# Patient Record
Sex: MALE | Race: WHITE | NOT HISPANIC OR LATINO | Employment: OTHER | URBAN - METROPOLITAN AREA
[De-identification: names, ages, dates, MRNs, and addresses within clinical notes are randomized per-mention and may not be internally consistent; named-entity substitution may affect disease eponyms.]

---

## 2017-01-30 ENCOUNTER — TELEPHONE (OUTPATIENT)
Dept: FAMILY MEDICINE | Facility: CLINIC | Age: 67
End: 2017-01-30

## 2017-01-30 DIAGNOSIS — E78.5 HYPERLIPIDEMIA, UNSPECIFIED HYPERLIPIDEMIA TYPE: Primary | ICD-10-CM

## 2017-01-30 RX ORDER — PRAVASTATIN SODIUM 80 MG/1
80 TABLET ORAL DAILY
Qty: 90 TABLET | Refills: 0 | Status: SHIPPED | OUTPATIENT
Start: 2017-01-30 | End: 2017-04-28 | Stop reason: SDUPTHER

## 2017-01-30 RX ORDER — PRAVASTATIN SODIUM 40 MG/1
TABLET ORAL
Qty: 90 TABLET | Refills: 1 | Status: SHIPPED | OUTPATIENT
Start: 2017-01-30 | End: 2017-01-30 | Stop reason: SDUPTHER

## 2017-01-30 NOTE — TELEPHONE ENCOUNTER
----- Message from Violette Osorio sent at 1/30/2017  2:45 PM CST -----  Contact: self/[phone] or [phone]  He is returning the nurse call.

## 2017-01-30 NOTE — TELEPHONE ENCOUNTER
Attempted to reach patient on phone - no answer - left voice message to call office and also sent message over patient portal:      Message sent over patient portal:  I have released your lab results.  Your blood sugar, kidney and liver function are all within acceptable range.  Your cholesterol levels are okay BUT due to heart history - we want your LDL below 100 - increase your Pravastatin from 40 to 80 mg daily.  Recheck fasting labs and then office visit in 3 months to recheck cholesterol levels.      Your Amiodarone levels are low - NON-THERAPEUTIC levels - need to make follow up appointment with Cardiology as we discussed at our office visit.

## 2017-01-30 NOTE — TELEPHONE ENCOUNTER
Spoke with patient on phone - advised I had sent him message over portal as well:    Your blood sugar, kidney and liver function are all within acceptable range.  Your cholesterol levels are okay BUT due to heart history - we want your LDL below 100 - increase your Pravastatin from 40 to 80 mg daily.  Recheck fasting labs and then office visit in 3 months to recheck cholesterol levels.      Your Amiodarone levels are low - NON-THERAPEUTIC levels - need to make follow up appointment with Cardiology as we discussed at our office visit.

## 2017-02-06 ENCOUNTER — OFFICE VISIT (OUTPATIENT)
Dept: FAMILY MEDICINE | Facility: CLINIC | Age: 67
End: 2017-02-06
Payer: MEDICARE

## 2017-02-06 VITALS
DIASTOLIC BLOOD PRESSURE: 74 MMHG | HEIGHT: 71 IN | SYSTOLIC BLOOD PRESSURE: 116 MMHG | BODY MASS INDEX: 35.14 KG/M2 | WEIGHT: 251 LBS | OXYGEN SATURATION: 98 % | HEART RATE: 78 BPM | TEMPERATURE: 98 F

## 2017-02-06 DIAGNOSIS — J06.9 UPPER RESPIRATORY TRACT INFECTION, UNSPECIFIED TYPE: Primary | ICD-10-CM

## 2017-02-06 DIAGNOSIS — J10.1 INFLUENZA A: ICD-10-CM

## 2017-02-06 LAB
CTP QC/QA: YES
FLUAV AG NPH QL: POSITIVE
FLUBV AG NPH QL: NEGATIVE

## 2017-02-06 PROCEDURE — 99999 PR PBB SHADOW E&M-EST. PATIENT-LVL IV: CPT | Mod: PBBFAC,,, | Performed by: NURSE PRACTITIONER

## 2017-02-06 PROCEDURE — 99214 OFFICE O/P EST MOD 30 MIN: CPT | Mod: PBBFAC,PN | Performed by: NURSE PRACTITIONER

## 2017-02-06 PROCEDURE — 99213 OFFICE O/P EST LOW 20 MIN: CPT | Mod: S$PBB,,, | Performed by: NURSE PRACTITIONER

## 2017-02-06 PROCEDURE — 87804 INFLUENZA ASSAY W/OPTIC: CPT | Mod: PBBFAC,PN | Performed by: NURSE PRACTITIONER

## 2017-02-06 RX ORDER — PROMETHAZINE HYDROCHLORIDE AND CODEINE PHOSPHATE 6.25; 1 MG/5ML; MG/5ML
SOLUTION ORAL
Qty: 180 ML | Refills: 0 | Status: SHIPPED | OUTPATIENT
Start: 2017-02-06 | End: 2017-06-23 | Stop reason: ALTCHOICE

## 2017-02-06 RX ORDER — OSELTAMIVIR PHOSPHATE 75 MG/1
75 CAPSULE ORAL 2 TIMES DAILY
Qty: 10 CAPSULE | Refills: 0 | Status: SHIPPED | OUTPATIENT
Start: 2017-02-06 | End: 2017-02-11

## 2017-02-06 RX ORDER — NEOMYCIN SULFATE, POLYMYXIN B SULFATE AND HYDROCORTISONE 10; 3.5; 1 MG/ML; MG/ML; [USP'U]/ML
SUSPENSION/ DROPS AURICULAR (OTIC)
Refills: 0 | COMMUNITY
Start: 2016-12-29 | End: 2017-02-06 | Stop reason: ALTCHOICE

## 2017-02-06 RX ORDER — BENZONATATE 200 MG/1
200 CAPSULE ORAL 3 TIMES DAILY PRN
Qty: 30 CAPSULE | Refills: 0 | Status: SHIPPED | OUTPATIENT
Start: 2017-02-06 | End: 2017-02-16

## 2017-02-06 NOTE — PROGRESS NOTES
Subjective:       Patient ID: Hussein Steinberg is a 66 y.o. male.    Chief Complaint: URI    URI    This is a new problem. Episode onset: past 72 hours. The problem has been unchanged. There has been no fever. Associated symptoms include congestion, coughing, headaches, rhinorrhea, sinus pain and a sore throat. Pertinent negatives include no abdominal pain, chest pain, diarrhea, dysuria, ear pain, nausea, neck pain, rash, sneezing or vomiting. Associated symptoms comments: Cough is intermittently productive - yellow.  Nasal discharge - clear and thin. Treatments tried: Coricidin HBP. The treatment provided moderate relief.       Previous Medications    AMIODARONE (PACERONE) 200 MG TAB    TAKE 1 TABLET EVERY DAY SKIP WEDNESDAY AND SUNDAY    AMLODIPINE (NORVASC) 5 MG TABLET    Take 1 tablet (5 mg total) by mouth once daily.    LISINOPRIL (PRINIVIL,ZESTRIL) 40 MG TABLET    Take 1 tablet (40 mg total) by mouth once daily.    METOPROLOL TARTRATE (LOPRESSOR) 100 MG TABLET    TAKE 1 TABLET BY MOUTH TWICE A DAY    NITROGLYCERIN (NITROSTAT) 0.4 MG SL TABLET    Place under the tongue. 1 tablet, sublingual Sublingual .  take up to 3 tablets 5 minutes apart for chest pain    PRADAXA 150 MG CAP    TAKE 1 TABLET TWICE A DAY    PRAVASTATIN (PRAVACHOL) 80 MG TABLET    Take 1 tablet (80 mg total) by mouth once daily.    TRIAMTERENE-HYDROCHLOROTHIAZIDE 37.5-25 MG (MAXZIDE-25) 37.5-25 MG PER TABLET    Take 1 tablet by mouth once daily.       Past Medical History   Diagnosis Date    Atrial fibrillation     Cardiac arrest 2012     has pacemaker/defibrillator placed 3/19/13 - followed by [facility]    Cataract     High cholesterol     Hypertension     Kidney stone 10/2013    LBBB (left bundle branch block) 10/10/2012    LV dysfunction 10/10/2012    Prostate cancer      Treated by Dr. Rasmussen    Stroke 09/2012     + hemorrhagic infarct to right occipital lobe after started on Plavix following cardiac event    SVT  (supraventricular tachycardia) 10/10/2012       Past Surgical History   Procedure Laterality Date    Arthroscopic  knee    Prostatectomy      Cardiac pacemaker placement  2013    Colonoscopy  2/12/2016     + polyp - repeat in 5 years- Dr. Calles       Family History   Problem Relation Age of Onset    Macular degeneration Mother     Cataracts Mother     Hypertension Mother     Glaucoma Mother     Cancer Father 56     leukemia    Heart attack Brother 59     had stent placed    Heart disease Brother     Heart attack Maternal Grandfather     Heart attack Paternal Grandfather     No Known Problems Sister     Parkinsonism Brother 54    No Known Problems Brother     No Known Problems Daughter     No Known Problems Daughter     Amblyopia Neg Hx     Blindness Neg Hx     Diabetes Neg Hx     Retinal detachment Neg Hx     Strabismus Neg Hx     Stroke Neg Hx     Thyroid disease Neg Hx        Social History     Social History    Marital status:      Spouse name: N/A    Number of children: N/A    Years of education: N/A     Occupational History    administrative position      Social History Main Topics    Smoking status: Never Smoker    Smokeless tobacco: None    Alcohol use No    Drug use: No    Sexual activity: Yes     Partners: Female     Other Topics Concern    None     Social History Narrative       Review of Systems   Constitutional: Positive for chills and fatigue. Negative for activity change, appetite change, fever and unexpected weight change.   HENT: Positive for congestion, postnasal drip, rhinorrhea, sinus pressure and sore throat. Negative for ear pain, mouth sores, nosebleeds, sneezing, trouble swallowing and voice change.    Eyes: Negative.    Respiratory: Positive for cough. Negative for chest tightness and shortness of breath.    Cardiovascular: Negative for chest pain, palpitations and leg swelling.   Gastrointestinal: Negative.  Negative for abdominal pain, blood  "in stool, constipation, diarrhea, nausea and vomiting.   Endocrine: Negative.    Genitourinary: Negative for difficulty urinating, dysuria, flank pain, hematuria and urgency.   Musculoskeletal: Negative for arthralgias, back pain, gait problem, joint swelling, myalgias and neck pain.   Skin: Negative for color change, rash and wound.   Allergic/Immunologic: Negative for immunocompromised state.   Neurological: Positive for headaches. Negative for dizziness, tremors, seizures, syncope and speech difficulty.   Hematological: Negative for adenopathy. Does not bruise/bleed easily.   Psychiatric/Behavioral: Negative for behavioral problems, dysphoric mood, sleep disturbance and suicidal ideas. The patient is not nervous/anxious.          Objective:     Vitals:    02/06/17 1312   BP: 116/74   Pulse: 78   Temp: 98.4 °F (36.9 °C)   SpO2: 98%   Weight: 113.9 kg (251 lb)   Height: 5' 11" (1.803 m)          Physical Exam   Constitutional: He is oriented to person, place, and time. He appears well-developed and well-nourished.   HENT:   Head: Normocephalic.   Right Ear: External ear normal.   Left Ear: External ear normal.   Nose: Mucosal edema and rhinorrhea present.   Mouth/Throat: Posterior oropharyngeal erythema present. No oropharyngeal exudate or posterior oropharyngeal edema.   Eyes: EOM are normal. Pupils are equal, round, and reactive to light. Right eye exhibits no discharge. Left eye exhibits no discharge. No scleral icterus.   Neck: Normal range of motion. Neck supple. No tracheal deviation present. No thyromegaly present.   Cardiovascular: Normal rate, regular rhythm and normal heart sounds.    No murmur heard.  Pulmonary/Chest: Effort normal and breath sounds normal. No respiratory distress.   Abdominal: Soft. He exhibits no distension.   Musculoskeletal: Normal range of motion. He exhibits no edema.   Lymphadenopathy:     He has no cervical adenopathy.   Neurological: He is alert and oriented to person, place, " and time. Coordination normal.   Skin: Skin is warm and dry. No rash noted.   Psychiatric: He has a normal mood and affect. His behavior is normal.       Office Visit on 02/06/2017   Component Date Value Ref Range Status    Rapid Influenza A Ag 02/06/2017 Positive* Negative Final    Rapid Influenza B Ag 02/06/2017 Negative  Negative Final     Acceptable 02/06/2017 Yes   Final       Assessment:         ICD-10-CM ICD-9-CM   1. Upper respiratory tract infection, unspecified type J06.9 465.9   2. Influenza A J10.1 487.1       Plan:       Upper respiratory tract infection, unspecified type  -     POCT Influenza A/B    Influenza A  -  Take Tamiflu as directed.  Rotate Tylenol and Ibuprofen.  Continue  Coricidin during the day and Prometh with codeine at night.  See handout for other homecare instructions.  Off work for 1 week.  -     oseltamivir (TAMIFLU) 75 MG capsule; Take 1 capsule (75 mg total) by mouth 2 (two) times daily.  Dispense: 10 capsule; Refill: 0  -     promethazine-codeine 6.25-10 mg/5 ml (PHENERGAN WITH CODEINE) 6.25-10 mg/5 mL syrup; Take 1 to 2 teaspoons at night as needed for cough/congestion  Dispense: 180 mL; Refill: 0  -     benzonatate (TESSALON) 200 MG capsule; Take 1 capsule (200 mg total) by mouth 3 (three) times daily as needed for Cough.  Dispense: 30 capsule; Refill: 0    Return if symptoms worsen or fail to improve.     Patient's Medications   New Prescriptions    BENZONATATE (TESSALON) 200 MG CAPSULE    Take 1 capsule (200 mg total) by mouth 3 (three) times daily as needed for Cough.    OSELTAMIVIR (TAMIFLU) 75 MG CAPSULE    Take 1 capsule (75 mg total) by mouth 2 (two) times daily.    PROMETHAZINE-CODEINE 6.25-10 MG/5 ML (PHENERGAN WITH CODEINE) 6.25-10 MG/5 ML SYRUP    Take 1 to 2 teaspoons at night as needed for cough/congestion   Previous Medications    AMIODARONE (PACERONE) 200 MG TAB    TAKE 1 TABLET EVERY DAY SKIP WEDNESDAY AND SUNDAY    AMLODIPINE (NORVASC) 5 MG  TABLET    Take 1 tablet (5 mg total) by mouth once daily.    LISINOPRIL (PRINIVIL,ZESTRIL) 40 MG TABLET    Take 1 tablet (40 mg total) by mouth once daily.    METOPROLOL TARTRATE (LOPRESSOR) 100 MG TABLET    TAKE 1 TABLET BY MOUTH TWICE A DAY    NITROGLYCERIN (NITROSTAT) 0.4 MG SL TABLET    Place under the tongue. 1 tablet, sublingual Sublingual .  take up to 3 tablets 5 minutes apart for chest pain    PRADAXA 150 MG CAP    TAKE 1 TABLET TWICE A DAY    PRAVASTATIN (PRAVACHOL) 80 MG TABLET    Take 1 tablet (80 mg total) by mouth once daily.    TRIAMTERENE-HYDROCHLOROTHIAZIDE 37.5-25 MG (MAXZIDE-25) 37.5-25 MG PER TABLET    Take 1 tablet by mouth once daily.   Modified Medications    No medications on file   Discontinued Medications    NEOMYCIN-POLYMYXIN-HYDROCORTISONE (CORTISPORIN) 3.5-10,000-1 MG/ML-UNIT/ML-% OTIC SUSPENSION    INSTILL 4 DROPS IN LEFT EAR EVERY 6 HOURS

## 2017-02-06 NOTE — MR AVS SNAPSHOT
[facility]  [address]  [place] [address]  Phone: [phone]  Fax: [phone]                  Hussein Steinberg   2017 1:00 PM   Office Visit    Description:  Male : 1950   Provider:  Valerie Nickerson NP   Department:  [facility]           Reason for Visit     URI           Diagnoses this Visit        Comments    Upper respiratory tract infection, unspecified type    -  Primary     Influenza A                To Do List           Goals (5 Years of Data)     None      Follow-Up and Disposition     Return if symptoms worsen or fail to improve.       These Medications        Disp Refills Start End    oseltamivir (TAMIFLU) 75 MG capsule 10 capsule 0 2017    Take 1 capsule (75 mg total) by mouth 2 (two) times daily. - Oral    Pharmacy: [facility]/pharmacy #[id number] - [place], [place] - [address] [address] Ph #: [phone]       promethazine-codeine 6.25-10 mg/5 ml (PHENERGAN WITH CODEINE) 6.25-10 mg/5 mL syrup 180 mL 0 2017     Take 1 to 2 teaspoons at night as needed for cough/congestion    Pharmacy: [facility]/pharmacy #[id number] - [place] - [address] [address] [facility] Ph #: [phone]       benzonatate (TESSALON) 200 MG capsule 30 capsule 0 2017    Take 1 capsule (200 mg total) by mouth 3 (three) times daily as needed for Cough. - Oral    Pharmacy: [facility]/pharmacy #[id number] - [place] - [address] [address] [facility] Ph #: [phone]         [facility]     [facility] On Call Nurse Care Line -  Assistance  Registered nurses in the [facility] provide clinical advisement, health education, appointment booking, and other advisory services.  Call for this free service at [phone].             Medications           Message regarding Medications     Verify the changes and/or additions to your medication regime listed below are the same as discussed with your clinician today.  If any of these changes or additions are incorrect, please notify  your healthcare provider.        START taking these NEW medications        Refills    oseltamivir (TAMIFLU) 75 MG capsule 0    Sig: Take 1 capsule (75 mg total) by mouth 2 (two) times daily.    Class: Normal    Route: Oral    promethazine-codeine 6.25-10 mg/5 ml (PHENERGAN WITH CODEINE) 6.25-10 mg/5 mL syrup 0    Sig: Take 1 to 2 teaspoons at night as needed for cough/congestion    Class: Print    benzonatate (TESSALON) 200 MG capsule 0    Sig: Take 1 capsule (200 mg total) by mouth 3 (three) times daily as needed for Cough.    Class: Normal    Route: Oral      STOP taking these medications     neomycin-polymyxin-hydrocortisone (CORTISPORIN) 3.5-10,000-1 mg/mL-unit/mL-% otic suspension INSTILL 4 DROPS IN LEFT EAR EVERY 6 HOURS           Verify that the below list of medications is an accurate representation of the medications you are currently taking.  If none reported, the list may be blank. If incorrect, please contact your healthcare provider. Carry this list with you in case of emergency.           Current Medications     amiodarone (PACERONE) 200 MG Tab TAKE 1 TABLET EVERY DAY SKIP WEDNESDAY AND SUNDAY    amlodipine (NORVASC) 5 MG tablet Take 1 tablet (5 mg total) by mouth once daily.    lisinopril (PRINIVIL,ZESTRIL) 40 MG tablet Take 1 tablet (40 mg total) by mouth once daily.    metoprolol tartrate (LOPRESSOR) 100 MG tablet TAKE 1 TABLET BY MOUTH TWICE A DAY    nitroGLYCERIN (NITROSTAT) 0.4 MG SL tablet Place under the tongue. 1 tablet, sublingual Sublingual .  take up to 3 tablets 5 minutes apart for chest pain    PRADAXA 150 mg Cap TAKE 1 TABLET TWICE A DAY    pravastatin (PRAVACHOL) 80 MG tablet Take 1 tablet (80 mg total) by mouth once daily.    triamterene-hydrochlorothiazide 37.5-25 mg (MAXZIDE-25) 37.5-25 mg per tablet Take 1 tablet by mouth once daily.    benzonatate (TESSALON) 200 MG capsule Take 1 capsule (200 mg total) by mouth 3 (three) times daily as needed for Cough.    oseltamivir (TAMIFLU) 75  "MG capsule Take 1 capsule (75 mg total) by mouth 2 (two) times daily.    promethazine-codeine 6.25-10 mg/5 ml (PHENERGAN WITH CODEINE) 6.25-10 mg/5 mL syrup Take 1 to 2 teaspoons at night as needed for cough/congestion           Clinical Reference Information           Your Vitals Were     BP Pulse Temp Height Weight SpO2    116/74 78 98.4 °F (36.9 °C) 5' 11" (1.803 m) 113.9 kg (251 lb) 98%    BMI                35.01 kg/m2          Blood Pressure          Most Recent Value    BP  116/74      Allergies as of 2/6/2017     No Known Allergies      Immunizations Administered on Date of Encounter - 2/6/2017     None      Orders Placed During Today's Visit      Normal Orders This Visit    POCT Influenza A/B          2/6/2017  2:03 PM - Valerie Nickerson NP      Component Results     Component Value Flag Ref Range Units Status    Rapid Influenza A Ag Positive (A) Negative  Final    Rapid Influenza B Ag Negative  Negative  Final     Acceptable Yes    Final            Instructions      Influenza (Adult)    Influenza is also called the flu. It is a viral illness that affects the air passages of your lungs. It is different from the common cold. The flu can easily be passed from one to person to another. It may be spread through the air by coughing and sneezing. Or it can be spread by touching the sick person and then touching your own eyes, nose, or mouth.  The flu starts 1 to 3 days after you are exposed to the flu virus. It may last for 1 to 2 weeks. You usually dont need to take antibiotics unless you have a complication. This might be an ear or sinus infection or pneumonia.  Symptoms of the flu may be mild or severe. They can include extreme tiredness (wanting to stay in bed all day), chills, fevers, muscle aches, soreness with eye movement, headache, and a dry, hacking cough.  Home care  Follow these guidelines when caring for yourself at home:  · Avoid being around cigarette smoke, whether yours or other " peoples.  · Acetaminophen or ibuprofen will help ease your fever, muscle aches, and headache. Dont give aspirin to anyone younger than 18 who has the flu. Aspirin can harm the liver.  · Nausea and loss of appetite are common with the flu. Eat light meals. Drink 6 to 8 glasses of liquids every day. Good choices are water, sport drinks, soft drinks without caffeine, juices, tea, and soup. Extra fluids will also help loosen secretions in your nose and lungs.  · Over-the-counter cold medicines will not make the flu go away faster. But the medicines may help with coughing, sore throat, and congestion in your nose and sinuses. Dont use a decongestant if you have high blood pressure.  · Stay home until your fever has been gone for at least 24 hours without using medicine to reduce fever.  Follow-up care  Follow up with your healthcare provider, or as advised, if you are not getting better over the next week.  If you are 65 or older, talk with your provider about getting a pneumococcal vaccine every 5 years. You should also get this vaccine if you have chronic asthma or COPD. All adults should get a flu vaccine every fall. Ask your provider about this.  When to seek medical advice  Call your healthcare provider right away if any of these occur:  · Cough with lots of colored sputum (mucus) or blood in your sputum  · Chest pain, shortness of breath, wheezing, or difficulty breathing  · Severe headache, or face, neck, or ear pain  · New rash with fever  · Fever of 100.4°F (38°C) or higher, or as directed by your healthcare provider  · Confusion, behavior change, or seizure  · Severe weakness or dizziness  · You get a fever or cough after getting better for a few days  Date Last Reviewed: 12/23/2014  © 6762-8518 [employer]. [address] [place] [address], [place] [address]. All rights reserved. This information is not intended as a substitute for professional medical care. Always follow your healthcare professional's  instructions.             Language Assistance Services     ATTENTION: Language assistance services are available, free of charge. Please call [phone].      ATENCIÓN: Si habla nabila, tiene a haro disposición servicios gratuitos de asistencia lingüística. Llame al [phone].     CHÚ Ý: N?u b?n nói Ti?ng Vi?t, có các d?ch v? h? tr? ngôn ng? mi?n phí dành cho b?n. G?i s? [phone].         [facility] complies with applicable Federal civil rights laws and does not discriminate on the basis of race, color, national origin, age, disability, or sex.

## 2017-02-06 NOTE — PATIENT INSTRUCTIONS
Influenza (Adult)    Influenza is also called the flu. It is a viral illness that affects the air passages of your lungs. It is different from the common cold. The flu can easily be passed from one to person to another. It may be spread through the air by coughing and sneezing. Or it can be spread by touching the sick person and then touching your own eyes, nose, or mouth.  The flu starts 1 to 3 days after you are exposed to the flu virus. It may last for 1 to 2 weeks. You usually dont need to take antibiotics unless you have a complication. This might be an ear or sinus infection or pneumonia.  Symptoms of the flu may be mild or severe. They can include extreme tiredness (wanting to stay in bed all day), chills, fevers, muscle aches, soreness with eye movement, headache, and a dry, hacking cough.  Home care  Follow these guidelines when caring for yourself at home:  · Avoid being around cigarette smoke, whether yours or other peoples.  · Acetaminophen or ibuprofen will help ease your fever, muscle aches, and headache. Dont give aspirin to anyone younger than 18 who has the flu. Aspirin can harm the liver.  · Nausea and loss of appetite are common with the flu. Eat light meals. Drink 6 to 8 glasses of liquids every day. Good choices are water, sport drinks, soft drinks without caffeine, juices, tea, and soup. Extra fluids will also help loosen secretions in your nose and lungs.  · Over-the-counter cold medicines will not make the flu go away faster. But the medicines may help with coughing, sore throat, and congestion in your nose and sinuses. Dont use a decongestant if you have high blood pressure.  · Stay home until your fever has been gone for at least 24 hours without using medicine to reduce fever.  Follow-up care  Follow up with your healthcare provider, or as advised, if you are not getting better over the next week.  If you are 65 or older, talk with your provider about getting a pneumococcal vaccine  every 5 years. You should also get this vaccine if you have chronic asthma or COPD. All adults should get a flu vaccine every fall. Ask your provider about this.  When to seek medical advice  Call your healthcare provider right away if any of these occur:  · Cough with lots of colored sputum (mucus) or blood in your sputum  · Chest pain, shortness of breath, wheezing, or difficulty breathing  · Severe headache, or face, neck, or ear pain  · New rash with fever  · Fever of 100.4°F (38°C) or higher, or as directed by your healthcare provider  · Confusion, behavior change, or seizure  · Severe weakness or dizziness  · You get a fever or cough after getting better for a few days  Date Last Reviewed: 12/23/2014  © 7276-5234 [employer], [employer]. [address] [place] [address], [place] [address]. All rights reserved. This information is not intended as a substitute for professional medical care. Always follow your healthcare professional's instructions.

## 2017-02-09 ENCOUNTER — TELEPHONE (OUTPATIENT)
Dept: FAMILY MEDICINE | Facility: CLINIC | Age: 67
End: 2017-02-09

## 2017-02-17 ENCOUNTER — OFFICE VISIT (OUTPATIENT)
Dept: OPTOMETRY | Facility: CLINIC | Age: 67
End: 2017-02-17
Payer: MEDICARE

## 2017-02-17 DIAGNOSIS — H52.7 REFRACTIVE ERRORS: ICD-10-CM

## 2017-02-17 DIAGNOSIS — H25.13 NUCLEAR SCLEROSIS, BILATERAL: Primary | ICD-10-CM

## 2017-02-17 DIAGNOSIS — H18.003 VORTEX KERATOPATHY, BILATERAL: ICD-10-CM

## 2017-02-17 DIAGNOSIS — H52.4 PRESBYOPIA OU: ICD-10-CM

## 2017-02-17 DIAGNOSIS — I10 ESSENTIAL HYPERTENSION: ICD-10-CM

## 2017-02-17 DIAGNOSIS — Z98.890 HISTORY OF REFRACTIVE SURGERY: ICD-10-CM

## 2017-02-17 DIAGNOSIS — Z13.5 SCREENING FOR EYE CONDITION: ICD-10-CM

## 2017-02-17 PROCEDURE — 92014 COMPRE OPH EXAM EST PT 1/>: CPT | Mod: S$PBB,,, | Performed by: OPTOMETRIST

## 2017-02-17 PROCEDURE — 92015 DETERMINE REFRACTIVE STATE: CPT | Mod: ,,, | Performed by: OPTOMETRIST

## 2017-02-17 PROCEDURE — 99213 OFFICE O/P EST LOW 20 MIN: CPT | Mod: PBBFAC | Performed by: OPTOMETRIST

## 2017-02-17 PROCEDURE — 99999 PR PBB SHADOW E&M-EST. PATIENT-LVL III: CPT | Mod: PBBFAC,,, | Performed by: OPTOMETRIST

## 2017-02-17 NOTE — MR AVS SNAPSHOT
[facility]  [address]  [facility] [address]  Phone: [phone]  Fax: [phone]                  Hussein Steinberg   2017 10:45 AM   Office Visit    Description:  Male : 1950   Provider:  Ray Esqueda OD   Department:  [facility]           Reason for Visit     Concerns About Ocular Health                To Do List           Goals (5 Years of Data)     None      [facility]     [facility] On Call Nurse Care Line -  Assistance  Registered nurses in the [facility] On Call Center provide clinical advisement, health education, appointment booking, and other advisory services.  Call for this free service at [phone].             Medications           Message regarding Medications     Verify the changes and/or additions to your medication regime listed below are the same as discussed with your clinician today.  If any of these changes or additions are incorrect, please notify your healthcare provider.             Verify that the below list of medications is an accurate representation of the medications you are currently taking.  If none reported, the list may be blank. If incorrect, please contact your healthcare provider. Carry this list with you in case of emergency.           Current Medications     amiodarone (PACERONE) 200 MG Tab TAKE 1 TABLET EVERY DAY SKIP WEDNESDAY AND     amlodipine (NORVASC) 5 MG tablet Take 1 tablet (5 mg total) by mouth once daily.    lisinopril (PRINIVIL,ZESTRIL) 40 MG tablet Take 1 tablet (40 mg total) by mouth once daily.    metoprolol tartrate (LOPRESSOR) 100 MG tablet TAKE 1 TABLET BY MOUTH TWICE A DAY    nitroGLYCERIN (NITROSTAT) 0.4 MG SL tablet Place under the tongue. 1 tablet, sublingual Sublingual .  take up to 3 tablets 5 minutes apart for chest pain    PRADAXA 150 mg Cap TAKE 1 TABLET TWICE A DAY    pravastatin (PRAVACHOL) 80 MG tablet Take 1 tablet (80 mg total) by mouth once daily.    promethazine-codeine  6.25-10 mg/5 ml (PHENERGAN WITH CODEINE) 6.25-10 mg/5 mL syrup Take 1 to 2 teaspoons at night as needed for cough/congestion    triamterene-hydrochlorothiazide 37.5-25 mg (MAXZIDE-25) 37.5-25 mg per tablet Take 1 tablet by mouth once daily.           Clinical Reference Information           Allergies as of 2/17/2017     No Known Allergies      Immunizations Administered on Date of Encounter - 2/17/2017     None      Instructions    S/P RK refractive surgery in each eye.  Early nuclear sclerotic lens changes in both eyes.  Hyperopia with astigmatism in each eye, with very satisfactory correctable visual acuity in each eye.  Presbyopia.  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   New spectacle lens Rx issued for full-time wear.  Recheck in one year.              Language Assistance Services     ATTENTION: Language assistance services are available, free of charge. Please call [phone].      ATENCIÓN: Si philomena dahl, tiene a haro disposición servicios gratuitos de asistencia lingüística. Llame al [phone].     GLEN Ý: N?u b?n nói Ti?ng Vi?t, có các d?ch v? h? tr? ngôn ng? mi?n phí dành cho b?n. G?i s? [phone].         [facility] complies with applicable Federal civil rights laws and does not discriminate on the basis of race, color, national origin, age, disability, or sex.

## 2017-02-17 NOTE — PROGRESS NOTES
HPI     Concerns About Ocular Health    Additional comments: Eye exam and refraction.  No acute ocular/vision   problems.            Comments   Patient's age: 66 y.o. WM  Occupation:   [employer] in [place]  Approximate date of last eye examination:  01/26/2016  Name of last eye doctor seen: Dr Esqueda  City/State: [facility]  Wears glasses? Yes     If yes, wears  Full-time or part-time?  Full-time  Present glasses are: Bifocal, SV Distance, SV Reading?  Progressive lens  Approximate age of present glasses:  3+ years old   Got new glasses following last exam, or subsequently?:  No   Any problem with VA with glasses?  No  Wears CLs?:  No  Headaches?  No  Eye pain/discomfort?  No                                                                                     Flashes?  No  Floaters?  No  Diplopia/Double vision?  No  Patient's Ocular History:         Any eye surgeries? No         Any eye injury?  No         Any treatment for eye disease?  No  Family history of eye disease?    Mother + Macular Degeneration    + Glaucoma    + Cataracts  Significant patient medical history:         1. Diabetes?  No   2. HBP?  Yes, controlled by medication and diet              3. Other (describe):  Heart attack and stroke Sept, 2012   -pacemaker, prostate cancer    ! OTC eyedrops currently using:  None   ! Prescription eye meds currently using:  None   ! Any history of allergy/adverse reaction to any eye meds used   previously?  No    ! Any history of allergy/adverse reaction to eyedrops used during prior   eye exam(s)? No    ! Any history of allergy/adverse reaction to Novacaine or similar meds?   No   ! Any history of allergy/adverse reaction to Epinephrine or similar meds?   No    ! Patient okay with use of anesthetic eyedrops to check eye pressure?    Yes        ! Patient okay with use of eyedrops to dilate pupils today?  Yes   !  Allergies/Medications/Medical History/Family History reviewed today?    Yes      PD =    "64/60  Desired reading distance =  19"                                                                     Last edited by Ray Esqueda, OD on 2/17/2017 11:28 AM. (History)            Assessment /Plan     For exam results, see Encounter Report.    1. Nuclear sclerosis, bilateral     2. Vortex keratopathy, bilateral     3. Essential hypertension     4. Screening for eye condition     5. History of refractive surgery     6. Refractive errors     7. Presbyopia OU                    S/P RK refractive surgery in each eye.  Early nuclear sclerotic lens changes in both eyes.  Hyperopia with astigmatism in each eye, with very satisfactory correctable visual acuity in each eye.  Presbyopia.  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   New spectacle lens Rx issued for full-time wear.  Recheck in one year.         "

## 2017-02-17 NOTE — PATIENT INSTRUCTIONS
S/P RK refractive surgery in each eye.  Early nuclear sclerotic lens changes in both eyes.  Hyperopia with astigmatism in each eye, with very satisfactory correctable visual acuity in each eye.  Presbyopia.  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   New spectacle lens Rx issued for full-time wear.  Recheck in one year.

## 2017-02-24 DIAGNOSIS — I10 ESSENTIAL HYPERTENSION, BENIGN: ICD-10-CM

## 2017-02-24 RX ORDER — TRIAMTERENE/HYDROCHLOROTHIAZID 37.5-25 MG
TABLET ORAL
Qty: 90 TABLET | Refills: 1 | Status: SHIPPED | OUTPATIENT
Start: 2017-02-24 | End: 2017-08-21 | Stop reason: SDUPTHER

## 2017-03-03 DIAGNOSIS — I10 ESSENTIAL HYPERTENSION, BENIGN: ICD-10-CM

## 2017-03-03 RX ORDER — AMLODIPINE BESYLATE 5 MG/1
TABLET ORAL
Qty: 90 TABLET | Refills: 1 | Status: SHIPPED | OUTPATIENT
Start: 2017-03-03 | End: 2017-08-21 | Stop reason: SDUPTHER

## 2017-03-13 ENCOUNTER — TELEPHONE (OUTPATIENT)
Dept: ELECTROPHYSIOLOGY | Facility: CLINIC | Age: 67
End: 2017-03-13

## 2017-03-13 DIAGNOSIS — I48.19 PERSISTENT ATRIAL FIBRILLATION: Primary | ICD-10-CM

## 2017-03-13 RX ORDER — DABIGATRAN ETEXILATE 150 MG/1
CAPSULE ORAL
Qty: 180 CAPSULE | Refills: 3 | Status: SHIPPED | OUTPATIENT
Start: 2017-03-13 | End: 2018-02-09 | Stop reason: SDUPTHER

## 2017-03-13 NOTE — TELEPHONE ENCOUNTER
Left message with pt's wife that I will call pharmacy to determine why med was not renewed. Called pharmacy and was told med was canceled electronically. Will review with Dr. Salvador and call in new Rx ASAP if needed.

## 2017-03-13 NOTE — TELEPHONE ENCOUNTER
----- Message from Mary Joanajeremiah sent at 3/13/2017 10:02 AM CDT -----  Contact: pt's wife-Zaira  Pt's wife called this am because the pharmacy said Dr. Salvador did not want the pt to refill the PRADAXA 150 mg Cap.  She said that is making him stop cold turkey and she wants to know about the side effects of stopping the meds like that.  She can be reached @ [phone].  The pt is completely out of the meds as of this am.  LOV 11/7/14.  Thanks

## 2017-03-20 ENCOUNTER — OFFICE VISIT (OUTPATIENT)
Dept: FAMILY MEDICINE | Facility: CLINIC | Age: 67
End: 2017-03-20
Payer: MEDICARE

## 2017-03-20 VITALS
HEIGHT: 71 IN | SYSTOLIC BLOOD PRESSURE: 140 MMHG | HEART RATE: 62 BPM | DIASTOLIC BLOOD PRESSURE: 80 MMHG | TEMPERATURE: 98 F | BODY MASS INDEX: 36.11 KG/M2 | WEIGHT: 257.94 LBS

## 2017-03-20 DIAGNOSIS — J40 BRONCHITIS: Primary | ICD-10-CM

## 2017-03-20 DIAGNOSIS — I10 ESSENTIAL HYPERTENSION: ICD-10-CM

## 2017-03-20 PROCEDURE — 99999 PR PBB SHADOW E&M-EST. PATIENT-LVL III: CPT | Mod: PBBFAC,,, | Performed by: NURSE PRACTITIONER

## 2017-03-20 PROCEDURE — 99213 OFFICE O/P EST LOW 20 MIN: CPT | Mod: S$PBB,,, | Performed by: NURSE PRACTITIONER

## 2017-03-20 PROCEDURE — 99213 OFFICE O/P EST LOW 20 MIN: CPT | Mod: PBBFAC,PO | Performed by: NURSE PRACTITIONER

## 2017-03-20 RX ORDER — BENZONATATE 200 MG/1
200 CAPSULE ORAL 2 TIMES DAILY PRN
Qty: 60 CAPSULE | Refills: 0 | Status: SHIPPED | OUTPATIENT
Start: 2017-03-20 | End: 2017-04-19

## 2017-03-20 RX ORDER — DOXYCYCLINE 100 MG/1
100 CAPSULE ORAL EVERY 12 HOURS
Qty: 20 CAPSULE | Refills: 0 | Status: SHIPPED | OUTPATIENT
Start: 2017-03-20 | End: 2017-03-30

## 2017-03-20 RX ORDER — GUAIFENESIN 600 MG/1
1200 TABLET, EXTENDED RELEASE ORAL 2 TIMES DAILY
Qty: 40 TABLET | Refills: 0 | COMMUNITY
Start: 2017-03-20 | End: 2017-03-30

## 2017-03-20 NOTE — MR AVS SNAPSHOT
[facility]   [place]  [place] [address]  Phone: [phone]  Fax: [phone]                  Hussein Steinberg   3/20/2017 5:40 PM   Office Visit    Description:  Male : 1950   Provider:  Rosaura Gomez NP   Department:  [facility]           Reason for Visit     Cough           Diagnoses this Visit        Comments    Bronchitis    -  Primary     Essential hypertension                To Do List           Future Appointments        Provider Department Dept Phone    6/15/2017 1:45 PM EKG, APPT [facility] - [phone]    6/15/2017 2:20 PM COORDINATED DEVICE CHECK [facility] - Arrhythmia [phone]    6/15/2017 3:20 PM MD [facility] - Arrhythmia [phone]      Goals (5 Years of Data)     None      Follow-Up and Disposition     Return if symptoms worsen or fail to improve.       These Medications        Disp Refills Start End    doxycycline (MONODOX) 100 MG capsule 20 capsule 0 3/20/2017 3/30/2017    Take 1 capsule (100 mg total) by mouth every 12 (twelve) hours. - Oral    Pharmacy: [facility]/pharmacy #[id number] - [place], [place] - [address] [address] Ph #: [phone]       benzonatate (TESSALON) 200 MG capsule 60 capsule 0 3/20/2017 2017    Take 1 capsule (200 mg total) by mouth 2 (two) times daily as needed. - Oral    Pharmacy: [facility]/pharmacy #[id number] - [place], [place] - [address] [address] Ph #: [phone]         PURCHASE these Medications (No prescription required)        Start End    guaifenesin (MUCINEX) 600 mg 12 hr tablet 3/20/2017 3/30/2017    Sig - Route: Take 2 tablets (1,200 mg total) by mouth 2 (two) times daily. - Oral    Class: OTC      [facility]     [facility] On Call Nurse Care Line -  Assistance  Registered nurses in the [facility] On Call Center provide clinical advisement, health education, appointment booking, and other advisory services.  Call for this free service at [phone].             Medications            Message regarding Medications     Verify the changes and/or additions to your medication regime listed below are the same as discussed with your clinician today.  If any of these changes or additions are incorrect, please notify your healthcare provider.        START taking these NEW medications        Refills    doxycycline (MONODOX) 100 MG capsule 0    Sig: Take 1 capsule (100 mg total) by mouth every 12 (twelve) hours.    Class: Normal    Route: Oral    benzonatate (TESSALON) 200 MG capsule 0    Sig: Take 1 capsule (200 mg total) by mouth 2 (two) times daily as needed.    Class: Normal    Route: Oral    guaifenesin (MUCINEX) 600 mg 12 hr tablet 0    Sig: Take 2 tablets (1,200 mg total) by mouth 2 (two) times daily.    Class: OTC    Route: Oral           Verify that the below list of medications is an accurate representation of the medications you are currently taking.  If none reported, the list may be blank. If incorrect, please contact your healthcare provider. Carry this list with you in case of emergency.           Current Medications     amiodarone (PACERONE) 200 MG Tab TAKE 1 TABLET EVERY DAY SKIP WEDNESDAY AND SUNDAY    amlodipine (NORVASC) 5 MG tablet TAKE 1 TABLET BY MOUTH EVERY DAY    dabigatran etexilate (PRADAXA) 150 mg Cap TAKE 1 TABLET TWICE A DAY    lisinopril (PRINIVIL,ZESTRIL) 40 MG tablet Take 1 tablet (40 mg total) by mouth once daily.    metoprolol tartrate (LOPRESSOR) 100 MG tablet TAKE 1 TABLET BY MOUTH TWICE A DAY    nitroGLYCERIN (NITROSTAT) 0.4 MG SL tablet Place under the tongue. 1 tablet, sublingual Sublingual .  take up to 3 tablets 5 minutes apart for chest pain    pravastatin (PRAVACHOL) 80 MG tablet Take 1 tablet (80 mg total) by mouth once daily.    promethazine-codeine 6.25-10 mg/5 ml (PHENERGAN WITH CODEINE) 6.25-10 mg/5 mL syrup Take 1 to 2 teaspoons at night as needed for cough/congestion    triamterene-hydrochlorothiazide 37.5-25 mg (MAXZIDE-25) 37.5-25 mg per tablet TAKE 1  "TABLET BY MOUTH ONCE DAILY.    benzonatate (TESSALON) 200 MG capsule Take 1 capsule (200 mg total) by mouth 2 (two) times daily as needed.    doxycycline (MONODOX) 100 MG capsule Take 1 capsule (100 mg total) by mouth every 12 (twelve) hours.    guaifenesin (MUCINEX) 600 mg 12 hr tablet Take 2 tablets (1,200 mg total) by mouth 2 (two) times daily.           Clinical Reference Information           Your Vitals Were     BP Pulse Temp Height Weight BMI    140/80 (BP Location: Left arm, Patient Position: Sitting, BP Method: Manual) 62 98.2 °F (36.8 °C) (Oral) 5' 11" (1.803 m) 117 kg (257 lb 15 oz) 35.98 kg/m2      Blood Pressure          Most Recent Value    BP  (!)  140/80      Allergies as of 3/20/2017     No Known Allergies      Immunizations Administered on Date of Encounter - 3/20/2017     None      Language Assistance Services     ATTENTION: Language assistance services are available, free of charge. Please call [phone].      ATENCIÓN: Si philomena dahl, tiene a haro disposición servicios gratuitos de asistencia lingüística. Llame al [phone].     GLEN Ý: N?u b?n nói Ti?ng Vi?t, có các d?ch v? h? tr? ngôn ng? mi?n phí judithh cho b?n. G?i s? [phone].         [facility] complies with applicable Federal civil rights laws and does not discriminate on the basis of race, color, national origin, age, disability, or sex.        "

## 2017-03-20 NOTE — PROGRESS NOTES
Subjective:       Patient ID: Hussein Steinberg is a 67 y.o. male.    Chief Complaint: Cough    Cough   This is a recurrent problem. Episode onset: about 6 weeks ago. The problem has been waxing and waning. The problem occurs every few minutes. The cough is productive of sputum. Associated symptoms include a sore throat. Pertinent negatives include no chest pain, chills, eye redness, fever, headaches, postnasal drip, rash, rhinorrhea, shortness of breath or wheezing. Treatments tried: sough syrup with codeine at night , Tessalon perles. There is no history of environmental allergies. was diagnosed with the flu in early february. treated with Tamiflu. The ciough has not resolved.      Review of Systems   Constitutional: Negative for activity change, appetite change, chills, fatigue, fever and unexpected weight change.   HENT: Positive for congestion and sore throat. Negative for facial swelling, postnasal drip, rhinorrhea, sinus pressure and trouble swallowing.    Eyes: Negative for pain, redness and visual disturbance.   Respiratory: Positive for cough and chest tightness. Negative for shortness of breath and wheezing.    Cardiovascular: Negative.  Negative for chest pain, palpitations and leg swelling.   Gastrointestinal: Negative for abdominal pain, diarrhea, nausea and vomiting.   Genitourinary: Negative for dysuria, flank pain and urgency.   Musculoskeletal: Negative for gait problem, neck pain and neck stiffness.   Skin: Negative for rash.   Allergic/Immunologic: Negative for environmental allergies, food allergies and immunocompromised state.   Neurological: Negative for dizziness, weakness, light-headedness and headaches.   Psychiatric/Behavioral: Negative for agitation and confusion. The patient is not nervous/anxious.        Past Medical History:   Diagnosis Date    Atrial fibrillation     Cardiac arrest 2012    has pacemaker/defibrillator placed 3/19/13 - [facility]    Cataract   "   High cholesterol     Hypertension     Kidney stone 10/2013    LBBB (left bundle branch block) 10/10/2012    LV dysfunction 10/10/2012    Prostate cancer     Treated by Dr. Rasmussen    Stroke 09/2012    + hemorrhagic infarct to right occipital lobe after started on Plavix following cardiac event    SVT (supraventricular tachycardia) 10/10/2012       Objective:     BP (!) 140/80 (BP Location: Left arm, Patient Position: Sitting, BP Method: Manual)  Pulse 62  Temp 98.2 °F (36.8 °C) (Oral)   Ht 5' 11" (1.803 m)  Wt 117 kg (257 lb 15 oz)  BMI 35.98 kg/m2      Physical Exam   Constitutional: He is oriented to person, place, and time. He appears well-developed and well-nourished.   HENT:   Head: Normocephalic.   Right Ear: Tympanic membrane normal.   Left Ear: Tympanic membrane normal.   Nose: Nose normal.   Mouth/Throat: Uvula is midline and mucous membranes are normal. Posterior oropharyngeal erythema present. No oropharyngeal exudate.   Eyes: Conjunctivae and EOM are normal. Pupils are equal, round, and reactive to light. No scleral icterus.   Neck: Normal range of motion. Neck supple.   Cardiovascular: Normal rate, regular rhythm and normal heart sounds.    Pulmonary/Chest: Effort normal. He has no wheezes. He has rales. He exhibits no tenderness.   Abdominal: Soft. Normal appearance and bowel sounds are normal. He exhibits no distension and no mass. There is no splenomegaly or hepatomegaly. There is no tenderness. There is no rebound, no guarding, no CVA tenderness, no tenderness at McBurney's point and negative Trinh's sign.   Musculoskeletal: Normal range of motion. He exhibits edema.   +1 bilateral peripheral edema   Lymphadenopathy:     He has no cervical adenopathy.   Neurological: He is alert and oriented to person, place, and time. He exhibits normal muscle tone. Coordination normal.   Skin: Skin is warm and dry.   Psychiatric: He has a normal mood and affect. His behavior is normal.   Vitals " reviewed.      Assessment:       1. Bronchitis    2. Essential hypertension        Plan:           Hussein was seen today for cough.    Diagnoses and all orders for this visit:    Essential hypertension  Stable, continue medication regime as prescribed     Bronchitis  -     doxycycline (MONODOX) 100 MG capsule; Take 1 capsule (100 mg total) by mouth every 12 (twelve) hours.  -     benzonatate (TESSALON) 200 MG capsule; Take 1 capsule (200 mg total) by mouth 2 (two) times daily as needed.  -     guaifenesin (MUCINEX) 600 mg 12 hr tablet; Take 2 tablets (1,200 mg total) by mouth 2 (two) times daily.    Follow up with PCP if symptoms persist or worsen

## 2017-04-28 RX ORDER — PRAVASTATIN SODIUM 80 MG/1
TABLET ORAL
Qty: 90 TABLET | Refills: 0 | Status: SHIPPED | OUTPATIENT
Start: 2017-04-28 | End: 2017-06-23 | Stop reason: ALTCHOICE

## 2017-06-02 ENCOUNTER — TELEPHONE (OUTPATIENT)
Dept: FAMILY MEDICINE | Facility: CLINIC | Age: 67
End: 2017-06-02

## 2017-06-02 NOTE — TELEPHONE ENCOUNTER
Spoke with pt he is out of town need labs to go to [facility] will go on Tuesday morning for his labs,cancel Monday appointment, will call an reschedule his follow up.

## 2017-06-06 LAB
ALBUMIN SERPL-MCNC: 4.5 G/DL (ref 3.6–5.1)
ALBUMIN/GLOB SERPL: 1.9 (CALC) (ref 1–2.5)
ALP SERPL-CCNC: 94 U/L (ref 40–115)
ALT SERPL-CCNC: 18 U/L (ref 9–46)
AST SERPL-CCNC: 17 U/L (ref 10–35)
BILIRUB SERPL-MCNC: 0.7 MG/DL (ref 0.2–1.2)
BUN SERPL-MCNC: 20 MG/DL (ref 7–25)
BUN/CREAT SERPL: NORMAL (CALC) (ref 6–22)
CALCIUM SERPL-MCNC: 9.6 MG/DL (ref 8.6–10.3)
CHLORIDE SERPL-SCNC: 102 MMOL/L (ref 98–110)
CHOLEST SERPL-MCNC: 183 MG/DL (ref 125–200)
CHOLEST/HDLC SERPL: 4 (CALC)
CO2 SERPL-SCNC: 30 MMOL/L (ref 20–31)
CREAT SERPL-MCNC: 1.07 MG/DL (ref 0.7–1.25)
GFR SERPL CREATININE-BSD FRML MDRD: 71 ML/MIN/1.73M2
GLOBULIN SER CALC-MCNC: 2.4 G/DL (CALC) (ref 1.9–3.7)
GLUCOSE SERPL-MCNC: 91 MG/DL (ref 65–99)
HDLC SERPL-MCNC: 46 MG/DL
LDLC SERPL CALC-MCNC: 114 MG/DL (CALC)
NONHDLC SERPL-MCNC: 137 MG/DL (CALC)
POTASSIUM SERPL-SCNC: 4.1 MMOL/L (ref 3.5–5.3)
PROT SERPL-MCNC: 6.9 G/DL (ref 6.1–8.1)
SODIUM SERPL-SCNC: 141 MMOL/L (ref 135–146)
TRIGL SERPL-MCNC: 116 MG/DL

## 2017-06-14 DIAGNOSIS — I48.19 PERSISTENT ATRIAL FIBRILLATION: Primary | ICD-10-CM

## 2017-06-14 DIAGNOSIS — I48.19 PERSISTENT ATRIAL FIBRILLATION: ICD-10-CM

## 2017-06-14 DIAGNOSIS — Z95.810 AUTOMATIC IMPLANTABLE CARDIAC DEFIBRILLATOR IN SITU: ICD-10-CM

## 2017-06-14 DIAGNOSIS — Z95.0 CARDIAC PACEMAKER IN SITU: Primary | ICD-10-CM

## 2017-06-15 ENCOUNTER — OFFICE VISIT (OUTPATIENT)
Dept: ELECTROPHYSIOLOGY | Facility: CLINIC | Age: 67
End: 2017-06-15
Payer: MEDICARE

## 2017-06-15 ENCOUNTER — CLINICAL SUPPORT (OUTPATIENT)
Dept: ELECTROPHYSIOLOGY | Facility: CLINIC | Age: 67
End: 2017-06-15
Payer: MEDICARE

## 2017-06-15 ENCOUNTER — HOSPITAL ENCOUNTER (OUTPATIENT)
Dept: CARDIOLOGY | Facility: CLINIC | Age: 67
Discharge: HOME | End: 2017-06-15
Payer: MEDICARE

## 2017-06-15 VITALS
HEART RATE: 75 BPM | DIASTOLIC BLOOD PRESSURE: 86 MMHG | BODY MASS INDEX: 35.49 KG/M2 | WEIGHT: 253.5 LBS | HEIGHT: 71 IN | SYSTOLIC BLOOD PRESSURE: 155 MMHG

## 2017-06-15 DIAGNOSIS — I48.0 PAROXYSMAL ATRIAL FIBRILLATION: ICD-10-CM

## 2017-06-15 DIAGNOSIS — Z91.89 AT RISK FOR AMIODARONE TOXICITY WITH LONG TERM USE: ICD-10-CM

## 2017-06-15 DIAGNOSIS — Z95.810 AUTOMATIC IMPLANTABLE CARDIOVERTER-DEFIBRILLATOR IN SITU: ICD-10-CM

## 2017-06-15 DIAGNOSIS — Z79.899 AT RISK FOR AMIODARONE TOXICITY WITH LONG TERM USE: ICD-10-CM

## 2017-06-15 DIAGNOSIS — I51.9 LV DYSFUNCTION: Primary | ICD-10-CM

## 2017-06-15 DIAGNOSIS — I42.0 DCM (DILATED CARDIOMYOPATHY): ICD-10-CM

## 2017-06-15 DIAGNOSIS — E78.9 LIPID DISORDER: ICD-10-CM

## 2017-06-15 DIAGNOSIS — I44.7 LBBB (LEFT BUNDLE BRANCH BLOCK): ICD-10-CM

## 2017-06-15 DIAGNOSIS — I48.19 PERSISTENT ATRIAL FIBRILLATION: ICD-10-CM

## 2017-06-15 DIAGNOSIS — E66.01 SEVERE OBESITY (BMI 35.0-35.9 WITH COMORBIDITY): ICD-10-CM

## 2017-06-15 DIAGNOSIS — Z95.810 AUTOMATIC IMPLANTABLE CARDIAC DEFIBRILLATOR IN SITU: ICD-10-CM

## 2017-06-15 DIAGNOSIS — I10 ESSENTIAL HYPERTENSION: ICD-10-CM

## 2017-06-15 PROCEDURE — 99213 OFFICE O/P EST LOW 20 MIN: CPT | Mod: PBBFAC | Performed by: INTERNAL MEDICINE

## 2017-06-15 PROCEDURE — 1159F MED LIST DOCD IN RCRD: CPT | Mod: ,,, | Performed by: INTERNAL MEDICINE

## 2017-06-15 PROCEDURE — 93010 ELECTROCARDIOGRAM REPORT: CPT | Mod: S$PBB,,, | Performed by: INTERNAL MEDICINE

## 2017-06-15 PROCEDURE — 99215 OFFICE O/P EST HI 40 MIN: CPT | Mod: S$PBB,,, | Performed by: INTERNAL MEDICINE

## 2017-06-15 PROCEDURE — 1126F AMNT PAIN NOTED NONE PRSNT: CPT | Mod: ,,, | Performed by: INTERNAL MEDICINE

## 2017-06-15 PROCEDURE — 93284 PRGRMG EVAL IMPLANTABLE DFB: CPT | Mod: PBBFAC | Performed by: INTERNAL MEDICINE

## 2017-06-15 PROCEDURE — 99999 PR PBB SHADOW E&M-EST. PATIENT-LVL III: CPT | Mod: PBBFAC,,, | Performed by: INTERNAL MEDICINE

## 2017-06-15 NOTE — PROGRESS NOTES
Subjective:   Patient ID:  Hussein Steinberg is a 67 y.o. male     Chief complaint:Atrial Fibrillation      HPI  From my last note (14):  DCM, AF, LBBB  He received CRT-D on 3/19/13. RAY Paradym >Sensing timin msec into a QRS of 160 msec.BiV QRS duration 100 msec   Status update since last visit 6 months ago:  He can do what he wants -- mostly -- he has been mowing his yard in one session-- He can do anything he wants but he tries not to lift -- trying to protect his ICD   He is working full time. He works in his yard, cleans out the gutters etc  He has little opportunity to climb stairs  PFT and DLCO/ TSH OK -- did not get repeat echo as ordered  Still with some Halo effect -- mild    Update since then:  Has been doing well  Still on amio  Last echo dec 2014:  >>   1 - Moderate left atrial enlargement.     2 - Mildly depressed left ventricular systolic function (EF 45-50%).     3 - Normal left ventricular diastolic function.     4 - Normal right ventricular systolic function .     5 - Mild tricuspid regurgitation.     6 - No evidence of LV dyssynchrony.  I have reviewed the actual image of the ECG tracing obtained today and it shows ABiV pacing and narrow QRSd  (120 at most).  [place] shows 1 day of AF last AMS for 3 min on 17. He is on Pradaxa  Amio tox not up to date     Current Outpatient Prescriptions   Medication Sig    amiodarone (PACERONE) 200 MG Tab TAKE 1 TABLET EVERY DAY SKIP WEDNESDAY AND     amlodipine (NORVASC) 5 MG tablet TAKE 1 TABLET BY MOUTH EVERY DAY    dabigatran etexilate (PRADAXA) 150 mg Cap TAKE 1 TABLET TWICE A DAY    lisinopril (PRINIVIL,ZESTRIL) 40 MG tablet Take 1 tablet (40 mg total) by mouth once daily.    metoprolol tartrate (LOPRESSOR) 100 MG tablet TAKE 1 TABLET BY MOUTH TWICE A DAY    nitroGLYCERIN (NITROSTAT) 0.4 MG SL tablet Place under the tongue. 1 tablet, sublingual Sublingual .  take up to 3 tablets 5 minutes apart for chest pain    pravastatin  (PRAVACHOL) 80 MG tablet TAKE 1 TABLET (80 MG TOTAL) BY MOUTH ONCE DAILY.    promethazine-codeine 6.25-10 mg/5 ml (PHENERGAN WITH CODEINE) 6.25-10 mg/5 mL syrup Take 1 to 2 teaspoons at night as needed for cough/congestion    triamterene-hydrochlorothiazide 37.5-25 mg (MAXZIDE-25) 37.5-25 mg per tablet TAKE 1 TABLET BY MOUTH ONCE DAILY.     No current facility-administered medications for this visit.      Review of Systems   Constitution: Negative for decreased appetite, weakness, malaise/fatigue, weight gain and weight loss.   HENT: Negative for headaches.    Eyes: Positive for visual halos. Negative for blurred vision.   Cardiovascular: Negative for chest pain, claudication, cyanosis, dyspnea on exertion, irregular heartbeat, leg swelling, near-syncope, orthopnea and palpitations.   Respiratory: Negative for cough, shortness of breath, sleep disturbances due to breathing, snoring and wheezing.    Endocrine: Negative for heat intolerance.   Hematologic/Lymphatic: Does not bruise/bleed easily.   Musculoskeletal: Negative for muscle weakness and myalgias.   Gastrointestinal: Negative for melena, nausea and vomiting.   Genitourinary: Negative for nocturia.   Neurological: Negative for excessive daytime sleepiness, dizziness and light-headedness.   Psychiatric/Behavioral: Negative for depression, memory loss and substance abuse. The patient does not have insomnia and is not nervous/anxious.        Objective:   Physical Exam   Constitutional: He is oriented to person, place, and time. He appears well-developed and well-nourished.   overweight   HENT:   Head: Normocephalic and atraumatic.   Right Ear: External ear normal.   Left Ear: External ear normal.   Eyes: Conjunctivae are normal. Pupils are equal, round, and reactive to light. Left conjunctiva is not injected. Left conjunctiva has no hemorrhage.   Neck: Neck supple. No JVD present. No thyromegaly present.   Cardiovascular: Normal rate, regular rhythm, normal  "heart sounds and intact distal pulses.  PMI is not displaced.  Exam reveals no gallop, no friction rub, no midsystolic click and no opening snap.    No murmur heard.  Pulses:       Carotid pulses are 2+ on the right side, and 2+ on the left side.       Radial pulses are 2+ on the right side, and 2+ on the left side.        Dorsalis pedis pulses are 2+ on the right side, and 2+ on the left side.        Posterior tibial pulses are 2+ on the right side, and 2+ on the left side.   Pulmonary/Chest: Effort normal and breath sounds normal. No respiratory distress. He has no wheezes. He has no rales. He exhibits no tenderness.   Device pocket is in excellent repair     Abdominal: Soft. Normal appearance. He exhibits no pulsatile liver. There is no hepatomegaly. There is no tenderness. There is no rigidity and no guarding.   Obese abdomen   Musculoskeletal: Normal range of motion. He exhibits no edema or tenderness.        Right knee: He exhibits no swelling.        Left knee: He exhibits no swelling.        Right ankle: He exhibits no swelling.        Left ankle: He exhibits no swelling.        Right lower leg: He exhibits no swelling.        Left lower leg: He exhibits no swelling.        Right foot: There is no swelling.        Left foot: There is no swelling.   Neurological: He is alert and oriented to person, place, and time. He has normal strength and normal reflexes. No cranial nerve deficit. Coordination normal.   Skin: Skin is warm, dry and intact. No rash noted. No cyanosis. No pallor.   Psychiatric: He has a normal mood and affect. His behavior is normal.   Nursing note and vitals reviewed.    BP (!) 155/86   Pulse 75   Ht 5' 11" (1.803 m)   Wt 115 kg (253 lb 8.5 oz)   BMI 35.36 kg/m²      Assessment:      1. LV dysfunction    2. DCM (dilated cardiomyopathy)    3. LBBB (left bundle branch block)    4. Paroxysmal atrial fibrillation    5. Essential hypertension    6. Lipid disorder    7. Severe obesity (BMI " 35.0-35.9 with comorbidity)    8. Automatic implantable cardioverter-defibrillator in situ    9. At risk for amiodarone toxicity with long term use        Plan:    Also needs ICD clinic visit Q 6 months  Orders Placed This Encounter   Procedures    TSH     Standing Status:   Future     Standing Expiration Date:   8/14/2018    Hemoglobin     Standing Status:   Future     Standing Expiration Date:   8/14/2018    Amiodarone level     Standing Status:   Future     Standing Expiration Date:   8/14/2018    Brain natriuretic peptide     Standing Status:   Future     Standing Expiration Date:   8/14/2018    2D echo with color flow doppler     Please analyze and report on TDIs  Tx     Standing Status:   Future     Standing Expiration Date:   6/15/2018    Complete PFT with bronchodilator     Standing Status:   Future     Standing Expiration Date:   6/15/2018     Return in about 1 year (around 6/15/2018).  There are no discontinued medications.  New Prescriptions    No medications on file     Modified Medications    No medications on file

## 2017-06-22 ENCOUNTER — TELEPHONE (OUTPATIENT)
Dept: ELECTROPHYSIOLOGY | Facility: CLINIC | Age: 67
End: 2017-06-22

## 2017-06-22 DIAGNOSIS — I10 ESSENTIAL HYPERTENSION, BENIGN: ICD-10-CM

## 2017-06-22 DIAGNOSIS — Z91.89 AT RISK FOR AMIODARONE TOXICITY WITH LONG TERM USE: Primary | ICD-10-CM

## 2017-06-22 DIAGNOSIS — Z79.899 AT RISK FOR AMIODARONE TOXICITY WITH LONG TERM USE: Primary | ICD-10-CM

## 2017-06-22 RX ORDER — METOPROLOL TARTRATE 100 MG/1
TABLET ORAL
Qty: 180 TABLET | Refills: 0 | Status: SHIPPED | OUTPATIENT
Start: 2017-06-22 | End: 2017-09-26 | Stop reason: SDUPTHER

## 2017-06-22 NOTE — TELEPHONE ENCOUNTER
----- Message from Eric Salvador MD sent at 6/15/2017  8:26 PM CDT -----  Please see comments below and call patient.  In general you do not need to route back to me.  TSH a bit low with normal T4 -- unusual with amio  Needs T3  Also Hb increasing -- needs CBC

## 2017-06-23 ENCOUNTER — LAB VISIT (OUTPATIENT)
Dept: LAB | Facility: HOSPITAL | Age: 67
End: 2017-06-23
Attending: INTERNAL MEDICINE
Payer: MEDICARE

## 2017-06-23 ENCOUNTER — OFFICE VISIT (OUTPATIENT)
Dept: FAMILY MEDICINE | Facility: CLINIC | Age: 67
End: 2017-06-23
Payer: MEDICARE

## 2017-06-23 VITALS
WEIGHT: 253.5 LBS | HEIGHT: 71 IN | HEART RATE: 66 BPM | BODY MASS INDEX: 35.49 KG/M2 | DIASTOLIC BLOOD PRESSURE: 80 MMHG | SYSTOLIC BLOOD PRESSURE: 130 MMHG | OXYGEN SATURATION: 98 % | TEMPERATURE: 98 F

## 2017-06-23 DIAGNOSIS — E78.9 LIPID DISORDER: ICD-10-CM

## 2017-06-23 DIAGNOSIS — I10 ESSENTIAL HYPERTENSION: Primary | ICD-10-CM

## 2017-06-23 DIAGNOSIS — I42.0 DCM (DILATED CARDIOMYOPATHY): ICD-10-CM

## 2017-06-23 DIAGNOSIS — Z85.46 HISTORY OF PROSTATE CANCER: ICD-10-CM

## 2017-06-23 DIAGNOSIS — Z79.899 AT RISK FOR AMIODARONE TOXICITY WITH LONG TERM USE: ICD-10-CM

## 2017-06-23 DIAGNOSIS — I48.19 PERSISTENT ATRIAL FIBRILLATION: ICD-10-CM

## 2017-06-23 DIAGNOSIS — Z91.89 AT RISK FOR AMIODARONE TOXICITY WITH LONG TERM USE: ICD-10-CM

## 2017-06-23 DIAGNOSIS — Z95.810 ICD (IMPLANTABLE CARDIOVERTER-DEFIBRILLATOR), BIVENTRICULAR, IN SITU: ICD-10-CM

## 2017-06-23 LAB
HGB BLD-MCNC: 16.1 G/DL
T3 SERPL-MCNC: 76 NG/DL

## 2017-06-23 PROCEDURE — 85018 HEMOGLOBIN: CPT

## 2017-06-23 PROCEDURE — 99999 PR PBB SHADOW E&M-EST. PATIENT-LVL IV: CPT | Mod: PBBFAC,,, | Performed by: NURSE PRACTITIONER

## 2017-06-23 PROCEDURE — 1126F AMNT PAIN NOTED NONE PRSNT: CPT | Mod: ,,, | Performed by: NURSE PRACTITIONER

## 2017-06-23 PROCEDURE — 84480 ASSAY TRIIODOTHYRONINE (T3): CPT

## 2017-06-23 PROCEDURE — 99214 OFFICE O/P EST MOD 30 MIN: CPT | Mod: S$PBB,,, | Performed by: NURSE PRACTITIONER

## 2017-06-23 PROCEDURE — 99214 OFFICE O/P EST MOD 30 MIN: CPT | Mod: PBBFAC,PN | Performed by: NURSE PRACTITIONER

## 2017-06-23 PROCEDURE — 1159F MED LIST DOCD IN RCRD: CPT | Mod: ,,, | Performed by: NURSE PRACTITIONER

## 2017-06-23 PROCEDURE — 36415 COLL VENOUS BLD VENIPUNCTURE: CPT

## 2017-06-23 RX ORDER — ROSUVASTATIN CALCIUM 20 MG/1
20 TABLET, COATED ORAL DAILY
Qty: 90 TABLET | Refills: 1 | Status: SHIPPED | OUTPATIENT
Start: 2017-06-23 | End: 2017-12-15 | Stop reason: SDUPTHER

## 2017-06-23 NOTE — PROGRESS NOTES
"Subjective:       Patient ID: Hussein Steinberg is a 67 y.o. male.    Chief Complaint: Follow-up (lab results) and Medication Refill    Patient is here today for 6 month follow up with lab results.    Patient has Hypertension that is controlled on Lisinopril, Amlodipine and Maxzide at present doses.  /80 (BP Location: Left arm, Patient Position: Sitting, BP Method: Manual)   Pulse 66   Temp 98.1 °F (36.7 °C) (Oral)   Ht 5' 11" (1.803 m)   Wt 115 kg (253 lb 8.5 oz)   SpO2 98%   BMI 35.36 kg/m²     Patient has Hyperlipidemia and currently takes Pravastatin 80 mg daily and LDL remain >100.  Does report he was on Crestor in past and tolerated well but was taken off due to cost. Crestor is now generic - will trial change to Crestor.    Patient has chronic multiple heart problems that is followed by cardiologist - see cardiology office visit progress notes.    Patient voices no complaints today.          Component      Latest Ref Rng & Units 6/6/2017 1/25/2017 6/21/2016   Glucose      65 - 99 mg/dL 91 90 90   BUN, Bld      7 - 25 mg/dL 20 19 16   Creatinine      0.70 - 1.25 mg/dL 1.07 1.15 1.0   eGFR if non       > OR = 60 mL/min/1.73m2 71 66 >60.0   eGFR if       > OR = 60 mL/min/1.73m2 83 76 >60.0   BUN/Creatinine Ratio      6 - 22 (calc) NOT APPLICABLE NOT APPLICABLE    Sodium      135 - 146 mmol/L 141 139 142   Potassium      3.5 - 5.3 mmol/L 4.1 3.9 3.8   Chloride      98 - 110 mmol/L 102 101 104   CO2      20 - 31 mmol/L 30 28 29   Calcium      8.6 - 10.3 mg/dL 9.6 9.3 9.0   Total Protein      6.1 - 8.1 g/dL 6.9 6.8 6.5   Albumin      3.6 - 5.1 g/dL 4.5 4.3 3.8   Globulin, Total      1.9 - 3.7 g/dL (calc) 2.4 2.5    Albumin/Globulin Ratio      1.0 - 2.5 (calc) 1.9 1.7    Total Bilirubin      0.2 - 1.2 mg/dL 0.7 0.5 0.6   Alkaline Phosphatase      40 - 115 U/L 94 83 84   AST      10 - 35 U/L 17 14 18   ALT      9 - 46 U/L 18 16 18   Anion Gap      8 - 16 mmol/L   9 "   Cholesterol      125 - 200 mg/dL 183 196 180   Triglycerides      <150 mg/dL 116 120 102   HDL      > OR = 40 mg/dL 46 46 42   LDL Cholesterol      <130 mg/dL (calc) 114 126 117.6   HDL/Chol Ratio      < OR = 5.0 (calc) 4.0 4.3 23.3   Total Cholesterol/HDL Ratio      2.0 - 5.0   4.3   Non-HDL Cholesterol      mg/dL   138   Non HDL Chol. (LDL+VLDL)      mg/dL (calc) 137 150      Previous Medications    AMIODARONE (PACERONE) 200 MG TAB    TAKE 1 TABLET EVERY DAY SKIP WEDNESDAY AND SUNDAY    AMLODIPINE (NORVASC) 5 MG TABLET    TAKE 1 TABLET BY MOUTH EVERY DAY    DABIGATRAN ETEXILATE (PRADAXA) 150 MG CAP    TAKE 1 TABLET TWICE A DAY    LISINOPRIL (PRINIVIL,ZESTRIL) 40 MG TABLET    Take 1 tablet (40 mg total) by mouth once daily.    METOPROLOL TARTRATE (LOPRESSOR) 100 MG TABLET    TAKE 1 TABLET BY MOUTH TWICE A DAY    NITROGLYCERIN (NITROSTAT) 0.4 MG SL TABLET    Place under the tongue. 1 tablet, sublingual Sublingual .  take up to 3 tablets 5 minutes apart for chest pain    PRAVASTATIN (PRAVACHOL) 80 MG TABLET    TAKE 1 TABLET (80 MG TOTAL) BY MOUTH ONCE DAILY.    TRIAMTERENE-HYDROCHLOROTHIAZIDE 37.5-25 MG (MAXZIDE-25) 37.5-25 MG PER TABLET    TAKE 1 TABLET BY MOUTH ONCE DAILY.       Past Medical History:   Diagnosis Date    Atrial fibrillation     Cardiac arrest 2012    has pacemaker/defibrillator [facility] 3/19/13 [facility]    Cataract     High cholesterol     Hypertension     Kidney stone 10/2013    LBBB (left bundle branch block) 10/10/2012    LV dysfunction 10/10/2012    Prostate cancer     Treated by Dr. Rasmussen    Stroke 09/2012    + hemorrhagic infarct to right occipital lobe after started on Plavix following cardiac event    SVT (supraventricular tachycardia) 10/10/2012       Past Surgical History:   Procedure Laterality Date    arthroscopic  knee    CARDIAC PACEMAKER PLACEMENT  2013    COLONOSCOPY  2/12/2016    + polyp - repeat in 5 years- Dr. Calles    PROSTATECTOMY          Family History   Problem Relation Age of Onset    Macular degeneration Mother     Cataracts Mother     Hypertension Mother     Glaucoma Mother     Cancer Father 56     leukemia    Heart attack Brother 59     had stent placed    Heart disease Brother     Heart attack Maternal Grandfather     Heart attack Paternal Grandfather     No Known Problems Sister     Parkinsonism Brother 54    No Known Problems Brother     No Known Problems Daughter     No Known Problems Daughter     Amblyopia Neg Hx     Blindness Neg Hx     Diabetes Neg Hx     Retinal detachment Neg Hx     Strabismus Neg Hx     Stroke Neg Hx     Thyroid disease Neg Hx        Social History     Social History    Marital status:      Spouse name: N/A    Number of children: N/A    Years of education: N/A     Occupational History    administrative position      Social History Main Topics    Smoking status: Never Smoker    Smokeless tobacco: None    Alcohol use No    Drug use: No    Sexual activity: Yes     Partners: Female     Other Topics Concern    None     Social History Narrative    None       Review of Systems   Constitutional: Negative for activity change, appetite change, fatigue, fever and unexpected weight change.   HENT: Negative for congestion, ear pain, hearing loss, mouth sores, nosebleeds, postnasal drip, rhinorrhea, sinus pressure, sneezing, sore throat, trouble swallowing and voice change.    Eyes: Negative.  Negative for discharge and visual disturbance.   Respiratory: Negative for cough, chest tightness, shortness of breath and wheezing.    Cardiovascular: Negative for chest pain, palpitations and leg swelling.   Gastrointestinal: Negative.  Negative for abdominal pain, blood in stool, constipation, diarrhea, nausea and vomiting.   Endocrine: Negative.  Negative for polydipsia and polyuria.   Genitourinary: Negative for difficulty urinating, dysuria, flank pain, hematuria and urgency.  "  Musculoskeletal: Positive for arthralgias. Negative for back pain, gait problem, joint swelling, myalgias and neck pain.   Skin: Negative for color change, rash and wound.   Allergic/Immunologic: Negative for immunocompromised state.   Neurological: Negative for dizziness, tremors, seizures, syncope, speech difficulty, weakness and headaches.   Hematological: Negative for adenopathy. Does not bruise/bleed easily.   Psychiatric/Behavioral: Negative for behavioral problems, confusion, dysphoric mood, sleep disturbance and suicidal ideas. The patient is not nervous/anxious.          Objective:     Vitals:    06/23/17 1014   BP: 130/80   BP Location: Left arm   Patient Position: Sitting   BP Method: Manual   Pulse: 66   Temp: 98.1 °F (36.7 °C)   TempSrc: Oral   SpO2: 98%   Weight: 115 kg (253 lb 8.5 oz)   Height: 5' 11" (1.803 m)          Physical Exam   Constitutional: He is oriented to person, place, and time. He appears well-developed and well-nourished. No distress.   + obesity with Body mass index is 35.7 kg/(m^2).       HENT:   Head: Normocephalic and atraumatic.   Right Ear: External ear normal.   Left Ear: External ear normal.   Nose: Nose normal.   Mouth/Throat: Oropharynx is clear and moist. No oropharyngeal exudate.   Eyes: EOM are normal. Pupils are equal, round, and reactive to light.   Neck: Normal range of motion. Neck supple. No JVD present. No tracheal deviation present. No thyromegaly present.   Cardiovascular: Normal rate, regular rhythm, normal heart sounds and intact distal pulses.    Pulmonary/Chest: Effort normal and breath sounds normal. No stridor. No respiratory distress.   Abdominal: Soft. Bowel sounds are normal. He exhibits no distension. There is no guarding.   Musculoskeletal: Normal range of motion. He exhibits no edema.   Lymphadenopathy:     He has no cervical adenopathy.   Neurological: He is alert and oriented to person, place, and time. Coordination normal.   Skin: Skin is warm and " dry. No rash noted. He is not diaphoretic.   Psychiatric: He has a normal mood and affect. His behavior is normal.         Assessment:         ICD-10-CM ICD-9-CM   1. Essential hypertension I10 401.9   2. Lipid disorder E78.9 272.9   3. Persistent atrial fibrillation I48.1 427.31   4. DCM (dilated cardiomyopathy) I42.0 425.4   5. ICD (implantable cardioverter-defibrillator), biventricular, in situ Z95.810 V45.02   6. History of prostate cancer Z85.46 V10.46       Plan:       Essential hypertension  -  Continue current medications and follow up in 6 months.  -     CBC auto differential; Future; Expected date: 12/18/2017  -     Comprehensive metabolic panel; Future; Expected date: 12/18/2017    Lipid disorder  -  Stop the Pravastatin 80 mg daily and change to Crestor 20 mg daily and repeat fasting labs and office visit in 6 months.  -     rosuvastatin (CRESTOR) 20 MG tablet; Take 1 tablet (20 mg total) by mouth once daily.  Dispense: 90 tablet; Refill: 1  -     Lipid panel; Future; Expected date: 12/18/2017    Persistent atrial fibrillation  -  Followed by cardiology.    DCM (dilated cardiomyopathy)  -  Followed by cardiology.    ICD (implantable cardioverter-defibrillator), biventricular, in situ  -  Sees ICD clinic every 6 months.    History of prostate cancer  -  Due for yearly PSA level  -     PROSTATE SPECIFIC ANTIGEN, DIAGNOSTIC; Future; Expected date: 12/18/2017      Return in about 6 months (around 12/23/2017) for fasting labs and full WELLNESS EXAM.     Patient's Medications   New Prescriptions    ROSUVASTATIN (CRESTOR) 20 MG TABLET    Take 1 tablet (20 mg total) by mouth once daily.   Previous Medications    AMIODARONE (PACERONE) 200 MG TAB    TAKE 1 TABLET EVERY DAY SKIP WEDNESDAY AND SUNDAY    AMLODIPINE (NORVASC) 5 MG TABLET    TAKE 1 TABLET BY MOUTH EVERY DAY    DABIGATRAN ETEXILATE (PRADAXA) 150 MG CAP    TAKE 1 TABLET TWICE A DAY    LISINOPRIL (PRINIVIL,ZESTRIL) 40 MG TABLET    Take 1 tablet (40 mg  total) by mouth once daily.    METOPROLOL TARTRATE (LOPRESSOR) 100 MG TABLET    TAKE 1 TABLET BY MOUTH TWICE A DAY    NITROGLYCERIN (NITROSTAT) 0.4 MG SL TABLET    Place under the tongue. 1 tablet, sublingual Sublingual .  take up to 3 tablets 5 minutes apart for chest pain    TRIAMTERENE-HYDROCHLOROTHIAZIDE 37.5-25 MG (MAXZIDE-25) 37.5-25 MG PER TABLET    TAKE 1 TABLET BY MOUTH ONCE DAILY.   Modified Medications    No medications on file   Discontinued Medications    PRAVASTATIN (PRAVACHOL) 80 MG TABLET    TAKE 1 TABLET (80 MG TOTAL) BY MOUTH ONCE DAILY.    PROMETHAZINE-CODEINE 6.25-10 MG/5 ML (PHENERGAN WITH CODEINE) 6.25-10 MG/5 ML SYRUP    Take 1 to 2 teaspoons at night as needed for cough/congestion

## 2017-06-24 RX ORDER — AMIODARONE HYDROCHLORIDE 200 MG/1
TABLET ORAL
Qty: 30 TABLET | Refills: 2 | Status: SHIPPED | OUTPATIENT
Start: 2017-06-24 | End: 2018-01-10 | Stop reason: SDUPTHER

## 2017-06-26 ENCOUNTER — TELEPHONE (OUTPATIENT)
Dept: ELECTROPHYSIOLOGY | Facility: CLINIC | Age: 67
End: 2017-06-26

## 2017-06-26 NOTE — TELEPHONE ENCOUNTER
----- Message from Eric Salvador MD sent at 6/23/2017  5:21 PM CDT -----  All results are OK. Please see comments below- if any- and call patient.  In general you do not need to route back to me.

## 2017-06-27 ENCOUNTER — OFFICE VISIT (OUTPATIENT)
Dept: FAMILY MEDICINE | Facility: CLINIC | Age: 67
End: 2017-06-27
Payer: MEDICARE

## 2017-06-27 VITALS
DIASTOLIC BLOOD PRESSURE: 80 MMHG | SYSTOLIC BLOOD PRESSURE: 120 MMHG | HEIGHT: 71 IN | OXYGEN SATURATION: 98 % | HEART RATE: 69 BPM | BODY MASS INDEX: 35.03 KG/M2 | WEIGHT: 250.25 LBS | TEMPERATURE: 99 F

## 2017-06-27 DIAGNOSIS — I10 ESSENTIAL HYPERTENSION, BENIGN: ICD-10-CM

## 2017-06-27 DIAGNOSIS — J20.9 ACUTE BRONCHITIS WITH SYMPTOMS > 10 DAYS: Primary | ICD-10-CM

## 2017-06-27 PROCEDURE — 1126F AMNT PAIN NOTED NONE PRSNT: CPT | Mod: ,,, | Performed by: NURSE PRACTITIONER

## 2017-06-27 PROCEDURE — 99213 OFFICE O/P EST LOW 20 MIN: CPT | Mod: S$PBB,,, | Performed by: NURSE PRACTITIONER

## 2017-06-27 PROCEDURE — 1159F MED LIST DOCD IN RCRD: CPT | Mod: ,,, | Performed by: NURSE PRACTITIONER

## 2017-06-27 PROCEDURE — 99999 PR PBB SHADOW E&M-EST. PATIENT-LVL IV: CPT | Mod: PBBFAC,,, | Performed by: NURSE PRACTITIONER

## 2017-06-27 PROCEDURE — 99214 OFFICE O/P EST MOD 30 MIN: CPT | Mod: PBBFAC,PN | Performed by: NURSE PRACTITIONER

## 2017-06-27 RX ORDER — AMOXICILLIN AND CLAVULANATE POTASSIUM 875; 125 MG/1; MG/1
1 TABLET, FILM COATED ORAL 2 TIMES DAILY
Qty: 20 TABLET | Refills: 0 | Status: SHIPPED | OUTPATIENT
Start: 2017-06-27 | End: 2017-07-07

## 2017-06-27 RX ORDER — PROMETHAZINE HYDROCHLORIDE AND CODEINE PHOSPHATE 6.25; 1 MG/5ML; MG/5ML
SOLUTION ORAL
Qty: 180 ML | Refills: 0 | Status: SHIPPED | OUTPATIENT
Start: 2017-06-27 | End: 2017-12-15 | Stop reason: ALTCHOICE

## 2017-06-27 RX ORDER — ALBUTEROL SULFATE 90 UG/1
2 AEROSOL, METERED RESPIRATORY (INHALATION) EVERY 6 HOURS PRN
Qty: 1 EACH | Refills: 1 | Status: SHIPPED | OUTPATIENT
Start: 2017-06-27 | End: 2018-07-10

## 2017-06-27 RX ORDER — LISINOPRIL 40 MG/1
40 TABLET ORAL DAILY
Qty: 90 TABLET | Refills: 1 | Status: SHIPPED | OUTPATIENT
Start: 2017-06-27 | End: 2017-12-15 | Stop reason: SDUPTHER

## 2017-06-27 RX ORDER — BENZONATATE 200 MG/1
200 CAPSULE ORAL 3 TIMES DAILY PRN
Qty: 30 CAPSULE | Refills: 0 | Status: SHIPPED | OUTPATIENT
Start: 2017-06-27 | End: 2017-07-07

## 2017-06-27 NOTE — PROGRESS NOTES
Subjective:       Patient ID: Hussein Steinberg is a 67 y.o. male.    Chief Complaint: URI (ptm started coughing 1 week ago with wheezing at night) and Bronchitis    Cough   This is a recurrent problem. Episode onset: over 7 days ago and worsening. The problem has been gradually worsening. The problem occurs every few minutes. The cough is productive of sputum. Associated symptoms include myalgias, a sore throat and wheezing. Pertinent negatives include no chest pain, chills, ear congestion, ear pain, fever, headaches, heartburn, hemoptysis, nasal congestion, postnasal drip, rash, rhinorrhea, shortness of breath, sweats or weight loss. Associated symptoms comments: No nasal congestion, no sinus pressure - more chest congestion with cough. Nothing aggravates the symptoms. He has tried OTC cough suppressant for the symptoms. The treatment provided no relief. His past medical history is significant for bronchitis. There is no history of asthma, bronchiectasis, COPD, emphysema, environmental allergies or pneumonia.       Previous Medications    AMIODARONE (PACERONE) 200 MG TAB    TAKE 1 TABLET EVERY DAY SKIP WEDNESDAY AND SUNDAY    AMLODIPINE (NORVASC) 5 MG TABLET    TAKE 1 TABLET BY MOUTH EVERY DAY    DABIGATRAN ETEXILATE (PRADAXA) 150 MG CAP    TAKE 1 TABLET TWICE A DAY    LISINOPRIL (PRINIVIL,ZESTRIL) 40 MG TABLET    Take 1 tablet (40 mg total) by mouth once daily.    METOPROLOL TARTRATE (LOPRESSOR) 100 MG TABLET    TAKE 1 TABLET BY MOUTH TWICE A DAY    NITROGLYCERIN (NITROSTAT) 0.4 MG SL TABLET    Place under the tongue. 1 tablet, sublingual Sublingual .  take up to 3 tablets 5 minutes apart for chest pain    ROSUVASTATIN (CRESTOR) 20 MG TABLET    Take 1 tablet (20 mg total) by mouth once daily.    TRIAMTERENE-HYDROCHLOROTHIAZIDE 37.5-25 MG (MAXZIDE-25) 37.5-25 MG PER TABLET    TAKE 1 TABLET BY MOUTH ONCE DAILY.       Past Medical History:   Diagnosis Date    Atrial fibrillation     Cardiac arrest 2012    has  pacemaker/defibrillator placed 3/19/13 - followed by [facility]    Cataract     DCM (dilated cardiomyopathy) 6/15/2017    High cholesterol     Hypertension     Kidney stone 10/2013    LBBB (left bundle branch block) 10/10/2012    LV dysfunction 10/10/2012    Prostate cancer     Treated by Dr. Rasmussen    Stroke 09/2012    + hemorrhagic infarct to right occipital lobe after started on Plavix following cardiac event    SVT (supraventricular tachycardia) 10/10/2012       Past Surgical History:   Procedure Laterality Date    arthroscopic  knee    CARDIAC PACEMAKER PLACEMENT  2013    COLONOSCOPY  2/12/2016    + polyp - repeat in 5 years- Dr. Calles    PROSTATECTOMY         Family History   Problem Relation Age of Onset    Macular degeneration Mother     Cataracts Mother     Hypertension Mother     Glaucoma Mother     Cancer Father 56     leukemia    Heart attack Brother 59     had stent placed    Heart disease Brother     Heart attack Maternal Grandfather     Heart attack Paternal Grandfather     No Known Problems Sister     Parkinsonism Brother 54    No Known Problems Brother     No Known Problems Daughter     No Known Problems Daughter     Amblyopia Neg Hx     Blindness Neg Hx     Diabetes Neg Hx     Retinal detachment Neg Hx     Strabismus Neg Hx     Stroke Neg Hx     Thyroid disease Neg Hx        Social History     Social History    Marital status:      Spouse name: N/A    Number of children: N/A    Years of education: N/A     Occupational History    administrative position      Social History Main Topics    Smoking status: Never Smoker    Smokeless tobacco: Never Used    Alcohol use No    Drug use: No    Sexual activity: Yes     Partners: Female     Other Topics Concern    None     Social History Narrative    None       Review of Systems   Constitutional: Positive for fatigue. Negative for activity change, appetite change, chills, fever, unexpected  "weight change and weight loss.   HENT: Positive for sore throat. Negative for congestion, ear pain, mouth sores, nosebleeds, postnasal drip, rhinorrhea, sinus pressure, sneezing, trouble swallowing and voice change.    Eyes: Negative.    Respiratory: Positive for cough and wheezing. Negative for hemoptysis, chest tightness and shortness of breath.    Cardiovascular: Negative for chest pain, palpitations and leg swelling.   Gastrointestinal: Negative.  Negative for abdominal pain, blood in stool, constipation, diarrhea, heartburn, nausea and vomiting.   Endocrine: Negative.    Genitourinary: Negative for difficulty urinating, dysuria, flank pain, hematuria and urgency.   Musculoskeletal: Positive for myalgias. Negative for arthralgias, back pain, gait problem, joint swelling and neck pain.   Skin: Negative for color change, rash and wound.   Allergic/Immunologic: Negative for environmental allergies and immunocompromised state.   Neurological: Negative for dizziness, tremors, seizures, syncope, speech difficulty and headaches.   Hematological: Negative for adenopathy. Does not bruise/bleed easily.   Psychiatric/Behavioral: Negative for behavioral problems, dysphoric mood, sleep disturbance and suicidal ideas. The patient is not nervous/anxious.          Objective:     Vitals:    06/27/17 1026   BP: 120/80   BP Location: Left arm   Patient Position: Sitting   BP Method: Manual   Pulse: 69   Temp: 99.1 °F (37.3 °C)   TempSrc: Oral   SpO2: 98%   Weight: 113.5 kg (250 lb 3.6 oz)   Height: 5' 11" (1.803 m)          Physical Exam   Constitutional: He is oriented to person, place, and time. He appears well-developed and well-nourished.   HENT:   Head: Normocephalic.   Right Ear: External ear normal.   Left Ear: External ear normal.   Nose: Mucosal edema present. No rhinorrhea.   Mouth/Throat: Oropharynx is clear and moist. No oropharyngeal exudate, posterior oropharyngeal edema or posterior oropharyngeal erythema.   Eyes: " EOM are normal. Pupils are equal, round, and reactive to light. Right eye exhibits no discharge. Left eye exhibits no discharge. No scleral icterus.   Neck: Normal range of motion. Neck supple. No tracheal deviation present. No thyromegaly present.   Cardiovascular: Normal rate, regular rhythm and normal heart sounds.    No murmur heard.  Pulmonary/Chest: Effort normal. No respiratory distress. He has no decreased breath sounds. He has wheezes. He has no rales.   Mild scattered end-expiratory wheezes bilaterally - wheezes did improve/clear with cough.   Abdominal: Soft. He exhibits no distension.   Musculoskeletal: Normal range of motion. He exhibits no edema.   Lymphadenopathy:     He has no cervical adenopathy.   Neurological: He is alert and oriented to person, place, and time. Coordination normal.   Skin: Skin is warm and dry. No rash noted.   Psychiatric: He has a normal mood and affect. His behavior is normal.         Assessment:         ICD-10-CM ICD-9-CM   1. Acute bronchitis with symptoms > 10 days J20.9 466.0   2. Essential hypertension, benign I10 401.1       Plan:       Acute bronchitis with symptoms > 10 days  - Patient has bout of Bronchitis usually once a year that is treated with symptomatic management for the first couple weeks but with worsening - it requires antibiotic treatment - patient has dilated cardiomyopathy and internal defibriillator in place - limited on medication to treat - try to avoid any type of steroid or anti-inflammatory.  This combination has worked well in the past to resolve symptoms.    -     amoxicillin-clavulanate 875-125mg (AUGMENTIN) 875-125 mg per tablet; Take 1 tablet by mouth 2 (two) times daily.  Dispense: 20 tablet; Refill: 0  -     benzonatate (TESSALON) 200 MG capsule; Take 1 capsule (200 mg total) by mouth 3 (three) times daily as needed for Cough.  Dispense: 30 capsule; Refill: 0  -     promethazine-codeine 6.25-10 mg/5 ml (PHENERGAN WITH CODEINE) 6.25-10 mg/5 mL  syrup; Take 1 to 2 teaspoons at night as needed for cough/congestion  Dispense: 180 mL; Refill: 0  -     albuterol 90 mcg/actuation inhaler; Inhale 2 puffs into the lungs every 6 (six) hours as needed for Wheezing. Rescue  Dispense: 1 each; Refill: 1    Essential hypertension  -     lisinopril (PRINIVIL,ZESTRIL) 40 MG tablet; Take 1 tablet (40 mg total) by mouth once daily.  Dispense: 90 tablet; Refill: 1      Return if symptoms worsen or fail to improve.     Patient's Medications   New Prescriptions    ALBUTEROL 90 MCG/ACTUATION INHALER    Inhale 2 puffs into the lungs every 6 (six) hours as needed for Wheezing. Rescue    AMOXICILLIN-CLAVULANATE 875-125MG (AUGMENTIN) 875-125 MG PER TABLET    Take 1 tablet by mouth 2 (two) times daily.    BENZONATATE (TESSALON) 200 MG CAPSULE    Take 1 capsule (200 mg total) by mouth 3 (three) times daily as needed for Cough.    PROMETHAZINE-CODEINE 6.25-10 MG/5 ML (PHENERGAN WITH CODEINE) 6.25-10 MG/5 ML SYRUP    Take 1 to 2 teaspoons at night as needed for cough/congestion   Previous Medications    AMIODARONE (PACERONE) 200 MG TAB    TAKE 1 TABLET EVERY DAY SKIP WEDNESDAY AND SUNDAY    AMLODIPINE (NORVASC) 5 MG TABLET    TAKE 1 TABLET BY MOUTH EVERY DAY    DABIGATRAN ETEXILATE (PRADAXA) 150 MG CAP    TAKE 1 TABLET TWICE A DAY    METOPROLOL TARTRATE (LOPRESSOR) 100 MG TABLET    TAKE 1 TABLET BY MOUTH TWICE A DAY    NITROGLYCERIN (NITROSTAT) 0.4 MG SL TABLET    Place under the tongue. 1 tablet, sublingual Sublingual .  take up to 3 tablets 5 minutes apart for chest pain    ROSUVASTATIN (CRESTOR) 20 MG TABLET    Take 1 tablet (20 mg total) by mouth once daily.    TRIAMTERENE-HYDROCHLOROTHIAZIDE 37.5-25 MG (MAXZIDE-25) 37.5-25 MG PER TABLET    TAKE 1 TABLET BY MOUTH ONCE DAILY.   Modified Medications    Modified Medication Previous Medication    LISINOPRIL (PRINIVIL,ZESTRIL) 40 MG TABLET lisinopril (PRINIVIL,ZESTRIL) 40 MG tablet       Take 1 tablet (40 mg total) by mouth once daily.     Take 1 tablet (40 mg total) by mouth once daily.   Discontinued Medications    No medications on file

## 2017-08-21 DIAGNOSIS — I10 ESSENTIAL HYPERTENSION, BENIGN: ICD-10-CM

## 2017-08-21 RX ORDER — AMLODIPINE BESYLATE 5 MG/1
TABLET ORAL
Qty: 90 TABLET | Refills: 1 | Status: SHIPPED | OUTPATIENT
Start: 2017-08-21 | End: 2018-02-16 | Stop reason: SDUPTHER

## 2017-08-21 RX ORDER — TRIAMTERENE/HYDROCHLOROTHIAZID 37.5-25 MG
TABLET ORAL
Qty: 90 TABLET | Refills: 1 | Status: SHIPPED | OUTPATIENT
Start: 2017-08-21 | End: 2018-02-16 | Stop reason: SDUPTHER

## 2017-09-26 DIAGNOSIS — I10 ESSENTIAL HYPERTENSION, BENIGN: ICD-10-CM

## 2017-09-27 RX ORDER — METOPROLOL TARTRATE 100 MG/1
TABLET ORAL
Qty: 180 TABLET | Refills: 0 | Status: SHIPPED | OUTPATIENT
Start: 2017-09-27 | End: 2017-12-15 | Stop reason: SDUPTHER

## 2017-10-25 ENCOUNTER — TELEPHONE (OUTPATIENT)
Dept: ELECTROPHYSIOLOGY | Facility: CLINIC | Age: 67
End: 2017-10-25

## 2017-10-25 NOTE — TELEPHONE ENCOUNTER
----- Message from Lisy Alcaraz sent at 10/25/2017  8:57 AM CDT -----  Contact: [facility]/Dr Harvinder Cuadra  Please call [facility] at [phone]/fax# [phone]. Patient is having a surgical tooth extraction done on 10/26/17 at 8 am and they need an immediate clearance today. Form was faxed on Monday 10/23/17    Thank you

## 2017-10-25 NOTE — TELEPHONE ENCOUNTER
Advised pt that Dr. Salvador need device checked for rhythm due to history of CVA and AF. Pt's dental extraction postponed until device check done. Pt to come tomorrow for device check. Understanding verbalized.

## 2017-10-26 ENCOUNTER — CLINICAL SUPPORT (OUTPATIENT)
Dept: ELECTROPHYSIOLOGY | Facility: CLINIC | Age: 67
End: 2017-10-26
Payer: MEDICARE

## 2017-10-26 DIAGNOSIS — I48.19 PERSISTENT ATRIAL FIBRILLATION: ICD-10-CM

## 2017-10-26 DIAGNOSIS — Z95.810 IMPLANTABLE CARDIOVERTER-DEFIBRILLATOR (ICD) IN SITU: ICD-10-CM

## 2017-10-26 PROCEDURE — 93284 PRGRMG EVAL IMPLANTABLE DFB: CPT | Mod: PBBFAC | Performed by: INTERNAL MEDICINE

## 2017-12-11 ENCOUNTER — LAB VISIT (OUTPATIENT)
Dept: LAB | Facility: HOSPITAL | Age: 67
End: 2017-12-11
Attending: NURSE PRACTITIONER
Payer: MEDICARE

## 2017-12-11 DIAGNOSIS — I10 ESSENTIAL HYPERTENSION: ICD-10-CM

## 2017-12-11 DIAGNOSIS — E78.9 LIPID DISORDER: ICD-10-CM

## 2017-12-11 LAB
ALBUMIN SERPL BCP-MCNC: 3.8 G/DL
ALP SERPL-CCNC: 105 U/L
ALT SERPL W/O P-5'-P-CCNC: 20 U/L
ANION GAP SERPL CALC-SCNC: 8 MMOL/L
AST SERPL-CCNC: 21 U/L
BASOPHILS # BLD AUTO: 0.05 K/UL
BASOPHILS NFR BLD: 0.5 %
BILIRUB SERPL-MCNC: 0.4 MG/DL
BUN SERPL-MCNC: 13 MG/DL
CALCIUM SERPL-MCNC: 9 MG/DL
CHLORIDE SERPL-SCNC: 102 MMOL/L
CHOLEST SERPL-MCNC: 153 MG/DL
CHOLEST/HDLC SERPL: 3.6 {RATIO}
CO2 SERPL-SCNC: 33 MMOL/L
CREAT SERPL-MCNC: 1.1 MG/DL
DIFFERENTIAL METHOD: NORMAL
EOSINOPHIL # BLD AUTO: 0.4 K/UL
EOSINOPHIL NFR BLD: 4.1 %
ERYTHROCYTE [DISTWIDTH] IN BLOOD BY AUTOMATED COUNT: 12.7 %
EST. GFR  (AFRICAN AMERICAN): >60 ML/MIN/1.73 M^2
EST. GFR  (NON AFRICAN AMERICAN): >60 ML/MIN/1.73 M^2
GLUCOSE SERPL-MCNC: 101 MG/DL
HCT VFR BLD AUTO: 48.1 %
HDLC SERPL-MCNC: 43 MG/DL
HDLC SERPL: 28.1 %
HGB BLD-MCNC: 15.6 G/DL
LDLC SERPL CALC-MCNC: 88.6 MG/DL
LYMPHOCYTES # BLD AUTO: 2.6 K/UL
LYMPHOCYTES NFR BLD: 27.7 %
MCH RBC QN AUTO: 28.2 PG
MCHC RBC AUTO-ENTMCNC: 32.4 G/DL
MCV RBC AUTO: 87 FL
MONOCYTES # BLD AUTO: 0.7 K/UL
MONOCYTES NFR BLD: 7.6 %
NEUTROPHILS # BLD AUTO: 5.5 K/UL
NEUTROPHILS NFR BLD: 60 %
NONHDLC SERPL-MCNC: 110 MG/DL
PLATELET # BLD AUTO: 240 K/UL
PMV BLD AUTO: 10.4 FL
POTASSIUM SERPL-SCNC: 4 MMOL/L
PROT SERPL-MCNC: 7.1 G/DL
RBC # BLD AUTO: 5.53 M/UL
SODIUM SERPL-SCNC: 143 MMOL/L
TRIGL SERPL-MCNC: 107 MG/DL
WBC # BLD AUTO: 9.24 K/UL

## 2017-12-11 PROCEDURE — 80061 LIPID PANEL: CPT

## 2017-12-11 PROCEDURE — 80053 COMPREHEN METABOLIC PANEL: CPT

## 2017-12-11 PROCEDURE — 36415 COLL VENOUS BLD VENIPUNCTURE: CPT

## 2017-12-11 PROCEDURE — 85025 COMPLETE CBC W/AUTO DIFF WBC: CPT

## 2017-12-15 ENCOUNTER — OFFICE VISIT (OUTPATIENT)
Dept: FAMILY MEDICINE | Facility: CLINIC | Age: 67
End: 2017-12-15
Payer: MEDICARE

## 2017-12-15 VITALS
HEIGHT: 71 IN | DIASTOLIC BLOOD PRESSURE: 84 MMHG | OXYGEN SATURATION: 97 % | HEART RATE: 60 BPM | TEMPERATURE: 98 F | SYSTOLIC BLOOD PRESSURE: 132 MMHG | WEIGHT: 264.31 LBS | BODY MASS INDEX: 37 KG/M2

## 2017-12-15 DIAGNOSIS — I10 ESSENTIAL HYPERTENSION, BENIGN: ICD-10-CM

## 2017-12-15 DIAGNOSIS — Z85.46 HISTORY OF PROSTATE CANCER: ICD-10-CM

## 2017-12-15 DIAGNOSIS — I42.0 DCM (DILATED CARDIOMYOPATHY): ICD-10-CM

## 2017-12-15 DIAGNOSIS — Z00.00 MEDICARE ANNUAL WELLNESS VISIT, SUBSEQUENT: Primary | ICD-10-CM

## 2017-12-15 DIAGNOSIS — E78.9 LIPID DISORDER: ICD-10-CM

## 2017-12-15 DIAGNOSIS — Z95.810 ICD (IMPLANTABLE CARDIOVERTER-DEFIBRILLATOR), BIVENTRICULAR, IN SITU: ICD-10-CM

## 2017-12-15 DIAGNOSIS — D04.9 SQUAMOUS CELL CARCINOMA IN SITU OF SKIN: ICD-10-CM

## 2017-12-15 DIAGNOSIS — E66.01 SEVERE OBESITY (BMI 35.0-39.9) WITH COMORBIDITY: ICD-10-CM

## 2017-12-15 PROCEDURE — 99214 OFFICE O/P EST MOD 30 MIN: CPT | Mod: PBBFAC,PN | Performed by: NURSE PRACTITIONER

## 2017-12-15 PROCEDURE — G0439 PPPS, SUBSEQ VISIT: HCPCS | Mod: ,,, | Performed by: NURSE PRACTITIONER

## 2017-12-15 PROCEDURE — 99999 PR PBB SHADOW E&M-EST. PATIENT-LVL IV: CPT | Mod: PBBFAC,,, | Performed by: NURSE PRACTITIONER

## 2017-12-15 RX ORDER — METOPROLOL TARTRATE 100 MG/1
100 TABLET ORAL 2 TIMES DAILY
Qty: 180 TABLET | Refills: 1 | Status: SHIPPED | OUTPATIENT
Start: 2017-12-15 | End: 2018-06-07 | Stop reason: SDUPTHER

## 2017-12-15 RX ORDER — LISINOPRIL 40 MG/1
40 TABLET ORAL DAILY
Qty: 90 TABLET | Refills: 1 | Status: SHIPPED | OUTPATIENT
Start: 2017-12-15 | End: 2018-06-07 | Stop reason: SDUPTHER

## 2017-12-15 RX ORDER — ROSUVASTATIN CALCIUM 20 MG/1
20 TABLET, COATED ORAL DAILY
Qty: 90 TABLET | Refills: 1 | Status: SHIPPED | OUTPATIENT
Start: 2017-12-15 | End: 2018-06-07 | Stop reason: SDUPTHER

## 2017-12-15 NOTE — PROGRESS NOTES
"Subjective:       Patient ID: Hussein Steinberg is a 67 y.o. male.    Chief Complaint: Annual Exam (wellness)    Patient is a 67 year old white male here today for Medicare Annual Wellness Visit with fasting lab results.    ###SEE FLOWSHEETS - Health Risk Assessment, Depression Screen and ADLs completed####    Patient has Hypertension that is controlled on Lisinopril, Amlodipine, Metoprolol and Maxzide at present doses.  /84 (BP Location: Right arm, Patient Position: Sitting, BP Method: Medium (Manual))   Pulse 60   Temp 98.4 °F (36.9 °C) (Oral)   Ht 5' 11" (1.803 m)   Wt 119.9 kg (264 lb 5.3 oz)   SpO2 97%   BMI 36.87 kg/m²     Patient has Hyperlipidemia.  At last visit, I changed patient to Crestor due to LDL > 100 on Pravastatin - cholesterol levels are much improved - see results below.    Patient has LV Dysfunction, Dilated Cardiomyopathy, LBBB, A-Fib, Automatic implantable Cardioverter-Defibrillator that are all followed by [facility] and up to date with exam in June 2017.    Patient has a history of Prostate Cancer and is followed by Urologist, Dr. Rasmussen.    Patient reports he had a Squamous Cell Carcinoma removed from left arm in October 2017 that is being followed by Dermatologist.    Wellness Labs:  -  CBC WNL  -  CMP within acceptable range.  -  Cholesterol improved on Crestor.    Health Maintenance:  - Up to date.      Component      Latest Ref Rng & Units 12/11/2017 6/6/2017 1/25/2017   WBC      3.90 - 12.70 K/uL 9.24     RBC      4.60 - 6.20 M/uL 5.53     Hemoglobin      14.0 - 18.0 g/dL 15.6     Hematocrit      40.0 - 54.0 % 48.1     MCV      82 - 98 fL 87     MCH      27.0 - 31.0 pg 28.2     MCHC      32.0 - 36.0 g/dL 32.4     RDW      11.5 - 14.5 % 12.7     Platelets      150 - 350 K/uL 240     MPV      9.2 - 12.9 fL 10.4     Gran #      1.8 - 7.7 K/uL 5.5     Lymph #      1.0 - 4.8 K/uL 2.6     Mono #      0.3 - 1.0 K/uL 0.7     Eos #      0.0 - 0.5 K/uL 0.4     Baso #      " 0.00 - 0.20 K/uL 0.05     Gran%      38.0 - 73.0 % 60.0     Lymph%      18.0 - 48.0 % 27.7     Mono%      4.0 - 15.0 % 7.6     Eosinophil%      0.0 - 8.0 % 4.1     Basophil%      0.0 - 1.9 % 0.5     Differential Method       Automated     Glucose      70 - 110 mg/dL 101 91 90   BUN, Bld      8 - 23 mg/dL 13 20 19   Creatinine      0.5 - 1.4 mg/dL 1.1 1.07 1.15   eGFR if non African American      >60 mL/min/1.73 m:2 >60 71 66   eGFR if African American      >60 mL/min/1.73 m:2 >60 83 76   BUN/Creatinine Ratio      6 - 22 (calc)  NOT APPLICABLE NOT APPLICABLE   Sodium      136 - 145 mmol/L 143 141 139   Potassium      3.5 - 5.1 mmol/L 4.0 4.1 3.9   Chloride      95 - 110 mmol/L 102 102 101   CO2      23 - 29 mmol/L 33 (H) 30 28   Calcium      8.7 - 10.5 mg/dL 9.0 9.6 9.3   Total Protein      6.0 - 8.4 g/dL 7.1 6.9 6.8   Albumin      3.5 - 5.2 g/dL 3.8 4.5 4.3   Globulin, Total      1.9 - 3.7 g/dL (calc)  2.4 2.5   Albumin/Globulin Ratio      1.0 - 2.5 (calc)  1.9 1.7   Total Bilirubin      0.1 - 1.0 mg/dL 0.4 0.7 0.5   Alkaline Phosphatase      55 - 135 U/L 105 94 83   AST      10 - 40 U/L 21 17 14   ALT      10 - 44 U/L 20 18 16   Anion Gap      8 - 16 mmol/L 8     Cholesterol      120 - 199 mg/dL 153 183 196   Triglycerides      30 - 150 mg/dL 107 116 120   HDL      40 - 75 mg/dL 43 46 46   LDL Cholesterol      63.0 - 159.0 mg/dL 88.6 114 126   HDL/Chol Ratio      20.0 - 50.0 % 28.1 4.0 4.3   Total Cholesterol/HDL Ratio      2.0 - 5.0 3.6     Non-HDL Cholesterol      mg/dL 110     Non HDL Chol. (LDL+VLDL)      mg/dL (calc)  137 150     Previous Medications    ALBUTEROL 90 MCG/ACTUATION INHALER    Inhale 2 puffs into the lungs every 6 (six) hours as needed for Wheezing. Rescue    AMIODARONE (PACERONE) 200 MG TAB    TAKE 1 TABLET EVERY DAY SKIP WEDNESDAY AND SUNDAY    AMLODIPINE (NORVASC) 5 MG TABLET    TAKE 1 TABLET BY MOUTH EVERY DAY    DABIGATRAN ETEXILATE (PRADAXA) 150 MG CAP    TAKE 1 TABLET TWICE A DAY     LISINOPRIL (PRINIVIL,ZESTRIL) 40 MG TABLET    Take 1 tablet (40 mg total) by mouth once daily.    METOPROLOL TARTRATE (LOPRESSOR) 100 MG TABLET    TAKE 1 TABLET BY MOUTH TWICE A DAY    NITROGLYCERIN (NITROSTAT) 0.4 MG SL TABLET    Place under the tongue. 1 tablet, sublingual Sublingual .  take up to 3 tablets 5 minutes apart for chest pain    ROSUVASTATIN (CRESTOR) 20 MG TABLET    Take 1 tablet (20 mg total) by mouth once daily.    TRIAMTERENE-HYDROCHLOROTHIAZIDE 37.5-25 MG (MAXZIDE-25) 37.5-25 MG PER TABLET    TAKE 1 TABLET BY MOUTH ONCE DAILY.       Past Medical History:   Diagnosis Date    Atrial fibrillation     Cardiac arrest 2012    has pacemaker/defibrillator placed 3/19/13 - followed by [facility]    Cataract     DCM (dilated cardiomyopathy) 6/15/2017    High cholesterol     Hypertension     Kidney stone 10/2013    LBBB (left bundle branch block) 10/10/2012    LV dysfunction 10/10/2012    Prostate cancer     Treated by Dr. Rasmussen    Squamous cell carcinoma in situ of skin 2017    left arm removed    Stroke 09/2012    + hemorrhagic infarct to right occipital lobe after started on Plavix following cardiac event    SVT (supraventricular tachycardia) 10/10/2012       Past Surgical History:   Procedure Laterality Date    arthroscopic  knee    CARDIAC PACEMAKER PLACEMENT  2013    COLONOSCOPY  2/12/2016    + polyp - repeat in 5 years- Dr. Calles    PROSTATECTOMY      SKIN CANCER EXCISION Left 10/2017    squamous cell carcinoma removed from left arm       Family History   Problem Relation Age of Onset    Macular degeneration Mother     Cataracts Mother     Hypertension Mother     Glaucoma Mother     Heart disease Mother      09/2017 passed  age 95 of heart attack    Cancer Father 56     leukemia    Heart attack Brother 59     had stent placed    Heart disease Brother     Heart attack Maternal Grandfather     Heart attack Paternal Grandfather     No Known Problems Sister   "   Parkinsonism Brother 54    No Known Problems Brother     No Known Problems Daughter     No Known Problems Daughter     Amblyopia Neg Hx     Blindness Neg Hx     Diabetes Neg Hx     Retinal detachment Neg Hx     Strabismus Neg Hx     Stroke Neg Hx     Thyroid disease Neg Hx        Social History     Social History    Marital status:      Spouse name: N/A    Number of children: N/A    Years of education: N/A     Occupational History    administrative position      Social History Main Topics    Smoking status: Never Smoker    Smokeless tobacco: Never Used    Alcohol use No    Drug use: No    Sexual activity: Yes     Partners: Female     Other Topics Concern    None     Social History Narrative    None       Review of Systems   Constitutional: Positive for unexpected weight change. Negative for activity change.   HENT: Negative for hearing loss and rhinorrhea.    Eyes: Negative for discharge and visual disturbance.   Respiratory: Positive for cough and wheezing. Negative for chest tightness.         Reports he is getting over an URI/bronchitis and just has lingering cough   Cardiovascular: Negative for chest pain and palpitations.   Gastrointestinal: Negative for blood in stool, constipation, diarrhea and vomiting.   Endocrine: Negative for polydipsia and polyuria.   Genitourinary: Negative for difficulty urinating, hematuria and urgency.   Musculoskeletal: Negative for arthralgias, joint swelling and neck pain.   Neurological: Negative for weakness and headaches.   Psychiatric/Behavioral: Negative for dysphoric mood.         Objective:     Vitals:    12/15/17 0805   BP: 132/84   BP Location: Right arm   Patient Position: Sitting   BP Method: Medium (Manual)   Pulse: 60   Temp: 98.4 °F (36.9 °C)   TempSrc: Oral   SpO2: 97%   Weight: 119.9 kg (264 lb 5.3 oz)   Height: 5' 11" (1.803 m)          Physical Exam   Constitutional: He is oriented to person, place, and time. He appears " well-developed and well-nourished. No distress.   + obesity with Body mass index is 36.87 kg/m².         HENT:   Head: Normocephalic and atraumatic.   Right Ear: External ear normal.   Left Ear: External ear normal.   Nose: Nose normal.   Mouth/Throat: Oropharynx is clear and moist. No oropharyngeal exudate.   Eyes: EOM are normal. Pupils are equal, round, and reactive to light.   Neck: Normal range of motion. Neck supple. No JVD present. No tracheal deviation present. No thyromegaly present.   Cardiovascular: Normal rate, regular rhythm, normal heart sounds and intact distal pulses.    Cardioverter-Defibrillator to left chest wall   Pulmonary/Chest: Effort normal and breath sounds normal. No stridor. No respiratory distress.   Abdominal: Soft. Bowel sounds are normal. He exhibits no distension. There is no guarding.   Musculoskeletal: Normal range of motion. He exhibits no edema.   Lymphadenopathy:     He has no cervical adenopathy.   Neurological: He is alert and oriented to person, place, and time. Coordination normal.   Skin: Skin is warm and dry. No rash noted. He is not diaphoretic.   Psychiatric: He has a normal mood and affect. His behavior is normal.         Assessment:         ICD-10-CM ICD-9-CM   1. Medicare annual wellness visit, subsequent Z00.00 V70.0   2. Squamous cell carcinoma in situ of skin D04.9 232.9   3. Essential hypertension, benign I10 401.1   4. Lipid disorder E78.9 272.9   5. DCM (dilated cardiomyopathy) I42.0 425.4   6. ICD (implantable cardioverter-defibrillator), biventricular, in situ Z95.810 V45.02   7. History of prostate cancer Z85.46 V10.46   8. Severe obesity (BMI 35.0-39.9) with comorbidity E66.01 278.01       Plan:       Medicare annual wellness visit, subsequent  Health Maintenance Summary     Colonoscopy Next Due 2/12/2021     Done 2/12/2016 2mm polyp - Dr. Calles   Lipid Panel Next Due 12/11/2022     Done 12/11/2017 LIPID PANEL    Done 6/6/2017 LIPID PANEL    Done  1/25/2017 LIPID PANEL    Done 6/21/2016 LIPID PANEL    Done 11/3/2015 LIPID PANEL     Patient has more history with this topic...   TETANUS VACCINE Next Due 6/23/2026     Done 6/23/2016 Imm Admin: Tdap   Zoster Vaccine Completed     Done 11/6/2015 done at [facility]   Hepatitis C Screening Completed     Done 6/21/2016 HEPATITIS C ANTIBODY   Pneumococcal (65+) Completed     Done 12/5/2016 Imm Admin: Pneumococcal Polysaccharide - 23 Valent    Done 11/6/2015 Imm Admin: Pneumococcal Conjugate - 13 Valent   Influenza Vaccine Completed     Done 11/22/2017 Imm Admin: Influenza - Trivalent - PF (ADULT)    Done 12/5/2016 Imm Admin: Influenza - High Dose    Done 10/29/2015 Imm Admin: Influenza Whole    Done 10/27/2015 Imm Admin: Influenza - High Dose    Done 9/30/2014 Imm Admin: Influenza    Patient has more history with this topic...         Squamous cell carcinoma in situ of skin  -  Removed from left arm in October 2017 - followed by dermatology.    Essential hypertension, benign  -  Controlled on Amlodipine, Lisinopril, Metoprolol and Maxzide at present doses.  -     lisinopril (PRINIVIL,ZESTRIL) 40 MG tablet; Take 1 tablet (40 mg total) by mouth once daily.  Dispense: 90 tablet; Refill: 1  -     metoprolol tartrate (LOPRESSOR) 100 MG tablet; Take 1 tablet (100 mg total) by mouth 2 (two) times daily.  Dispense: 180 tablet; Refill: 1    Lipid disorder  -  Improved on Crestor at present dose - recheck in 6 months.  -     rosuvastatin (CRESTOR) 20 MG tablet; Take 1 tablet (20 mg total) by mouth once daily.  Dispense: 90 tablet; Refill: 1    DCM (dilated cardiomyopathy)  -  Followed by Cardiology    ICD (implantable cardioverter-defibrillator), biventricular, in situ  -  Checked every 6 months - has appointment in March 2018 for check.    History of prostate cancer  -  Followed by Dr. Rasmussen    Severe obesity (BMI 35.0-39.9) with comorbidity  -  Advised on lifestyle modifications.      Return in about 6 months (around  6/15/2018) for cholesterol and blood pressure check.     Patient's Medications   New Prescriptions    No medications on file   Previous Medications    ALBUTEROL 90 MCG/ACTUATION INHALER    Inhale 2 puffs into the lungs every 6 (six) hours as needed for Wheezing. Rescue    AMIODARONE (PACERONE) 200 MG TAB    TAKE 1 TABLET EVERY DAY SKIP WEDNESDAY AND SUNDAY    AMLODIPINE (NORVASC) 5 MG TABLET    TAKE 1 TABLET BY MOUTH EVERY DAY    DABIGATRAN ETEXILATE (PRADAXA) 150 MG CAP    TAKE 1 TABLET TWICE A DAY    NITROGLYCERIN (NITROSTAT) 0.4 MG SL TABLET    Place under the tongue. 1 tablet, sublingual Sublingual .  take up to 3 tablets 5 minutes apart for chest pain    TRIAMTERENE-HYDROCHLOROTHIAZIDE 37.5-25 MG (MAXZIDE-25) 37.5-25 MG PER TABLET    TAKE 1 TABLET BY MOUTH ONCE DAILY.   Modified Medications    Modified Medication Previous Medication    LISINOPRIL (PRINIVIL,ZESTRIL) 40 MG TABLET lisinopril (PRINIVIL,ZESTRIL) 40 MG tablet       Take 1 tablet (40 mg total) by mouth once daily.    Take 1 tablet (40 mg total) by mouth once daily.    METOPROLOL TARTRATE (LOPRESSOR) 100 MG TABLET metoprolol tartrate (LOPRESSOR) 100 MG tablet       Take 1 tablet (100 mg total) by mouth 2 (two) times daily.    TAKE 1 TABLET BY MOUTH TWICE A DAY    ROSUVASTATIN (CRESTOR) 20 MG TABLET rosuvastatin (CRESTOR) 20 MG tablet       Take 1 tablet (20 mg total) by mouth once daily.    Take 1 tablet (20 mg total) by mouth once daily.   Discontinued Medications    PROMETHAZINE-CODEINE 6.25-10 MG/5 ML (PHENERGAN WITH CODEINE) 6.25-10 MG/5 ML SYRUP    Take 1 to 2 teaspoons at night as needed for cough/congestion

## 2017-12-17 DIAGNOSIS — E78.9 LIPID DISORDER: ICD-10-CM

## 2017-12-17 DIAGNOSIS — I10 ESSENTIAL HYPERTENSION, BENIGN: ICD-10-CM

## 2017-12-18 RX ORDER — LISINOPRIL 40 MG/1
TABLET ORAL
Qty: 90 TABLET | Refills: 1 | OUTPATIENT
Start: 2017-12-18

## 2017-12-18 RX ORDER — ROSUVASTATIN CALCIUM 20 MG/1
20 TABLET, COATED ORAL DAILY
Qty: 90 TABLET | Refills: 1 | OUTPATIENT
Start: 2017-12-18 | End: 2018-12-18

## 2018-01-10 RX ORDER — AMIODARONE HYDROCHLORIDE 200 MG/1
TABLET ORAL
Qty: 30 TABLET | Refills: 2 | Status: SHIPPED | OUTPATIENT
Start: 2018-01-10 | End: 2018-04-18 | Stop reason: SDUPTHER

## 2018-01-24 ENCOUNTER — PATIENT MESSAGE (OUTPATIENT)
Dept: FAMILY MEDICINE | Facility: CLINIC | Age: 68
End: 2018-01-24

## 2018-02-09 DIAGNOSIS — I48.19 PERSISTENT ATRIAL FIBRILLATION: ICD-10-CM

## 2018-02-09 RX ORDER — DABIGATRAN ETEXILATE MESYLATE 150 MG/1
CAPSULE ORAL
Qty: 180 CAPSULE | Refills: 3 | Status: SHIPPED | OUTPATIENT
Start: 2018-02-09 | End: 2019-02-06 | Stop reason: SDUPTHER

## 2018-02-16 DIAGNOSIS — I10 ESSENTIAL HYPERTENSION, BENIGN: ICD-10-CM

## 2018-02-16 RX ORDER — AMLODIPINE BESYLATE 5 MG/1
TABLET ORAL
Qty: 90 TABLET | Refills: 1 | Status: SHIPPED | OUTPATIENT
Start: 2018-02-16 | End: 2018-05-28 | Stop reason: SDUPTHER

## 2018-02-16 RX ORDER — TRIAMTERENE/HYDROCHLOROTHIAZID 37.5-25 MG
TABLET ORAL
Qty: 90 TABLET | Refills: 1 | Status: SHIPPED | OUTPATIENT
Start: 2018-02-16 | End: 2018-08-10 | Stop reason: SDUPTHER

## 2018-02-20 ENCOUNTER — OFFICE VISIT (OUTPATIENT)
Dept: OPTOMETRY | Facility: CLINIC | Age: 68
End: 2018-02-20
Payer: MEDICARE

## 2018-02-20 DIAGNOSIS — H18.003 VORTEX KERATOPATHY, BILATERAL: ICD-10-CM

## 2018-02-20 DIAGNOSIS — Z13.5 SCREENING FOR EYE CONDITION: ICD-10-CM

## 2018-02-20 DIAGNOSIS — H25.13 NUCLEAR SCLEROSIS, BILATERAL: Primary | ICD-10-CM

## 2018-02-20 DIAGNOSIS — H52.4 PRESBYOPIA OF BOTH EYES: ICD-10-CM

## 2018-02-20 DIAGNOSIS — Z98.890 HISTORY OF REFRACTIVE SURGERY: ICD-10-CM

## 2018-02-20 DIAGNOSIS — I10 ESSENTIAL HYPERTENSION: ICD-10-CM

## 2018-02-20 DIAGNOSIS — H52.7 REFRACTIVE ERRORS: ICD-10-CM

## 2018-02-20 PROCEDURE — 99999 PR PBB SHADOW E&M-EST. PATIENT-LVL II: CPT | Mod: PBBFAC,,, | Performed by: OPTOMETRIST

## 2018-02-20 PROCEDURE — 99212 OFFICE O/P EST SF 10 MIN: CPT | Mod: PBBFAC | Performed by: OPTOMETRIST

## 2018-02-20 PROCEDURE — 92014 COMPRE OPH EXAM EST PT 1/>: CPT | Mod: S$PBB,,, | Performed by: OPTOMETRIST

## 2018-02-20 PROCEDURE — 92015 DETERMINE REFRACTIVE STATE: CPT | Mod: ,,, | Performed by: OPTOMETRIST

## 2018-02-20 NOTE — PATIENT INSTRUCTIONS
S/P RK refractive surgery in each eye.  Early nuclear sclerotic lens changes in both eyes.  Few vitreous floaters in both eyes.  No evidence of retinal etiology.   Hyperopia with astigmatism in each eye, with very satisfactory correctable visual acuity in each eye.  Presbyopia.  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   New spectacle lens Rx issued for full-time wear.  Recheck in one year.

## 2018-02-20 NOTE — PROGRESS NOTES
HPI     Concerns About Ocular Health    Additional comments: Eye exam - general eye examination and refraction.            Comments   Patient's age: 67 y.o. WM  Occupation:   [employer]  Approximate date of last eye examination: 02/17/2017  Name of last eye doctor seen: Dr Esqueda  City/State: [facility]  Wears glasses? Yes     If yes, wears  Full-time or part-time?  Full-time  Present glasses are: Bifocal, SV Distance, SV Reading?  Progressive lens  Approximate age of present glasses:  3+ years old   Got new glasses following last exam, or subsequently?:  No   Any problem with VA with glasses?  No  Wears CLs?:  No  Headaches?  No  Eye pain/discomfort?  No                                                                                     Flashes?  No  Floaters?  No  Diplopia/Double vision?  No  Patient's Ocular History:         Any eye surgeries? No         Any eye injury?  No         Any treatment for eye disease?  No  Family history of eye disease?    Mother + Macular Degeneration    + Glaucoma    + Cataracts  Significant patient medical history:         1. Diabetes?  No   2. HBP?  Yes, controlled by medication and diet              3. Other (describe):  Heart attack and stroke Sept, 2012   -pacemaker, prostate cancer    ! OTC eyedrops currently using:  None   ! Prescription eye meds currently using:  None   ! Any history of allergy/adverse reaction to any eye meds used   previously?  No    ! Any history of allergy/adverse reaction to eyedrops used during prior   eye exam(s)? No    ! Any history of allergy/adverse reaction to Novacaine or similar meds?   No   ! Any history of allergy/adverse reaction to Epinephrine or similar meds?   No    ! Patient okay with use of anesthetic eyedrops to check eye pressure?    Yes        ! Patient okay with use of eyedrops to dilate pupils today?  Yes   !  Allergies/Medications/Medical History/Family History reviewed today?    Yes      PD =    "64/60  Desired reading distance =  19"                                                                     Last edited by Ray Esqueda, OD on 2/20/2018  2:51 PM. (History)            Assessment /Plan     For exam results, see Encounter Report.    1. Nuclear sclerosis, bilateral     2. Vortex keratopathy, bilateral     3. Essential hypertension     4. Screening for eye condition     5. History of refractive surgery     6. Refractive errors     7. Presbyopia of both eyes                    S/P RK refractive surgery in each eye.  Early nuclear sclerotic lens changes in both eyes.  Few vitreous floaters in both eyes.  No evidence of retinal etiology.   Hyperopia with astigmatism in each eye, with very satisfactory correctable visual acuity in each eye.  Presbyopia.  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   New spectacle lens Rx issued for full-time wear.  Recheck in one year.             "

## 2018-03-06 ENCOUNTER — CLINICAL SUPPORT (OUTPATIENT)
Dept: ELECTROPHYSIOLOGY | Facility: CLINIC | Age: 68
End: 2018-03-06
Payer: MEDICARE

## 2018-03-06 DIAGNOSIS — I48.19 PERSISTENT ATRIAL FIBRILLATION: ICD-10-CM

## 2018-03-06 DIAGNOSIS — Z95.810 IMPLANTABLE CARDIOVERTER-DEFIBRILLATOR (ICD) IN SITU: ICD-10-CM

## 2018-03-06 PROCEDURE — 93284 PRGRMG EVAL IMPLANTABLE DFB: CPT | Mod: PBBFAC | Performed by: INTERNAL MEDICINE

## 2018-04-18 RX ORDER — AMIODARONE HYDROCHLORIDE 200 MG/1
TABLET ORAL
Qty: 30 TABLET | Refills: 2 | Status: SHIPPED | OUTPATIENT
Start: 2018-04-18 | End: 2018-07-11 | Stop reason: SDUPTHER

## 2018-04-19 ENCOUNTER — PATIENT MESSAGE (OUTPATIENT)
Dept: GASTROENTEROLOGY | Facility: CLINIC | Age: 68
End: 2018-04-19

## 2018-05-01 ENCOUNTER — HOSPITAL ENCOUNTER (OUTPATIENT)
Dept: PULMONOLOGY | Facility: CLINIC | Age: 68
Discharge: HOME | End: 2018-05-01
Payer: MEDICARE

## 2018-05-01 ENCOUNTER — HOSPITAL ENCOUNTER (OUTPATIENT)
Dept: CARDIOLOGY | Facility: CLINIC | Age: 68
Discharge: HOME | End: 2018-05-01
Attending: INTERNAL MEDICINE
Payer: MEDICARE

## 2018-05-01 DIAGNOSIS — Z91.89 AT RISK FOR AMIODARONE TOXICITY WITH LONG TERM USE: ICD-10-CM

## 2018-05-01 DIAGNOSIS — I44.7 LBBB (LEFT BUNDLE BRANCH BLOCK): ICD-10-CM

## 2018-05-01 DIAGNOSIS — Z95.810 AUTOMATIC IMPLANTABLE CARDIOVERTER-DEFIBRILLATOR IN SITU: ICD-10-CM

## 2018-05-01 DIAGNOSIS — Z79.899 AT RISK FOR AMIODARONE TOXICITY WITH LONG TERM USE: ICD-10-CM

## 2018-05-01 DIAGNOSIS — I42.0 DCM (DILATED CARDIOMYOPATHY): ICD-10-CM

## 2018-05-01 LAB
DIASTOLIC DYSFUNCTION: NO
ESTIMATED PA SYSTOLIC PRESSURE: 31.94
MITRAL VALVE REGURGITATION: NORMAL
PRE FEV1 FVC: 80
PRE FEV1: 3.28
PRE FVC: 4.1
PREDICTED FEV1 FVC: 78
PREDICTED FEV1: 3.51
PREDICTED FVC: 4.4
RETIRED EF AND QEF - SEE NOTES: 60 (ref 55–65)
TRICUSPID VALVE REGURGITATION: NORMAL

## 2018-05-01 PROCEDURE — 94010 BREATHING CAPACITY TEST: CPT | Mod: PBBFAC | Performed by: INTERNAL MEDICINE

## 2018-05-01 PROCEDURE — 93306 TTE W/DOPPLER COMPLETE: CPT | Mod: PBBFAC | Performed by: INTERNAL MEDICINE

## 2018-05-01 PROCEDURE — 94729 DIFFUSING CAPACITY: CPT | Mod: 26,S$PBB,, | Performed by: INTERNAL MEDICINE

## 2018-05-01 PROCEDURE — 94010 BREATHING CAPACITY TEST: CPT | Mod: 26,S$PBB,, | Performed by: INTERNAL MEDICINE

## 2018-05-01 PROCEDURE — 94729 DIFFUSING CAPACITY: CPT | Mod: PBBFAC | Performed by: INTERNAL MEDICINE

## 2018-05-28 DIAGNOSIS — E78.9 LIPID DISORDER: Primary | ICD-10-CM

## 2018-05-28 DIAGNOSIS — I10 ESSENTIAL HYPERTENSION, BENIGN: ICD-10-CM

## 2018-05-29 RX ORDER — AMLODIPINE BESYLATE 5 MG/1
TABLET ORAL
Qty: 90 TABLET | Refills: 0 | Status: SHIPPED | OUTPATIENT
Start: 2018-05-29 | End: 2018-06-19 | Stop reason: SDUPTHER

## 2018-05-29 NOTE — TELEPHONE ENCOUNTER
Advise patient he is due for 6 month fasting labs and then office visit to check blood pressure and cholesterol - schedule appointments

## 2018-05-31 ENCOUNTER — TELEPHONE (OUTPATIENT)
Dept: FAMILY MEDICINE | Facility: CLINIC | Age: 68
End: 2018-05-31

## 2018-05-31 NOTE — TELEPHONE ENCOUNTER
Pt would like his labs sent to [facility] going on Tuesday morning.pt schedule follow up appointment.

## 2018-05-31 NOTE — TELEPHONE ENCOUNTER
----- Message from Dayanna Jenkins sent at 5/30/2018  5:11 PM CDT -----  Contact: Self / [phone]  Patient is requesting a call back regarding, a letter he received in the mail in regards to his medication. Please advise

## 2018-06-06 LAB
ALBUMIN SERPL-MCNC: 4.5 G/DL (ref 3.6–5.1)
ALBUMIN/GLOB SERPL: 2 (CALC) (ref 1–2.5)
ALP SERPL-CCNC: 94 U/L (ref 40–115)
ALT SERPL-CCNC: 18 U/L (ref 9–46)
AST SERPL-CCNC: 16 U/L (ref 10–35)
BILIRUB SERPL-MCNC: 0.6 MG/DL (ref 0.2–1.2)
BUN SERPL-MCNC: 13 MG/DL (ref 7–25)
BUN/CREAT SERPL: ABNORMAL (CALC) (ref 6–22)
CALCIUM SERPL-MCNC: 9.5 MG/DL (ref 8.6–10.3)
CHLORIDE SERPL-SCNC: 101 MMOL/L (ref 98–110)
CHOLEST SERPL-MCNC: 150 MG/DL
CHOLEST/HDLC SERPL: 3.3 (CALC)
CO2 SERPL-SCNC: 34 MMOL/L (ref 20–31)
CREAT SERPL-MCNC: 1 MG/DL (ref 0.7–1.25)
GFR SERPL CREATININE-BSD FRML MDRD: 77 ML/MIN/1.73M2
GLOBULIN SER CALC-MCNC: 2.3 G/DL (CALC) (ref 1.9–3.7)
GLUCOSE SERPL-MCNC: 95 MG/DL (ref 65–99)
HDLC SERPL-MCNC: 45 MG/DL
LDLC SERPL CALC-MCNC: 85 MG/DL (CALC)
NONHDLC SERPL-MCNC: 105 MG/DL (CALC)
POTASSIUM SERPL-SCNC: 3.9 MMOL/L (ref 3.5–5.3)
PROT SERPL-MCNC: 6.8 G/DL (ref 6.1–8.1)
SODIUM SERPL-SCNC: 142 MMOL/L (ref 135–146)
TRIGL SERPL-MCNC: 103 MG/DL

## 2018-06-07 DIAGNOSIS — E78.9 LIPID DISORDER: ICD-10-CM

## 2018-06-07 DIAGNOSIS — I10 ESSENTIAL HYPERTENSION, BENIGN: ICD-10-CM

## 2018-06-08 RX ORDER — ROSUVASTATIN CALCIUM 20 MG/1
20 TABLET, COATED ORAL DAILY
Qty: 90 TABLET | Refills: 0 | Status: SHIPPED | OUTPATIENT
Start: 2018-06-08 | End: 2018-09-04 | Stop reason: SDUPTHER

## 2018-06-08 RX ORDER — LISINOPRIL 40 MG/1
40 TABLET ORAL DAILY
Qty: 90 TABLET | Refills: 0 | Status: SHIPPED | OUTPATIENT
Start: 2018-06-08 | End: 2018-09-04 | Stop reason: SDUPTHER

## 2018-06-08 RX ORDER — METOPROLOL TARTRATE 100 MG/1
100 TABLET ORAL 2 TIMES DAILY
Qty: 180 TABLET | Refills: 0 | Status: SHIPPED | OUTPATIENT
Start: 2018-06-08 | End: 2018-09-04 | Stop reason: SDUPTHER

## 2018-06-14 DIAGNOSIS — I42.0 DILATED CARDIOMYOPATHY: ICD-10-CM

## 2018-06-14 DIAGNOSIS — Z95.810 ICD (IMPLANTABLE CARDIOVERTER-DEFIBRILLATOR) IN PLACE: Primary | ICD-10-CM

## 2018-06-15 ENCOUNTER — CLINICAL SUPPORT (OUTPATIENT)
Dept: ELECTROPHYSIOLOGY | Facility: CLINIC | Age: 68
End: 2018-06-15
Payer: MEDICARE

## 2018-06-15 DIAGNOSIS — I42.0 DILATED CARDIOMYOPATHY: ICD-10-CM

## 2018-06-15 DIAGNOSIS — Z95.810 ICD (IMPLANTABLE CARDIOVERTER-DEFIBRILLATOR) IN PLACE: ICD-10-CM

## 2018-06-15 PROCEDURE — 93284 PRGRMG EVAL IMPLANTABLE DFB: CPT | Mod: PBBFAC | Performed by: INTERNAL MEDICINE

## 2018-06-19 ENCOUNTER — OFFICE VISIT (OUTPATIENT)
Dept: FAMILY MEDICINE | Facility: CLINIC | Age: 68
End: 2018-06-19
Payer: MEDICARE

## 2018-06-19 VITALS
DIASTOLIC BLOOD PRESSURE: 98 MMHG | HEART RATE: 68 BPM | OXYGEN SATURATION: 97 % | TEMPERATURE: 99 F | SYSTOLIC BLOOD PRESSURE: 150 MMHG | HEIGHT: 71 IN | WEIGHT: 260.25 LBS | BODY MASS INDEX: 36.44 KG/M2

## 2018-06-19 DIAGNOSIS — I42.0 DCM (DILATED CARDIOMYOPATHY): ICD-10-CM

## 2018-06-19 DIAGNOSIS — Z85.46 HISTORY OF PROSTATE CANCER: ICD-10-CM

## 2018-06-19 DIAGNOSIS — Z95.810 AUTOMATIC IMPLANTABLE CARDIOVERTER-DEFIBRILLATOR IN SITU: ICD-10-CM

## 2018-06-19 DIAGNOSIS — E78.5 HYPERLIPIDEMIA, UNSPECIFIED HYPERLIPIDEMIA TYPE: ICD-10-CM

## 2018-06-19 DIAGNOSIS — I10 ESSENTIAL HYPERTENSION: Primary | ICD-10-CM

## 2018-06-19 PROCEDURE — 99214 OFFICE O/P EST MOD 30 MIN: CPT | Mod: S$PBB,,, | Performed by: NURSE PRACTITIONER

## 2018-06-19 PROCEDURE — 99214 OFFICE O/P EST MOD 30 MIN: CPT | Mod: PBBFAC,PN | Performed by: NURSE PRACTITIONER

## 2018-06-19 PROCEDURE — 99999 PR PBB SHADOW E&M-EST. PATIENT-LVL IV: CPT | Mod: PBBFAC,,, | Performed by: NURSE PRACTITIONER

## 2018-06-19 RX ORDER — AMLODIPINE BESYLATE 10 MG/1
10 TABLET ORAL DAILY
Qty: 30 TABLET | Refills: 0 | Status: SHIPPED | OUTPATIENT
Start: 2018-06-19 | End: 2018-07-17 | Stop reason: SDUPTHER

## 2018-06-19 NOTE — PROGRESS NOTES
"Subjective:       Patient ID: Hussein Steinberg is a 68 y.o. male.    Chief Complaint: Follow-up (lab results)    Patient is a 68-year-old white male with hypertension, hyperlipidemia, dilated cardiomyopathy, left ventricular dysfunction, left bundle branch block, A. fib, automatic implantable cardioverter-defibrillator followed by [facility], history of prostate cancer followed by urologist Dr. Rasmussen, history of Squamous cell carcinoma and basal cell carcinoma that was excised and followed by dermatology that is here today for 6 month follow-up with fasting lab results.    Patient has hypertension that is uncontrolled on lisinopril, amlodipine, metoprolol, and Maxide at present doses.  BP (!) 150/98   Pulse 68   Temp 98.6 °F (37 °C) (Oral)   Ht 5' 11" (1.803 m)   Wt 118.1 kg (260 lb 4.1 oz)   SpO2 97%   BMI 36.30 kg/m²     Patient has hyperlipidemia that is controlled on Crestor at present dose with an LDL of 85.    Patient has LV Dysfunction, Dilated Cardiomyopathy, LBBB, A-Fib, Automatic implantable Cardioverter-Defibrillator that are all followed by [facility].    Patient has a history of Prostate Cancer and is followed by Urologist, Dr. Rasmussen.     Patient reports he had a Squamous Cell Carcinoma removed from left arm and a Basal Cell Carcinoma removed from the nose that is followed by Dermatology.    Patient reports that due to his advancing age and multiple chronic problems with extensive cardiac history and several types of cancer present, he feels that he should establish care with a physician for his primary care provider and ask for my recommendations.  He reports he discussed with cardiologist and based on his significant medical history with other chronic problems, that it is his primary care provider that does an all around assessment and management of chronic conditions and that he should establish with a physician for care.        Component      Latest Ref Rng & Units 6/5/2018 " 12/11/2017 6/6/2017   Glucose      65 - 99 mg/dL 95 101 91   BUN, Bld      7 - 25 mg/dL 13 13 20   Creatinine      0.70 - 1.25 mg/dL 1.00 1.1 1.07   eGFR if non       > OR = 60 mL/min/1.73m2 77 >60 71   eGFR if       > OR = 60 mL/min/1.73m2 89 >60 83   BUN/Creatinine Ratio      6 - 22 (calc) NOT APPLICABLE  NOT APPLICABLE   Sodium      135 - 146 mmol/L 142 143 141   Potassium      3.5 - 5.3 mmol/L 3.9 4.0 4.1   Chloride      98 - 110 mmol/L 101 102 102   CO2      20 - 31 mmol/L 34 (H) 33 (H) 30   Calcium      8.6 - 10.3 mg/dL 9.5 9.0 9.6   Total Protein      6.1 - 8.1 g/dL 6.8 7.1 6.9   Albumin      3.6 - 5.1 g/dL 4.5 3.8 4.5   Globulin, Total      1.9 - 3.7 g/dL (calc) 2.3  2.4   Albumin/Globulin Ratio      1.0 - 2.5 (calc) 2.0  1.9   Total Bilirubin      0.2 - 1.2 mg/dL 0.6 0.4 0.7   Alkaline Phosphatase      40 - 115 U/L 94 105 94   AST      10 - 35 U/L 16 21 17   ALT      9 - 46 U/L 18 20 18   Anion Gap      8 - 16 mmol/L  8    Cholesterol      <200 mg/dL 150 153 183   Triglycerides      <150 mg/dL 103 107 116   HDL      >40 mg/dL 45 43 46   LDL Cholesterol      mg/dL (calc) 85 88.6 114   HDL/Chol Ratio      <5.0 (calc) 3.3 28.1 4.0   Total Cholesterol/HDL Ratio      2.0 - 5.0  3.6    Non-HDL Cholesterol      mg/dL  110    Non HDL Chol. (LDL+VLDL)      <130 mg/dL (calc) 105  137           Previous Medications    ALBUTEROL 90 MCG/ACTUATION INHALER    Inhale 2 puffs into the lungs every 6 (six) hours as needed for Wheezing. Rescue    AMIODARONE (PACERONE) 200 MG TAB    TAKE 1 TABLET EVERY DAY SKIP WEDNESDAY AND SUNDAY    AMLODIPINE (NORVASC) 5 MG TABLET    TAKE 1 TABLET BY MOUTH EVERY DAY    LISINOPRIL (PRINIVIL,ZESTRIL) 40 MG TABLET    TAKE 1 TABLET (40 MG TOTAL) BY MOUTH ONCE DAILY.    METOPROLOL TARTRATE (LOPRESSOR) 100 MG TABLET    TAKE 1 TABLET (100 MG TOTAL) BY MOUTH 2 (TWO) TIMES DAILY.    NITROGLYCERIN (NITROSTAT) 0.4 MG SL TABLET    Place under the tongue. 1 tablet,  sublingual Sublingual .  take up to 3 tablets 5 minutes apart for chest pain    PRADAXA 150 MG CAP    TAKE 1 TABLET TWICE A DAY    ROSUVASTATIN (CRESTOR) 20 MG TABLET    Take 1 tablet (20 mg total) by mouth once daily.    TRIAMTERENE-HYDROCHLOROTHIAZIDE 37.5-25 MG (MAXZIDE-25) 37.5-25 MG PER TABLET    TAKE 1 TABLET BY MOUTH ONCE DAILY.       Past Medical History:   Diagnosis Date    Atrial fibrillation     Cardiac arrest 2012    has pacemaker/defibrillator placed 3/19/13 - followed by [facility]    Cataract     DCM (dilated cardiomyopathy) 6/15/2017    High cholesterol     Hypertension     Kidney stone 10/2013    LBBB (left bundle branch block) 10/10/2012    LV dysfunction 10/10/2012    Prostate cancer     Treated by Dr. Rasmussen    Squamous cell carcinoma in situ of skin 2017    left arm removed    Stroke 09/2012    + hemorrhagic infarct to right occipital lobe after started on Plavix following cardiac event    SVT (supraventricular tachycardia) 10/10/2012       Past Surgical History:   Procedure Laterality Date    arthroscopic  knee    BASAL CELL CARCINOMA EXCISION  2018    removed from nose    CARDIAC PACEMAKER PLACEMENT  2013    COLONOSCOPY  2/12/2016    + polyp - repeat in 5 years- Dr. Calles    PROSTATECTOMY      SKIN CANCER EXCISION Left 10/2017    squamous cell carcinoma removed from left arm       Family History   Problem Relation Age of Onset    Macular degeneration Mother     Cataracts Mother     Hypertension Mother     Glaucoma Mother     Heart disease Mother         09/2017 passed  age 95 of heart attack    Cancer Father 56        leukemia    Heart attack Brother 59        had stent placed    Heart disease Brother     Heart attack Maternal Grandfather     Heart attack Paternal Grandfather     No Known Problems Sister     Parkinsonism Brother 54    No Known Problems Brother     No Known Problems Daughter     No Known Problems Daughter     Amblyopia Neg Hx      Blindness Neg Hx     Diabetes Neg Hx     Retinal detachment Neg Hx     Strabismus Neg Hx     Stroke Neg Hx     Thyroid disease Neg Hx        Social History     Social History    Marital status:      Spouse name: N/A    Number of children: N/A    Years of education: N/A     Occupational History    administrative position      Social History Main Topics    Smoking status: Never Smoker    Smokeless tobacco: Never Used    Alcohol use No    Drug use: No    Sexual activity: Yes     Partners: Female     Other Topics Concern    None     Social History Narrative    None       Review of Systems   Constitutional: Positive for unexpected weight change. Negative for activity change, appetite change, fatigue and fever.   HENT: Negative for congestion, ear pain, hearing loss, mouth sores, nosebleeds, postnasal drip, rhinorrhea, sinus pressure, sneezing, sore throat, trouble swallowing and voice change.    Eyes: Negative.  Negative for discharge and visual disturbance.   Respiratory: Negative for cough, chest tightness, shortness of breath and wheezing.    Cardiovascular: Negative for chest pain, palpitations and leg swelling.   Gastrointestinal: Negative.  Negative for abdominal pain, blood in stool, constipation, diarrhea, nausea and vomiting.   Endocrine: Negative.  Negative for polydipsia and polyuria.   Genitourinary: Negative for difficulty urinating, dysuria, flank pain, hematuria and urgency.   Musculoskeletal: Negative for arthralgias, back pain, gait problem, joint swelling, myalgias and neck pain.   Skin: Negative for color change, rash and wound.   Allergic/Immunologic: Negative for immunocompromised state.   Neurological: Negative for dizziness, tremors, seizures, syncope, speech difficulty, weakness and headaches.   Hematological: Negative for adenopathy. Does not bruise/bleed easily.   Psychiatric/Behavioral: Negative for behavioral problems, confusion, dysphoric mood, sleep disturbance and  "suicidal ideas. The patient is not nervous/anxious.          Objective:     Vitals:    06/19/18 1002   BP: (!) 150/100   BP Location: Right arm   Patient Position: Sitting   BP Method: Medium (Manual)   Pulse: 68   Temp: 98.6 °F (37 °C)   TempSrc: Oral   SpO2: 97%   Weight: 118.1 kg (260 lb 4.1 oz)   Height: 5' 11" (1.803 m)          Physical Exam   Constitutional: He is oriented to person, place, and time. He appears well-developed and well-nourished. No distress.   + obesity with Body mass index is 36.3 kg/m².   HENT:   Head: Normocephalic and atraumatic.   Right Ear: External ear normal.   Left Ear: External ear normal.   Nose: Nose normal.   Mouth/Throat: Oropharynx is clear and moist. No oropharyngeal exudate.   Eyes: EOM are normal. Pupils are equal, round, and reactive to light.   Neck: Normal range of motion. Neck supple. No JVD present. No tracheal deviation present. No thyromegaly present.   Cardiovascular: Normal rate, regular rhythm, normal heart sounds and intact distal pulses.    Cardioverter-Defibrillator to left chest wall   Pulmonary/Chest: Effort normal and breath sounds normal. No stridor. No respiratory distress.   Abdominal: Soft. Bowel sounds are normal. He exhibits no distension. There is no guarding.   Musculoskeletal: Normal range of motion. He exhibits edema.   Trace edema to BLE   Lymphadenopathy:     He has no cervical adenopathy.   Neurological: He is alert and oriented to person, place, and time. Coordination normal.   Skin: Skin is warm and dry. No rash noted. He is not diaphoretic.   Psychiatric: He has a normal mood and affect. His behavior is normal.         Assessment:         ICD-10-CM ICD-9-CM   1. Essential hypertension I10 401.9   2. Hyperlipidemia, unspecified hyperlipidemia type E78.5 272.4   3. DCM (dilated cardiomyopathy) I42.0 425.4   4. Automatic implantable cardioverter-defibrillator in situ Z95.810 V45.02   5. History of prostate cancer Z85.46 V10.46       Plan:     "   Essential hypertension, benign  -  Increase amlodipine from 5 mg up to 10 mg daily.  Continue all other medicines at present doses.  We'll make an appointment with Dr. Thea Ellison to establish care as PCP and follow-up on blood pressure.  -     amLODIPine (NORVASC) 10 MG tablet; Take 1 tablet (10 mg total) by mouth once daily.  Dispense: 30 tablet; Refill: 0    Hyperlipidemia, unspecified hyperlipidemia type  -  Continue generic Crestor present dose.  Cholesterol levels well controlled on this medication.    DCM (dilated cardiomyopathy)  -  Keep follow-up appointment with cardiologist.    Automatic implantable cardioverter-defibrillator in situ      Follow-up in about 3 weeks (around 7/10/2018) for establish care with Dr. Ellison and blood pressure check.     Patient's Medications   New Prescriptions    No medications on file   Previous Medications    ALBUTEROL 90 MCG/ACTUATION INHALER    Inhale 2 puffs into the lungs every 6 (six) hours as needed for Wheezing. Rescue    AMIODARONE (PACERONE) 200 MG TAB    TAKE 1 TABLET EVERY DAY SKIP WEDNESDAY AND SUNDAY    LISINOPRIL (PRINIVIL,ZESTRIL) 40 MG TABLET    TAKE 1 TABLET (40 MG TOTAL) BY MOUTH ONCE DAILY.    METOPROLOL TARTRATE (LOPRESSOR) 100 MG TABLET    TAKE 1 TABLET (100 MG TOTAL) BY MOUTH 2 (TWO) TIMES DAILY.    NITROGLYCERIN (NITROSTAT) 0.4 MG SL TABLET    Place under the tongue. 1 tablet, sublingual Sublingual .  take up to 3 tablets 5 minutes apart for chest pain    PRADAXA 150 MG CAP    TAKE 1 TABLET TWICE A DAY    ROSUVASTATIN (CRESTOR) 20 MG TABLET    Take 1 tablet (20 mg total) by mouth once daily.    TRIAMTERENE-HYDROCHLOROTHIAZIDE 37.5-25 MG (MAXZIDE-25) 37.5-25 MG PER TABLET    TAKE 1 TABLET BY MOUTH ONCE DAILY.   Modified Medications    Modified Medication Previous Medication    AMLODIPINE (NORVASC) 10 MG TABLET amLODIPine (NORVASC) 5 MG tablet       Take 1 tablet (10 mg total) by mouth once daily.    TAKE 1 TABLET BY MOUTH EVERY DAY    Discontinued Medications    No medications on file

## 2018-07-09 ENCOUNTER — PATIENT MESSAGE (OUTPATIENT)
Dept: FAMILY MEDICINE | Facility: CLINIC | Age: 68
End: 2018-07-09

## 2018-07-10 ENCOUNTER — OFFICE VISIT (OUTPATIENT)
Dept: FAMILY MEDICINE | Facility: CLINIC | Age: 68
End: 2018-07-10
Payer: MEDICARE

## 2018-07-10 VITALS
BODY MASS INDEX: 36.23 KG/M2 | TEMPERATURE: 98 F | WEIGHT: 258.81 LBS | HEART RATE: 77 BPM | OXYGEN SATURATION: 95 % | DIASTOLIC BLOOD PRESSURE: 86 MMHG | RESPIRATION RATE: 18 BRPM | HEIGHT: 71 IN | SYSTOLIC BLOOD PRESSURE: 132 MMHG

## 2018-07-10 DIAGNOSIS — I10 ESSENTIAL HYPERTENSION: Primary | ICD-10-CM

## 2018-07-10 DIAGNOSIS — R09.82 POST-NASAL DRIP: ICD-10-CM

## 2018-07-10 DIAGNOSIS — E78.5 HYPERLIPIDEMIA, UNSPECIFIED HYPERLIPIDEMIA TYPE: ICD-10-CM

## 2018-07-10 PROCEDURE — 99999 PR PBB SHADOW E&M-EST. PATIENT-LVL III: CPT | Mod: PBBFAC,,, | Performed by: FAMILY MEDICINE

## 2018-07-10 PROCEDURE — 99214 OFFICE O/P EST MOD 30 MIN: CPT | Mod: S$PBB,,, | Performed by: FAMILY MEDICINE

## 2018-07-10 PROCEDURE — 99213 OFFICE O/P EST LOW 20 MIN: CPT | Mod: PBBFAC,PN | Performed by: FAMILY MEDICINE

## 2018-07-10 RX ORDER — AMOXICILLIN 500 MG/1
500 CAPSULE ORAL 3 TIMES DAILY
Refills: 0 | COMMUNITY
Start: 2018-07-03 | End: 2018-07-10

## 2018-07-10 NOTE — PROGRESS NOTES
HPI:  Hussein Steinberg is a 68 y.o. year old male that  presents to establish care. He was started on a new blood pressure medication and is doing well. No reported side effects. He has an established cardiologist  Who wanted him to get established with a PCP.  Chief Complaint   Patient presents with    Establish Care    Blood Pressure Check   .     HPI    Past Medical History:   Diagnosis Date    Atrial fibrillation     Basal cell carcinoma     Cardiac arrest 2012    has pacemaker/defibrillator placed 3/19/13 [facility]    Cataract     DCM (dilated cardiomyopathy) 6/15/2017    High cholesterol     Hypertension     Kidney stone 10/2013    LBBB (left bundle branch block) 10/10/2012    LV dysfunction 10/10/2012    Prostate cancer     Treated by Dr. Rasmussen    Squamous cell carcinoma in situ of skin 2017    left arm removed    Stroke 09/2012    + hemorrhagic infarct to right occipital lobe after started on Plavix following cardiac event    SVT (supraventricular tachycardia) 10/10/2012     Social History     Social History    Marital status:      Spouse name: N/A    Number of children: N/A    Years of education: N/A     Occupational History    administrative position      Social History Main Topics    Smoking status: Never Smoker    Smokeless tobacco: Never Used    Alcohol use No    Drug use: No    Sexual activity: Yes     Partners: Female     Other Topics Concern    Not on file     Social History Narrative    No narrative on file     Past Surgical History:   Procedure Laterality Date    arthroscopic  knee    BASAL CELL CARCINOMA EXCISION  2018    removed from nose    CARDIAC PACEMAKER PLACEMENT  2013    COLONOSCOPY  2/12/2016    + polyp - repeat in 5 years- Dr. Calles    PROSTATECTOMY      SKIN CANCER EXCISION Left 10/2017    squamous cell carcinoma removed from left arm     Family History   Problem Relation Age of Onset    Macular degeneration  "Mother     Cataracts Mother     Hypertension Mother     Glaucoma Mother     Heart disease Mother         09/2017 passed  age 95 of heart attack    Cancer Father 56        leukemia    Heart attack Brother 59        had stent placed    Heart disease Brother     Heart attack Maternal Grandfather     Heart attack Paternal Grandfather     No Known Problems Sister     Parkinsonism Brother 54    No Known Problems Brother     No Known Problems Daughter     No Known Problems Daughter     Amblyopia Neg Hx     Blindness Neg Hx     Diabetes Neg Hx     Retinal detachment Neg Hx     Strabismus Neg Hx     Stroke Neg Hx     Thyroid disease Neg Hx            Review of Systems  General ROS: negative for chills, fever or weight loss  Psychological ROS: negative for hallucination, depression or suicidal ideation  Ophthalmic ROS: negative for blurry vision, photophobia or eye pain  ENT ROS: negative for epistaxis, sore throat or rhinorrhea  Respiratory ROS: pos dry cough, shortness of breath, or wheezing  Cardiovascular ROS: no chest pain or dyspnea on exertion  Gastrointestinal ROS: no abdominal pain, change in bowel habits, or black/ bloody stools  Genito-Urinary ROS: no dysuria, trouble voiding, or hematuria  Musculoskeletal ROS: negative for gait disturbance or muscular weakness  Neurological ROS: no syncope or seizures; no ataxia  Dermatological ROS: negative for pruritis, rash and jaundice      Physical Exam:  /86   Pulse 77   Temp 98.4 °F (36.9 °C) (Oral)   Resp 18   Ht 5' 11" (1.803 m)   Wt 117.4 kg (258 lb 13.1 oz)   SpO2 95%   BMI 36.10 kg/m²   General appearance: alert, cooperative, no distress  Constitutional:Oriented to person, place, and time.appears well-developed and well-nourished.  HEENT: Normocephalic, atraumatic, neck symmetrical, no nasal discharge, TM - clear bilaterally   Eyes: conjunctivae/corneas clear, PERRL, EOM's intact  Lungs: clear to auscultation bilaterally, no dullness " to percussion bilaterally  Heart: regular rate and rhythm without rub; no displacement of the PMI   Abdomen: soft, non-tender; bowel sounds normoactive; no organomegaly  Extremities: extremities symmetric; no clubbing, cyanosis, or edema  Integument: Skin color, texture, turgor normal; no rashes; hair distrubution normal  Neurologic: Alert and oriented X 3, normal strength, normal coordination and gait  Psychiatric: no pressured speech; normal affect; no evidence of impaired cognition   Physical Exam  LABS:    Complete Blood Count  Lab Results   Component Value Date    RBC 5.53 12/11/2017    HGB 15.6 12/11/2017    HCT 48.1 12/11/2017    MCV 87 12/11/2017    MCH 28.2 12/11/2017    MCHC 32.4 12/11/2017    RDW 12.7 12/11/2017     12/11/2017    MPV 10.4 12/11/2017    GRAN 5.5 12/11/2017    GRAN 60.0 12/11/2017    LYMPH 2.6 12/11/2017    LYMPH 27.7 12/11/2017    MONO 0.7 12/11/2017    MONO 7.6 12/11/2017    EOS 0.4 12/11/2017    BASO 0.05 12/11/2017    EOSINOPHIL 4.1 12/11/2017    BASOPHIL 0.5 12/11/2017    DIFFMETHOD Automated 12/11/2017       Comprehensive Metabolic Panel  Lab Results   Component Value Date    GLU 95 06/05/2018    BUN 13 06/05/2018    CREATININE 1.00 06/05/2018     06/05/2018    K 3.9 06/05/2018     06/05/2018    PROT 6.8 06/05/2018    ALBUMIN 4.5 06/05/2018    BILITOT 0.6 06/05/2018    AST 16 06/05/2018    ALKPHOS 94 06/05/2018    CO2 34 (H) 06/05/2018    ALT 18 06/05/2018    ANIONGAP 8 12/11/2017    EGFRNONAA 77 06/05/2018    ESTGFRAFRICA 89 06/05/2018       LIPID  Lab Results   Component Value Date    CHOL 150 06/05/2018    HDL 45 06/05/2018       TSH  Lab Results   Component Value Date    TSH 0.150 (L) 06/15/2017       Current Outpatient Prescriptions   Medication Sig Dispense Refill    amiodarone (PACERONE) 200 MG Tab TAKE 1 TABLET EVERY DAY SKIP WEDNESDAY AND SUNDAY 30 tablet 2    amLODIPine (NORVASC) 10 MG tablet Take 1 tablet (10 mg total) by mouth once daily. 30 tablet 0     lisinopril (PRINIVIL,ZESTRIL) 40 MG tablet TAKE 1 TABLET (40 MG TOTAL) BY MOUTH ONCE DAILY. 90 tablet 0    metoprolol tartrate (LOPRESSOR) 100 MG tablet TAKE 1 TABLET (100 MG TOTAL) BY MOUTH 2 (TWO) TIMES DAILY. 180 tablet 0    nitroGLYCERIN (NITROSTAT) 0.4 MG SL tablet Place under the tongue. 1 tablet, sublingual Sublingual .  take up to 3 tablets 5 minutes apart for chest pain      PRADAXA 150 mg Cap TAKE 1 TABLET TWICE A  capsule 3    rosuvastatin (CRESTOR) 20 MG tablet Take 1 tablet (20 mg total) by mouth once daily. 90 tablet 0    triamterene-hydrochlorothiazide 37.5-25 mg (MAXZIDE-25) 37.5-25 mg per tablet TAKE 1 TABLET BY MOUTH ONCE DAILY. 90 tablet 1     No current facility-administered medications for this visit.        Assessment:    ICD-10-CM ICD-9-CM    1. Essential hypertension I10 401.9    2. Hyperlipidemia, unspecified hyperlipidemia type E78.5 272.4    3. Post-nasal drip R09.82 784.91          Plan:  Instructed pt to use any anti-histamine for his cough.  Follow-up in 3 months (on 10/16/2018).          Thea Ellison MD  Answers for HPI/ROS submitted by the patient on 7/9/2018   activity change: No  unexpected weight change: No  neck pain: No  hearing loss: No  rhinorrhea: No  trouble swallowing: No  eye discharge: No  visual disturbance: No  chest tightness: No  wheezing: No  chest pain: No  palpitations: No  blood in stool: No  constipation: No  vomiting: No  diarrhea: No  polydipsia: No  polyuria: No  difficulty urinating: No  urgency: No  hematuria: No  joint swelling: No  arthralgias: No  headaches: No  weakness: No  confusion: No  dysphoric mood: No

## 2018-07-10 NOTE — LETTER
July 11, 2018      [place]  [address] [facility] [place]  [address]  [facility] [address]           [facility]  [address]  [facility] [address]  Phone: [phone]  Fax: [phone]          Patient: Hussein Steinberg   MR Number: [id number]   YOB: 1950   Date of Visit: 7/10/2018       Dear Valerie Nickerson:    Thank you for referring Hussein Steinberg to me for evaluation. Attached you will find relevant portions of my assessment and plan of care.    If you have questions, please do not hesitate to call me. I look forward to following Hussein Steinberg along with you.    Sincerely,    Thea Ellison MD    Enclosure  CC:  No Recipients    If you would like to receive this communication electronically, please contact [email or web address] or [phone] to request more information on [employer] access.    For providers and/or their staff who would like to refer a patient to [facility], please contact us through our one-stop-shop provider referral line, [facility] , at [phone].    If you feel you have received this communication in error or would no longer like to receive these types of communications, please e-mail [email or web address]

## 2018-07-12 RX ORDER — AMIODARONE HYDROCHLORIDE 200 MG/1
TABLET ORAL
Qty: 30 TABLET | Refills: 2 | Status: SHIPPED | OUTPATIENT
Start: 2018-07-12 | End: 2018-10-02 | Stop reason: SDUPTHER

## 2018-07-17 DIAGNOSIS — I10 ESSENTIAL HYPERTENSION: ICD-10-CM

## 2018-07-17 RX ORDER — AMLODIPINE BESYLATE 10 MG/1
TABLET ORAL
Qty: 30 TABLET | Refills: 2 | Status: SHIPPED | OUTPATIENT
Start: 2018-07-17 | End: 2018-12-19 | Stop reason: SDUPTHER

## 2018-08-10 DIAGNOSIS — I10 ESSENTIAL HYPERTENSION, BENIGN: ICD-10-CM

## 2018-08-10 RX ORDER — TRIAMTERENE/HYDROCHLOROTHIAZID 37.5-25 MG
TABLET ORAL
Qty: 90 TABLET | Refills: 0 | Status: SHIPPED | OUTPATIENT
Start: 2018-08-10 | End: 2018-11-07 | Stop reason: SDUPTHER

## 2018-08-16 ENCOUNTER — OFFICE VISIT (OUTPATIENT)
Dept: ELECTROPHYSIOLOGY | Facility: CLINIC | Age: 68
End: 2018-08-16
Payer: MEDICARE

## 2018-08-16 ENCOUNTER — HOSPITAL ENCOUNTER (OUTPATIENT)
Dept: CARDIOLOGY | Facility: CLINIC | Age: 68
Discharge: HOME | End: 2018-08-16
Payer: MEDICARE

## 2018-08-16 VITALS
DIASTOLIC BLOOD PRESSURE: 78 MMHG | HEART RATE: 72 BPM | BODY MASS INDEX: 35.8 KG/M2 | OXYGEN SATURATION: 94 % | SYSTOLIC BLOOD PRESSURE: 158 MMHG | HEIGHT: 71 IN | WEIGHT: 255.69 LBS

## 2018-08-16 DIAGNOSIS — E66.01 SEVERE OBESITY (BMI 35.0-35.9 WITH COMORBIDITY): ICD-10-CM

## 2018-08-16 DIAGNOSIS — I42.0 DCM (DILATED CARDIOMYOPATHY): Primary | ICD-10-CM

## 2018-08-16 DIAGNOSIS — I63.139: ICD-10-CM

## 2018-08-16 DIAGNOSIS — Z91.89 AT RISK FOR AMIODARONE TOXICITY WITH LONG TERM USE: ICD-10-CM

## 2018-08-16 DIAGNOSIS — Z86.010 ENCOUNTER FOR COLONOSCOPY DUE TO HISTORY OF ADENOMATOUS COLONIC POLYPS: ICD-10-CM

## 2018-08-16 DIAGNOSIS — Z12.11 ENCOUNTER FOR COLONOSCOPY DUE TO HISTORY OF ADENOMATOUS COLONIC POLYPS: ICD-10-CM

## 2018-08-16 DIAGNOSIS — C61 PROSTATE CANCER: ICD-10-CM

## 2018-08-16 DIAGNOSIS — I48.0 PAROXYSMAL ATRIAL FIBRILLATION: ICD-10-CM

## 2018-08-16 DIAGNOSIS — I44.7 LBBB (LEFT BUNDLE BRANCH BLOCK): ICD-10-CM

## 2018-08-16 DIAGNOSIS — I48.91 ATRIAL FIBRILLATION, UNSPECIFIED TYPE: ICD-10-CM

## 2018-08-16 DIAGNOSIS — Z79.899 AT RISK FOR AMIODARONE TOXICITY WITH LONG TERM USE: ICD-10-CM

## 2018-08-16 DIAGNOSIS — I48.91 ATRIAL FIBRILLATION, UNSPECIFIED TYPE: Primary | ICD-10-CM

## 2018-08-16 DIAGNOSIS — I10 ESSENTIAL HYPERTENSION: ICD-10-CM

## 2018-08-16 DIAGNOSIS — Z95.810 ICD (IMPLANTABLE CARDIOVERTER-DEFIBRILLATOR), BIVENTRICULAR, IN SITU: ICD-10-CM

## 2018-08-16 DIAGNOSIS — E78.5 HYPERLIPIDEMIA, UNSPECIFIED HYPERLIPIDEMIA TYPE: ICD-10-CM

## 2018-08-16 PROBLEM — Z85.46 HISTORY OF PROSTATE CANCER: Status: RESOLVED | Noted: 2017-06-23 | Resolved: 2018-08-16

## 2018-08-16 PROCEDURE — 99215 OFFICE O/P EST HI 40 MIN: CPT | Mod: S$PBB,,, | Performed by: INTERNAL MEDICINE

## 2018-08-16 PROCEDURE — 99999 PR PBB SHADOW E&M-EST. PATIENT-LVL III: CPT | Mod: PBBFAC,,, | Performed by: INTERNAL MEDICINE

## 2018-08-16 PROCEDURE — 99213 OFFICE O/P EST LOW 20 MIN: CPT | Mod: PBBFAC,25 | Performed by: INTERNAL MEDICINE

## 2018-08-16 PROCEDURE — 93010 ELECTROCARDIOGRAM REPORT: CPT | Mod: S$PBB,,, | Performed by: INTERNAL MEDICINE

## 2018-08-16 PROCEDURE — 93005 ELECTROCARDIOGRAM TRACING: CPT | Mod: PBBFAC | Performed by: INTERNAL MEDICINE

## 2018-08-16 NOTE — PROGRESS NOTES
Subjective:   Patient ID:  Hussein Steinberg is a 68 y.o. male     Chief complaint:Follow-up      HPI  Background   DCM, AF, LBBB  He received CRT-D on 3/19/13. RAY Paradym >Sensing timin msec into a QRS of 160 msec.BiV QRS duration 100 msec   Status update since last visit 6 months ago:  He can do what he wants -- mostly -- he has been mowing his yard in one session-- He can do anything he wants but he tries not to lift -- trying to protect his ICD   He is working full time. He works in his yard, cleans out the gutters etc  He has little opportunity to climb stairs  PFT and DLCO/ TSH OK -- did not get repeat echo as ordered  Still with some Halo effect -- mild     Update 2017:  Has been doing well  Still on amio  Last echo dec 2014:  >>   1 - Moderate left atrial enlargement.     2 - Mildly depressed left ventricular systolic function (EF 45-50%).     3 - Normal left ventricular diastolic function.     4 - Normal right ventricular systolic function .     5 - Mild tricuspid regurgitation.     6 - No evidence of LV dyssynchrony.  I have reviewed the actual image of the ECG tracing obtained today and it shows ABiV pacing and narrow QRSd  (120 at most).  [place] shows 1 day of AF last AMS for 3 min on 17. He is on Pradaxa  Amio tox not up to date    Update since then:  Had amio tox screen completed -- all OK -=- this includes DLCO in May 2018  TSH OK in 2017  Last amio level 0.5/0.5He has been doing well and has had no issues at all   Echo from may:  >>    1 - Normal left ventricular systolic function (EF 60-65%).     2 - Normal right ventricular systolic function .     3 - The estimated PA systolic pressure is 32 mmHg.     4 - Trivial to mild mitral regurgitation.     5 - Trivial to mild tricuspid regurgitation.     6 - Pacemaker/catheter seen in the RA, RV.     7 - No wall motion abnormalities.     8 - Normal left ventricular diastolic function.   I have reviewed the actual image of the ECG tracing  obtained today and it shows ABiV pacing with a favorable QRS morphology /duration (QRSd no more than 120 Msec in any one lead).      Current Outpatient Medications   Medication Sig    amiodarone (PACERONE) 200 MG Tab TAKE 1 TABLET EVERY DAY SKIP WEDNESDAY AND SUNDAY (Patient taking differently: TAKE 1/2 TABLET BID SKIP WEDNESDAY AND SUNDAY.)    amLODIPine (NORVASC) 10 MG tablet TAKE 1 TABLET BY MOUTH EVERY DAY    folic acid/multivit-min/lutein (CENTRUM SILVER ORAL) Take by mouth once daily.    lisinopril (PRINIVIL,ZESTRIL) 40 MG tablet TAKE 1 TABLET (40 MG TOTAL) BY MOUTH ONCE DAILY.    metoprolol tartrate (LOPRESSOR) 100 MG tablet TAKE 1 TABLET (100 MG TOTAL) BY MOUTH 2 (TWO) TIMES DAILY.    nitroGLYCERIN (NITROSTAT) 0.4 MG SL tablet Place under the tongue. 1 tablet, sublingual Sublingual .  take up to 3 tablets 5 minutes apart for chest pain    PRADAXA 150 mg Cap TAKE 1 TABLET TWICE A DAY    rosuvastatin (CRESTOR) 20 MG tablet Take 1 tablet (20 mg total) by mouth once daily.    triamterene-hydrochlorothiazide 37.5-25 mg (MAXZIDE-25) 37.5-25 mg per tablet TAKE 1 TABLET BY MOUTH ONCE DAILY.     No current facility-administered medications for this visit.      Review of Systems   Constitution: Positive for malaise/fatigue. Negative for decreased appetite, weakness, weight gain and weight loss.   Eyes: Negative for blurred vision.   Cardiovascular: Negative for chest pain, claudication, cyanosis, dyspnea on exertion, irregular heartbeat, leg swelling, near-syncope, orthopnea and palpitations.   Respiratory: Negative for cough, shortness of breath, sleep disturbances due to breathing, snoring and wheezing.    Endocrine: Negative for heat intolerance.   Hematologic/Lymphatic: Does not bruise/bleed easily.   Musculoskeletal: Negative for muscle weakness and myalgias.   Gastrointestinal: Negative for melena, nausea and vomiting.   Genitourinary: Negative for nocturia.   Neurological: Negative for excessive  daytime sleepiness, dizziness, headaches and light-headedness.   Psychiatric/Behavioral: Negative for depression, memory loss and substance abuse. The patient has insomnia. The patient is not nervous/anxious.        Objective:   Physical Exam   Constitutional: He is oriented to person, place, and time. He appears well-developed and well-nourished.   overweight   HENT:   Head: Normocephalic and atraumatic.   Right Ear: External ear normal.   Left Ear: External ear normal.   Eyes: Conjunctivae are normal. Pupils are equal, round, and reactive to light. Left conjunctiva is not injected. Left conjunctiva has no hemorrhage.   Neck: Neck supple. No JVD present. No thyromegaly present.   Cardiovascular: Normal rate, regular rhythm, normal heart sounds and intact distal pulses. PMI is not displaced. Exam reveals no gallop, no friction rub, no midsystolic click and no opening snap.   No murmur heard.  Pulses:       Carotid pulses are 2+ on the right side, and 2+ on the left side.       Radial pulses are 2+ on the right side, and 2+ on the left side.        Dorsalis pedis pulses are 2+ on the right side, and 2+ on the left side.        Posterior tibial pulses are 2+ on the right side, and 2+ on the left side.   Pulmonary/Chest: Effort normal and breath sounds normal. No respiratory distress. He has no wheezes. He has no rales. He exhibits no tenderness.   Device pocket is in excellent repair     Abdominal: Soft. Normal appearance. He exhibits no pulsatile liver. There is no hepatomegaly. There is no tenderness. There is no rigidity and no guarding.   Obese abdomen   Musculoskeletal: Normal range of motion. He exhibits no edema or tenderness.        Right knee: He exhibits no swelling.        Left knee: He exhibits no swelling.        Right ankle: He exhibits no swelling.        Left ankle: He exhibits no swelling.        Right lower leg: He exhibits no swelling.        Left lower leg: He exhibits no swelling.        Right  "foot: There is no swelling.        Left foot: There is no swelling.   Neurological: He is alert and oriented to person, place, and time. He has normal strength and normal reflexes. No cranial nerve deficit. Coordination normal.   Skin: Skin is warm, dry and intact. No rash noted. No cyanosis. No pallor.   Psychiatric: He has a normal mood and affect. His behavior is normal.   Nursing note and vitals reviewed.    BP (!) 158/78   Pulse 72   Ht 5' 11" (1.803 m)   Wt 116 kg (255 lb 11.2 oz)   SpO2 (!) 94%   BMI 35.66 kg/m²      Assessment:    LV remodeled back to normal  BPs a bit High -- we will register him in the [facility] BP clinic  1. DCM (dilated cardiomyopathy)    2. LBBB (left bundle branch block)    3. Essential hypertension    4. Hyperlipidemia, unspecified hyperlipidemia type    5. Cerebral infarction due to embolism of carotid artery, unspecified blood vessel laterality    6. Paroxysmal atrial fibrillation    7. Severe obesity (BMI 35.0-35.9 with comorbidity)    8. Prostate cancer    9. ICD (implantable cardioverter-defibrillator), biventricular, in situ    10. Encounter for colonoscopy due to history of adenomatous colonic polyps    11. At risk for amiodarone toxicity with long term use        Plan:       Orders Placed This Encounter   Procedures    TSH     Standing Status:   Future     Standing Expiration Date:   10/15/2019     Follow-up in about 1 year (around 8/16/2019), or if symptoms worsen or fail to improve.  There are no discontinued medications.  New Prescriptions    No medications on file     Modified Medications    No medications on file              "

## 2018-08-20 ENCOUNTER — TELEPHONE (OUTPATIENT)
Dept: ELECTROPHYSIOLOGY | Facility: CLINIC | Age: 68
End: 2018-08-20

## 2018-08-20 NOTE — TELEPHONE ENCOUNTER
----- Message from Eric Salvador MD sent at 8/18/2018 11:31 AM CDT -----  Results are abnormal.  Please see comments below and call patient.  In general you do not need to route back to me.  TSH seems high   Please ask lab to report T4

## 2018-08-20 NOTE — TELEPHONE ENCOUNTER
Spoke with patient. Provided patient with results of  Free T4 and TSH. Per - TSH were high, Md wanted to see T4,free results.    Notes recorded by Eric Salvador MD on 8/18/2018 at 11:31 AM CDT  Results are abnormal.  Please see comments below and call patient.  In general you do not need to route back to me.  TSH seems high   Please ask lab to report T4     Noted that T4, free results were marked in routed message from  as 1.01- message sent to MD alerting results were present. Advised pt to call clinic if pt had any questions or concerns. Pt verbalized understanding. No further questions or concerns at this time.    [facility] CARRION CCM

## 2018-08-27 ENCOUNTER — TELEPHONE (OUTPATIENT)
Dept: ELECTROPHYSIOLOGY | Facility: CLINIC | Age: 68
End: 2018-08-27

## 2018-08-27 NOTE — TELEPHONE ENCOUNTER
----- Message from Eric Salvador MD sent at 8/25/2018  3:29 PM CDT -----  OK   Then keep same-- no change in meds  ----- Message -----  From: Naomi Chicas RN  Sent: 8/20/2018  12:00 PM  To: Eric Salvador MD    I see on 8-16-18 T4,free was 1.01.  It was in the message you routed to me.    Thank you,  Naomi Chicas RN    ----- Message -----  From: Eric Salvador MD  Sent: 8/18/2018  11:31 AM  To: Naomi Chicas RN    Results are abnormal.  Please see comments below and call patient.  In general you do not need to route back to me.  TSH seems high   Please ask lab to report T4

## 2018-08-27 NOTE — TELEPHONE ENCOUNTER
Spoke with patient. Provided patient with results of T4 free and TSH. Per Dr.Abi-Samra- BRAGA   Then keep same-- no change in meds.     Advised pt to call clinic if pt had any questions or concerns. Pt verbalized understanding. No further questions or concerns at this time.    [facility] CARRION CCM

## 2018-09-04 DIAGNOSIS — E78.9 LIPID DISORDER: ICD-10-CM

## 2018-09-04 DIAGNOSIS — I10 ESSENTIAL HYPERTENSION, BENIGN: ICD-10-CM

## 2018-09-04 RX ORDER — ROSUVASTATIN CALCIUM 20 MG/1
TABLET, COATED ORAL
Qty: 90 TABLET | Refills: 0 | Status: SHIPPED | OUTPATIENT
Start: 2018-09-04 | End: 2019-01-22

## 2018-09-04 RX ORDER — LISINOPRIL 40 MG/1
TABLET ORAL
Qty: 90 TABLET | Refills: 0 | Status: SHIPPED | OUTPATIENT
Start: 2018-09-04 | End: 2018-09-17 | Stop reason: SDUPTHER

## 2018-09-04 RX ORDER — METOPROLOL TARTRATE 100 MG/1
TABLET ORAL
Qty: 180 TABLET | Refills: 0 | Status: SHIPPED | OUTPATIENT
Start: 2018-09-04 | End: 2019-01-09 | Stop reason: SDUPTHER

## 2018-09-17 DIAGNOSIS — I10 ESSENTIAL HYPERTENSION, BENIGN: ICD-10-CM

## 2018-09-17 RX ORDER — LISINOPRIL 40 MG/1
TABLET ORAL
Qty: 90 TABLET | Refills: 0 | Status: SHIPPED | OUTPATIENT
Start: 2018-09-17 | End: 2018-12-20 | Stop reason: SDUPTHER

## 2018-09-18 ENCOUNTER — CLINICAL SUPPORT (OUTPATIENT)
Dept: ELECTROPHYSIOLOGY | Facility: CLINIC | Age: 68
End: 2018-09-18
Attending: INTERNAL MEDICINE
Payer: MEDICARE

## 2018-09-18 DIAGNOSIS — I42.0 DILATED CARDIOMYOPATHY: ICD-10-CM

## 2018-09-18 DIAGNOSIS — Z95.810 ICD (IMPLANTABLE CARDIOVERTER-DEFIBRILLATOR) IN PLACE: Primary | ICD-10-CM

## 2018-09-18 DIAGNOSIS — Z95.810 ICD (IMPLANTABLE CARDIOVERTER-DEFIBRILLATOR) IN PLACE: ICD-10-CM

## 2018-09-18 PROCEDURE — 93284 PRGRMG EVAL IMPLANTABLE DFB: CPT | Mod: PBBFAC | Performed by: INTERNAL MEDICINE

## 2018-09-28 DIAGNOSIS — I10 ESSENTIAL HYPERTENSION, BENIGN: ICD-10-CM

## 2018-09-28 RX ORDER — METOPROLOL TARTRATE 100 MG/1
TABLET ORAL
Qty: 180 TABLET | Refills: 0 | OUTPATIENT
Start: 2018-09-28

## 2018-10-03 RX ORDER — AMIODARONE HYDROCHLORIDE 200 MG/1
TABLET ORAL
Qty: 30 TABLET | Refills: 2 | Status: SHIPPED | OUTPATIENT
Start: 2018-10-03 | End: 2019-01-06 | Stop reason: SDUPTHER

## 2018-10-08 ENCOUNTER — PATIENT MESSAGE (OUTPATIENT)
Dept: ADMINISTRATIVE | Facility: OTHER | Age: 68
End: 2018-10-08

## 2018-10-16 ENCOUNTER — OFFICE VISIT (OUTPATIENT)
Dept: FAMILY MEDICINE | Facility: CLINIC | Age: 68
End: 2018-10-16
Payer: MEDICARE

## 2018-10-16 VITALS
TEMPERATURE: 99 F | DIASTOLIC BLOOD PRESSURE: 84 MMHG | RESPIRATION RATE: 20 BRPM | WEIGHT: 258 LBS | OXYGEN SATURATION: 97 % | SYSTOLIC BLOOD PRESSURE: 132 MMHG | HEART RATE: 79 BPM | BODY MASS INDEX: 36.12 KG/M2 | HEIGHT: 71 IN

## 2018-10-16 DIAGNOSIS — I10 ESSENTIAL HYPERTENSION: Primary | ICD-10-CM

## 2018-10-16 DIAGNOSIS — E78.5 HYPERLIPIDEMIA, UNSPECIFIED HYPERLIPIDEMIA TYPE: ICD-10-CM

## 2018-10-16 DIAGNOSIS — G47.00 INSOMNIA, UNSPECIFIED TYPE: ICD-10-CM

## 2018-10-16 PROCEDURE — 99213 OFFICE O/P EST LOW 20 MIN: CPT | Mod: PBBFAC,PN | Performed by: FAMILY MEDICINE

## 2018-10-16 PROCEDURE — 99214 OFFICE O/P EST MOD 30 MIN: CPT | Mod: S$PBB,,, | Performed by: FAMILY MEDICINE

## 2018-10-16 PROCEDURE — 99999 PR PBB SHADOW E&M-EST. PATIENT-LVL III: CPT | Mod: PBBFAC,,, | Performed by: FAMILY MEDICINE

## 2018-10-16 RX ORDER — TRAZODONE HYDROCHLORIDE 50 MG/1
50 TABLET ORAL NIGHTLY
Qty: 30 TABLET | Refills: 2 | Status: SHIPPED | OUTPATIENT
Start: 2018-10-16 | End: 2019-01-22 | Stop reason: SDUPTHER

## 2018-10-16 NOTE — PROGRESS NOTES
HPI:  Hussein Steinberg is a 68 y.o. year old male that  presents for 3 month f/u of HTN. He is getting signed up for [facility] Hypertension Program. He just needs to get the BP cuff associated with it. He states that he is feeling pretty good but he is not sleeping. Does not have exercise routine. He does work in his yard.  Chief Complaint   Patient presents with    Follow-up     3 month f/u   .     HPI      Past Medical History:   Diagnosis Date    Atrial fibrillation     Basal cell carcinoma     Cardiac arrest 2012    has pacemaker/defibrillator placed 3/19/13 - followed by [facility]ologist    Cataract     DCM (dilated cardiomyopathy) 6/15/2017    High cholesterol     History of prostate cancer 6/23/2017    Hypertension     Kidney stone 10/2013    LBBB (left bundle branch block) 10/10/2012    LV dysfunction 10/10/2012    Prostate cancer     Treated by Dr. Rasmussen    Squamous cell carcinoma in situ of skin 2017    left arm removed    Stroke 09/2012    + hemorrhagic infarct to right occipital lobe after started on Plavix following cardiac event    SVT (supraventricular tachycardia) 10/10/2012     Social History     Socioeconomic History    Marital status:      Spouse name: Not on file    Number of children: Not on file    Years of education: Not on file    Highest education level: Not on file   Social Needs    Financial resource strain: Not on file    Food insecurity - worry: Not on file    Food insecurity - inability: Not on file    Transportation needs - medical: Not on file    Transportation needs - non-medical: Not on file   Occupational History    Occupation: administrative position   Tobacco Use    Smoking status: Never Smoker    Smokeless tobacco: Never Used   Substance and Sexual Activity    Alcohol use: No    Drug use: No    Sexual activity: Yes     Partners: Female   Other Topics Concern    Not on file   Social History Narrative    Not on file     Past Surgical  "History:   Procedure Laterality Date    arthroscopic  knee    BASAL CELL CARCINOMA EXCISION  2018    removed from nose    CARDIAC PACEMAKER PLACEMENT  2013    COLONOSCOPY  2/12/2016    + polyp - repeat in 5 years- Dr. Calles    PROSTATECTOMY      SKIN CANCER EXCISION Left 10/2017    squamous cell carcinoma removed from left arm     Family History   Problem Relation Age of Onset    Macular degeneration Mother     Cataracts Mother     Hypertension Mother     Glaucoma Mother     Heart disease Mother         09/2017 passed  age 95 of heart attack    Cancer Father 56        leukemia    Heart attack Brother 59        had stent placed    Heart disease Brother     Heart attack Maternal Grandfather     Heart attack Paternal Grandfather     No Known Problems Sister     Parkinsonism Brother 54    No Known Problems Brother     No Known Problems Daughter     No Known Problems Daughter     Amblyopia Neg Hx     Blindness Neg Hx     Diabetes Neg Hx     Retinal detachment Neg Hx     Strabismus Neg Hx     Stroke Neg Hx     Thyroid disease Neg Hx            Review of Systems  General ROS: negative for chills, fever or weight loss  ENT ROS: negative for epistaxis, sore throat or rhinorrhea  Respiratory ROS: no cough, shortness of breath, or wheezing  Cardiovascular ROS: no chest pain or dyspnea on exertion  Gastrointestinal ROS: no abdominal pain, change in bowel habits, or black/ bloody stools    Physical Exam:  /84   Pulse 79   Temp 98.6 °F (37 °C) (Oral)   Resp 20   Ht 5' 11" (1.803 m)   Wt 117 kg (258 lb)   SpO2 97%   BMI 35.98 kg/m²   General appearance: alert, cooperative, no distress  Constitutional:Oriented to person, place, and time.appears well-developed and well-nourished.  Lungs: clear to auscultation bilaterally, no dullness to percussion bilaterally  Heart: regular rate and rhythm without rub; no displacement of the PMI , S1&S2 present  Abdomen: soft, non-tender; bowel sounds " normoactive; no organomegaly  Physical Exam    LABS:    Complete Blood Count  Lab Results   Component Value Date    RBC 5.53 12/11/2017    HGB 15.6 12/11/2017    HCT 48.1 12/11/2017    MCV 87 12/11/2017    MCH 28.2 12/11/2017    MCHC 32.4 12/11/2017    RDW 12.7 12/11/2017     12/11/2017    MPV 10.4 12/11/2017    GRAN 5.5 12/11/2017    GRAN 60.0 12/11/2017    LYMPH 2.6 12/11/2017    LYMPH 27.7 12/11/2017    MONO 0.7 12/11/2017    MONO 7.6 12/11/2017    EOS 0.4 12/11/2017    BASO 0.05 12/11/2017    EOSINOPHIL 4.1 12/11/2017    BASOPHIL 0.5 12/11/2017    DIFFMETHOD Automated 12/11/2017       Comprehensive Metabolic Panel  Lab Results   Component Value Date    GLU 95 06/05/2018    BUN 13 06/05/2018    CREATININE 1.00 06/05/2018     06/05/2018    K 3.9 06/05/2018     06/05/2018    PROT 6.8 06/05/2018    ALBUMIN 4.5 06/05/2018    BILITOT 0.6 06/05/2018    AST 16 06/05/2018    ALKPHOS 94 06/05/2018    CO2 34 (H) 06/05/2018    ALT 18 06/05/2018    ANIONGAP 8 12/11/2017    EGFRNONAA 77 06/05/2018    ESTGFRAFRICA 89 06/05/2018       LIPID  Lab Results   Component Value Date    CHOL 150 06/05/2018    HDL 45 06/05/2018         TSH  Lab Results   Component Value Date    TSH 5.366 (H) 08/16/2018       Current Outpatient Medications   Medication Sig Dispense Refill    amiodarone (PACERONE) 200 MG Tab TAKE 1 TABLET EVERY DAY SKIP WEDNESDAY AND SUNDAY 30 tablet 2    amLODIPine (NORVASC) 10 MG tablet TAKE 1 TABLET BY MOUTH EVERY DAY 30 tablet 2    folic acid/multivit-min/lutein (CENTRUM SILVER ORAL) Take by mouth once daily.      lisinopril (PRINIVIL,ZESTRIL) 40 MG tablet TAKE 1 TABLET BY MOUTH EVERY DAY 90 tablet 0    metoprolol tartrate (LOPRESSOR) 100 MG tablet TAKE 1 TABLET TWICE A  tablet 0    nitroGLYCERIN (NITROSTAT) 0.4 MG SL tablet Place under the tongue. 1 tablet, sublingual Sublingual .  take up to 3 tablets 5 minutes apart for chest pain      PRADAXA 150 mg Cap TAKE 1 TABLET TWICE A DAY  180 capsule 3    rosuvastatin (CRESTOR) 20 MG tablet TAKE 1 TABLET EVERY DAY 90 tablet 0    triamterene-hydrochlorothiazide 37.5-25 mg (MAXZIDE-25) 37.5-25 mg per tablet TAKE 1 TABLET BY MOUTH ONCE DAILY. 90 tablet 0    FLUZONE HIGH-DOSE 2018-19, PF, 180 mcg/0.5 mL vaccine TO BE ADMINISTERED BY PHARMACIST FOR IMMUNIZATION  0    traZODone (DESYREL) 50 MG tablet Take 1 tablet (50 mg total) by mouth every evening. 30 tablet 2     No current facility-administered medications for this visit.        Assessment:    ICD-10-CM ICD-9-CM    1. Essential hypertension I10 401.9    2. Hyperlipidemia, unspecified hyperlipidemia type E78.5 272.4    3. Insomnia, unspecified type G47.00 780.52 traZODone (DESYREL) 50 MG tablet         Plan:    Follow-up in about 3 months (around 1/16/2019).          Thea Ellison MD  Answers for HPI/ROS submitted by the patient on 10/15/2018   activity change: No  unexpected weight change: No  neck pain: No  hearing loss: No  rhinorrhea: No  trouble swallowing: No  eye discharge: No  visual disturbance: No  chest tightness: No  wheezing: No  chest pain: No  palpitations: No  blood in stool: No  constipation: No  vomiting: No  diarrhea: No  polydipsia: No  polyuria: No  difficulty urinating: No  urgency: No  hematuria: No  joint swelling: No  arthralgias: Yes  headaches: No  weakness: No  confusion: No  dysphoric mood: No

## 2018-10-24 DIAGNOSIS — E78.9 LIPID DISORDER: ICD-10-CM

## 2018-10-24 RX ORDER — ROSUVASTATIN CALCIUM 20 MG/1
TABLET, COATED ORAL
Qty: 90 TABLET | Refills: 0 | Status: SHIPPED | OUTPATIENT
Start: 2018-10-24 | End: 2019-01-22

## 2018-11-07 DIAGNOSIS — I10 ESSENTIAL HYPERTENSION, BENIGN: ICD-10-CM

## 2018-11-09 RX ORDER — TRIAMTERENE/HYDROCHLOROTHIAZID 37.5-25 MG
TABLET ORAL
Qty: 90 TABLET | Refills: 0 | Status: SHIPPED | OUTPATIENT
Start: 2018-11-09 | End: 2019-01-22 | Stop reason: SDUPTHER

## 2018-11-11 ENCOUNTER — PATIENT MESSAGE (OUTPATIENT)
Dept: FAMILY MEDICINE | Facility: CLINIC | Age: 68
End: 2018-11-11

## 2018-11-11 DIAGNOSIS — E78.5 HYPERLIPIDEMIA, UNSPECIFIED HYPERLIPIDEMIA TYPE: Primary | ICD-10-CM

## 2018-11-12 ENCOUNTER — OFFICE VISIT (OUTPATIENT)
Dept: FAMILY MEDICINE | Facility: CLINIC | Age: 68
End: 2018-11-12
Payer: MEDICARE

## 2018-11-12 VITALS
WEIGHT: 261.25 LBS | TEMPERATURE: 99 F | BODY MASS INDEX: 36.57 KG/M2 | DIASTOLIC BLOOD PRESSURE: 80 MMHG | OXYGEN SATURATION: 95 % | SYSTOLIC BLOOD PRESSURE: 120 MMHG | HEART RATE: 68 BPM | HEIGHT: 71 IN

## 2018-11-12 DIAGNOSIS — J06.9 UPPER RESPIRATORY TRACT INFECTION, UNSPECIFIED TYPE: Primary | ICD-10-CM

## 2018-11-12 DIAGNOSIS — J40 BRONCHITIS: ICD-10-CM

## 2018-11-12 PROCEDURE — 99215 OFFICE O/P EST HI 40 MIN: CPT | Mod: PBBFAC,PN | Performed by: NURSE PRACTITIONER

## 2018-11-12 PROCEDURE — 99999 PR PBB SHADOW E&M-EST. PATIENT-LVL V: CPT | Mod: PBBFAC,,, | Performed by: NURSE PRACTITIONER

## 2018-11-12 PROCEDURE — 99213 OFFICE O/P EST LOW 20 MIN: CPT | Mod: S$PBB,,, | Performed by: NURSE PRACTITIONER

## 2018-11-12 RX ORDER — BENZONATATE 200 MG/1
200 CAPSULE ORAL 3 TIMES DAILY PRN
Qty: 30 CAPSULE | Refills: 0 | Status: SHIPPED | OUTPATIENT
Start: 2018-11-12 | End: 2018-11-22

## 2018-11-12 RX ORDER — EZETIMIBE 10 MG/1
10 TABLET ORAL DAILY
Qty: 90 TABLET | Refills: 3 | Status: SHIPPED | OUTPATIENT
Start: 2018-11-12 | End: 2019-01-22 | Stop reason: SDUPTHER

## 2018-11-12 RX ORDER — ALBUTEROL SULFATE 90 UG/1
2 AEROSOL, METERED RESPIRATORY (INHALATION) EVERY 6 HOURS PRN
Qty: 18 G | Refills: 0 | Status: SHIPPED | OUTPATIENT
Start: 2018-11-12 | End: 2019-08-23

## 2018-11-12 RX ORDER — PROMETHAZINE HYDROCHLORIDE AND CODEINE PHOSPHATE 6.25; 1 MG/5ML; MG/5ML
SOLUTION ORAL
Qty: 180 ML | Refills: 0 | Status: SHIPPED | OUTPATIENT
Start: 2018-11-12 | End: 2019-01-22

## 2018-11-12 NOTE — PROGRESS NOTES
Subjective:       Patient ID: Hussein Steinberg is a 68 y.o. male.    Chief Complaint: Bronchtis (started 3 to 4 days ago with coughing and chest congestion with some nasal congestion.) and Cough    Cough   This is a new problem. The current episode started in the past 7 days. The problem has been gradually worsening. The problem occurs every few minutes. The cough is productive of purulent sputum. Associated symptoms include chills, myalgias, nasal congestion, postnasal drip, rhinorrhea and wheezing. Pertinent negatives include no chest pain, ear congestion, ear pain, fever, headaches, heartburn, hemoptysis, rash, sore throat, shortness of breath, sweats or weight loss. The symptoms are aggravated by lying down. He has tried OTC cough suppressant and body position changes for the symptoms. His past medical history is significant for bronchitis. There is no history of asthma, bronchiectasis, COPD, emphysema, environmental allergies or pneumonia.       Current Outpatient Medications   Medication Sig Dispense Refill    amiodarone (PACERONE) 200 MG Tab TAKE 1 TABLET EVERY DAY SKIP WEDNESDAY AND SUNDAY 30 tablet 2    amLODIPine (NORVASC) 10 MG tablet TAKE 1 TABLET BY MOUTH EVERY DAY 30 tablet 2    FLUZONE HIGH-DOSE 2018-19, PF, 180 mcg/0.5 mL vaccine TO BE ADMINISTERED BY PHARMACIST FOR IMMUNIZATION  0    folic acid/multivit-min/lutein (CENTRUM SILVER ORAL) Take by mouth once daily.      lisinopril (PRINIVIL,ZESTRIL) 40 MG tablet TAKE 1 TABLET BY MOUTH EVERY DAY 90 tablet 0    metoprolol tartrate (LOPRESSOR) 100 MG tablet TAKE 1 TABLET TWICE A  tablet 0    nitroGLYCERIN (NITROSTAT) 0.4 MG SL tablet Place under the tongue. 1 tablet, sublingual Sublingual .  take up to 3 tablets 5 minutes apart for chest pain      PRADAXA 150 mg Cap TAKE 1 TABLET TWICE A  capsule 3    rosuvastatin (CRESTOR) 20 MG tablet TAKE 1 TABLET EVERY DAY 90 tablet 0    rosuvastatin (CRESTOR) 20 MG tablet TAKE 1 TABLET EVERY  DAY 90 tablet 0    traZODone (DESYREL) 50 MG tablet Take 1 tablet (50 mg total) by mouth every evening. 30 tablet 2    triamterene-hydrochlorothiazide 37.5-25 mg (MAXZIDE-25) 37.5-25 mg per tablet TAKE 1 TABLET BY MOUTH ONCE DAILY. 90 tablet 0     No current facility-administered medications for this visit.        Past Medical History:   Diagnosis Date    Atrial fibrillation     Basal cell carcinoma     Cardiac arrest 2012    has pacemaker/defibrillator placed 3/19/13 - followed by [facility]    Cataract     DCM (dilated cardiomyopathy) 6/15/2017    High cholesterol     History of prostate cancer 6/23/2017    Hypertension     Kidney stone 10/2013    LBBB (left bundle branch block) 10/10/2012    LV dysfunction 10/10/2012    Prostate cancer     Treated by Dr. Rasmussen    Squamous cell carcinoma in situ of skin 2017    left arm removed    Stroke 09/2012    + hemorrhagic infarct to right occipital lobe after started on Plavix following cardiac event    SVT (supraventricular tachycardia) 10/10/2012       Past Surgical History:   Procedure Laterality Date    arthroscopic  knee    BASAL CELL CARCINOMA EXCISION  2018    removed from nose    CARDIAC PACEMAKER PLACEMENT  2013    COLONOSCOPY  2/12/2016    + polyp - repeat in 5 years- Dr. Calles    PROSTATECTOMY      SKIN CANCER EXCISION Left 10/2017    squamous cell carcinoma removed from left arm       Family History   Problem Relation Age of Onset    Macular degeneration Mother     Cataracts Mother     Hypertension Mother     Glaucoma Mother     Heart disease Mother         09/2017 passed  age 95 of heart attack    Cancer Father 56        leukemia    Heart attack Brother 59        had stent placed    Heart disease Brother     Heart attack Maternal Grandfather     Heart attack Paternal Grandfather     No Known Problems Sister     Parkinsonism Brother 54    No Known Problems Brother     No Known Problems Daughter     No Known  Problems Daughter     Amblyopia Neg Hx     Blindness Neg Hx     Diabetes Neg Hx     Retinal detachment Neg Hx     Strabismus Neg Hx     Stroke Neg Hx     Thyroid disease Neg Hx        Social History     Socioeconomic History    Marital status:      Spouse name: None    Number of children: None    Years of education: None    Highest education level: None   Social Needs    Financial resource strain: None    Food insecurity - worry: None    Food insecurity - inability: None    Transportation needs - medical: None    Transportation needs - non-medical: None   Occupational History    Occupation: administrative position   Tobacco Use    Smoking status: Never Smoker    Smokeless tobacco: Never Used   Substance and Sexual Activity    Alcohol use: No    Drug use: No    Sexual activity: Yes     Partners: Female   Other Topics Concern    None   Social History Narrative    None       Review of Systems   Constitutional: Positive for chills. Negative for activity change, appetite change, fatigue, fever, unexpected weight change and weight loss.   HENT: Positive for congestion, postnasal drip and rhinorrhea. Negative for ear pain, mouth sores, nosebleeds, sinus pressure, sneezing, sore throat, trouble swallowing and voice change.    Eyes: Negative.    Respiratory: Positive for cough and wheezing. Negative for hemoptysis, chest tightness and shortness of breath.    Cardiovascular: Negative for chest pain, palpitations and leg swelling.   Gastrointestinal: Negative.  Negative for abdominal pain, blood in stool, constipation, diarrhea, heartburn, nausea and vomiting.   Endocrine: Negative.    Genitourinary: Negative for difficulty urinating, dysuria, flank pain, hematuria and urgency.   Musculoskeletal: Positive for myalgias. Negative for arthralgias, back pain, gait problem, joint swelling and neck pain.   Skin: Negative for color change, rash and wound.   Allergic/Immunologic: Negative for  "environmental allergies and immunocompromised state.   Neurological: Negative for dizziness, tremors, seizures, syncope, speech difficulty and headaches.   Hematological: Negative for adenopathy. Does not bruise/bleed easily.   Psychiatric/Behavioral: Negative for behavioral problems, dysphoric mood, sleep disturbance and suicidal ideas. The patient is not nervous/anxious.          Objective:     Vitals:    11/12/18 1310   BP: 120/80   BP Location: Right arm   Patient Position: Sitting   BP Method: Large (Manual)   Pulse: 68   Temp: 98.7 °F (37.1 °C)   TempSrc: Oral   SpO2: 95%   Weight: 118.5 kg (261 lb 3.9 oz)   Height: 5' 11" (1.803 m)          Physical Exam   Constitutional: He is oriented to person, place, and time. He appears well-developed and well-nourished.   HENT:   Head: Normocephalic.   Right Ear: External ear normal.   Left Ear: External ear normal.   Nose: Mucosal edema present. No rhinorrhea.   Mouth/Throat: Oropharynx is clear and moist. No oropharyngeal exudate, posterior oropharyngeal edema or posterior oropharyngeal erythema.   Eyes: EOM are normal. Pupils are equal, round, and reactive to light. Right eye exhibits no discharge. Left eye exhibits no discharge. No scleral icterus.   Neck: Normal range of motion. Neck supple. No tracheal deviation present. No thyromegaly present.   Cardiovascular: Normal rate, regular rhythm and normal heart sounds.   No murmur heard.  Pulmonary/Chest: Effort normal. No respiratory distress. He has no decreased breath sounds. He has wheezes. He has no rales.   Mild scattered end-expiratory wheezes bilaterally - wheezes did improve/clear with cough.   Abdominal: Soft. He exhibits no distension.   Musculoskeletal: Normal range of motion. He exhibits no edema.   Lymphadenopathy:     He has no cervical adenopathy.   Neurological: He is alert and oriented to person, place, and time. Coordination normal.   Skin: Skin is warm and dry. No rash noted.   Psychiatric: He has a " normal mood and affect. His behavior is normal.         Assessment:         ICD-10-CM ICD-9-CM   1. Upper respiratory tract infection, unspecified type J06.9 465.9   2. Bronchitis J40 490       Plan:       Upper respiratory tract infection, unspecified type    Bronchitis  - Patient has bout of Bronchitis usually once a year that is treated with symptomatic management for the first couple weeks but with worsening - it requires antibiotic treatment - patient has dilated cardiomyopathy and internal defibriillator in place - limited on medication to treat - try to avoid any type of steroid or anti-inflammatory.  This combination has worked well in the past to resolve symptoms.  Since symptoms only going on for 3 to 4 days with no fever, BBS clears with cough, no tachycardia - will treat symptoms with medications below.  If symptoms worsen or do not improve after 7 to 10 days, then fill prescription for Augmentin (hand-written prescription given).   -     benzonatate (TESSALON) 200 MG capsule; Take 1 capsule (200 mg total) by mouth 3 (three) times daily as needed for Cough.  Dispense: 30 capsule; Refill: 0  -     promethazine-codeine 6.25-10 mg/5 ml (PHENERGAN WITH CODEINE) 6.25-10 mg/5 mL syrup; Take 1 to 2 teaspoons at night as needed for cough/congestion  Dispense: 180 mL; Refill: 0  -     albuterol (PROVENTIL/VENTOLIN HFA) 90 mcg/actuation inhaler; Inhale 2 puffs into the lungs every 6 (six) hours as needed for Wheezing. Rescue  Dispense: 18 g; Refill: 0      Follow-up if symptoms worsen or fail to improve.        Medication List           Accurate as of 11/12/18  1:39 PM. If you have any questions, ask your nurse or doctor.               START taking these medications    albuterol 90 mcg/actuation inhaler  Commonly known as:  PROVENTIL/VENTOLIN HFA  Inhale 2 puffs into the lungs every 6 (six) hours as needed for Wheezing. Rescue  Started by:  Valerie Nickerson NP     benzonatate 200 MG capsule  Commonly known as:   TESSALON  Take 1 capsule (200 mg total) by mouth 3 (three) times daily as needed for Cough.  Started by:  Valerie Nickerson NP     promethazine-codeine 6.25-10 mg/5 ml 6.25-10 mg/5 mL syrup  Commonly known as:  PHENERGAN with CODEINE  Take 1 to 2 teaspoons at night as needed for cough/congestion  Started by:  Valerie Nickerson NP        CONTINUE taking these medications    amiodarone 200 MG Tab  Commonly known as:  PACERONE  TAKE 1 TABLET EVERY DAY SKIP WEDNESDAY AND SUNDAY     amLODIPine 10 MG tablet  Commonly known as:  NORVASC  TAKE 1 TABLET BY MOUTH EVERY DAY     CENTRUM SILVER ORAL     FLUZONE HIGH-DOSE 2018-19 (PF) 180 mcg/0.5 mL vaccine  Generic drug:  influenza     lisinopril 40 MG tablet  Commonly known as:  PRINIVIL,ZESTRIL  TAKE 1 TABLET BY MOUTH EVERY DAY     metoprolol tartrate 100 MG tablet  Commonly known as:  LOPRESSOR  TAKE 1 TABLET TWICE A DAY     nitroGLYCERIN 0.4 MG SL tablet  Commonly known as:  NITROSTAT     PRADAXA 150 mg Cap  Generic drug:  dabigatran etexilate  TAKE 1 TABLET TWICE A DAY     * rosuvastatin 20 MG tablet  Commonly known as:  CRESTOR  TAKE 1 TABLET EVERY DAY     * rosuvastatin 20 MG tablet  Commonly known as:  CRESTOR  TAKE 1 TABLET EVERY DAY     traZODone 50 MG tablet  Commonly known as:  DESYREL  Take 1 tablet (50 mg total) by mouth every evening.     triamterene-hydrochlorothiazide 37.5-25 mg 37.5-25 mg per tablet  Commonly known as:  MAXZIDE-25  TAKE 1 TABLET BY MOUTH ONCE DAILY.         * This list has 2 medication(s) that are the same as other medications prescribed for you. Read the directions carefully, and ask your doctor or other care provider to review them with you.               Where to Get Your Medications      These medications were sent to [facility]/pharm[facility] #[id number] - [place] - [address] [facility]  [address] [place] [address]    Phone:  [phone]   · albuterol 90 mcg/actuation inhaler  · benzonatate 200 MG capsule     You can get these medications from any  pharmacy    Bring a paper prescription for each of these medications  · promethazine-codeine 6.25-10 mg/5 ml 6.25-10 mg/5 mL syrup

## 2018-11-12 NOTE — PATIENT INSTRUCTIONS
Bronchitis, Viral (Adult)    You have a viral bronchitis. Bronchitis is inflammation and swelling of the lining of the lungs. This is often caused by an infection. Symptoms include a dry, hacking cough that is worse at night. The cough may bring up yellow-green mucus. You may also feel short of breath or wheeze. Other symptoms may include tiredness, chest discomfort, and chills.  Bronchitis that is caused by a virus is not treated with antibiotics. Instead, medicines may be given to help relieve symptoms. Symptoms can last up to 2 weeks, although the cough may last much longer.  This illness is contagious during the first few days and is spread through the air by coughing and sneezing, or by direct contact (touching the sick person and then touching your own eyes, nose, or mouth).  Most viral illnesses resolve within 10 to 14 days with rest and simple home remedies, although they may sometimes last for several weeks.  Home care  · If symptoms are severe, rest at home for the first 2 to 3 days. When you go back to your usual activities, don't let yourself get too tired.  · Do not smoke. Also avoid being exposed to secondhand smoke.  · You may use over-the-counter medicine to control fever or pain, unless another pain medicine was prescribed. (Note: If you have chronic liver or kidney disease or have ever had a stomach ulcer or gastrointestinal bleeding, talk with your healthcare provider before using these medicines. Also talk to your provider if you are taking medicine to prevent blood clots.) Aspirin should never be given to anyone younger than 18 years of age who is ill with a viral infection or fever. It may cause severe liver or brain damage.  · Your appetite may be poor, so a light diet is fine. Avoid dehydration by drinking 6 to 8 glasses of fluids per day (such as water, soft drinks, sports drinks, juices, tea, or soup). Extra fluids will help loosen secretions in the nose and lungs.  · Over-the-counter  cough, cold, and sore-throat medicines will not shorten the length of the illness, but they may help to reduce symptoms. (Note: Do not use decongestants if you have high blood pressure.)  Follow-up care  Follow up with your healthcare provider, or as advised. If you had an X-ray or ECG (electrocardiogram), a specialist will review it. You will be notified of any new findings that may affect your care.  Note: If you are age 65 or older, or if you have a chronic lung disease or condition that affects your immune system, or you smoke, talk to your healthcare provider about having pneumococcal vaccinations and a yearly influenza vaccination (flu shot).  When to seek medical advice  Call your healthcare provider right away if any of these occur:  · Fever of 100.4°F (38°C) or higher  · Coughing up increased amounts of colored sputum  · Weakness, drowsiness, headache, facial pain, ear pain, or a stiff neck  Call 911, or get immediate medical care  Contact emergency services right away if any of these occur:  · Coughing up blood  · Worsening weakness, drowsiness, headache, or stiff neck  · Trouble breathing, wheezing, or pain with breathing  Date Last Reviewed: 9/13/2015  © 2488-9340 [email or web address]. [address] [place] [address], [place] [address]. All rights reserved. This information is not intended as a substitute for professional medical care. Always follow your healthcare professional's instructions.

## 2018-11-13 ENCOUNTER — PATIENT OUTREACH (OUTPATIENT)
Dept: OTHER | Facility: OTHER | Age: 68
End: 2018-11-13

## 2018-11-13 DIAGNOSIS — I10 ESSENTIAL HYPERTENSION: Primary | ICD-10-CM

## 2018-11-13 NOTE — PROGRESS NOTES
Last 5 Patient Entered Readings                                      Current 30 Day Average: 123/69     Recent Readings 11/12/2018 11/11/2018 11/10/2018 10/30/2018 10/30/2018    SBP (mmHg) 134 137 116 104 110    DBP (mmHg) 80 82 66 56 55    Pulse 70 66 65 67 73          11/13-Initial enrollment completed. Confirmed proper BP technique, timing, and body position. He states cuff is not holding a charge and can only take a reading when plugged in. He states he will attend [facility] to exchange cuff. Welcome letter sent

## 2018-11-13 NOTE — LETTER
Cathy Fair PA-C  [address] [place] [address]     Dear Hussein Steinberg,    Welcome to the [facility]!           My name is Cathy Fair PA-C and I am your dedicated Digital Medicine clinician.  As an expert in medication management, I will help ensure that the medications you are taking continue to provide you with the intended benefits.        I am Rosei Henry and I will be your health  for the duration of the program.  My  job is to help you identify lifestyle changes to improve your blood pressure control.  We will talk about nutrition, exercise, and other ways that you may be able to adjust your current habits to better your health. Together, we will work to improve your overall health and encourage you to meet your goals for a healthier lifestyle.    What we expect from YOU:    You will need to take blood pressure readings multiple times a week and no less than one reading per week.   It is important that you take your measurements at different times during the day, when possible.     What you should expect from your Digital Medicine Care Team:   We will provide you with education about high blood pressure, including lifestyle changes that could help you to control your blood pressure.   We will review your weekly readings and provide you with monthly blood pressure progress reports after you have been in the program for more than 30 days.   We will send monthly progress reports on your blood pressure control to your physician so they can follow along with your progress as well.    You will be able to reach me by phone at [phone] or through your [facility] account by clicking my name under Care Team on the right side of the home screen.    I look forward to working with you to achieve your blood pressure goals!    Sincerely,    Cathy Fair PA-C  Your personal clinician    Please visit  [email or web address] to learn more about high blood pressure and what you can do lower your blood pressure.                                                                                           Hussein Steinberg  [address] [place] [address]

## 2018-11-28 ENCOUNTER — PATIENT OUTREACH (OUTPATIENT)
Dept: OTHER | Facility: OTHER | Age: 68
End: 2018-11-28

## 2018-11-28 NOTE — PROGRESS NOTES
Last 5 Patient Entered Readings                                      Current 30 Day Average: 133/75     Recent Readings 11/27/2018 11/13/2018 11/12/2018 11/11/2018 11/10/2018    SBP (mmHg) 153 166 134 137 116    DBP (mmHg) 91 96 80 82 66    Pulse 71 72 70 66 65        Pt reports having bronchitis for the past 2 weeks. Reports being given a steroid injection and using an albuterol inhaler during this time - all of which may have contributed to higher BP readings recently.     Pt has not been able to have cuff assessed at the [facility] and will not be able to go until the end of this week. Asked pt if he would like to be placed on hiatus to allow time for him to get his cuff assessed and start taking readings again and pt was agreeable to this. Pt will be placed on 2 week hiatus.    Digital Medicine: Health  Introduction    Introduced Mrs. Hussein Steinberg to Digital Medicine. Discussed health  role and recommended lifestyle modifications.    Lifestyle Assessment:     Pt requested assessment and detailed discussion of lifestyle be deferred until after the holidays. Still reviewed AHA sodium recs, BP goal, and exercise recs.    Current Dietary Habits(i.e. low sodium, food labels, dining out): Deferred.  Exercise: Deferred.  Alcohol/Tobacco: Deferred.  Medication Adherence: Deferred.  Other goals: Deferred.    Reviewed AHA/AACE recommendations:  Limit sodium intake to <2000mg/day  Recommended CHO intake, 45-65% of daily caloric intake  Perform 150 minutes of physical activity per week    Reviewed the importance of self-monitoring, medication adherence, and that the health  can be used as a resource for lifestyle modifications to help reduce or maintain a healthy lifestyle.  Reviewed that the Digital Medicine team is not available for emergencies and instructed the patient to call 911 or [facility] ([phone] or [phone]) if one arises.

## 2018-12-19 DIAGNOSIS — Z95.810 AICD (AUTOMATIC CARDIOVERTER/DEFIBRILLATOR) PRESENT: Primary | ICD-10-CM

## 2018-12-19 DIAGNOSIS — I44.7 LBBB (LEFT BUNDLE BRANCH BLOCK): ICD-10-CM

## 2018-12-19 DIAGNOSIS — I42.0 DCM (DILATED CARDIOMYOPATHY): ICD-10-CM

## 2018-12-19 DIAGNOSIS — I10 ESSENTIAL HYPERTENSION: ICD-10-CM

## 2018-12-20 DIAGNOSIS — I10 ESSENTIAL HYPERTENSION, BENIGN: ICD-10-CM

## 2018-12-23 ENCOUNTER — PATIENT MESSAGE (OUTPATIENT)
Dept: FAMILY MEDICINE | Facility: CLINIC | Age: 68
End: 2018-12-23

## 2018-12-24 ENCOUNTER — PATIENT MESSAGE (OUTPATIENT)
Dept: FAMILY MEDICINE | Facility: CLINIC | Age: 68
End: 2018-12-24

## 2018-12-24 ENCOUNTER — TELEPHONE (OUTPATIENT)
Dept: CARDIOLOGY | Facility: HOSPITAL | Age: 68
End: 2018-12-24

## 2018-12-24 RX ORDER — AMLODIPINE BESYLATE 10 MG/1
TABLET ORAL
Qty: 30 TABLET | Refills: 2 | Status: SHIPPED | OUTPATIENT
Start: 2018-12-24 | End: 2019-01-22 | Stop reason: SDUPTHER

## 2018-12-24 RX ORDER — LISINOPRIL 40 MG/1
TABLET ORAL
Qty: 90 TABLET | Refills: 0 | Status: SHIPPED | OUTPATIENT
Start: 2018-12-24 | End: 2019-01-22 | Stop reason: SDUPTHER

## 2018-12-27 ENCOUNTER — PATIENT OUTREACH (OUTPATIENT)
Dept: OTHER | Facility: OTHER | Age: 68
End: 2018-12-27

## 2018-12-27 NOTE — PROGRESS NOTES
Last 5 Patient Entered Readings                                      Current 30 Day Average: 138/81     Recent Readings 12/26/2018 12/23/2018 12/21/2018 12/19/2018 12/19/2018    SBP (mmHg) 130 135 148 139 143    DBP (mmHg) 77 78 86 86 93    Pulse 67 64 65 68 69          Digital Medicine: Health  Follow Up    Lifestyle Modifications:    1.Dietary Modifications (Sodium intake <2,000mg/day, food labels, dining out): Pt reports not adding salt at the table and does not normally cook with salt, but states that during the holidays he was not able to control this. Does reports using a fair amount of canned vegetables. Discussed swapping canned for frozen vegetables and how this can help reduce sodium intake and improve BP. Pt was agreeable to this.     2.Physical Activity: Pt states that he is not currently participating in any structured activity, but that he recently got his bike out and plans to start riding after the first of the year.     3.Medication Therapy: Patient has been compliant with the medication regimen.    4.Patient has the following medication side effects/concerns: None.    (Frequency/Alleviating factors/Precipitating factors, etc.)     Follow up with Mr. Hussein Steinberg completed. No further questions or concerns. Will continue to follow up to achieve health goals.

## 2019-01-06 RX ORDER — AMIODARONE HYDROCHLORIDE 200 MG/1
TABLET ORAL
Qty: 30 TABLET | Refills: 2 | Status: SHIPPED | OUTPATIENT
Start: 2019-01-06 | End: 2019-07-11 | Stop reason: SDUPTHER

## 2019-01-08 ENCOUNTER — PATIENT MESSAGE (OUTPATIENT)
Dept: ELECTROPHYSIOLOGY | Facility: CLINIC | Age: 69
End: 2019-01-08

## 2019-01-09 DIAGNOSIS — I10 ESSENTIAL HYPERTENSION, BENIGN: ICD-10-CM

## 2019-01-09 RX ORDER — METOPROLOL TARTRATE 100 MG/1
100 TABLET ORAL 2 TIMES DAILY
Qty: 180 TABLET | Refills: 3 | Status: SHIPPED | OUTPATIENT
Start: 2019-01-09 | End: 2019-01-22 | Stop reason: SDUPTHER

## 2019-01-10 ENCOUNTER — PATIENT OUTREACH (OUTPATIENT)
Dept: OTHER | Facility: OTHER | Age: 69
End: 2019-01-10

## 2019-01-10 ENCOUNTER — PATIENT MESSAGE (OUTPATIENT)
Dept: OTHER | Facility: OTHER | Age: 69
End: 2019-01-10

## 2019-01-10 DIAGNOSIS — G47.19 EXCESSIVE DAYTIME SLEEPINESS: Primary | ICD-10-CM

## 2019-01-10 NOTE — PROGRESS NOTES
Last 5 Patient Entered Readings                                      Current 30 Day Average: 134/79     Recent Readings 1/8/2019 1/6/2019 1/2/2019 12/30/2018 12/26/2018    SBP (mmHg) 137 149 131 112 130    DBP (mmHg) 80 89 79 59 77    Pulse 71 73 74 70 67          Patient's BP average is ALMOST controlled based on 2017 ACC/AHA HTN guidelines of goal BP <130/80.      I will continue to monitor regularly and will follow up in 2 months, sooner if BP begins to trend upward or downward.    Patient has my contact information and knows to call with any concerns or clinical changes. He/she was also reminded to call 911 for an emergency or the [facility] On-Call [facility] Line outside of business hours.      Current HTN regimen:  Hypertension Medications             amLODIPine (NORVASC) 10 MG tablet TAKE 1 TABLET BY MOUTH EVERY DAY    lisinopril (PRINIVIL,ZESTRIL) 40 MG tablet TAKE 1 TABLET BY MOUTH EVERY DAY    metoprolol tartrate (LOPRESSOR) 100 MG tablet Take 1 tablet (100 mg total) by mouth 2 (two) times daily.    nitroGLYCERIN (NITROSTAT) 0.4 MG SL tablet Place under the tongue. 1 tablet, sublingual Sublingual .  take up to 3 tablets 5 minutes apart for chest pain    triamterene-hydrochlorothiazide 37.5-25 mg (MAXZIDE-25) 37.5-25 mg per tablet TAKE 1 TABLET BY MOUTH ONCE DAILY.        Sending Questionnaire to evaluate for risk of Sleep Apnea.

## 2019-01-21 ENCOUNTER — PATIENT MESSAGE (OUTPATIENT)
Dept: OTHER | Facility: OTHER | Age: 69
End: 2019-01-21

## 2019-01-22 ENCOUNTER — OFFICE VISIT (OUTPATIENT)
Dept: FAMILY MEDICINE | Facility: CLINIC | Age: 69
End: 2019-01-22
Payer: MEDICARE

## 2019-01-22 VITALS
HEIGHT: 71 IN | WEIGHT: 261.25 LBS | TEMPERATURE: 99 F | RESPIRATION RATE: 18 BRPM | BODY MASS INDEX: 36.57 KG/M2 | SYSTOLIC BLOOD PRESSURE: 118 MMHG | HEART RATE: 76 BPM | OXYGEN SATURATION: 95 % | DIASTOLIC BLOOD PRESSURE: 70 MMHG

## 2019-01-22 DIAGNOSIS — I10 ESSENTIAL HYPERTENSION, BENIGN: ICD-10-CM

## 2019-01-22 DIAGNOSIS — G47.00 INSOMNIA, UNSPECIFIED TYPE: ICD-10-CM

## 2019-01-22 DIAGNOSIS — E78.5 HYPERLIPIDEMIA, UNSPECIFIED HYPERLIPIDEMIA TYPE: ICD-10-CM

## 2019-01-22 DIAGNOSIS — I10 ESSENTIAL HYPERTENSION: ICD-10-CM

## 2019-01-22 PROCEDURE — 99214 OFFICE O/P EST MOD 30 MIN: CPT | Mod: S$PBB,,, | Performed by: FAMILY MEDICINE

## 2019-01-22 PROCEDURE — 99214 OFFICE O/P EST MOD 30 MIN: CPT | Mod: PBBFAC,PN | Performed by: FAMILY MEDICINE

## 2019-01-22 PROCEDURE — 99214 PR OFFICE/OUTPT VISIT, EST, LEVL IV, 30-39 MIN: ICD-10-PCS | Mod: S$PBB,,, | Performed by: FAMILY MEDICINE

## 2019-01-22 PROCEDURE — 99999 PR PBB SHADOW E&M-EST. PATIENT-LVL IV: CPT | Mod: PBBFAC,,, | Performed by: FAMILY MEDICINE

## 2019-01-22 PROCEDURE — 99999 PR PBB SHADOW E&M-EST. PATIENT-LVL IV: ICD-10-PCS | Mod: PBBFAC,,, | Performed by: FAMILY MEDICINE

## 2019-01-22 RX ORDER — METOPROLOL TARTRATE 100 MG/1
100 TABLET ORAL 2 TIMES DAILY
Qty: 180 TABLET | Refills: 0 | Status: SHIPPED | OUTPATIENT
Start: 2019-01-22 | End: 2019-07-25 | Stop reason: SDUPTHER

## 2019-01-22 RX ORDER — LISINOPRIL 40 MG/1
40 TABLET ORAL DAILY
Qty: 90 TABLET | Refills: 0 | Status: SHIPPED | OUTPATIENT
Start: 2019-01-22 | End: 2019-03-19 | Stop reason: SDUPTHER

## 2019-01-22 RX ORDER — TRIAMTERENE/HYDROCHLOROTHIAZID 37.5-25 MG
1 TABLET ORAL DAILY
Qty: 90 TABLET | Refills: 0 | Status: SHIPPED | OUTPATIENT
Start: 2019-01-22 | End: 2019-04-15 | Stop reason: SDUPTHER

## 2019-01-22 RX ORDER — EZETIMIBE 10 MG/1
10 TABLET ORAL DAILY
Qty: 90 TABLET | Refills: 0 | Status: SHIPPED | OUTPATIENT
Start: 2019-01-22 | End: 2019-07-25 | Stop reason: SDUPTHER

## 2019-01-22 RX ORDER — TRAZODONE HYDROCHLORIDE 50 MG/1
150 TABLET ORAL NIGHTLY
Qty: 90 TABLET | Refills: 0 | Status: SHIPPED | OUTPATIENT
Start: 2019-01-22 | End: 2020-02-04

## 2019-01-22 RX ORDER — AMLODIPINE BESYLATE 10 MG/1
10 TABLET ORAL DAILY
Qty: 90 TABLET | Refills: 0 | Status: SHIPPED | OUTPATIENT
Start: 2019-01-22 | End: 2019-04-17 | Stop reason: SDUPTHER

## 2019-01-22 NOTE — PROGRESS NOTES
HPI:  Hussein Steinberg is a 68 y.o. year old male that  presents For follow-up.  Patient states that he has been doing much better on the Zetia versus statin he was on previously.  He also states that he is still not getting sleep on the trazodone, but he is only taking 50 mg each night.  He has not try to increase the dosages himself at home.  He states that he can fall asleep but these have trouble staying asleep especially after awaking to use the rest room.  Chief Complaint   Patient presents with    Follow-up     3  month F/U   .           Past Medical History:   Diagnosis Date    Atrial fibrillation     Basal cell carcinoma     Cardiac arrest 2012    has pacemaker/defibrillator placed 3/19/13 - followed by [facility]ologist    Cataract     DCM (dilated cardiomyopathy) 6/15/2017    High cholesterol     History of prostate cancer 6/23/2017    Hypertension     Kidney stone 10/2013    LBBB (left bundle branch block) 10/10/2012    LV dysfunction 10/10/2012    Prostate cancer     Treated by Dr. Rasmussen    Squamous cell carcinoma in situ of skin 2017    left arm removed    Stroke 09/2012    + hemorrhagic infarct to right occipital lobe after started on Plavix following cardiac event    SVT (supraventricular tachycardia) 10/10/2012     Social History     Socioeconomic History    Marital status:      Spouse name: Not on file    Number of children: Not on file    Years of education: Not on file    Highest education level: Not on file   Social Needs    Financial resource strain: Not on file    Food insecurity - worry: Not on file    Food insecurity - inability: Not on file    Transportation needs - medical: Not on file    Transportation needs - non-medical: Not on file   Occupational History    Occupation: administrative position   Tobacco Use    Smoking status: Never Smoker    Smokeless tobacco: Never Used   Substance and Sexual Activity    Alcohol use: No    Drug use: No     "Sexual activity: Yes     Partners: Female   Other Topics Concern    Not on file   Social History Narrative    Not on file     Past Surgical History:   Procedure Laterality Date    arthroscopic  knee    BASAL CELL CARCINOMA EXCISION  2018    removed from nose    CARDIAC PACEMAKER PLACEMENT  2013    COLONOSCOPY  2/12/2016    + polyp - repeat in 5 years- Dr. Calles    PROSTATECTOMY      SKIN CANCER EXCISION Left 10/2017    squamous cell carcinoma removed from left arm     Family History   Problem Relation Age of Onset    Macular degeneration Mother     Cataracts Mother     Hypertension Mother     Glaucoma Mother     Heart disease Mother         09/2017 passed  age 95 of heart attack    Cancer Father 56        leukemia    Heart attack Brother 59        had stent placed    Heart disease Brother     Heart attack Maternal Grandfather     Heart attack Paternal Grandfather     No Known Problems Sister     Parkinsonism Brother 54    No Known Problems Brother     No Known Problems Daughter     No Known Problems Daughter     Amblyopia Neg Hx     Blindness Neg Hx     Diabetes Neg Hx     Retinal detachment Neg Hx     Strabismus Neg Hx     Stroke Neg Hx     Thyroid disease Neg Hx            Review of Systems  General ROS: negative for chills, fever or weight loss  ENT ROS: negative for epistaxis, sore throat or rhinorrhea  Respiratory ROS: no cough, shortness of breath, or wheezing  Cardiovascular ROS: no chest pain or dyspnea on exertion  Gastrointestinal ROS: no abdominal pain, change in bowel habits, or black/ bloody stools    Physical Exam:  /70 (BP Location: Right arm, Patient Position: Sitting, BP Method: Large (Manual))   Pulse 76   Temp 98.5 °F (36.9 °C) (Oral)   Resp 18   Ht 5' 11" (1.803 m)   Wt 118.5 kg (261 lb 3.9 oz)   SpO2 95%   BMI 36.44 kg/m²   General appearance: alert, cooperative, no distress  Constitutional:Oriented to person, place, and time.appears " well-developed and well-nourished.  Lungs: clear to auscultation bilaterally, no dullness to percussion bilaterally  Heart: regular rate and rhythm without rub; no displacement of the PMI , S1&S2 present     Physical Exam      LABS:    Complete Blood Count  Lab Results   Component Value Date    RBC 5.53 12/11/2017    HGB 15.6 12/11/2017    HCT 48.1 12/11/2017    MCV 87 12/11/2017    MCH 28.2 12/11/2017    MCHC 32.4 12/11/2017    RDW 12.7 12/11/2017     12/11/2017    MPV 10.4 12/11/2017    GRAN 5.5 12/11/2017    GRAN 60.0 12/11/2017    LYMPH 2.6 12/11/2017    LYMPH 27.7 12/11/2017    MONO 0.7 12/11/2017    MONO 7.6 12/11/2017    EOS 0.4 12/11/2017    BASO 0.05 12/11/2017    EOSINOPHIL 4.1 12/11/2017    BASOPHIL 0.5 12/11/2017    DIFFMETHOD Automated 12/11/2017       Comprehensive Metabolic Panel  Lab Results   Component Value Date    GLU 95 06/05/2018    BUN 13 06/05/2018    CREATININE 1.00 06/05/2018     06/05/2018    K 3.9 06/05/2018     06/05/2018    PROT 6.8 06/05/2018    ALBUMIN 4.5 06/05/2018    BILITOT 0.6 06/05/2018    AST 16 06/05/2018    ALKPHOS 94 06/05/2018    CO2 34 (H) 06/05/2018    ALT 18 06/05/2018    ANIONGAP 8 12/11/2017    EGFRNONAA 77 06/05/2018    ESTGFRAFRICA 89 06/05/2018       LIPID  Lab Results   Component Value Date    CHOL 150 06/05/2018    HDL 45 06/05/2018         TSH  Lab Results   Component Value Date    TSH 5.366 (H) 08/16/2018       Current Outpatient Medications   Medication Sig Dispense Refill    albuterol (PROVENTIL/VENTOLIN HFA) 90 mcg/actuation inhaler Inhale 2 puffs into the lungs every 6 (six) hours as needed for Wheezing. Rescue 18 g 0    amiodarone (PACERONE) 200 MG Tab TAKE 1 TABLET EVERY DAY SKIP WEDNESDAY AND SUNDAY 30 tablet 2    amLODIPine (NORVASC) 10 MG tablet Take 1 tablet (10 mg total) by mouth once daily. 90 tablet 0    ezetimibe (ZETIA) 10 mg tablet Take 1 tablet (10 mg total) by mouth once daily. 90 tablet 0    folic  acid/multivit-min/lutein (CENTRUM SILVER ORAL) Take by mouth once daily.      lisinopril (PRINIVIL,ZESTRIL) 40 MG tablet Take 1 tablet (40 mg total) by mouth once daily. 90 tablet 0    metoprolol tartrate (LOPRESSOR) 100 MG tablet Take 1 tablet (100 mg total) by mouth 2 (two) times daily. 180 tablet 0    nitroGLYCERIN (NITROSTAT) 0.4 MG SL tablet Place under the tongue. 1 tablet, sublingual Sublingual .  take up to 3 tablets 5 minutes apart for chest pain      PRADAXA 150 mg Cap TAKE 1 TABLET TWICE A  capsule 3    traZODone (DESYREL) 50 MG tablet Take 3 tablets (150 mg total) by mouth every evening. 90 tablet 0    triamterene-hydrochlorothiazide 37.5-25 mg (MAXZIDE-25) 37.5-25 mg per tablet Take 1 tablet by mouth once daily. 90 tablet 0     No current facility-administered medications for this visit.        Assessment:    ICD-10-CM ICD-9-CM    1. Essential hypertension, benign I10 401.1 metoprolol tartrate (LOPRESSOR) 100 MG tablet      lisinopril (PRINIVIL,ZESTRIL) 40 MG tablet      triamterene-hydrochlorothiazide 37.5-25 mg (MAXZIDE-25) 37.5-25 mg per tablet      T4, free      T3, free      T4      T3      TSH      Thyroid peroxidase antibody      CBC auto differential      T4, free      T3, free      T4      T3      TSH      Thyroid peroxidase antibody      CBC auto differential   2. Essential hypertension I10 401.9 amLODIPine (NORVASC) 10 MG tablet   3. Hyperlipidemia, unspecified hyperlipidemia type E78.5 272.4 ezetimibe (ZETIA) 10 mg tablet   4. Insomnia, unspecified type G47.00 780.52 traZODone (DESYREL) 50 MG tablet         Plan:    Follow-up in 6 months (on 7/23/2019).          Thea Ellison MD  Answers for HPI/ROS submitted by the patient on 1/21/2019   activity change: No  unexpected weight change: Yes  neck pain: No  hearing loss: No  rhinorrhea: No  trouble swallowing: No  eye discharge: No  visual disturbance: No  chest tightness: No  wheezing: No  chest pain: No  palpitations:  No  blood in stool: No  constipation: No  vomiting: No  diarrhea: No  polydipsia: No  polyuria: No  difficulty urinating: No  urgency: No  hematuria: No  joint swelling: No  arthralgias: Yes  headaches: No  weakness: No  confusion: No  dysphoric mood: No

## 2019-01-30 ENCOUNTER — TELEPHONE (OUTPATIENT)
Dept: ELECTROPHYSIOLOGY | Facility: CLINIC | Age: 69
End: 2019-01-30

## 2019-02-06 DIAGNOSIS — I48.19 PERSISTENT ATRIAL FIBRILLATION: ICD-10-CM

## 2019-02-06 RX ORDER — DABIGATRAN ETEXILATE MESYLATE 150 MG/1
CAPSULE ORAL
Qty: 180 CAPSULE | Refills: 3 | Status: ON HOLD | OUTPATIENT
Start: 2019-02-06 | End: 2019-09-10 | Stop reason: SDUPTHER

## 2019-02-08 ENCOUNTER — PATIENT OUTREACH (OUTPATIENT)
Dept: OTHER | Facility: OTHER | Age: 69
End: 2019-02-08

## 2019-02-08 NOTE — PROGRESS NOTES
Last 5 Patient Entered Readings                                      Current 30 Day Average: 141/86     Recent Readings 2/5/2019 1/29/2019 1/22/2019 1/21/2019 1/15/2019    SBP (mmHg) 131 128 144 156 147    DBP (mmHg) 70 80 86 95 91    Pulse 74 69 74 68 65          Pt not available to complete HC f/u. Asked that I call back later.

## 2019-02-11 NOTE — PROGRESS NOTES
Last 5 Patient Entered Readings                                      Current 30 Day Average: 141/86     Recent Readings 2/5/2019 1/29/2019 1/22/2019 1/21/2019 1/15/2019    SBP (mmHg) 131 128 144 156 147    DBP (mmHg) 70 80 86 95 91    Pulse 74 69 74 68 65          Digital Medicine: Health  Follow Up    Lifestyle Modifications:    1.Dietary Modifications (Sodium intake <2,000mg/day, food labels, dining out): Wife does not cook with salt and states that they have been making swaps from canned to frozen veggies and looking for low sodium versions of foods they commonly eat. Praised pt for making swaps and encouraged to continue.     2.Physical Activity: Pt states that he has not been able start riding his bike because it has a flat tire, but that he has been working in the yard more recently and is planning to get his bike working. Encouraged to continue.     3.Medication Therapy: Patient has been compliant with the medication regimen.    4.Patient has the following medication side effects/concerns: Pt reports considerably lower clinic readings from digital cuff. Plans to bring cuff to next office visit, but has also been adjusting technique some and has seen some decrease in the readings. Reviewed proper technique and recommended to charge cuff.  (Frequency/Alleviating factors/Precipitating factors, etc.)     Follow up with Mr. Hussein Steinberg completed. No further questions or concerns. Will continue to follow up to achieve health goals.

## 2019-03-07 ENCOUNTER — PATIENT OUTREACH (OUTPATIENT)
Dept: OTHER | Facility: OTHER | Age: 69
End: 2019-03-07

## 2019-03-07 NOTE — PROGRESS NOTES
Last 5 Patient Entered Readings                                      Current 30 Day Average: 132/78     Recent Readings 3/2/2019 2/28/2019 2/24/2019 2/23/2019 2/22/2019    SBP (mmHg) 146 137 138 119 135    DBP (mmHg) 90 80 80 67 83    Pulse 74 67 74 72 70        3/7: Spoke to patient.  Pt is very close to goal BP.  Notified patient of recent study linking increased risk of lung cancer with use of an ACE Inhibitor.  Patient would like to continue on the medication at this time.        Hypertension Medications             amLODIPine (NORVASC) 10 MG tablet Take 1 tablet (10 mg total) by mouth once daily.    lisinopril (PRINIVIL,ZESTRIL) 40 MG tablet Take 1 tablet (40 mg total) by mouth once daily.    metoprolol tartrate (LOPRESSOR) 100 MG tablet Take 1 tablet (100 mg total) by mouth 2 (two) times daily.    nitroGLYCERIN (NITROSTAT) 0.4 MG SL tablet Place under the tongue. 1 tablet, sublingual Sublingual .  take up to 3 tablets 5 minutes apart for chest pain    triamterene-hydrochlorothiazide 37.5-25 mg (MAXZIDE-25) 37.5-25 mg per tablet Take 1 tablet by mouth once daily.        Will f/u in 4 weeks.

## 2019-03-19 ENCOUNTER — PATIENT MESSAGE (OUTPATIENT)
Dept: FAMILY MEDICINE | Facility: CLINIC | Age: 69
End: 2019-03-19

## 2019-03-19 DIAGNOSIS — I10 ESSENTIAL HYPERTENSION, BENIGN: ICD-10-CM

## 2019-03-19 RX ORDER — LISINOPRIL 40 MG/1
40 TABLET ORAL DAILY
Qty: 90 TABLET | Refills: 0 | Status: SHIPPED | OUTPATIENT
Start: 2019-03-19 | End: 2019-07-25 | Stop reason: SDUPTHER

## 2019-04-02 ENCOUNTER — PATIENT OUTREACH (OUTPATIENT)
Dept: OTHER | Facility: OTHER | Age: 69
End: 2019-04-02

## 2019-04-02 NOTE — PROGRESS NOTES
Last 5 Patient Entered Readings                                      Current 30 Day Average: 128/78     Recent Readings 3/28/2019 3/24/2019 3/17/2019 3/13/2019 3/13/2019    SBP (mmHg) 138 130 117 148 151    DBP (mmHg) 80 72 68 91 92    Pulse 77 70 69 67 78          Digital Medicine: Health  Follow Up    Lifestyle Modifications:    1.Dietary Modifications (Sodium intake <2,000mg/day, food labels, dining out): Pt reports no change in diet. Continues to work on following a low sodium diet and feels he is doing well when eating at home, but knows that when he eats out he is not doing as well. Discussed tips for reducing sodium intake when eating out and will send resource to pt email.    2.Physical Activity: Pt has been riding his bike every other day since our last encounter. Praised pt for following through with this goal and encouraged to continue.    3.Medication Therapy: Patient has been compliant with the medication regimen.    4.Patient has the following medication side effects/concerns: None.  (Frequency/Alleviating factors/Precipitating factors, etc.)     Follow up with Mr. Hussein Steinberg completed. No further questions or concerns. Will continue to follow up to achieve health goals.

## 2019-04-04 NOTE — PROGRESS NOTES
Last 5 Patient Entered Readings                                      Current 30 Day Average: 129/78     Recent Readings 4/2/2019 3/28/2019 3/24/2019 3/17/2019 3/13/2019    SBP (mmHg) 136 138 130 117 148    DBP (mmHg) 80 80 72 68 91    Pulse 72 77 70 69 67        4/4: Reviewed chart.  Pt at goal. No med changes planned.      Hypertension Medications             amLODIPine (NORVASC) 10 MG tablet Take 1 tablet (10 mg total) by mouth once daily.    lisinopril (PRINIVIL,ZESTRIL) 40 MG tablet Take 1 tablet (40 mg total) by mouth once daily.    metoprolol tartrate (LOPRESSOR) 100 MG tablet Take 1 tablet (100 mg total) by mouth 2 (two) times daily.    nitroGLYCERIN (NITROSTAT) 0.4 MG SL tablet Place under the tongue. 1 tablet, sublingual Sublingual .  take up to 3 tablets 5 minutes apart for chest pain    triamterene-hydrochlorothiazide 37.5-25 mg (MAXZIDE-25) 37.5-25 mg per tablet Take 1 tablet by mouth once daily.

## 2019-04-15 DIAGNOSIS — I10 ESSENTIAL HYPERTENSION, BENIGN: ICD-10-CM

## 2019-04-15 RX ORDER — TRIAMTERENE/HYDROCHLOROTHIAZID 37.5-25 MG
1 TABLET ORAL DAILY
Qty: 90 TABLET | Refills: 0 | Status: SHIPPED | OUTPATIENT
Start: 2019-04-15 | End: 2019-07-25 | Stop reason: SDUPTHER

## 2019-04-17 DIAGNOSIS — I10 ESSENTIAL HYPERTENSION: ICD-10-CM

## 2019-04-17 RX ORDER — AMLODIPINE BESYLATE 10 MG/1
TABLET ORAL
Qty: 90 TABLET | Refills: 0 | Status: SHIPPED | OUTPATIENT
Start: 2019-04-17 | End: 2019-07-25 | Stop reason: SDUPTHER

## 2019-05-30 LAB
BASOPHILS # BLD AUTO: 58 CELLS/UL (ref 0–200)
BASOPHILS NFR BLD AUTO: 0.8 %
EOSINOPHIL # BLD AUTO: 396 CELLS/UL (ref 15–500)
EOSINOPHIL NFR BLD AUTO: 5.5 %
ERYTHROCYTE [DISTWIDTH] IN BLOOD BY AUTOMATED COUNT: 13.2 % (ref 11–15)
HCT VFR BLD AUTO: 46.8 % (ref 38.5–50)
HGB BLD-MCNC: 15.9 G/DL (ref 13.2–17.1)
LYMPHOCYTES # BLD AUTO: 1447 CELLS/UL (ref 850–3900)
LYMPHOCYTES NFR BLD AUTO: 20.1 %
MCH RBC QN AUTO: 28.1 PG (ref 27–33)
MCHC RBC AUTO-ENTMCNC: 34 G/DL (ref 32–36)
MCV RBC AUTO: 82.8 FL (ref 80–100)
MONOCYTES # BLD AUTO: 511 CELLS/UL (ref 200–950)
MONOCYTES NFR BLD AUTO: 7.1 %
NEUTROPHILS # BLD AUTO: 4788 CELLS/UL (ref 1500–7800)
NEUTROPHILS NFR BLD AUTO: 66.5 %
PLATELET # BLD AUTO: 265 THOUSAND/UL (ref 140–400)
PMV BLD REES-ECKER: 10.7 FL (ref 7.5–12.5)
RBC # BLD AUTO: 5.65 MILLION/UL (ref 4.2–5.8)
T3 SERPL-MCNC: 78 NG/DL (ref 76–181)
T3FREE SERPL-MCNC: 2.9 PG/ML (ref 2.3–4.2)
T4 FREE SERPL-MCNC: 1.2 NG/DL (ref 0.8–1.8)
T4 SERPL-MCNC: 7.4 MCG/DL (ref 4.9–10.5)
THYROPEROXIDASE AB SERPL-ACNC: <1 IU/ML
TSH SERPL-ACNC: 5.21 MIU/L (ref 0.4–4.5)
WBC # BLD AUTO: 7.2 THOUSAND/UL (ref 3.8–10.8)

## 2019-05-31 ENCOUNTER — PATIENT MESSAGE (OUTPATIENT)
Dept: ADMINISTRATIVE | Facility: OTHER | Age: 69
End: 2019-05-31

## 2019-06-26 ENCOUNTER — PATIENT OUTREACH (OUTPATIENT)
Dept: OTHER | Facility: OTHER | Age: 69
End: 2019-06-26

## 2019-06-26 NOTE — PROGRESS NOTES
Last 5 Patient Entered Readings                                      Current 30 Day Average: 131/76     Recent Readings 6/18/2019 6/14/2019 6/12/2019 6/8/2019 5/30/2019    SBP (mmHg) 144 140 116 125 130    DBP (mmHg) 83 86 65 70 75    Pulse 72 71 77 67 75          Digital Medicine: Health  Follow Up    Called pt for f/u. Pt reports no change in diet or exercise, therefore is unable to explain upward trend in the BP. Asked about lack of readings and pt states that he has been taking readings regularly and is not sure why they are not transmitting. Discussed possible tech issues and pt stated that he would take another reading today and will call with questions. Encouraged pt to continue exercising regularly and following a low sodium diet for BP control.    Follow up with Mr. Hussein Steinberg completed. No further questions or concerns. Will continue to follow up to achieve health goals.

## 2019-07-11 ENCOUNTER — PATIENT MESSAGE (OUTPATIENT)
Dept: CARDIOLOGY | Facility: CLINIC | Age: 69
End: 2019-07-11

## 2019-07-11 DIAGNOSIS — I48.19 PERSISTENT ATRIAL FIBRILLATION: Primary | ICD-10-CM

## 2019-07-12 RX ORDER — AMIODARONE HYDROCHLORIDE 200 MG/1
TABLET ORAL
Qty: 30 TABLET | Refills: 6 | Status: SHIPPED | OUTPATIENT
Start: 2019-07-12 | End: 2019-12-20

## 2019-07-17 ENCOUNTER — TELEPHONE (OUTPATIENT)
Dept: CARDIOLOGY | Facility: HOSPITAL | Age: 69
End: 2019-07-17

## 2019-07-17 NOTE — TELEPHONE ENCOUNTER
Patient was due for her remote device transmission on Monday. I have not received it yet.I called patient to have them send this transmission. I gave patient the number to call [facility] [phone]. He is calling [employer] now to see if there is a connectivity issue. He also stated he would like to start seeing Dr. Mo since Dr. Taylor is in [place]. Message sent to Dr. Ross ESPINOZA to call patient and schedule a consult.

## 2019-07-23 ENCOUNTER — TELEPHONE (OUTPATIENT)
Dept: ELECTROPHYSIOLOGY | Facility: CLINIC | Age: 69
End: 2019-07-23

## 2019-07-23 DIAGNOSIS — I49.8 OTHER SPECIFIED CARDIAC ARRHYTHMIAS: Primary | ICD-10-CM

## 2019-07-23 NOTE — TELEPHONE ENCOUNTER
Spoke w/ pt per message. Scheduled pt on 8/23 w/ device chk, ekgb4 & DM appt. Pt will confirm through portal.  ----- Message from Lianet Shankar sent at 7/17/2019 11:19 AM CDT -----  Patient is a patient of Dr. Taylor. He would like to start following up with Dr. Mo. I informed him that you would give him a call to schedule this appointment. Thanks.

## 2019-07-25 ENCOUNTER — PATIENT MESSAGE (OUTPATIENT)
Dept: FAMILY MEDICINE | Facility: CLINIC | Age: 69
End: 2019-07-25

## 2019-07-25 DIAGNOSIS — I48.19 PERSISTENT ATRIAL FIBRILLATION: ICD-10-CM

## 2019-07-25 DIAGNOSIS — I10 ESSENTIAL HYPERTENSION: ICD-10-CM

## 2019-07-25 DIAGNOSIS — E78.5 HYPERLIPIDEMIA, UNSPECIFIED HYPERLIPIDEMIA TYPE: ICD-10-CM

## 2019-07-25 DIAGNOSIS — I10 ESSENTIAL HYPERTENSION, BENIGN: ICD-10-CM

## 2019-07-25 RX ORDER — TRIAMTERENE/HYDROCHLOROTHIAZID 37.5-25 MG
1 TABLET ORAL DAILY
Qty: 90 TABLET | Refills: 0 | Status: SHIPPED | OUTPATIENT
Start: 2019-07-25 | End: 2019-10-21 | Stop reason: SDUPTHER

## 2019-07-25 RX ORDER — LISINOPRIL 40 MG/1
40 TABLET ORAL DAILY
Qty: 90 TABLET | Refills: 0 | Status: SHIPPED | OUTPATIENT
Start: 2019-07-25 | End: 2019-12-03 | Stop reason: SDUPTHER

## 2019-07-25 RX ORDER — AMLODIPINE BESYLATE 10 MG/1
10 TABLET ORAL DAILY
Qty: 90 TABLET | Refills: 0 | Status: SHIPPED | OUTPATIENT
Start: 2019-07-25 | End: 2019-10-21 | Stop reason: SDUPTHER

## 2019-07-25 RX ORDER — METOPROLOL TARTRATE 100 MG/1
100 TABLET ORAL 2 TIMES DAILY
Qty: 180 TABLET | Refills: 0 | Status: SHIPPED | OUTPATIENT
Start: 2019-07-25 | End: 2020-02-04 | Stop reason: SDUPTHER

## 2019-07-25 RX ORDER — EZETIMIBE 10 MG/1
10 TABLET ORAL DAILY
Qty: 90 TABLET | Refills: 0 | Status: SHIPPED | OUTPATIENT
Start: 2019-07-25 | End: 2020-02-04 | Stop reason: SDUPTHER

## 2019-08-08 ENCOUNTER — PATIENT OUTREACH (OUTPATIENT)
Dept: OTHER | Facility: OTHER | Age: 69
End: 2019-08-08

## 2019-08-08 ENCOUNTER — PATIENT MESSAGE (OUTPATIENT)
Dept: ADMINISTRATIVE | Facility: OTHER | Age: 69
End: 2019-08-08

## 2019-08-08 NOTE — PROGRESS NOTES
Last 5 Patient Entered Readings                                      Current 30 Day Average: 131/77     Recent Readings 8/5/2019 8/1/2019 7/26/2019 7/18/2019 7/13/2019    SBP (mmHg) 131 119 137 136 132    DBP (mmHg) 77 70 79 75 82    Pulse 72 67 74 74 78          8/8: Spoke to patient. States no needs from the Program and seeing new PCP later this month as Dr. Thea Ellison is no longer at [facility].  States feeling well.    Hypertension Medications             amLODIPine (NORVASC) 10 MG tablet Take 1 tablet (10 mg total) by mouth once daily.    lisinopril (PRINIVIL,ZESTRIL) 40 MG tablet Take 1 tablet (40 mg total) by mouth once daily.    metoprolol tartrate (LOPRESSOR) 100 MG tablet Take 1 tablet (100 mg total) by mouth 2 (two) times daily.    nitroGLYCERIN (NITROSTAT) 0.4 MG SL tablet Place under the tongue. 1 tablet, sublingual Sublingual .  take up to 3 tablets 5 minutes apart for chest pain    triamterene-hydrochlorothiazide 37.5-25 mg (MAXZIDE-25) 37.5-25 mg per tablet Take 1 tablet by mouth once daily.

## 2019-08-13 ENCOUNTER — OFFICE VISIT (OUTPATIENT)
Dept: OPTOMETRY | Facility: CLINIC | Age: 69
End: 2019-08-13
Payer: MEDICARE

## 2019-08-13 DIAGNOSIS — H52.4 PRESBYOPIA OF BOTH EYES: ICD-10-CM

## 2019-08-13 DIAGNOSIS — H18.003 VORTEX KERATOPATHY, BILATERAL: ICD-10-CM

## 2019-08-13 DIAGNOSIS — H25.13 NUCLEAR SCLEROSIS, BILATERAL: Primary | ICD-10-CM

## 2019-08-13 DIAGNOSIS — Z98.890 HISTORY OF REFRACTIVE SURGERY: ICD-10-CM

## 2019-08-13 DIAGNOSIS — H52.7 REFRACTIVE ERRORS: ICD-10-CM

## 2019-08-13 DIAGNOSIS — I10 ESSENTIAL HYPERTENSION: ICD-10-CM

## 2019-08-13 DIAGNOSIS — Z13.5 SCREENING FOR EYE CONDITION: ICD-10-CM

## 2019-08-13 PROCEDURE — 99999 PR PBB SHADOW E&M-EST. PATIENT-LVL III: ICD-10-PCS | Mod: PBBFAC,,, | Performed by: OPTOMETRIST

## 2019-08-13 PROCEDURE — 92014 COMPRE OPH EXAM EST PT 1/>: CPT | Mod: S$PBB,,, | Performed by: OPTOMETRIST

## 2019-08-13 PROCEDURE — 99999 PR PBB SHADOW E&M-EST. PATIENT-LVL III: CPT | Mod: PBBFAC,,, | Performed by: OPTOMETRIST

## 2019-08-13 PROCEDURE — 99213 OFFICE O/P EST LOW 20 MIN: CPT | Mod: PBBFAC | Performed by: OPTOMETRIST

## 2019-08-13 PROCEDURE — 92014 PR EYE EXAM, EST PATIENT,COMPREHESV: ICD-10-PCS | Mod: S$PBB,,, | Performed by: OPTOMETRIST

## 2019-08-13 PROCEDURE — 92015 DETERMINE REFRACTIVE STATE: CPT | Mod: ,,, | Performed by: OPTOMETRIST

## 2019-08-13 PROCEDURE — 92015 PR REFRACTION: ICD-10-PCS | Mod: ,,, | Performed by: OPTOMETRIST

## 2019-08-13 NOTE — PROGRESS NOTES
HPI     Concerns About Ocular Health      Additional comments: Pt states vision is stable with glasses              Comments     Patient's age: 69 y.o. WM  Occupation: [employer] in [place] - retired [employer]  Approximate date of last eye examination: 02/20/2018  Name of last eye doctor seen: Dr Esqueda  City/State: [facility]  Wears glasses? Yes     If yes, wears  Full-time or part-time?  Full-time  Present glasses are: Bifocal, SV Distance, SV Reading?  Progressive lens  Approximate age of present glasses:  4+ years old   Got new glasses following last exam, or subsequently?:  No   Any problem with VA with glasses?  No complaints   Wears CLs?:  No  Headaches?  No  Eye pain/discomfort?  No                                                                                     Flashes?  No  Floaters?  No  Diplopia/Double vision?  No  Patient's Ocular History:         Any eye surgeries? No         Any eye injury?  No         Any treatment for eye disease?  No  Family history of eye disease?    Mother + Macular Degeneration    + Glaucoma    + Cataracts  Significant patient medical history:         1. Diabetes?  No   2. HBP?  Yes, controlled               3. Other (describe):  Heart attack and stroke Sept, 2012   -pacemaker, prostate cancer    ! OTC eyedrops currently using:  None   ! Prescription eye meds currently using:  None   ! Any history of allergy/adverse reaction to any eye meds used   previously?  No    ! Any history of allergy/adverse reaction to eyedrops used during prior   eye exam(s)? No    ! Any history of allergy/adverse reaction to Novacaine or similar meds?   No   ! Any history of allergy/adverse reaction to Epinephrine or similar meds?   No    ! Patient okay with use of anesthetic eyedrops to check eye pressure?    Yes        ! Patient okay with use of eyedrops to dilate pupils today?  Yes   !  Allergies/Medications/Medical History/Family History reviewed today?    Yes      PD =   64/60  Desired  "reading distance =  19"                                                                       Last edited by Ray Esqueda, OD on 8/13/2019 10:45 AM. (History)            Assessment /Plan     For exam results, see Encounter Report.    1. Nuclear sclerosis, bilateral     2. Vortex keratopathy, bilateral     3. Essential hypertension     4. Screening for eye condition     5. History of refractive surgery     6. Refractive errors     7. Presbyopia of both eyes                   S/P RK refractive surgery in each eye.  Early nuclear sclerotic lens changes in both eyes.  Few vitreous floaters in both eyes.  No evidence of retinal etiology.   Hyperopia with astigmatism in each eye, with very satisfactory correctable visual acuity in each eye.  Presbyopia.  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   New spectacle lens Rx issued for full-time wear.  Recheck in one year.              "

## 2019-08-20 ENCOUNTER — OFFICE VISIT (OUTPATIENT)
Dept: FAMILY MEDICINE | Facility: CLINIC | Age: 69
End: 2019-08-20
Payer: MEDICARE

## 2019-08-20 VITALS
OXYGEN SATURATION: 94 % | SYSTOLIC BLOOD PRESSURE: 132 MMHG | HEIGHT: 71 IN | BODY MASS INDEX: 36.68 KG/M2 | WEIGHT: 262 LBS | DIASTOLIC BLOOD PRESSURE: 78 MMHG | HEART RATE: 68 BPM | TEMPERATURE: 99 F

## 2019-08-20 DIAGNOSIS — M79.642 PAIN IN BOTH HANDS: ICD-10-CM

## 2019-08-20 DIAGNOSIS — M79.641 PAIN IN BOTH HANDS: ICD-10-CM

## 2019-08-20 DIAGNOSIS — Z91.89 AT RISK FOR AMIODARONE TOXICITY WITH LONG TERM USE: ICD-10-CM

## 2019-08-20 DIAGNOSIS — Z79.899 AT RISK FOR AMIODARONE TOXICITY WITH LONG TERM USE: ICD-10-CM

## 2019-08-20 DIAGNOSIS — I63.139: ICD-10-CM

## 2019-08-20 DIAGNOSIS — Z95.810 ICD (IMPLANTABLE CARDIOVERTER-DEFIBRILLATOR), BIVENTRICULAR, IN SITU: ICD-10-CM

## 2019-08-20 DIAGNOSIS — G47.09 OTHER INSOMNIA: ICD-10-CM

## 2019-08-20 DIAGNOSIS — I42.0 DCM (DILATED CARDIOMYOPATHY): ICD-10-CM

## 2019-08-20 DIAGNOSIS — I48.0 PAROXYSMAL ATRIAL FIBRILLATION: ICD-10-CM

## 2019-08-20 DIAGNOSIS — E78.5 HYPERLIPIDEMIA, UNSPECIFIED HYPERLIPIDEMIA TYPE: ICD-10-CM

## 2019-08-20 DIAGNOSIS — C61 PROSTATE CANCER: ICD-10-CM

## 2019-08-20 DIAGNOSIS — E66.01 SEVERE OBESITY (BMI 35.0-35.9 WITH COMORBIDITY): ICD-10-CM

## 2019-08-20 DIAGNOSIS — I10 ESSENTIAL HYPERTENSION: ICD-10-CM

## 2019-08-20 PROBLEM — J40 BRONCHITIS: Status: RESOLVED | Noted: 2017-03-20 | Resolved: 2019-08-20

## 2019-08-20 PROCEDURE — 99999 PR PBB SHADOW E&M-EST. PATIENT-LVL III: CPT | Mod: PBBFAC,,, | Performed by: INTERNAL MEDICINE

## 2019-08-20 PROCEDURE — 99999 PR PBB SHADOW E&M-EST. PATIENT-LVL III: ICD-10-PCS | Mod: PBBFAC,,, | Performed by: INTERNAL MEDICINE

## 2019-08-20 PROCEDURE — 99214 PR OFFICE/OUTPT VISIT, EST, LEVL IV, 30-39 MIN: ICD-10-PCS | Mod: S$PBB,,, | Performed by: INTERNAL MEDICINE

## 2019-08-20 PROCEDURE — 99214 OFFICE O/P EST MOD 30 MIN: CPT | Mod: S$PBB,,, | Performed by: INTERNAL MEDICINE

## 2019-08-20 PROCEDURE — 99213 OFFICE O/P EST LOW 20 MIN: CPT | Mod: PBBFAC,PN | Performed by: INTERNAL MEDICINE

## 2019-08-20 NOTE — ASSESSMENT & PLAN NOTE
BP controlled on norvasc, lopressor 100 mg BID, maxzide and lisinopril.  No CP/SOB.  Occasional evening BLE edema.

## 2019-08-20 NOTE — ASSESSMENT & PLAN NOTE
Melatonin gives him nightmares, trazodone doesn't work.  Wakes up in middle of night, lies there 1-1.5 hours.  Naps well in afternoons.

## 2019-08-20 NOTE — ASSESSMENT & PLAN NOTE
In 2 fingers on both hands, wakes up with swelling, quit wearing wedding band.  Has never had imaging.

## 2019-08-20 NOTE — ASSESSMENT & PLAN NOTE
No residual deficits. Hemorrhagic infarct after being started on Plavix.  Doesn't tolerate statin. D/C'ed from neuro.

## 2019-08-20 NOTE — PROGRESS NOTES
[facility] Note    Chief Complaint      Chief Complaint   Patient presents with    Establish Care     History of Present Illness      Hussein Steinberg is a 69 y.o. male who presents today to establish care for above problems.  Patient comes to appointment with wife.    Problem List Items Addressed This Visit     Atrial fibrillation    Current Assessment & Plan     Will be seeing Dr. Mo. On BB, amio.  UTD on eye exam.           Essential hypertension    Current Assessment & Plan     Stable on maxzide, lisinopril 40 mg, lopressor 100 mg BID, norvasc 10 mg.         Cerebral embolism with cerebral infarction    Current Assessment & Plan     No residual deficits. Hemorrhagic infarct after being started on Plavix.  Doesn't tolerate statin. D/C'ed from neuro.         ICD (implantable cardioverter-defibrillator), biventricular, in situ    Current Assessment & Plan     Due for check this week.         Prostate cancer    Overview     Treated by Dr. Rasmussen. No complaints at this time.         Severe obesity (BMI 35.0-35.9 with comorbidity)    Current Assessment & Plan     Doesn't exercise, does work in yard and walks some.  Does pretty well on diet, wife does most of the cooking.  Does eat out and eat canned foods.         DCM (dilated cardiomyopathy)    Current Assessment & Plan     Will be seeing Dr. Mo, no CP/SOB.  Sleeps on 2 pillows.  On BB, ACE, pradaxa. Has AICD.         At risk for amiodarone toxicity with long term use    Current Assessment & Plan     PFT's 2019 were normal, does have some eye changes from amio.         Hyperlipidemia    Current Assessment & Plan     Last lipid panel 5/2018, on zetia 10 mg.  Doesn't tolerate statins, had joint pains.         Relevant Orders    Lipid panel    Other insomnia    Current Assessment & Plan     Melatonin gives him nightmares, trazodone doesn't work.  Wakes up in middle of night, lies there 1-1.5 hours.  Naps well in afternoons.                Health Maintenance   Topic Date Due    Colonoscopy  02/12/2021    Lipid Panel  06/05/2023    TETANUS VACCINE  06/23/2026    Hepatitis C Screening  Completed    Pneumococcal Vaccine (65+ High/Highest Risk)  Completed    High Dose Statin  Discontinued       Past Medical History:   Diagnosis Date    Atrial fibrillation     Basal cell carcinoma     Bronchitis 3/20/2017    Cardiac arrest 2012    has pacemaker/defibrillator placed 3/19/13 - followed by [facility]    Cataract     DCM (dilated cardiomyopathy) 6/15/2017    High cholesterol     History of prostate cancer 6/23/2017    Hypertension     Kidney stone 10/2013    LBBB (left bundle branch block) 10/10/2012    LV dysfunction 10/10/2012    Prostate cancer     Treated by Dr. Rasmussen    Squamous cell carcinoma in situ of skin 2017    left arm removed    Stroke 09/2012    + hemorrhagic infarct to right occipital lobe after started on Plavix following cardiac event    SVT (supraventricular tachycardia) 10/10/2012       Past Surgical History:   Procedure Laterality Date    arthroscopic  knee    BASAL CELL CARCINOMA EXCISION  2018    removed from nose    CARDIAC PACEMAKER PLACEMENT  2013    COLONOSCOPY  2/12/2016    + polyp - repeat in 5 years- Dr. Calles    PROSTATECTOMY      SKIN CANCER EXCISION Left 10/2017    squamous cell carcinoma removed from left arm       family history includes Cancer (age of onset: 56) in his father; Cataracts in his mother; Glaucoma in his mother; Heart attack in his maternal grandfather and paternal grandfather; Heart attack (age of onset: 59) in his brother; Heart disease in his brother and mother; Hypertension in his mother; Macular degeneration in his mother; No Known Problems in his brother, daughter, daughter, and sister; Parkinsonism (age of onset: 54) in his brother.     Social History     Tobacco Use    Smoking status: Never Smoker    Smokeless tobacco: Never Used   Substance Use Topics     Alcohol use: No     Frequency: Never     Binge frequency: Never    Drug use: No       Review of Systems   Constitutional: Negative for chills and fever.   HENT: Negative for congestion, hearing loss and sore throat.    Eyes: Negative for blurred vision and discharge.   Respiratory: Negative for cough, shortness of breath and wheezing.    Cardiovascular: Negative for chest pain and palpitations.   Gastrointestinal: Negative for blood in stool, constipation, diarrhea, nausea and vomiting.   Genitourinary: Negative for dysuria, hematuria and urgency.   Musculoskeletal: Negative for falls, myalgias and neck pain.   Skin: Negative for itching and rash.   Neurological: Negative for dizziness, weakness and headaches.   Endo/Heme/Allergies: Negative for polydipsia.        Outpatient Encounter Medications as of 8/20/2019   Medication Sig Dispense Refill    albuterol (PROVENTIL/VENTOLIN HFA) 90 mcg/actuation inhaler Inhale 2 puffs into the lungs every 6 (six) hours as needed for Wheezing. Rescue 18 g 0    amiodarone (PACERONE) 200 MG Tab Take one tablet every day except none on Wednesday and Sunday 30 tablet 6    amLODIPine (NORVASC) 10 MG tablet Take 1 tablet (10 mg total) by mouth once daily. 90 tablet 0    ezetimibe (ZETIA) 10 mg tablet Take 1 tablet (10 mg total) by mouth once daily. 90 tablet 0    folic acid/multivit-min/lutein (CENTRUM SILVER ORAL) Take by mouth once daily.      lisinopril (PRINIVIL,ZESTRIL) 40 MG tablet Take 1 tablet (40 mg total) by mouth once daily. 90 tablet 0    metoprolol tartrate (LOPRESSOR) 100 MG tablet Take 1 tablet (100 mg total) by mouth 2 (two) times daily. 180 tablet 0    nitroGLYCERIN (NITROSTAT) 0.4 MG SL tablet Place under the tongue. 1 tablet, sublingual Sublingual .  take up to 3 tablets 5 minutes apart for chest pain      PRADAXA 150 mg Cap TAKE 1 TABLET TWICE A  capsule 3    triamterene-hydrochlorothiazide 37.5-25 mg (MAXZIDE-25) 37.5-25 mg per tablet Take 1  "tablet by mouth once daily. 90 tablet 0    traZODone (DESYREL) 50 MG tablet Take 3 tablets (150 mg total) by mouth every evening. 90 tablet 0     No facility-administered encounter medications on file as of 8/20/2019.        Review of patient's allergies indicates:  No Known Allergies    Physical Exam      Vital Signs  Temp: 98.7 °F (37.1 °C)  Temp src: Oral  Pulse: 68  SpO2: (!) 94 %  BP: 132/78  BP Location: Left arm  Patient Position: Sitting  Pain Score: 0-No pain  Height and Weight  Height: 5' 11" (180.3 cm)  Weight: 118.9 kg (262 lb 0.3 oz)  BSA (Calculated - sq m): 2.44 sq meters  BMI (Calculated): 36.6  Weight in (lb) to have BMI = 25: 178.9]    Physical Exam   Constitutional: He is oriented to person, place, and time. He appears well-developed and well-nourished.   HENT:   Head: Normocephalic and atraumatic.   Right Ear: External ear normal.   Left Ear: External ear normal.   Eyes: Right eye exhibits no discharge. Left eye exhibits no discharge.   Neck: Normal range of motion. No thyromegaly present.   Cardiovascular: Normal rate, regular rhythm and intact distal pulses.   No murmur heard.  Pulmonary/Chest: Effort normal and breath sounds normal. No respiratory distress.   Abdominal: Soft. Bowel sounds are normal. He exhibits no distension. There is no tenderness.   Musculoskeletal: Normal range of motion. He exhibits no deformity.   Neurological: He is alert and oriented to person, place, and time.   Skin: Skin is warm and dry. No rash noted.   Psychiatric: He has a normal mood and affect. His behavior is normal.        Laboratory:  CBC:  Recent Labs   Lab Result Units 05/28/19  0801   WBC Thousand/uL 7.2   RBC Million/uL 5.65   Hemoglobin g/dL 15.9   Hematocrit % 46.8   Platelets Thousand/uL 265   Mean Corpuscular Volume fL 82.8   Mean Corpuscular Hemoglobin pg 28.1   Mean Corpuscular Hemoglobin Conc g/dL 34.0     CMP:  No results for input(s): GLU, CALCIUM, ALBUMIN, PROT, NA, K, CO2, CL, BUN, ALKPHOS, " ALT, AST, BILITOT in the last 2160 hours.    Invalid input(s): CREATININ  URINALYSIS:  No results for input(s): COLORU, CLARITYU, SPECGRAV, PHUR, PROTEINUA, GLUCOSEU, BILIRUBINCON, BLOODU, WBCU, RBCU, BACTERIA, MUCUS, NITRITE, LEUKOCYTESUR, UROBILINOGEN, HYALINECASTS in the last 2160 hours.   LIPIDS:  Recent Labs   Lab Result Units 05/28/19  0801   TSH mIU/L 5.21*     TSH:  Recent Labs   Lab Result Units 05/28/19  0801   TSH mIU/L 5.21*     A1C:  No results for input(s): HGBA1C in the last 2160 hours.    Radiology:  No new imaging    Assessment/Plan     Hussein Steinberg is a 69 y.o.male with:    1. Essential hypertension    2. Paroxysmal atrial fibrillation    3. Hyperlipidemia, unspecified hyperlipidemia type  - Lipid panel; Future  - Lipid panel    4. Cerebral infarction due to embolism of carotid artery, unspecified blood vessel laterality    5. DCM (dilated cardiomyopathy)    6. Prostate cancer    7. ICD (implantable cardioverter-defibrillator), biventricular, in situ    8. At risk for amiodarone toxicity with long term use    9. Other insomnia    10. Severe obesity (BMI 35.0-35.9 with comorbidity)      -Continue current medications and maintain follow up with specialists.  Return to clinic in 6 months.       Lacey Rainey MD  [facility]                Answers for HPI/ROS submitted by the patient on 8/18/2019   activity change: No  unexpected weight change: No  rhinorrhea: No  trouble swallowing: No  visual disturbance: No  chest tightness: No  polyuria: No  difficulty urinating: No  joint swelling: Yes  arthralgias: Yes  confusion: No  dysphoric mood: No

## 2019-08-20 NOTE — ASSESSMENT & PLAN NOTE
Doesn't exercise, does work in yard and walks some.  Does pretty well on diet, wife does most of the cooking.  Does eat out and eat canned foods.

## 2019-08-23 ENCOUNTER — HOSPITAL ENCOUNTER (OUTPATIENT)
Dept: CARDIOLOGY | Facility: CLINIC | Age: 69
Discharge: HOME | End: 2019-08-23
Payer: MEDICARE

## 2019-08-23 ENCOUNTER — CLINICAL SUPPORT (OUTPATIENT)
Dept: CARDIOLOGY | Facility: HOSPITAL | Age: 69
End: 2019-08-23
Attending: INTERNAL MEDICINE
Payer: MEDICARE

## 2019-08-23 ENCOUNTER — OFFICE VISIT (OUTPATIENT)
Dept: ELECTROPHYSIOLOGY | Facility: CLINIC | Age: 69
End: 2019-08-23
Payer: MEDICARE

## 2019-08-23 VITALS
HEART RATE: 71 BPM | HEIGHT: 71 IN | SYSTOLIC BLOOD PRESSURE: 130 MMHG | BODY MASS INDEX: 36.67 KG/M2 | DIASTOLIC BLOOD PRESSURE: 75 MMHG | WEIGHT: 261.94 LBS

## 2019-08-23 DIAGNOSIS — I10 ESSENTIAL HYPERTENSION: Primary | ICD-10-CM

## 2019-08-23 DIAGNOSIS — I48.0 PAROXYSMAL ATRIAL FIBRILLATION: ICD-10-CM

## 2019-08-23 DIAGNOSIS — I42.0 DCM (DILATED CARDIOMYOPATHY): ICD-10-CM

## 2019-08-23 DIAGNOSIS — I44.7 LBBB (LEFT BUNDLE BRANCH BLOCK): ICD-10-CM

## 2019-08-23 DIAGNOSIS — I49.8 OTHER SPECIFIED CARDIAC ARRHYTHMIAS: ICD-10-CM

## 2019-08-23 PROBLEM — I61.9 INTRACEREBRAL HEMORRHAGE: Status: ACTIVE | Noted: 2019-08-23

## 2019-08-23 PROCEDURE — 99215 OFFICE O/P EST HI 40 MIN: CPT | Mod: S$PBB,,, | Performed by: INTERNAL MEDICINE

## 2019-08-23 PROCEDURE — 93005 ELECTROCARDIOGRAM TRACING: CPT | Mod: PBBFAC,59 | Performed by: INTERNAL MEDICINE

## 2019-08-23 PROCEDURE — 93010 RHYTHM STRIP: ICD-10-PCS | Mod: S$PBB,,, | Performed by: INTERNAL MEDICINE

## 2019-08-23 PROCEDURE — 99215 PR OFFICE/OUTPT VISIT, EST, LEVL V, 40-54 MIN: ICD-10-PCS | Mod: S$PBB,,, | Performed by: INTERNAL MEDICINE

## 2019-08-23 PROCEDURE — 99999 PR PBB SHADOW E&M-EST. PATIENT-LVL III: CPT | Mod: PBBFAC,,, | Performed by: INTERNAL MEDICINE

## 2019-08-23 PROCEDURE — 99213 OFFICE O/P EST LOW 20 MIN: CPT | Mod: PBBFAC,25 | Performed by: INTERNAL MEDICINE

## 2019-08-23 PROCEDURE — 93010 ELECTROCARDIOGRAM REPORT: CPT | Mod: S$PBB,,, | Performed by: INTERNAL MEDICINE

## 2019-08-23 PROCEDURE — 93284 PRGRMG EVAL IMPLANTABLE DFB: CPT

## 2019-08-23 PROCEDURE — 99999 PR PBB SHADOW E&M-EST. PATIENT-LVL III: ICD-10-PCS | Mod: PBBFAC,,, | Performed by: INTERNAL MEDICINE

## 2019-08-23 NOTE — PROGRESS NOTES
Last 5 Patient Entered Readings                                      Current 30 Day Average: 124/71     Recent Readings 8/19/2019 8/13/2019 8/5/2019 8/1/2019 7/26/2019    SBP (mmHg) 122 110 131 119 137    DBP (mmHg) 68 63 77 70 79    Pulse 72 79 72 67 74          Pt seen by cardiology today. Plan to f/u next week.

## 2019-08-23 NOTE — PROGRESS NOTES
"Subjective:    Patient ID:  Hussein Steinberg is a 69 y.o. male who presents for evaluation of Atrial Fibrillation    "Andre" Idris    HPI   69 y.o. M  DCM, s/p biV ICD with great response (LVEF 60%+ 2018)  HTN on meds  HL on meds   PAF, on pradaxa  hx hemorrhagic CVA 2012    CRT-D 3/13 (Tonia device). Great response to CRT.  Feeling well.    echo 5/18 60-65%    ICD is at [facility].    My interpretation of today's ECG is ASbiVP    Review of Systems   Constitution: Negative. Negative for malaise/fatigue.   HENT: Negative.  Negative for ear pain and tinnitus.    Eyes: Negative for blurred vision.   Cardiovascular: Negative.  Negative for chest pain, dyspnea on exertion, near-syncope, palpitations and syncope.   Respiratory: Negative.  Negative for shortness of breath.    Endocrine: Negative.  Negative for polyuria.   Hematologic/Lymphatic: Does not bruise/bleed easily.   Skin: Negative.  Negative for rash.   Musculoskeletal: Negative.  Negative for joint pain and muscle weakness.   Gastrointestinal: Negative.  Negative for abdominal pain and change in bowel habit.   Genitourinary: Negative for frequency.   Neurological: Negative.  Negative for dizziness and weakness.   Psychiatric/Behavioral: Negative.  Negative for depression. The patient is not nervous/anxious.    Allergic/Immunologic: Negative for environmental allergies.        Objective:    Physical Exam   Constitutional: He is oriented to person, place, and time. He appears well-developed and well-nourished.   HENT:   Head: Normocephalic and atraumatic.   Eyes: Conjunctivae, EOM and lids are normal. No scleral icterus.   Neck: Normal range of motion. No JVD present. No tracheal deviation present. No thyromegaly present.   Cardiovascular: Normal rate, regular rhythm, normal heart sounds and intact distal pulses.  No extrasystoles are present. PMI is not displaced. Exam reveals no gallop and no friction rub.   No murmur heard.  Pulses:       Radial pulses are 2+ on the " right side, and 2+ on the left side.   Pulmonary/Chest: Effort normal and breath sounds normal. No accessory muscle usage. No tachypnea. No respiratory distress. He has no wheezes. He has no rales.   Abdominal: Soft. Bowel sounds are normal. He exhibits no distension. There is no hepatosplenomegaly. There is no tenderness.   Musculoskeletal: Normal range of motion. He exhibits no edema.   Neurological: He is alert and oriented to person, place, and time. He has normal reflexes. He exhibits normal muscle tone.   Skin: Skin is warm and dry. No rash noted.   Psychiatric: He has a normal mood and affect. His behavior is normal.   Nursing note and vitals reviewed.        Assessment:       1. Essential hypertension    2. Paroxysmal atrial fibrillation    3. LBBB (left bundle branch block)    4. DCM (dilated cardiomyopathy)         Plan:       CRT-D at [facility].  Gen change; Tonia -> SJM device    I spent about a half hour discussing the risks and benefits of ICD generator replacement. Our discussion of risks included (but was not limited to) the possibility of infection, death, stroke, MI, pneumothorax, embolism, cardiac perforation, cardiac tamponade, renal injury, and bleeding.  I discussed with patient risks, indications, benefits, and alternatives of the planned procedure. All questions were answered. Patient understands and wishes to proceed.  No Pradaxa x3 day preprocedure.

## 2019-08-27 ENCOUNTER — PATIENT MESSAGE (OUTPATIENT)
Dept: ELECTROPHYSIOLOGY | Facility: CLINIC | Age: 69
End: 2019-08-27

## 2019-08-27 DIAGNOSIS — Z45.02 ICD (IMPLANTABLE CARDIOVERTER-DEFIBRILLATOR) BATTERY DEPLETION: ICD-10-CM

## 2019-08-27 DIAGNOSIS — I42.0 DCM (DILATED CARDIOMYOPATHY): Primary | ICD-10-CM

## 2019-08-27 DIAGNOSIS — I49.9 CARDIAC ARRHYTHMIA, UNSPECIFIED CARDIAC ARRHYTHMIA TYPE: ICD-10-CM

## 2019-08-29 ENCOUNTER — LAB VISIT (OUTPATIENT)
Dept: LAB | Facility: HOSPITAL | Age: 69
End: 2019-08-29
Attending: INTERNAL MEDICINE
Payer: MEDICARE

## 2019-08-29 DIAGNOSIS — I49.9 CARDIAC ARRHYTHMIA, UNSPECIFIED CARDIAC ARRHYTHMIA TYPE: ICD-10-CM

## 2019-08-29 DIAGNOSIS — I42.0 DCM (DILATED CARDIOMYOPATHY): ICD-10-CM

## 2019-08-29 DIAGNOSIS — Z45.02 ICD (IMPLANTABLE CARDIOVERTER-DEFIBRILLATOR) BATTERY DEPLETION: ICD-10-CM

## 2019-08-29 LAB
ANION GAP SERPL CALC-SCNC: 10 MMOL/L (ref 8–16)
APTT BLDCRRT: 39.6 SEC (ref 21–32)
BASOPHILS # BLD AUTO: 0.03 K/UL (ref 0–0.2)
BASOPHILS NFR BLD: 0.4 % (ref 0–1.9)
BUN SERPL-MCNC: 19 MG/DL (ref 8–23)
CALCIUM SERPL-MCNC: 9.2 MG/DL (ref 8.7–10.5)
CHLORIDE SERPL-SCNC: 105 MMOL/L (ref 95–110)
CO2 SERPL-SCNC: 27 MMOL/L (ref 23–29)
CREAT SERPL-MCNC: 1.1 MG/DL (ref 0.5–1.4)
DIFFERENTIAL METHOD: ABNORMAL
EOSINOPHIL # BLD AUTO: 0.6 K/UL (ref 0–0.5)
EOSINOPHIL NFR BLD: 7.7 % (ref 0–8)
ERYTHROCYTE [DISTWIDTH] IN BLOOD BY AUTOMATED COUNT: 13.4 % (ref 11.5–14.5)
EST. GFR  (AFRICAN AMERICAN): >60 ML/MIN/1.73 M^2
EST. GFR  (NON AFRICAN AMERICAN): >60 ML/MIN/1.73 M^2
GLUCOSE SERPL-MCNC: 101 MG/DL (ref 70–110)
HCT VFR BLD AUTO: 47.5 % (ref 40–54)
HGB BLD-MCNC: 15.1 G/DL (ref 14–18)
INR PPP: 1.1 (ref 0.8–1.2)
LYMPHOCYTES # BLD AUTO: 2 K/UL (ref 1–4.8)
LYMPHOCYTES NFR BLD: 26 % (ref 18–48)
MCH RBC QN AUTO: 27.2 PG (ref 27–31)
MCHC RBC AUTO-ENTMCNC: 31.8 G/DL (ref 32–36)
MCV RBC AUTO: 86 FL (ref 82–98)
MONOCYTES # BLD AUTO: 0.5 K/UL (ref 0.3–1)
MONOCYTES NFR BLD: 7.2 % (ref 4–15)
NEUTROPHILS # BLD AUTO: 4.4 K/UL (ref 1.8–7.7)
NEUTROPHILS NFR BLD: 58.7 % (ref 38–73)
PLATELET # BLD AUTO: 252 K/UL (ref 150–350)
PMV BLD AUTO: 10.2 FL (ref 9.2–12.9)
POTASSIUM SERPL-SCNC: 3.6 MMOL/L (ref 3.5–5.1)
PROTHROMBIN TIME: 11.5 SEC (ref 9–12.5)
RBC # BLD AUTO: 5.55 M/UL (ref 4.6–6.2)
SODIUM SERPL-SCNC: 142 MMOL/L (ref 136–145)
WBC # BLD AUTO: 7.51 K/UL (ref 3.9–12.7)

## 2019-08-29 PROCEDURE — 36415 COLL VENOUS BLD VENIPUNCTURE: CPT

## 2019-08-29 PROCEDURE — 80048 BASIC METABOLIC PNL TOTAL CA: CPT

## 2019-08-29 PROCEDURE — 85610 PROTHROMBIN TIME: CPT

## 2019-08-29 PROCEDURE — 85025 COMPLETE CBC W/AUTO DIFF WBC: CPT

## 2019-08-29 PROCEDURE — 85730 THROMBOPLASTIN TIME PARTIAL: CPT

## 2019-08-30 ENCOUNTER — PATIENT OUTREACH (OUTPATIENT)
Dept: OTHER | Facility: OTHER | Age: 69
End: 2019-08-30

## 2019-08-30 LAB
CHOLEST SERPL-MCNC: 179 MG/DL
CHOLEST/HDLC SERPL: 4.1 (CALC)
HDLC SERPL-MCNC: 44 MG/DL
LDLC SERPL CALC-MCNC: 115 MG/DL (CALC)
NONHDLC SERPL-MCNC: 135 MG/DL (CALC)
TRIGL SERPL-MCNC: 97 MG/DL

## 2019-08-30 NOTE — PROGRESS NOTES
Last 5 Patient Entered Readings                                      Current 30 Day Average: 123/71     Recent Readings 8/23/2019 8/19/2019 8/13/2019 8/5/2019 8/1/2019    SBP (mmHg) 135 122 110 131 119    DBP (mmHg) 77 68 63 77 70    Pulse 72 72 79 72 67          Digital Medicine: Health  Follow Up    Called pt for f/u. Pt states that he has been more active since our last encounter and attributes that to downward trend in BP. Mr. Steinberg will have his pacemaker batteries replaced in 2 weeks, which will cause a slight decrease in activity, but pt states that he plans to resume regular activity as soon as he is cleared by his cardiologist. Pt had a visit to establish care with a new PCP recently and was told that his labs showed slightly increased cholesterol, but was told this could be managed with medication and dietary changes. Discussed dietary recommendations for lowering cholesterol and encouraged to continue to be mindful of sodium intake as well. Pt verbalized understanding.     Follow up with Mr. Hussein Steinberg completed. No further questions or concerns. Will continue to follow up to achieve health goals.

## 2019-09-05 ENCOUNTER — TELEPHONE (OUTPATIENT)
Dept: ELECTROPHYSIOLOGY | Facility: CLINIC | Age: 69
End: 2019-09-05

## 2019-09-05 NOTE — TELEPHONE ENCOUNTER
Spoke to Hussein Steinberg    CONFIRMED procedure arrival time of 11am  Reiterated instructions including:  -Directions to check in desk  -NPO after midnight night prior to procedure  -High importance of HOLDING Pradaxa X 3 days. Last dose on 9/6   -Pre-procedure LABS 8.29  -Bathe night prior and morning prior to procedure with Hibiclens solution or an antibacterial soap       Pt verbalizes understanding of above and appreciates call.

## 2019-09-10 ENCOUNTER — ANESTHESIA EVENT (OUTPATIENT)
Dept: MEDSURG UNIT | Facility: HOSPITAL | Age: 69
End: 2019-09-10
Payer: MEDICARE

## 2019-09-10 ENCOUNTER — HOSPITAL ENCOUNTER (OUTPATIENT)
Facility: HOSPITAL | Age: 69
Discharge: HOME | End: 2019-09-10
Attending: INTERNAL MEDICINE | Admitting: INTERNAL MEDICINE
Payer: MEDICARE

## 2019-09-10 ENCOUNTER — ANESTHESIA (OUTPATIENT)
Dept: MEDSURG UNIT | Facility: HOSPITAL | Age: 69
End: 2019-09-10
Payer: MEDICARE

## 2019-09-10 VITALS
RESPIRATION RATE: 16 BRPM | HEART RATE: 60 BPM | OXYGEN SATURATION: 96 % | WEIGHT: 255 LBS | HEIGHT: 71 IN | SYSTOLIC BLOOD PRESSURE: 156 MMHG | BODY MASS INDEX: 35.7 KG/M2 | TEMPERATURE: 98 F | DIASTOLIC BLOOD PRESSURE: 84 MMHG

## 2019-09-10 DIAGNOSIS — Z01.812 PRE-PROCEDURE LAB EXAM: ICD-10-CM

## 2019-09-10 DIAGNOSIS — Z45.02 ICD (IMPLANTABLE CARDIOVERTER-DEFIBRILLATOR) BATTERY DEPLETION: Primary | ICD-10-CM

## 2019-09-10 DIAGNOSIS — I48.19 PERSISTENT ATRIAL FIBRILLATION: ICD-10-CM

## 2019-09-10 PROCEDURE — 93005 ELECTROCARDIOGRAM TRACING: CPT

## 2019-09-10 PROCEDURE — C1882 AICD, OTHER THAN SING/DUAL: HCPCS | Performed by: INTERNAL MEDICINE

## 2019-09-10 PROCEDURE — 63600175 PHARM REV CODE 636 W HCPCS: Performed by: NURSE ANESTHETIST, CERTIFIED REGISTERED

## 2019-09-10 PROCEDURE — 27201423 OPTIME MED/SURG SUP & DEVICES STERILE SUPPLY: Performed by: INTERNAL MEDICINE

## 2019-09-10 PROCEDURE — 93010 ELECTROCARDIOGRAM REPORT: CPT | Mod: 76,,, | Performed by: INTERNAL MEDICINE

## 2019-09-10 PROCEDURE — 93010 ELECTROCARDIOGRAM REPORT: CPT | Mod: ,,, | Performed by: INTERNAL MEDICINE

## 2019-09-10 PROCEDURE — 33264 RMVL & RPLCMT DFB GEN MLT LD: CPT | Mod: 22,,, | Performed by: INTERNAL MEDICINE

## 2019-09-10 PROCEDURE — 99220 PR INITIAL OBSERVATION CARE,LEVL III: ICD-10-PCS | Mod: ,,, | Performed by: INTERNAL MEDICINE

## 2019-09-10 PROCEDURE — D9220A PRA ANESTHESIA: Mod: CRNA,,, | Performed by: NURSE ANESTHETIST, CERTIFIED REGISTERED

## 2019-09-10 PROCEDURE — D9220A PRA ANESTHESIA: Mod: ANES,,, | Performed by: ANESTHESIOLOGY

## 2019-09-10 PROCEDURE — 37000009 HC ANESTHESIA EA ADD 15 MINS: Performed by: INTERNAL MEDICINE

## 2019-09-10 PROCEDURE — 33264 RMVL & RPLCMT DFB GEN MLT LD: CPT | Performed by: INTERNAL MEDICINE

## 2019-09-10 PROCEDURE — 93005 ELECTROCARDIOGRAM TRACING: CPT | Mod: 59

## 2019-09-10 PROCEDURE — 63600175 PHARM REV CODE 636 W HCPCS: Performed by: NURSE PRACTITIONER

## 2019-09-10 PROCEDURE — D9220A PRA ANESTHESIA: ICD-10-PCS | Mod: ANES,,, | Performed by: ANESTHESIOLOGY

## 2019-09-10 PROCEDURE — 93010 EKG 12-LEAD: ICD-10-PCS | Mod: 76,,, | Performed by: INTERNAL MEDICINE

## 2019-09-10 PROCEDURE — 25000003 PHARM REV CODE 250: Performed by: INTERNAL MEDICINE

## 2019-09-10 PROCEDURE — 63600175 PHARM REV CODE 636 W HCPCS: Performed by: INTERNAL MEDICINE

## 2019-09-10 PROCEDURE — 33264 PR REMV&REPLC CVD GEN MULT LEAD: ICD-10-PCS | Mod: 22,,, | Performed by: INTERNAL MEDICINE

## 2019-09-10 PROCEDURE — 37000008 HC ANESTHESIA 1ST 15 MINUTES: Performed by: INTERNAL MEDICINE

## 2019-09-10 PROCEDURE — 94761 N-INVAS EAR/PLS OXIMETRY MLT: CPT

## 2019-09-10 PROCEDURE — 99220 PR INITIAL OBSERVATION CARE,LEVL III: CPT | Mod: ,,, | Performed by: INTERNAL MEDICINE

## 2019-09-10 PROCEDURE — D9220A PRA ANESTHESIA: ICD-10-PCS | Mod: CRNA,,, | Performed by: NURSE ANESTHETIST, CERTIFIED REGISTERED

## 2019-09-10 DEVICE — CARDIAC RESYNCHRONIZATION DEVICE, TIERED-THERAPY CARDIOVERTER/DEFIBRILLATOR VVED DDDRV
Type: IMPLANTABLE DEVICE | Site: CHEST | Status: FUNCTIONAL
Brand: UNIFY ASSURA™

## 2019-09-10 RX ORDER — MIDAZOLAM HYDROCHLORIDE 1 MG/ML
INJECTION, SOLUTION INTRAMUSCULAR; INTRAVENOUS
Status: DISCONTINUED | OUTPATIENT
Start: 2019-09-10 | End: 2019-09-10

## 2019-09-10 RX ORDER — CEFAZOLIN SODIUM 1 G/3ML
1 INJECTION, POWDER, FOR SOLUTION INTRAMUSCULAR; INTRAVENOUS
Status: DISCONTINUED | OUTPATIENT
Start: 2019-09-11 | End: 2019-09-10

## 2019-09-10 RX ORDER — ONDANSETRON 2 MG/ML
INJECTION INTRAMUSCULAR; INTRAVENOUS
Status: DISCONTINUED | OUTPATIENT
Start: 2019-09-10 | End: 2019-09-10

## 2019-09-10 RX ORDER — METOPROLOL TARTRATE 50 MG/1
50 TABLET ORAL 2 TIMES DAILY
Status: DISCONTINUED | OUTPATIENT
Start: 2019-09-10 | End: 2019-09-10

## 2019-09-10 RX ORDER — BUPIVACAINE HYDROCHLORIDE 2.5 MG/ML
INJECTION, SOLUTION EPIDURAL; INFILTRATION; INTRACAUDAL
Status: DISCONTINUED | OUTPATIENT
Start: 2019-09-10 | End: 2019-09-11 | Stop reason: HOSPADM

## 2019-09-10 RX ORDER — ACETAMINOPHEN 325 MG/1
650 TABLET ORAL EVERY 6 HOURS PRN
Qty: 15 TABLET | Refills: 0 | COMMUNITY
Start: 2019-09-10

## 2019-09-10 RX ORDER — DABIGATRAN ETEXILATE 150 MG/1
CAPSULE ORAL
Qty: 180 CAPSULE | Refills: 3 | Status: SHIPPED | OUTPATIENT
Start: 2019-09-15 | End: 2020-12-21 | Stop reason: SDUPTHER

## 2019-09-10 RX ORDER — PHENYLEPHRINE HYDROCHLORIDE 10 MG/ML
INJECTION INTRAVENOUS
Status: DISCONTINUED | OUTPATIENT
Start: 2019-09-10 | End: 2019-09-10

## 2019-09-10 RX ORDER — FENTANYL CITRATE 50 UG/ML
INJECTION, SOLUTION INTRAMUSCULAR; INTRAVENOUS
Status: DISCONTINUED | OUTPATIENT
Start: 2019-09-10 | End: 2019-09-10

## 2019-09-10 RX ORDER — CEFAZOLIN SODIUM 1 G/3ML
2 INJECTION, POWDER, FOR SOLUTION INTRAMUSCULAR; INTRAVENOUS
Status: DISCONTINUED | OUTPATIENT
Start: 2019-09-10 | End: 2019-09-10 | Stop reason: HOSPADM

## 2019-09-10 RX ORDER — METOPROLOL TARTRATE 50 MG/1
100 TABLET ORAL 2 TIMES DAILY
Status: DISCONTINUED | OUTPATIENT
Start: 2019-09-10 | End: 2019-09-11 | Stop reason: HOSPADM

## 2019-09-10 RX ORDER — CEFAZOLIN SODIUM 1 G/3ML
1 INJECTION, POWDER, FOR SOLUTION INTRAMUSCULAR; INTRAVENOUS ONCE
Status: COMPLETED | OUTPATIENT
Start: 2019-09-10 | End: 2019-09-10

## 2019-09-10 RX ORDER — SODIUM CHLORIDE 0.9 % (FLUSH) 0.9 %
3 SYRINGE (ML) INJECTION
Status: DISCONTINUED | OUTPATIENT
Start: 2019-09-10 | End: 2019-09-11 | Stop reason: HOSPADM

## 2019-09-10 RX ORDER — CEFAZOLIN SODIUM 1 G/3ML
INJECTION, POWDER, FOR SOLUTION INTRAMUSCULAR; INTRAVENOUS
Status: DISCONTINUED | OUTPATIENT
Start: 2019-09-10 | End: 2019-09-10

## 2019-09-10 RX ORDER — LIDOCAINE HYDROCHLORIDE 20 MG/ML
INJECTION, SOLUTION INFILTRATION; PERINEURAL
Status: DISCONTINUED | OUTPATIENT
Start: 2019-09-10 | End: 2019-09-11 | Stop reason: HOSPADM

## 2019-09-10 RX ORDER — METOPROLOL SUCCINATE 50 MG/1
100 TABLET, EXTENDED RELEASE ORAL 2 TIMES DAILY
Status: DISCONTINUED | OUTPATIENT
Start: 2019-09-10 | End: 2019-09-10

## 2019-09-10 RX ORDER — SODIUM CHLORIDE 9 MG/ML
INJECTION, SOLUTION INTRAVENOUS CONTINUOUS
Status: DISCONTINUED | OUTPATIENT
Start: 2019-09-10 | End: 2019-09-11 | Stop reason: HOSPADM

## 2019-09-10 RX ORDER — PROPOFOL 10 MG/ML
VIAL (ML) INTRAVENOUS CONTINUOUS PRN
Status: DISCONTINUED | OUTPATIENT
Start: 2019-09-10 | End: 2019-09-10

## 2019-09-10 RX ORDER — ACETAMINOPHEN 325 MG/1
650 TABLET ORAL EVERY 4 HOURS PRN
Status: DISCONTINUED | OUTPATIENT
Start: 2019-09-10 | End: 2019-09-11 | Stop reason: HOSPADM

## 2019-09-10 RX ORDER — PROPOFOL 10 MG/ML
VIAL (ML) INTRAVENOUS
Status: DISCONTINUED | OUTPATIENT
Start: 2019-09-10 | End: 2019-09-10

## 2019-09-10 RX ORDER — CEPHALEXIN 500 MG/1
500 CAPSULE ORAL EVERY 8 HOURS
Qty: 15 CAPSULE | Refills: 0 | Status: SHIPPED | OUTPATIENT
Start: 2019-09-10 | End: 2020-02-04

## 2019-09-10 RX ORDER — LIDOCAINE HCL/PF 100 MG/5ML
SYRINGE (ML) INTRAVENOUS
Status: DISCONTINUED | OUTPATIENT
Start: 2019-09-10 | End: 2019-09-10

## 2019-09-10 RX ADMIN — SODIUM CHLORIDE: 0.9 INJECTION, SOLUTION INTRAVENOUS at 04:09

## 2019-09-10 RX ADMIN — PROPOFOL 50 MG: 10 INJECTION, EMULSION INTRAVENOUS at 04:09

## 2019-09-10 RX ADMIN — CEFAZOLIN 1 G: 330 INJECTION, POWDER, FOR SOLUTION INTRAMUSCULAR; INTRAVENOUS at 09:09

## 2019-09-10 RX ADMIN — PHENYLEPHRINE HYDROCHLORIDE 100 MCG: 10 INJECTION INTRAVENOUS at 05:09

## 2019-09-10 RX ADMIN — FENTANYL CITRATE 25 MCG: 50 INJECTION, SOLUTION INTRAMUSCULAR; INTRAVENOUS at 04:09

## 2019-09-10 RX ADMIN — LIDOCAINE HYDROCHLORIDE 100 MG: 20 INJECTION, SOLUTION INTRAVENOUS at 04:09

## 2019-09-10 RX ADMIN — ACETAMINOPHEN 650 MG: 325 TABLET ORAL at 06:09

## 2019-09-10 RX ADMIN — ONDANSETRON 4 MG: 2 INJECTION INTRAMUSCULAR; INTRAVENOUS at 05:09

## 2019-09-10 RX ADMIN — PHENYLEPHRINE HYDROCHLORIDE 100 MCG: 10 INJECTION INTRAVENOUS at 04:09

## 2019-09-10 RX ADMIN — ACETAMINOPHEN 650 MG: 325 TABLET ORAL at 09:09

## 2019-09-10 RX ADMIN — PROPOFOL 100 MCG/KG/MIN: 10 INJECTION, EMULSION INTRAVENOUS at 04:09

## 2019-09-10 RX ADMIN — CEFAZOLIN 2 G: 330 INJECTION, POWDER, FOR SOLUTION INTRAMUSCULAR; INTRAVENOUS at 04:09

## 2019-09-10 RX ADMIN — METOPROLOL TARTRATE 100 MG: 50 TABLET ORAL at 08:09

## 2019-09-10 RX ADMIN — MIDAZOLAM HYDROCHLORIDE 2 MG: 1 INJECTION, SOLUTION INTRAMUSCULAR; INTRAVENOUS at 04:09

## 2019-09-10 NOTE — Clinical Note
Existing device info:  left ventricle LEAD  [employer] 1258T-86 LEAD QUICKFLEX 1258T-86  S/N:[id number]  Eric Salvador  3/19/2013 - current     right atrium LEAD  [employer] 1888TC-52 LEAD TENDRIL 1888TC-52 S/N:[id number]  Eric Salvador  3/19/2013 - current     right ventricle LEAD  [employer] [id number] LEAD VIGILA 1CR 65 S/N:[id number]  Eric Salvador  3/19/2013 - current     CRT-D  [employer] CRT-9750 ICD PARADYM RF CRT-D 9750 S/N:[id number]  Eric Salvador  3/19/2013 - current

## 2019-09-10 NOTE — TRANSFER OF CARE
"Anesthesia Transfer of Care Note    Patient: Hussein Steinberg    Procedure(s) Performed: Procedure(s) (LRB):  CHANGE, ICD, BIV, GENERATOR (Left)    Patient location: PACU    Anesthesia Type: general    Transport from OR: Transported from OR on 6-10 L/min O2 by face mask with adequate spontaneous ventilation    Post pain: adequate analgesia    Post assessment: no apparent anesthetic complications and tolerated procedure well    Post vital signs: stable    Level of consciousness: awake, alert and oriented    Nausea/Vomiting: no nausea/vomiting    Complications: none    Transfer of care protocol was followed      Last vitals:   Visit Vitals  /75 (BP Location: Right arm, Patient Position: Lying)   Pulse 60   Temp 37.2 °C (99 °F)   Resp 20   Ht 5' 11" (1.803 m)   Wt 115.7 kg (255 lb)   SpO2 95%   BMI 35.57 kg/m²     "

## 2019-09-10 NOTE — Clinical Note
Right atrium LEAD [employer] 1888TC-52 LEAD TENDRIL 1888TC-52 S/N:[id number] Eric Salvador 3/19/2013 - current disconnected from existing generator     right ventricle LEAD [employer] [id number] LEAD VIGILA 1CR 65 S/N:[id number] disconnected from existing generator  Eric Salvador 3/19/2013 - current       left ventricle LEAD [employer] 1258T-86 LEAD QUICKFLEX 1258T-86  S/N:[id number]  Eric Salvador 3/19/2013 - current disconnected from existing device

## 2019-09-10 NOTE — PLAN OF CARE
Ambulated on unit this morning with wife and family at side.  Denies pain or SOB.  Verbalized understanding of procedure.  Oriented to room and call bell provided.  Will continue to monitor.

## 2019-09-10 NOTE — INTERVAL H&P NOTE
The patient has been examined and the H&P has been reviewed:    I concur with the findings and no changes have occurred since H&P was written.    We discussed the procedural details, risks and benefits and alternatives of the generator change with the patient, including but not limited to the risk of infection, pocket hematoma, lead dislodgement, lead malfunction, Patient  appeared to understand the whole discussion and verbalized that all questions were answered to satisfaction. After deliberating over the options, explanation, and risks/ benefits of the procedure, patient wishes to move forward with the planned procedure .    Last pradaxa was 9/6/19       Active Hospital Problems    Diagnosis  POA    ICD (implantable cardioverter-defibrillator) battery depletion [Z45.02]  Not Applicable      Resolved Hospital Problems   No resolved problems to display.

## 2019-09-10 NOTE — DISCHARGE SUMMARY
[facility]  Discharge Summary      Admit Date: 9/10/2019    Discharge Date and Time: 9/10/2019 10:05 PM    Attending Physician: No att. providers found     Reason for Admission: gen change     Procedures Performed: Procedure(s) (LRB):  CHANGE, ICD, BIV, GENERATOR (Left)    Hospital Course     Patient is s/p generator change for BIV ICD :   SJM generator implanted for a kenia generator: Biv ICD DF1 device   Tolerated procedure well. No acute complication noted.  Resume Pradaxa 5 days post op.   NO HEPARIN PRODUCTS  Keflex 500 mg TID for 5 days at discharge  Dressing will be removed in 7 days in Clinic      Other instruction:   ==============================  No lifting over 5 pounds  Do not let beam of shower/water hit site directly and no scrubbing in area  Follow up in device clinic in 1 week and with Ep clinic  in 3 months.  Notify Cardiology/EP increased redness, warmth, drainage, or re-opening of the wound   Please call [phone] option 1 during business hours or the main [facility] number and ask for on-call for device clinic after hours.            Final Diagnoses:    Principal Problem: <principal problem not specified>   Secondary Diagnoses:   Active Hospital Problems    Diagnosis  POA    ICD (implantable cardioverter-defibrillator) battery depletion [Z45.02]  Not Applicable      Resolved Hospital Problems   No resolved problems to display.       Discharged Condition: stable    Disposition: Home or Self Care    Follow Up/Patient Instructions:     Medications:  Reconciled Home Medications:      Medication List      START taking these medications    acetaminophen 325 MG tablet  Commonly known as:  TYLENOL  Take 2 tablets (650 mg total) by mouth every 6 (six) hours as needed (pain 1-4/10 pain scale).     cephALEXin 500 MG capsule  Commonly known as:  KEFLEX  Take 1 capsule (500 mg total) by mouth every 8 (eight) hours.        CHANGE how you take these medications    dabigatran etexilate 150  mg Cap  Commonly known as:  PRADAXA  TAKE 1 TABLET TWICE A DAY  Start taking on:  9/15/2019  What changed:    · how much to take  · how to take this  · when to take this  · additional instructions  · These instructions start on 9/15/2019. If you are unsure what to do until then, ask your doctor or other care provider.        CONTINUE taking these medications    amiodarone 200 MG Tab  Commonly known as:  PACERONE  Take one tablet every day except none on Wednesday and Sunday     amLODIPine 10 MG tablet  Commonly known as:  NORVASC  Take 1 tablet (10 mg total) by mouth once daily.     CENTRUM SILVER ORAL  Take by mouth once daily.     ezetimibe 10 mg tablet  Commonly known as:  ZETIA  Take 1 tablet (10 mg total) by mouth once daily.     lisinopril 40 MG tablet  Commonly known as:  PRINIVIL,ZESTRIL  Take 1 tablet (40 mg total) by mouth once daily.     metoprolol tartrate 100 MG tablet  Commonly known as:  LOPRESSOR  Take 1 tablet (100 mg total) by mouth 2 (two) times daily.     nitroGLYCERIN 0.4 MG SL tablet  Commonly known as:  NITROSTAT  Place under the tongue. 1 tablet, sublingual Sublingual .  take up to 3 tablets 5 minutes apart for chest pain     traZODone 50 MG tablet  Commonly known as:  DESYREL  Take 3 tablets (150 mg total) by mouth every evening.     triamterene-hydrochlorothiazide 37.5-25 mg 37.5-25 mg per tablet  Commonly known as:  MAXZIDE-25  Take 1 tablet by mouth once daily.          Discharge Procedure Orders   Diet Cardiac     No driving until:   Scheduling Instructions: Hold  Pradaxa for 5 days  Keflex 500 mg TID for 5 days at discharge  Dressing will be removed in 7 days in Clinic      ==============================  No lifting over 5 pounds  Do not let beam of shower/water hit site directly and no scrubbing in area  Follow up in device clinic in 1 week and with Ep clinic  in 3 months.  Notify Cardiology/EP increased redness, warmth, drainage, or re-opening of the wound   Please call [phone]  option 1 during business hours or the main [facility] number and ask for on-call for device clinic after hours.     Notify your health care provider if you experience any of the following:  temperature >100.4     Notify your health care provider if you experience any of the following:  persistent nausea and vomiting or diarrhea     Notify your health care provider if you experience any of the following:  severe uncontrolled pain     Notify your health care provider if you experience any of the following:  redness, tenderness, or signs of infection (pain, swelling, redness, odor or green/yellow discharge around incision site)     Notify your health care provider if you experience any of the following:  difficulty breathing or increased cough     Notify your health care provider if you experience any of the following:  severe persistent headache     Notify your health care provider if you experience any of the following:  worsening rash     Notify your health care provider if you experience any of the following:  persistent dizziness, light-headedness, or visual disturbances     Notify your health care provider if you experience any of the following:  increased confusion or weakness     Leave dressing on - Keep it clean, dry, and intact until clinic visit     Follow-up Information     [facility] - Arrhythmia In 1 week.    Specialty:  Cardiology  Why:  post gen change - wound check   Contact information:  [address] [facility] [address] [phone]  Additional information:  Clinic [place] - 3rd Floor           Dejan Mo MD In 3 months.    Specialties:  Electrophysiology, Cardiology  Why:  post gen change   Contact information:  [address]  [facility] [id number] [phone]

## 2019-09-10 NOTE — ANESTHESIA PREPROCEDURE EVALUATION
09/10/2019  Hussein Steinberg is a 69 y.o., male.    Anesthesia Evaluation    I have reviewed the Patient Summary Reports.     I have reviewed the Medications.     Review of Systems  Anesthesia Hx:  No problems with previous Anesthesia  History of prior surgery of interest to airway management or planning: tracheostomy. Previous anesthesia: General   Social:  Non-Smoker, No Alcohol Use    Hematology/Oncology:  Hematology Normal   Oncology Normal     EENT/Dental:EENT/Dental Normal   Cardiovascular:   Exercise tolerance: poor Hypertension, well controlled Dysrhythmias    Pulmonary:  Pulmonary Normal    Renal/:   Chronic Renal Disease renal calculi    Hepatic/GI:  Hepatic/GI Normal    Musculoskeletal:  Musculoskeletal Normal    Neurological:   CVA    Endocrine:  Endocrine Normal    Dermatological:  Skin Normal    Psych:  Psychiatric Normal           Physical Exam  General:  Well nourished    Airway/Jaw/Neck:  Airway Findings: Mouth Opening: Normal Tongue: Normal  General Airway Assessment: Adult  Mallampati: II  TM Distance: Normal, at least 6 cm  Jaw/Neck Findings:  Neck ROM: Normal ROM      Dental:  Dental Findings: In tact        Mental Status:  Mental Status Findings:  Cooperative, Alert and Oriented         Anesthesia Plan  Type of Anesthesia, risks & benefits discussed:  Anesthesia Type:  general, MAC  Patient's Preference: MAC  Intra-op Monitoring Plan: standard ASA monitors  Intra-op Monitoring Plan Comments:   Post Op Pain Control Plan: multimodal analgesia  Post Op Pain Control Plan Comments:   Induction:   IV  Beta Blocker:  Patient is on a Beta-Blocker and has received one dose within the past 24 hours (No further documentation required).       Informed Consent: Patient understands risks and agrees with Anesthesia plan.  Questions answered. Anesthesia consent signed with patient.  ASA Score: 3      Day of Surgery Review of History & Physical:  There are no significant changes.  H&P update referred to the provider.         Ready For Surgery From Anesthesia Perspective.

## 2019-09-10 NOTE — BRIEF OP NOTE
Patient is s/p generator change for BIV ICD :   SJM generator implanted for a kenia generator: Biv ICD DF1 device   Tolerated procedure well. No acute complication noted.  Post op care per protocol.  Will monitor in recovery on tele   Resume Pradaxa 4 days post op.   Fu EKG  NO HEPARIN PRODUCTS  Keflex 500 mg TID for 5 days at discharge  Dressing will be removed in 7 days in Clinic     Other instruction:   ==============================  No lifting over 5 pounds  Do not let beam of shower/water hit site directly and no scrubbing in area  Follow up in device clinic in 1 week and with Ep clinic  in 3 months.  Notify Cardiology/EP increased redness, warmth, drainage, or re-opening of the wound   Please call [phone] option 1 during business hours or the [facility] number and ask for on-call for device clinic after hours.

## 2019-09-11 ENCOUNTER — TELEPHONE (OUTPATIENT)
Dept: ELECTROPHYSIOLOGY | Facility: CLINIC | Age: 69
End: 2019-09-11

## 2019-09-11 NOTE — NURSING
Tele monitor removed, peripheral IV removed, dressing to left chest dry and intact with ice pack on. Discharge instructions given to patient and wife. Patient ambulatory to car.

## 2019-09-11 NOTE — NURSING TRANSFER
Nursing Transfer Note      9/10/2019     Transfer  From PACU to OBS 06    Transfer via stretcher    Transfer with cardiac monitoring, patient belongings bag    Transported by PACU PCT    Medicines sent:     Chart send with patient: Yes    Notified: spouse

## 2019-09-13 NOTE — ANESTHESIA POSTPROCEDURE EVALUATION
Anesthesia Post Evaluation    Patient: Hussein Steinberg    Procedure(s) Performed: Procedure(s) (LRB):  CHANGE, ICD, BIV, GENERATOR (Left)    Final Anesthesia Type: general  Patient location during evaluation: PACU  Patient participation: Yes- Able to Participate  Level of consciousness: awake and alert  Post-procedure vital signs: reviewed and stable  Pain management: adequate  Airway patency: patent  PONV status at discharge: No PONV  Anesthetic complications: no      Cardiovascular status: hemodynamically stable  Respiratory status: room air, spontaneous ventilation and unassisted  Hydration status: euvolemic  Follow-up not needed.          Vitals Value Taken Time   /84 9/10/2019  9:24 PM   Temp 36.6 °C (97.8 °F) 9/10/2019  9:24 PM   Pulse 60 9/10/2019  9:24 PM   Resp 16 9/10/2019  9:24 PM   SpO2 96 % 9/10/2019  9:24 PM         No case tracking events are documented in the log.      Pain/Liberty Score: No data recorded

## 2019-09-17 ENCOUNTER — CLINICAL SUPPORT (OUTPATIENT)
Dept: CARDIOLOGY | Facility: HOSPITAL | Age: 69
End: 2019-09-17
Attending: INTERNAL MEDICINE
Payer: MEDICARE

## 2019-09-17 DIAGNOSIS — I42.0 DCM (DILATED CARDIOMYOPATHY): ICD-10-CM

## 2019-09-17 DIAGNOSIS — I44.7 LBBB (LEFT BUNDLE BRANCH BLOCK): ICD-10-CM

## 2019-09-17 DIAGNOSIS — Z95.810 AICD (AUTOMATIC CARDIOVERTER/DEFIBRILLATOR) PRESENT: ICD-10-CM

## 2019-09-17 PROCEDURE — 93284 PRGRMG EVAL IMPLANTABLE DFB: CPT

## 2019-10-14 ENCOUNTER — PATIENT OUTREACH (OUTPATIENT)
Dept: OTHER | Facility: OTHER | Age: 69
End: 2019-10-14

## 2019-10-14 NOTE — PROGRESS NOTES
Digital Medicine: Health  Follow-Up      Follow Up  Follow-up reason(s): reading review and routine education      Readings are trending up due to lifestyle change and inaccurate technique.    Routine Education Topics: eating patterns and physical activity  Pt attributes BP spikes and upward trend to change in eating habits while on vacation last week along with not resting before reading on 10/13.         Diet:   Patient reports eating or drinking the following: fruit, water, fresh vegetables and restaurant foodHe has the following dietary restrictions: none    Barriers to a Healthy Diet: holidays/special events    Pt continues to be mindful of sodium intake when eating at home. He feels he is doing a bit better when eating out, but found it difficult this past week when he was on vacation.     Intervention(s): low sodium diet education    Physical Activity:   When asked if exercising, patient responded: yes    He exercises for 60 minutes per day 3 day(s) a week.  His level of intensity when exercising is moderate.    Patient participates in the following activities: biking    He identified the following barriers to physical activity: no barriers to being active      SDOH: Deferred.     INTERVENTION(S)  recommended diet modifications, reviewed monitoring technique and encouragement/support    Praised pt for exercising regularly and encouraged pt to rest for 5-10 min before taking a reading. Also encouraged pt to follow tips for reducing sodium intake when eating out while on vacation.      There are no preventive care reminders to display for this patient.    Last 5 Patient Entered Readings                                      Current 30 Day Average: 137/79     Recent Readings 10/13/2019 10/4/2019 9/29/2019 9/21/2019 9/13/2019    SBP (mmHg) 147 136 131 134 115    DBP (mmHg) 84 77 78 76 72    Pulse 63 66 64 70 73

## 2019-10-21 DIAGNOSIS — I10 ESSENTIAL HYPERTENSION, BENIGN: ICD-10-CM

## 2019-10-21 DIAGNOSIS — I10 ESSENTIAL HYPERTENSION: ICD-10-CM

## 2019-10-21 RX ORDER — TRIAMTERENE/HYDROCHLOROTHIAZID 37.5-25 MG
TABLET ORAL
Qty: 90 TABLET | Refills: 0 | Status: SHIPPED | OUTPATIENT
Start: 2019-10-21 | End: 2020-01-31

## 2019-10-21 RX ORDER — AMLODIPINE BESYLATE 10 MG/1
TABLET ORAL
Qty: 90 TABLET | Refills: 0 | Status: SHIPPED | OUTPATIENT
Start: 2019-10-21 | End: 2020-01-21 | Stop reason: SDUPTHER

## 2019-11-27 ENCOUNTER — PATIENT OUTREACH (OUTPATIENT)
Dept: OTHER | Facility: OTHER | Age: 69
End: 2019-11-27

## 2019-11-27 NOTE — PROGRESS NOTES
Digital Medicine: Health  Follow-Up      Follow Up  Follow-up reason(s): reading review and routine education      Readings are trending down due to decreased stress.  Pt retired since our last encounter, which he attributes to decreased stress.    He is currently on vacation with his family. Therefore, our encounter was brief.     INTERVENTION(S)  encouragement/support, denied resources and denied questions    PLAN  continue monitoring    Congratulated pt on his detention. Also praised him for maintaining healthy habits and encouraged to continue.     There are no preventive care reminders to display for this patient.    Last 5 Patient Entered Readings                                      Current 30 Day Average: 120/73     Recent Readings 11/22/2019 11/14/2019 11/6/2019 11/2/2019 10/21/2019    SBP (mmHg) 117 116 109 138 122    DBP (mmHg) 66 70 71 83 71    Pulse 61 67 71 83 62            Diet Screening   No change to diet.      Pt continues to follow a low sodium diet and feels he is doing well with this.     Physical Activity Screening   No change to exercise routine.    When asked if exercising, patient responded: yes    He exercises for 60 minutes per day 3 day(s) a week.  His level of intensity when exercising is moderate.    Patient participates in the following activities: biking    SDOH: Deferred.

## 2019-12-02 ENCOUNTER — PATIENT MESSAGE (OUTPATIENT)
Dept: ELECTROPHYSIOLOGY | Facility: CLINIC | Age: 69
End: 2019-12-02

## 2019-12-03 DIAGNOSIS — I10 ESSENTIAL HYPERTENSION, BENIGN: ICD-10-CM

## 2019-12-03 RX ORDER — LISINOPRIL 40 MG/1
TABLET ORAL
Qty: 90 TABLET | Refills: 1 | Status: SHIPPED | OUTPATIENT
Start: 2019-12-03 | End: 2020-01-29 | Stop reason: ALTCHOICE

## 2019-12-04 ENCOUNTER — PATIENT MESSAGE (OUTPATIENT)
Dept: ADMINISTRATIVE | Facility: OTHER | Age: 69
End: 2019-12-04

## 2019-12-09 ENCOUNTER — CLINICAL SUPPORT (OUTPATIENT)
Dept: CARDIOLOGY | Facility: HOSPITAL | Age: 69
End: 2019-12-09
Attending: INTERNAL MEDICINE
Payer: MEDICARE

## 2019-12-09 DIAGNOSIS — I42.0 DILATED CARDIOMYOPATHY: ICD-10-CM

## 2019-12-09 DIAGNOSIS — Z95.810 ICD (IMPLANTABLE CARDIOVERTER-DEFIBRILLATOR) IN PLACE: ICD-10-CM

## 2019-12-09 PROCEDURE — 93295 CARDIAC DEVICE CHECK - REMOTE: ICD-10-PCS | Mod: ,,, | Performed by: INTERNAL MEDICINE

## 2019-12-09 PROCEDURE — 93296 REM INTERROG EVL PM/IDS: CPT | Performed by: INTERNAL MEDICINE

## 2019-12-09 PROCEDURE — 93295 DEV INTERROG REMOTE 1/2/MLT: CPT | Mod: ,,, | Performed by: INTERNAL MEDICINE

## 2019-12-18 ENCOUNTER — PATIENT OUTREACH (OUTPATIENT)
Dept: ADMINISTRATIVE | Facility: OTHER | Age: 69
End: 2019-12-18

## 2019-12-20 ENCOUNTER — HOSPITAL ENCOUNTER (OUTPATIENT)
Dept: CARDIOLOGY | Facility: CLINIC | Age: 69
Discharge: HOME | End: 2019-12-20
Payer: MEDICARE

## 2019-12-20 ENCOUNTER — PATIENT MESSAGE (OUTPATIENT)
Dept: OPTOMETRY | Facility: CLINIC | Age: 69
End: 2019-12-20

## 2019-12-20 ENCOUNTER — OFFICE VISIT (OUTPATIENT)
Dept: ELECTROPHYSIOLOGY | Facility: CLINIC | Age: 69
End: 2019-12-20
Attending: INTERNAL MEDICINE
Payer: MEDICARE

## 2019-12-20 ENCOUNTER — CLINICAL SUPPORT (OUTPATIENT)
Dept: CARDIOLOGY | Facility: HOSPITAL | Age: 69
End: 2019-12-20
Attending: INTERNAL MEDICINE
Payer: MEDICARE

## 2019-12-20 VITALS — DIASTOLIC BLOOD PRESSURE: 88 MMHG | HEART RATE: 60 BPM | SYSTOLIC BLOOD PRESSURE: 130 MMHG

## 2019-12-20 DIAGNOSIS — E78.5 HYPERLIPIDEMIA, UNSPECIFIED HYPERLIPIDEMIA TYPE: ICD-10-CM

## 2019-12-20 DIAGNOSIS — I10 ESSENTIAL HYPERTENSION: ICD-10-CM

## 2019-12-20 DIAGNOSIS — I42.0 DCM (DILATED CARDIOMYOPATHY): Primary | ICD-10-CM

## 2019-12-20 DIAGNOSIS — I48.0 PAROXYSMAL ATRIAL FIBRILLATION: ICD-10-CM

## 2019-12-20 DIAGNOSIS — I42.0 DCM (DILATED CARDIOMYOPATHY): ICD-10-CM

## 2019-12-20 DIAGNOSIS — I44.7 LBBB (LEFT BUNDLE BRANCH BLOCK): ICD-10-CM

## 2019-12-20 DIAGNOSIS — I49.8 OTHER SPECIFIED CARDIAC ARRHYTHMIAS: ICD-10-CM

## 2019-12-20 DIAGNOSIS — Z95.810 AICD (AUTOMATIC CARDIOVERTER/DEFIBRILLATOR) PRESENT: ICD-10-CM

## 2019-12-20 DIAGNOSIS — Z95.810 ICD (IMPLANTABLE CARDIOVERTER-DEFIBRILLATOR), BIVENTRICULAR, IN SITU: ICD-10-CM

## 2019-12-20 PROCEDURE — 93005 ELECTROCARDIOGRAM TRACING: CPT | Mod: PBBFAC | Performed by: INTERNAL MEDICINE

## 2019-12-20 PROCEDURE — 93010 RHYTHM STRIP: ICD-10-PCS | Mod: S$PBB,,, | Performed by: INTERNAL MEDICINE

## 2019-12-20 PROCEDURE — 1126F PR PAIN SEVERITY QUANTIFIED, NO PAIN PRESENT: ICD-10-PCS | Mod: ,,, | Performed by: INTERNAL MEDICINE

## 2019-12-20 PROCEDURE — 93010 ELECTROCARDIOGRAM REPORT: CPT | Mod: S$PBB,,, | Performed by: INTERNAL MEDICINE

## 2019-12-20 PROCEDURE — 1159F MED LIST DOCD IN RCRD: CPT | Mod: ,,, | Performed by: INTERNAL MEDICINE

## 2019-12-20 PROCEDURE — 99999 PR PBB SHADOW E&M-EST. PATIENT-LVL III: CPT | Mod: PBBFAC,,, | Performed by: INTERNAL MEDICINE

## 2019-12-20 PROCEDURE — 93284 PRGRMG EVAL IMPLANTABLE DFB: CPT

## 2019-12-20 PROCEDURE — 1159F PR MEDICATION LIST DOCUMENTED IN MEDICAL RECORD: ICD-10-PCS | Mod: ,,, | Performed by: INTERNAL MEDICINE

## 2019-12-20 PROCEDURE — 99213 OFFICE O/P EST LOW 20 MIN: CPT | Mod: PBBFAC,25 | Performed by: INTERNAL MEDICINE

## 2019-12-20 PROCEDURE — 99214 OFFICE O/P EST MOD 30 MIN: CPT | Mod: S$PBB,,, | Performed by: INTERNAL MEDICINE

## 2019-12-20 PROCEDURE — 99214 PR OFFICE/OUTPT VISIT, EST, LEVL IV, 30-39 MIN: ICD-10-PCS | Mod: S$PBB,,, | Performed by: INTERNAL MEDICINE

## 2019-12-20 PROCEDURE — 99999 PR PBB SHADOW E&M-EST. PATIENT-LVL III: ICD-10-PCS | Mod: PBBFAC,,, | Performed by: INTERNAL MEDICINE

## 2019-12-20 PROCEDURE — 1126F AMNT PAIN NOTED NONE PRSNT: CPT | Mod: ,,, | Performed by: INTERNAL MEDICINE

## 2019-12-20 NOTE — LETTER
December 20, 2019      Eric Salvador MD  [address]  [facility] [address]           [facility] - Arrhythmia  [address]  [facility] [address]  Phone: [phone]  Fax: [phone]          Patient: Hussein Steinberg   MR Number: [id number]   YOB: 1950   Date of Visit: 12/20/2019       Dear Dr. Eric Salvador:    Thank you for referring Hussein Steinberg to me for evaluation. Attached you will find relevant portions of my assessment and plan of care.    If you have questions, please do not hesitate to call me. I look forward to following Hussein Steinberg along with you.    Sincerely,    Dejan Mo MD    Enclosure  CC:  No Recipients    If you would like to receive this communication electronically, please contact [email or web address] or [phone] to request more information on [email or web address] Link access.    For providers and/or their staff who would like to refer a patient to [facility], please contact us through our one-stop-shop provider referral line, [facility], at [phone].    If you feel you have received this communication in error or would no longer like to receive these types of communications, please e-mail [email or web address]

## 2019-12-20 NOTE — PROGRESS NOTES
"Subjective:    Patient ID:  Hussein Steinberg is a 69 y.o. male who presents for evaluation of Atrial Fibrillation    "Syeda Steinberg    Atrial Fibrillation   Symptoms are negative for chest pain, dizziness, palpitations, shortness of breath, syncope and weakness. Past medical history includes atrial fibrillation.      69 y.o. M  DCM, s/p biV ICD with great response (LVEF 60%+ 2018)  HTN on meds  HL on meds   PAF, on pradaxa  hx hemorrhagic CVA 2012    CRT-D 3/13 (Tonia device). Great response to CRT. Gen cahgne 9/2019.  Feeling well.  echo 5/18 60-65%  ICD shows 8% AMS (all asymptomatic, including last evening's episode)    Hx of higher-dose amio, c/b night vision changes. Now taking 100 bid, 5x/wk. Still has some vision changes.    My interpretation of today's ECG is ASbiVP    Review of Systems   Constitution: Negative. Negative for malaise/fatigue.   HENT: Negative.  Negative for ear pain and tinnitus.    Eyes: Negative for blurred vision.   Cardiovascular: Negative.  Negative for chest pain, dyspnea on exertion, near-syncope, palpitations and syncope.   Respiratory: Negative.  Negative for shortness of breath.    Endocrine: Negative.  Negative for polyuria.   Hematologic/Lymphatic: Does not bruise/bleed easily.   Skin: Negative.  Negative for rash.   Musculoskeletal: Negative.  Negative for joint pain and muscle weakness.   Gastrointestinal: Negative.  Negative for abdominal pain and change in bowel habit.   Genitourinary: Negative for frequency.   Neurological: Negative.  Negative for dizziness and weakness.   Psychiatric/Behavioral: Negative.  Negative for depression. The patient is not nervous/anxious.    Allergic/Immunologic: Negative for environmental allergies.        Objective:    Physical Exam   Constitutional: He is oriented to person, place, and time. He appears well-developed and well-nourished.   HENT:   Head: Normocephalic and atraumatic.   Eyes: Conjunctivae, EOM and lids are normal. No scleral " icterus.   Neck: Normal range of motion. No JVD present. No tracheal deviation present. No thyromegaly present.   Cardiovascular: Normal rate, regular rhythm, normal heart sounds and intact distal pulses.  No extrasystoles are present. PMI is not displaced. Exam reveals no gallop and no friction rub.   No murmur heard.  Pulses:       Radial pulses are 2+ on the right side, and 2+ on the left side.   Pulmonary/Chest: Effort normal and breath sounds normal. No accessory muscle usage. No tachypnea. No respiratory distress. He has no wheezes. He has no rales.   Abdominal: Soft. Bowel sounds are normal. He exhibits no distension. There is no hepatosplenomegaly. There is no tenderness.   Musculoskeletal: Normal range of motion. He exhibits no edema.   Neurological: He is alert and oriented to person, place, and time. He has normal reflexes. He exhibits normal muscle tone.   Skin: Skin is warm and dry. No rash noted.   Psychiatric: He has a normal mood and affect. His behavior is normal.   Nursing note and vitals reviewed.        Assessment:       1. DCM (dilated cardiomyopathy)    2. Paroxysmal atrial fibrillation    3. Essential hypertension    4. Hyperlipidemia, unspecified hyperlipidemia type    5. ICD (implantable cardioverter-defibrillator), biventricular, in situ         Plan:       [facility]D at [facility].  Gen change; Tonia -> [facility] device.  PAF, asxymptomatic.  KITTY from amio    d/c amio today (taking intermittent dosing, didn't tolerate normal dose, ineffective anyway). If AAD needed in future, consider tikosyn.    f/u 6 mos with ICD interrogation, or earlier prn.

## 2020-01-17 ENCOUNTER — PATIENT MESSAGE (OUTPATIENT)
Dept: OPHTHALMOLOGY | Facility: CLINIC | Age: 70
End: 2020-01-17

## 2020-01-21 ENCOUNTER — PATIENT MESSAGE (OUTPATIENT)
Dept: FAMILY MEDICINE | Facility: CLINIC | Age: 70
End: 2020-01-21

## 2020-01-21 DIAGNOSIS — I10 ESSENTIAL HYPERTENSION: ICD-10-CM

## 2020-01-22 ENCOUNTER — PATIENT OUTREACH (OUTPATIENT)
Dept: OTHER | Facility: OTHER | Age: 70
End: 2020-01-22

## 2020-01-22 DIAGNOSIS — I10 ESSENTIAL HYPERTENSION: Primary | ICD-10-CM

## 2020-01-22 RX ORDER — AMLODIPINE BESYLATE 10 MG/1
10 TABLET ORAL DAILY
Qty: 90 TABLET | Refills: 0 | Status: SHIPPED | OUTPATIENT
Start: 2020-01-22 | End: 2020-01-31 | Stop reason: SDUPTHER

## 2020-01-22 NOTE — PROGRESS NOTES
Digital Medicine: Clinician Follow-Up    01/22/2020  Spoke to patient. At goal.  States feeling well except for not sleeping well at night.  Found that Lisinopril can cause this on the Internet.  Explained that is not a common side effect of Lisinopril.  Asked about nocturia and he admits that he gets up several times nightly and then finds it difficult to go back to sleep.  I suggested that he HOLD the diuretic for 1 week to determine if this will be helpful.  He has cardiomyopathy so likely needs a low dose diuretic for fluid maintenance.  Will add back low dose HCTZ eventually.      The history is provided by the patient. No  was used.     Follow Up  Follow-up reason(s): reading review      Medication Change: stop therapy        INTERVENTION(S)  recommended med change    PLAN  patient amenable to changes and continue monitoring    Pt will hold diuretic for 1 week to determine whether it will reduce nocturia and thus improve his sleep.  Will add back HCTZ alone at lower dose for fluid maintenance in this patient with cardiomyopathy.           Last 5 Patient Entered Readings                                      Current 30 Day Average: 128/75     Recent Readings 1/17/2020 1/13/2020 1/7/2020 12/30/2019 12/30/2019    SBP (mmHg) 127 134 132 120 120    DBP (mmHg) 73 72 78 80 80    Pulse 62 63 60 80 80             Hypertension Medications             amLODIPine (NORVASC) 10 MG tablet Take 1 tablet (10 mg total) by mouth once daily.    lisinopril (PRINIVIL,ZESTRIL) 40 MG tablet TAKE 1 TABLET BY MOUTH EVERY DAY    metoprolol tartrate (LOPRESSOR) 100 MG tablet Take 1 tablet (100 mg total) by mouth 2 (two) times daily.    nitroGLYCERIN (NITROSTAT) 0.4 MG SL tablet Place under the tongue. 1 tablet, sublingual Sublingual .  take up to 3 tablets 5 minutes apart for chest pain    triamterene-hydrochlorothiazide 37.5-25 mg (MAXZIDE-25) 37.5-25 mg per tablet TAKE 1 TABLET BY MOUTH EVERY DAY                  Screenings

## 2020-01-23 ENCOUNTER — PATIENT MESSAGE (OUTPATIENT)
Dept: OTHER | Facility: OTHER | Age: 70
End: 2020-01-23

## 2020-01-24 ENCOUNTER — OFFICE VISIT (OUTPATIENT)
Dept: OPHTHALMOLOGY | Facility: CLINIC | Age: 70
End: 2020-01-24
Payer: MEDICARE

## 2020-01-24 ENCOUNTER — CLINICAL SUPPORT (OUTPATIENT)
Dept: OPHTHALMOLOGY | Facility: CLINIC | Age: 70
End: 2020-01-24
Payer: MEDICARE

## 2020-01-24 DIAGNOSIS — H53.453 OTHER LOCALIZED VISUAL FIELD DEFECT, BILATERAL: ICD-10-CM

## 2020-01-24 DIAGNOSIS — H53.462 LEFT HOMONYMOUS HEMIANOPSIA: Primary | ICD-10-CM

## 2020-01-24 DIAGNOSIS — H47.20 OPTIC ATROPHY OF BOTH EYES: ICD-10-CM

## 2020-01-24 PROCEDURE — 99999 PR PBB SHADOW E&M-EST. PATIENT-LVL III: CPT | Mod: PBBFAC,,, | Performed by: OPHTHALMOLOGY

## 2020-01-24 PROCEDURE — 92133 CPTRZD OPH DX IMG PST SGM ON: CPT | Mod: PBBFAC | Performed by: OPHTHALMOLOGY

## 2020-01-24 PROCEDURE — 99999 PR PBB SHADOW E&M-EST. PATIENT-LVL III: ICD-10-PCS | Mod: PBBFAC,,, | Performed by: OPHTHALMOLOGY

## 2020-01-24 PROCEDURE — 92014 PR EYE EXAM, EST PATIENT,COMPREHESV: ICD-10-PCS | Mod: S$GLB,,, | Performed by: OPHTHALMOLOGY

## 2020-01-24 PROCEDURE — 92083 EXTENDED VISUAL FIELD XM: CPT | Mod: PBBFAC | Performed by: OPHTHALMOLOGY

## 2020-01-24 PROCEDURE — 99213 OFFICE O/P EST LOW 20 MIN: CPT | Mod: PBBFAC | Performed by: OPHTHALMOLOGY

## 2020-01-24 PROCEDURE — 92133 POSTERIOR SEGMENT OCT OPTIC NERVE(OCULAR COHERENCE TOMOGRAPHY) - OU - BOTH EYES: ICD-10-PCS | Mod: S$GLB,,, | Performed by: OPHTHALMOLOGY

## 2020-01-24 PROCEDURE — 92083 HUMPHREY VISUAL FIELD - OU - BOTH EYES: ICD-10-PCS | Mod: S$GLB,,, | Performed by: OPHTHALMOLOGY

## 2020-01-24 PROCEDURE — 92014 COMPRE OPH EXAM EST PT 1/>: CPT | Mod: S$GLB,,, | Performed by: OPHTHALMOLOGY

## 2020-01-24 NOTE — PROGRESS NOTES
HPI     Referred by /   D/C amiodarone x 2 months.  Patient here for evaluation taking Amiodarone for years.(2013)  Seeing halos mainly at night over the years, not sure if it has gotten   worse. He suffered a stroke in the right occipital lobe in the past when   he had an episode of atrial fibrillation.    I have personally interviewed the patient, reviewed the history and   examined the patient and agree with the technician's exam.    Last edited by Leon Skelton MD on 1/24/2020 10:50 AM. (History)            Assessment /Plan     For exam results, see Encounter Report.    Left homonymous hemianopsia  -     Cuevas Visual Field - OU - Extended - Both Eyes  -     Posterior Segment OCT Optic Nerve- Both eyes    Other localized visual field defect, bilateral     Optic atrophy of both eyes      The homonymous hemianopia is consistent with his history of an occipital lobe hemorrhagic stroke, not amiodarone expopsure. The optic nerve changes could be due to past amiodarone but I do not have prior OCT pictures for comparison.. Since he is off the amiodarone, annual follow up with OCT and visual field testing would be appropriate.

## 2020-01-24 NOTE — LETTER
[facility] - Ophthalmology  [address]  [facility] [address]  Phone: [phone]  Fax: [phone]   January 24, 2020    Dejan Mo MD  [address]  [facility] [address]    Patient: Hussein Steinberg   MR Number: [id number]   YOB: 1950   Date of Visit: 1/24/2020       Dear Dr. Mo:    Thank you for referring Hussein Steinberg to me for evaluation. Here is my assessment and plan of care:    Assessment:  /Plan     For exam results, see Encounter Report.    Left homonymous hemianopsia  -     Cuevas Visual Field - OU - Extended - Both Eyes  -     Posterior Segment OCT Optic Nerve- Both eyes    Other localized visual field defect, bilateral     Optic atrophy of both eyes      The homonymous hemianopia is consistent with his history of an occipital lobe hemorrhagic stroke, not amiodarone expopsure. The optic nerve changes could be due to past amiodarone but I do not have prior OCT pictures for comparison.. Since he is off the amiodarone, annual follow up with OCT and visual field testing would be appropriate.           Plan:  For exam results, see Encounter Report.    Left homonymous hemianopsia  -     Cuevas Visual Field - OU - Extended - Both Eyes  -     Posterior Segment OCT Optic Nerve- Both eyes    Other localized visual field defect, bilateral     Optic atrophy of both eyes      The homonymous hemianopia is consistent with his history of an occipital lobe hemorrhagic stroke, not amiodarone expopsure. The optic nerve changes could be due to past amiodarone but I do not have prior OCT pictures for comparison.. Since he is off the amiodarone, annual follow up with OCT and visual field testing would be appropriate.             Below you will find my full exam findings. If you have questions, please do not hesitate to call me. I look forward to following . Hussein Steinberg along with you.    Sincerely,          MD SUAD Castaneda, PALMA Rahman  DIETER Rainey MD             Base Eye Exam     Visual Acuity (Snellen - Linear)       Right Left    Dist cc 20/30 20/25 -1    Correction:  Glasses          Tonometry (Applanation, 11:01 AM)       Right Left    Pressure 16 16          Pupils       Dark Light Shape React APD    Right 5 4 Round Brisk None    Left 5 4 Round Brisk None          Visual Fields    See HVF report           Neuro/Psych     Oriented x3:  Yes    Mood/Affect:  Normal          Dilation     Both eyes:  1% Mydriacyl, 2.5% Phenylephrine @ 11:13 AM            Additional Tests     Color       Right Left    Ishihara 12/12 12/12            Slit Lamp and Fundus Exam     External Exam       Right Left    External Normal Normal          Slit Lamp Exam       Right Left    Lids/Lashes Normal Normal    Conjunctiva/Sclera White and quiet White and quiet    Cornea Old RK scars Old RK scars    Anterior Chamber Deep and quiet Deep and quiet    Iris Round and reactive Round and reactive    Lens Clear Clear    Vitreous Normal Normal          Fundus Exam       Right Left    Disc 1+ Pallor 1+ Pallor    C/D Ratio 0.3 0.3    Macula Normal Normal    Vessels Normal Normal    Periphery Normal Normal

## 2020-01-29 ENCOUNTER — PATIENT OUTREACH (OUTPATIENT)
Dept: OTHER | Facility: OTHER | Age: 70
End: 2020-01-29

## 2020-01-29 DIAGNOSIS — I10 ESSENTIAL HYPERTENSION: Primary | ICD-10-CM

## 2020-01-29 RX ORDER — OLMESARTAN MEDOXOMIL 40 MG/1
40 TABLET ORAL DAILY
Qty: 30 TABLET | Refills: 3 | Status: SHIPPED | OUTPATIENT
Start: 2020-01-29 | End: 2020-03-06

## 2020-01-29 NOTE — PROGRESS NOTES
"Digital Medicine: Clinician Follow-Up    01/29/2020  Spoke to patient to review readings without Maxzide. Higher readings and average.  Pt reports no change in sleep and rings fitting tighter.  He states that his bigger complaint is a dry cough for "years" that he believes is r/t Lisinopril.  Switching to ARB.  Restarting Maxzide.     The history is provided by the patient. No  was used.     Follow Up  Follow-up reason(s): reading review and medication change follow-up      Readings are trending up       INTERVENTION(S)  recommended med change and encouragement/support    PLAN  patient amenable to changes and continue monitoring    Switching to ARB. Resume diuretic.            Last 5 Patient Entered Readings                                      Current 30 Day Average: 131/77     Recent Readings 1/28/2020 1/27/2020 1/27/2020 1/26/2020 1/25/2020    SBP (mmHg) 137 139 145 142 124    DBP (mmHg) 83 78 83 82 72    Pulse 60 67 64 65 70          Hypertension Medications             amLODIPine (NORVASC) 10 MG tablet Take 1 tablet (10 mg total) by mouth once daily.    metoprolol tartrate (LOPRESSOR) 100 MG tablet Take 1 tablet (100 mg total) by mouth 2 (two) times daily.    nitroGLYCERIN (NITROSTAT) 0.4 MG SL tablet Place under the tongue. 1 tablet, sublingual Sublingual .  take up to 3 tablets 5 minutes apart for chest pain    olmesartan (BENICAR) 40 MG tablet Take 1 tablet (40 mg total) by mouth once daily.    triamterene-hydrochlorothiazide 37.5-25 mg (MAXZIDE-25) 37.5-25 mg per tablet TAKE 1 TABLET BY MOUTH EVERY DAY                  Screenings  "

## 2020-01-31 ENCOUNTER — PATIENT MESSAGE (OUTPATIENT)
Dept: FAMILY MEDICINE | Facility: CLINIC | Age: 70
End: 2020-01-31

## 2020-01-31 ENCOUNTER — PATIENT MESSAGE (OUTPATIENT)
Dept: OTHER | Facility: OTHER | Age: 70
End: 2020-01-31

## 2020-01-31 DIAGNOSIS — I10 ESSENTIAL HYPERTENSION: ICD-10-CM

## 2020-01-31 DIAGNOSIS — I10 ESSENTIAL HYPERTENSION, BENIGN: ICD-10-CM

## 2020-01-31 RX ORDER — AMLODIPINE BESYLATE 10 MG/1
10 TABLET ORAL DAILY
Qty: 90 TABLET | Refills: 3 | Status: SHIPPED | OUTPATIENT
Start: 2020-01-31 | End: 2021-02-09 | Stop reason: SDUPTHER

## 2020-01-31 RX ORDER — TRIAMTERENE/HYDROCHLOROTHIAZID 37.5-25 MG
TABLET ORAL
Qty: 90 TABLET | Refills: 3 | Status: SHIPPED | OUTPATIENT
Start: 2020-01-31 | End: 2021-01-12

## 2020-02-04 ENCOUNTER — OFFICE VISIT (OUTPATIENT)
Dept: FAMILY MEDICINE | Facility: CLINIC | Age: 70
End: 2020-02-04
Payer: MEDICARE

## 2020-02-04 VITALS
HEART RATE: 77 BPM | BODY MASS INDEX: 36.17 KG/M2 | WEIGHT: 259.38 LBS | OXYGEN SATURATION: 94 % | TEMPERATURE: 99 F | SYSTOLIC BLOOD PRESSURE: 132 MMHG | DIASTOLIC BLOOD PRESSURE: 84 MMHG

## 2020-02-04 DIAGNOSIS — I42.0 DCM (DILATED CARDIOMYOPATHY): ICD-10-CM

## 2020-02-04 DIAGNOSIS — E66.01 SEVERE OBESITY (BMI 35.0-35.9 WITH COMORBIDITY): ICD-10-CM

## 2020-02-04 DIAGNOSIS — I10 ESSENTIAL HYPERTENSION: ICD-10-CM

## 2020-02-04 DIAGNOSIS — Z95.810 ICD (IMPLANTABLE CARDIOVERTER-DEFIBRILLATOR), BIVENTRICULAR, IN SITU: ICD-10-CM

## 2020-02-04 DIAGNOSIS — C61 PROSTATE CANCER: ICD-10-CM

## 2020-02-04 DIAGNOSIS — Z86.79 HISTORY OF INTRACEREBRAL HEMORRHAGE WITHOUT RESIDUAL DEFICIT: ICD-10-CM

## 2020-02-04 DIAGNOSIS — E78.2 MIXED HYPERLIPIDEMIA: ICD-10-CM

## 2020-02-04 DIAGNOSIS — I48.0 PAROXYSMAL ATRIAL FIBRILLATION: ICD-10-CM

## 2020-02-04 PROCEDURE — 99999 PR PBB SHADOW E&M-EST. PATIENT-LVL III: ICD-10-PCS | Mod: PBBFAC,,, | Performed by: INTERNAL MEDICINE

## 2020-02-04 PROCEDURE — 99214 PR OFFICE/OUTPT VISIT, EST, LEVL IV, 30-39 MIN: ICD-10-PCS | Mod: S$GLB,,, | Performed by: INTERNAL MEDICINE

## 2020-02-04 PROCEDURE — 99214 OFFICE O/P EST MOD 30 MIN: CPT | Mod: S$GLB,,, | Performed by: INTERNAL MEDICINE

## 2020-02-04 PROCEDURE — 99999 PR PBB SHADOW E&M-EST. PATIENT-LVL III: CPT | Mod: PBBFAC,,, | Performed by: INTERNAL MEDICINE

## 2020-02-04 RX ORDER — EZETIMIBE 10 MG/1
10 TABLET ORAL DAILY
Qty: 90 TABLET | Refills: 3 | Status: SHIPPED | OUTPATIENT
Start: 2020-02-04 | End: 2021-01-18 | Stop reason: SDUPTHER

## 2020-02-04 RX ORDER — METOPROLOL TARTRATE 100 MG/1
100 TABLET ORAL 2 TIMES DAILY
Qty: 180 TABLET | Refills: 3 | Status: SHIPPED | OUTPATIENT
Start: 2020-02-04 | End: 2020-12-17

## 2020-02-04 NOTE — PROGRESS NOTES
[facility] Note    Chief Complaint      Chief Complaint   Patient presents with    Follow-up     6m f/u on HTN      History of Present Illness      Hussein Steinberg is a 69 y.o. male who presents today for HTN.  Patient comes to appointment alone.    Problem List Items Addressed This Visit     Atrial fibrillation    Current Assessment & Plan     sees Dr. Mo. On BB, no longer on amio.  UTD on eye exam.           Essential hypertension    Current Assessment & Plan     BP controlled on norvasc, lopressor 100 mg BID, maxzide and olmesartan 40 mg daily.  No CP/SOB.  Occasional evening BLE edema.  Had dry cough with lisinopril.         Relevant Medications    metoprolol tartrate (LOPRESSOR) 100 MG tablet    Other Relevant Orders    CBC auto differential    Comprehensive metabolic panel    ICD (implantable cardioverter-defibrillator), biventricular, in situ    Current Assessment & Plan     Battery replaced 9/2019 with Dr. Mo.  No issues at present.         Prostate cancer    Current Assessment & Plan     Treated by Dr. Rasmussen, sees PRN. No complaints at this time.         Severe obesity (BMI 35.0-35.9 with comorbidity)    Current Assessment & Plan     Doesn't exercise as much as he should, does work in yard and walks some.  Does pretty well on diet, wife does most of the cooking.  Does eat out and eat canned foods.         DCM (dilated cardiomyopathy)    Current Assessment & Plan     Will be seeing Dr. Mo, no CP/SOB.  Sleeps on 2 pillows.  On BB, ARB, pradaxa. Has AICD.         Hyperlipidemia    Current Assessment & Plan     Last lipid panel 8/2019, on zetia 10 mg.  Doesn't tolerate statins, had joint pains.         Relevant Medications    ezetimibe (ZETIA) 10 mg tablet    Other Relevant Orders    Lipid panel    History of intracerebral hemorrhage without residual deficit    Current Assessment & Plan     No residual deficits. Hemorrhagic infarct after being started on Plavix. Does  have some peripheral visual deficits. Doesn't tolerate statin. D/C'ed from neuro.               Health Maintenance   Topic Date Due    Colonoscopy  02/12/2021    Lipid Panel  08/29/2024    TETANUS VACCINE  06/23/2026    Hepatitis C Screening  Completed    Pneumococcal Vaccine (65+ High/Highest Risk)  Completed       Past Medical History:   Diagnosis Date    Atrial fibrillation     Basal cell carcinoma     Bronchitis 3/20/2017    Cardiac arrest 2012    has pacemaker/defibrillator placed 3/19/13 - [facility]    Cataract     DCM (dilated cardiomyopathy) 6/15/2017    High cholesterol     History of intracerebral hemorrhage without residual deficit 8/23/2019    History of prostate cancer 6/23/2017    Hypertension     Kidney stone 10/2013    LBBB (left bundle branch block) 10/10/2012    LV dysfunction 10/10/2012    Prostate cancer     Treated by Dr. Rasmussen    Squamous cell carcinoma in situ of skin 2017    left arm removed    Stroke 09/2012    + hemorrhagic infarct to right occipital lobe after started on Plavix following cardiac event    SVT (supraventricular tachycardia) 10/10/2012       Past Surgical History:   Procedure Laterality Date    arthroscopic  knee    BASAL CELL CARCINOMA EXCISION  2018    removed from nose    CARDIAC PACEMAKER PLACEMENT  2013    COLONOSCOPY  2/12/2016    + polyp - repeat in 5 years- Dr. Calles    PROSTATECTOMY      SKIN CANCER EXCISION Left 10/2017    squamous cell carcinoma removed from left arm       family history includes Cancer (age of onset: 56) in his father; Cataracts in his mother; Glaucoma in his mother; Heart attack in his maternal grandfather and paternal grandfather; Heart attack (age of onset: 59) in his brother; Heart disease in his brother and mother; Hypertension in his mother; Macular degeneration in his mother; No Known Problems in his brother, daughter, daughter, and sister; Parkinsonism (age of onset: 54) in his  brother.     Social History     Tobacco Use    Smoking status: Never Smoker    Smokeless tobacco: Never Used   Substance Use Topics    Alcohol use: No     Frequency: Never     Binge frequency: Never    Drug use: No       Review of Systems   Constitutional: Negative for chills, fever and malaise/fatigue.   HENT: Negative for congestion, hearing loss and sore throat.    Eyes: Negative for blurred vision and discharge.   Respiratory: Negative for cough, shortness of breath and wheezing.    Cardiovascular: Negative for chest pain, palpitations, orthopnea and PND.   Gastrointestinal: Negative for blood in stool, constipation, diarrhea, nausea and vomiting.   Genitourinary: Negative for dysuria, hematuria and urgency.   Musculoskeletal: Negative for falls, myalgias and neck pain.   Skin: Negative for itching and rash.   Neurological: Negative for dizziness, weakness and headaches.   Endo/Heme/Allergies: Negative for polydipsia.        Outpatient Encounter Medications as of 2/4/2020   Medication Sig Dispense Refill    acetaminophen (TYLENOL) 325 MG tablet Take 2 tablets (650 mg total) by mouth every 6 (six) hours as needed (pain 1-4/10 pain scale). 15 tablet 0    amLODIPine (NORVASC) 10 MG tablet Take 1 tablet (10 mg total) by mouth once daily. 90 tablet 3    dabigatran etexilate (PRADAXA) 150 mg Cap TAKE 1 TABLET TWICE A  capsule 3    ezetimibe (ZETIA) 10 mg tablet Take 1 tablet (10 mg total) by mouth once daily. 90 tablet 3    folic acid/multivit-min/lutein (CENTRUM SILVER ORAL) Take by mouth once daily.      metoprolol tartrate (LOPRESSOR) 100 MG tablet Take 1 tablet (100 mg total) by mouth 2 (two) times daily. 180 tablet 3    nitroGLYCERIN (NITROSTAT) 0.4 MG SL tablet Place under the tongue. 1 tablet, sublingual Sublingual .  take up to 3 tablets 5 minutes apart for chest pain      olmesartan (BENICAR) 40 MG tablet Take 1 tablet (40 mg total) by mouth once daily. 30 tablet 3     triamterene-hydrochlorothiazide 37.5-25 mg (MAXZIDE-25) 37.5-25 mg per tablet TAKE 1 TABLET BY MOUTH EVERY DAY 90 tablet 3    [DISCONTINUED] ezetimibe (ZETIA) 10 mg tablet Take 1 tablet (10 mg total) by mouth once daily. 90 tablet 0    [DISCONTINUED] metoprolol tartrate (LOPRESSOR) 100 MG tablet Take 1 tablet (100 mg total) by mouth 2 (two) times daily. 180 tablet 0    [DISCONTINUED] cephALEXin (KEFLEX) 500 MG capsule Take 1 capsule (500 mg total) by mouth every 8 (eight) hours. 15 capsule 0    [DISCONTINUED] FLUZONE HIGH-DOSE 2019-20, PF, 180 mcg/0.5 mL Syrg TO BE ADMINISTERED BY PHARMACIST FOR IMMUNIZATION  0    [DISCONTINUED] traZODone (DESYREL) 50 MG tablet Take 3 tablets (150 mg total) by mouth every evening. (Patient not taking: Reported on 2/4/2020) 90 tablet 0     No facility-administered encounter medications on file as of 2/4/2020.        Review of patient's allergies indicates:  No Known Allergies    Physical Exam      Vital Signs  Temp: 98.5 °F (36.9 °C)  Temp src: Oral  Pulse: 77  SpO2: (!) 94 %  BP: 132/84  BP Location: Left arm  Patient Position: Sitting  Pain Score: 0-No pain  Height and Weight  Weight: 117.7 kg (259 lb 5.9 oz)]    Physical Exam   Constitutional: He is oriented to person, place, and time. He appears well-developed and well-nourished.   HENT:   Head: Normocephalic and atraumatic.   Right Ear: External ear normal.   Left Ear: External ear normal.   Eyes: Right eye exhibits no discharge. Left eye exhibits no discharge.   Neck: Normal range of motion. No thyromegaly present.   Cardiovascular: Normal rate, regular rhythm and intact distal pulses.   No murmur heard.  Pulmonary/Chest: Effort normal and breath sounds normal. No respiratory distress.   Abdominal: Soft. Bowel sounds are normal. He exhibits no distension. There is no tenderness.   Musculoskeletal: Normal range of motion. He exhibits no deformity.   Neurological: He is alert and oriented to person, place, and time.   Skin:  Skin is warm and dry. No rash noted.   Psychiatric: He has a normal mood and affect. His behavior is normal.        Laboratory:  CBC:  No results for input(s): WBC, RBC, HGB, HCT, PLT, MCV, MCH, MCHC in the last 2160 hours.  CMP:  No results for input(s): GLU, CALCIUM, ALBUMIN, PROT, NA, K, CO2, CL, BUN, ALKPHOS, ALT, AST, BILITOT in the last 2160 hours.    Invalid input(s): CREATININ  URINALYSIS:  No results for input(s): COLORU, CLARITYU, SPECGRAV, PHUR, PROTEINUA, GLUCOSEU, BILIRUBINCON, BLOODU, WBCU, RBCU, BACTERIA, MUCUS, NITRITE, LEUKOCYTESUR, UROBILINOGEN, HYALINECASTS in the last 2160 hours.   LIPIDS:  No results for input(s): TSH, HDL, CHOL, TRIG, LDLCALC, CHOLHDL, NONHDLCHOL, TOTALCHOLEST in the last 2160 hours.  TSH:  No results for input(s): TSH in the last 2160 hours.  A1C:  No results for input(s): HGBA1C in the last 2160 hours.    Radiology:  No new imaging    Assessment/Plan     Hussein Steinberg is a 69 y.o.male with:    1. Prostate cancer    2. DCM (dilated cardiomyopathy)    3. Paroxysmal atrial fibrillation    4. Essential hypertension  - metoprolol tartrate (LOPRESSOR) 100 MG tablet; Take 1 tablet (100 mg total) by mouth 2 (two) times daily.  Dispense: 180 tablet; Refill: 3  - CBC auto differential; Future  - Comprehensive metabolic panel; Future    5. Mixed hyperlipidemia  - ezetimibe (ZETIA) 10 mg tablet; Take 1 tablet (10 mg total) by mouth once daily.  Dispense: 90 tablet; Refill: 3  - Lipid panel; Future    6. ICD (implantable cardioverter-defibrillator), biventricular, in situ    7. History of intracerebral hemorrhage without residual deficit    8. Severe obesity (BMI 35.0-35.9 with comorbidity)    -Continue current medications and maintain follow up with specialists.  Return to clinic in 6 months.       Lacey Rainey MD  [facility]    Answers for HPI/ROS submitted by the patient on 1/31/2020   Hypertension  Chronicity: chronic  Onset: more than 1 year  ago  Progression since onset: unchanged  Condition status: controlled  anxiety: No  peripheral edema: Yes  sweats: No  Agents associated with hypertension: NSAIDs  CAD risks: dyslipidemia  Compliance problems: no compliance problems  Past treatments: ACE inhibitors, diuretics  Improvement on treatment: moderate

## 2020-02-04 NOTE — ASSESSMENT & PLAN NOTE
No residual deficits. Hemorrhagic infarct after being started on Plavix. Does have some peripheral visual deficits. Doesn't tolerate statin. D/C'ed from neuro.

## 2020-02-04 NOTE — ASSESSMENT & PLAN NOTE
Doesn't exercise as much as he should, does work in yard and walks some.  Does pretty well on diet, wife does most of the cooking.  Does eat out and eat canned foods.

## 2020-02-04 NOTE — ASSESSMENT & PLAN NOTE
BP controlled on norvasc, lopressor 100 mg BID, maxzide and olmesartan 40 mg daily.  No CP/SOB.  Occasional evening BLE edema.  Had dry cough with lisinopril.

## 2020-02-12 ENCOUNTER — PATIENT OUTREACH (OUTPATIENT)
Dept: OTHER | Facility: OTHER | Age: 70
End: 2020-02-12

## 2020-02-18 ENCOUNTER — PATIENT MESSAGE (OUTPATIENT)
Dept: ADMINISTRATIVE | Facility: OTHER | Age: 70
End: 2020-02-18

## 2020-02-27 ENCOUNTER — TELEPHONE (OUTPATIENT)
Dept: CARDIOLOGY | Facility: HOSPITAL | Age: 70
End: 2020-02-27

## 2020-02-27 NOTE — TELEPHONE ENCOUNTER
Received alert from [facility]/ICD. Pt went in AF on 12/25/2020 1240 am. V rates controlled. AF >48 hrs.  BiV pacing down due to AF at 91%.    Called and left vm for pt to contact clinic.  Will assess symptoms, meds and bp.    Pt called back at 0940. Asymptomatic. Taking Pradaxa, Lopressore 100mg po bid. No longer on amiodarone since 9/2019.  Bp 132/84, has not been lower thatn 116/75.  Message sent to .    2/28/2020 pt sent manual [facility]. No longer in   AF, it broke after 2 days 13 hrs.

## 2020-02-28 DIAGNOSIS — I48.19 PERSISTENT ATRIAL FIBRILLATION: ICD-10-CM

## 2020-02-28 RX ORDER — AMIODARONE HYDROCHLORIDE 200 MG/1
TABLET ORAL
Qty: 60 TABLET | Refills: 3 | Status: SHIPPED | OUTPATIENT
Start: 2020-02-28 | End: 2020-03-06

## 2020-03-05 ENCOUNTER — PATIENT OUTREACH (OUTPATIENT)
Dept: OTHER | Facility: OTHER | Age: 70
End: 2020-03-05

## 2020-03-06 ENCOUNTER — PATIENT MESSAGE (OUTPATIENT)
Dept: ELECTROPHYSIOLOGY | Facility: CLINIC | Age: 70
End: 2020-03-06

## 2020-03-06 ENCOUNTER — PATIENT MESSAGE (OUTPATIENT)
Dept: OTHER | Facility: OTHER | Age: 70
End: 2020-03-06

## 2020-03-06 DIAGNOSIS — I10 ESSENTIAL HYPERTENSION: Primary | ICD-10-CM

## 2020-03-06 RX ORDER — CANDESARTAN 32 MG/1
32 TABLET ORAL DAILY
Qty: 30 TABLET | Refills: 3 | Status: SHIPPED | OUTPATIENT
Start: 2020-03-06 | End: 2020-12-17 | Stop reason: SDUPTHER

## 2020-03-06 NOTE — PROGRESS NOTES
03/06/2020  Pt sent message reporting diarrhea and muscle aches since starting Olmesartan.  D/C'ed that med and starting Candesartan 32 mg.  FU in 2 weeks.    Also, discontinued Amiodarone which was refilled by Dr. Salvador in error as pt is no longer taking it.    Last 5 Patient Entered Readings                                      Current 30 Day Average: 120/73     Recent Readings 3/6/2020 2/28/2020 2/21/2020 2/20/2020 2/11/2020    SBP (mmHg) 102 110 125 141 117    DBP (mmHg) 70 70 76 83 69    Pulse 86 70 62 61 61          Hypertension Medications             amLODIPine (NORVASC) 10 MG tablet Take 1 tablet (10 mg total) by mouth once daily.    candesartan (ATACAND) 32 MG tablet Take 1 tablet (32 mg total) by mouth once daily.    metoprolol tartrate (LOPRESSOR) 100 MG tablet Take 1 tablet (100 mg total) by mouth 2 (two) times daily.    nitroGLYCERIN (NITROSTAT) 0.4 MG SL tablet Place under the tongue. 1 tablet, sublingual Sublingual .  take up to 3 tablets 5 minutes apart for chest pain    triamterene-hydrochlorothiazide 37.5-25 mg (MAXZIDE-25) 37.5-25 mg per tablet TAKE 1 TABLET BY MOUTH EVERY DAY

## 2020-03-06 NOTE — TELEPHONE ENCOUNTER
03/06/2020  Pt called to report diarrhea and muscle aches since starting Olmesartan.  Discontinuing that medication and starting Candesartan.    Last 5 Patient Entered Readings                                      Current 30 Day Average: 120/73     Recent Readings 3/6/2020 2/28/2020 2/21/2020 2/20/2020 2/11/2020    SBP (mmHg) 102 110 125 141 117    DBP (mmHg) 70 70 76 83 69    Pulse 86 70 62 61 61          Cathy Fair, MPH, PA-C  [facility]  [phone]

## 2020-03-08 ENCOUNTER — CLINICAL SUPPORT (OUTPATIENT)
Dept: CARDIOLOGY | Facility: HOSPITAL | Age: 70
End: 2020-03-08
Attending: INTERNAL MEDICINE
Payer: MEDICARE

## 2020-03-08 DIAGNOSIS — Z95.810 ICD (IMPLANTABLE CARDIOVERTER-DEFIBRILLATOR) IN PLACE: ICD-10-CM

## 2020-03-08 DIAGNOSIS — I42.0 DILATED CARDIOMYOPATHY: ICD-10-CM

## 2020-03-08 PROCEDURE — 93296 REM INTERROG EVL PM/IDS: CPT | Performed by: INTERNAL MEDICINE

## 2020-03-08 PROCEDURE — 93295 DEV INTERROG REMOTE 1/2/MLT: CPT | Mod: ,,, | Performed by: INTERNAL MEDICINE

## 2020-03-08 PROCEDURE — 93295 CARDIAC DEVICE CHECK - REMOTE: ICD-10-PCS | Mod: ,,, | Performed by: INTERNAL MEDICINE

## 2020-03-16 ENCOUNTER — PATIENT MESSAGE (OUTPATIENT)
Dept: OTHER | Facility: OTHER | Age: 70
End: 2020-03-16

## 2020-03-24 ENCOUNTER — PATIENT OUTREACH (OUTPATIENT)
Dept: OTHER | Facility: OTHER | Age: 70
End: 2020-03-24

## 2020-03-24 NOTE — PROGRESS NOTES
Digital Medicine: Clinician Follow-Up    03/24/2020  Spoke to patient. At goal.  States diarrhea is much less so did not make appt with PCP.  Denies fever, abdominal pain or cramps or blood in the stool.  Believes that the Olmesartan changed the gut physiology so it is taking awhile to improve. States eating yogurt for probiotic effect.        The history is provided by the patient. No  was used.     Follow Up  Follow-up reason(s): reading review      Readings are trending down       INTERVENTION(S)  encouragement/support    PLAN  continue monitoring    Great BP readings. Less diarrhea. Continue current regimen.           Last 5 Patient Entered Readings                                      Current 30 Day Average: 111/70     Recent Readings 3/19/2020 3/14/2020 3/6/2020 2/28/2020 2/21/2020    SBP (mmHg) 120 112 102 110 125    DBP (mmHg) 65 75 70 70 76    Pulse 64 89 86 70 62             Hypertension Medications             amLODIPine (NORVASC) 10 MG tablet Take 1 tablet (10 mg total) by mouth once daily.    candesartan (ATACAND) 32 MG tablet Take 1 tablet (32 mg total) by mouth once daily.    metoprolol tartrate (LOPRESSOR) 100 MG tablet Take 1 tablet (100 mg total) by mouth 2 (two) times daily.    nitroGLYCERIN (NITROSTAT) 0.4 MG SL tablet Place under the tongue. 1 tablet, sublingual Sublingual .  take up to 3 tablets 5 minutes apart for chest pain    triamterene-hydrochlorothiazide 37.5-25 mg (MAXZIDE-25) 37.5-25 mg per tablet TAKE 1 TABLET BY MOUTH EVERY DAY                             Medication Adherence Screening   He did not miss a dose this month.

## 2020-04-24 NOTE — PROGRESS NOTES
*LOOKIN' BODY RESULTS    YES:       NO: x      REASON TEST NOT PERFORMED: Off balance       HEIGHT: 5'3  WEIGHT: 143lb  _____________________________________________________________________________  *VENIPUNCTURE    YES:  x     NO:        REASON VENIPUNCTU Digital Medicine: Health  Follow-Up      Follow Up  Follow-up reason(s): reading review and goal follow-up    Pt states that he is feeling well and is happy with his BP readings. He continues to ride his bike and work in the yard for exercise. He is also working on limiting sodium intake and plans to maintain these healthy habits. He has no new health goals to establish at this time.     INTERVENTION(S)  encouragement/support    PLAN  continue monitoring    There are no preventive care reminders to display for this patient.    Last 5 Patient Entered Readings                                      Current 30 Day Average: 128/79     Recent Readings 4/19/2020 4/12/2020 4/4/2020 3/27/2020 3/19/2020    SBP (mmHg) 121 122 132 135 120    DBP (mmHg) 87 76 75 76 65    Pulse 96 69 73 70 64        SDOH: Deferred.

## 2020-05-27 ENCOUNTER — TELEPHONE (OUTPATIENT)
Dept: ELECTROPHYSIOLOGY | Facility: CLINIC | Age: 70
End: 2020-05-27

## 2020-05-27 DIAGNOSIS — I42.0 DCM (DILATED CARDIOMYOPATHY): Primary | ICD-10-CM

## 2020-05-27 NOTE — TELEPHONE ENCOUNTER
----- Message from Renata Hester sent at 2020 10:17 AM CDT -----  Contact: Self  Pt's device in-clinic orders  needs scheduling. Thanks

## 2020-05-27 NOTE — TELEPHONE ENCOUNTER
Called Mr. Steinberg regarding message I recv'd re needing new device orders and to schedule device apt.  Adv Mr. Steinberg that he will need his device check when he comes to see Dr. Mo and unfortunately, there are not any device apts available on 7/10/20.  I let him know the date I can coordinate ekg, device & Dr. Mo apts on the same day is on 7/24/20.  Per Mr. Steinberg's request, apts are scheduled on 7/24/20 at 8:00AM EKG, 8:20 device ck & 9:00 Dr. Mo apt.  Mr. Steinberg confirmed the rescheduled apts and adv we will see him then!

## 2020-06-06 ENCOUNTER — CLINICAL SUPPORT (OUTPATIENT)
Dept: CARDIOLOGY | Facility: HOSPITAL | Age: 70
End: 2020-06-06
Attending: INTERNAL MEDICINE
Payer: MEDICARE

## 2020-06-06 DIAGNOSIS — Z95.810 ICD (IMPLANTABLE CARDIOVERTER-DEFIBRILLATOR) IN PLACE: ICD-10-CM

## 2020-06-06 DIAGNOSIS — I42.0 DILATED CARDIOMYOPATHY: ICD-10-CM

## 2020-06-06 PROCEDURE — 93295 DEV INTERROG REMOTE 1/2/MLT: CPT | Mod: ,,, | Performed by: INTERNAL MEDICINE

## 2020-06-06 PROCEDURE — 93296 REM INTERROG EVL PM/IDS: CPT | Performed by: INTERNAL MEDICINE

## 2020-06-06 PROCEDURE — 93295 CARDIAC DEVICE CHECK - REMOTE: ICD-10-PCS | Mod: ,,, | Performed by: INTERNAL MEDICINE

## 2020-06-12 ENCOUNTER — TELEPHONE (OUTPATIENT)
Dept: ELECTROPHYSIOLOGY | Facility: CLINIC | Age: 70
End: 2020-06-12

## 2020-06-12 ENCOUNTER — DOCUMENTATION ONLY (OUTPATIENT)
Dept: ELECTROPHYSIOLOGY | Facility: CLINIC | Age: 70
End: 2020-06-12

## 2020-06-12 NOTE — PROGRESS NOTES
Alert received from [facility] for AF at 49 hours at time of transmission.  Spoke with patient.  Asymptomatic, compliant with pradaxa and metoprolol.  BP last night 116/72.  Appt with Dr. Mo 7/24/20.  MD messaged.

## 2020-06-12 NOTE — TELEPHONE ENCOUNTER
----- Message from Dejan Mo MD sent at 6/12/2020  2:12 PM CDT -----  right  thanks    ----- Message -----  From: Kar Lua RN  Sent: 6/12/2020   9:44 AM CDT  To: Dejan Mo MD    DPM,    I received an alert from [facility] for Mr. Steinberg.  He has been in AF for 49 hrs at time of transmission today.  AF burden 11%, V rates controlled.  BiV pacing at 91%.  Spoke with patient, states asymptomatic.  Compliant with pradaxa and metoprolol 100mg qd.  BP followed by digital hyptertension program.  Was 116/72 last night.  Has appt with you 7/24/20.  Continue to monitor for now????    AR

## 2020-06-25 ENCOUNTER — TELEPHONE (OUTPATIENT)
Dept: CARDIOLOGY | Facility: HOSPITAL | Age: 70
End: 2020-06-25

## 2020-06-25 NOTE — TELEPHONE ENCOUNTER
"Remote device alert escalation for persistent AF episode now exceeding 48 hrs.   Spoke to patient by phone, he is currently on Pradaxa and Lopressor 100mg BID (good compliance); amiodarone stopped 12/2019 - side effects and ineffective.    AF episode in progress since 6/22/2020, 20% conducted rates > 110 bpm, BiV pacing 90%.  Pt is asymptomatic, denies SOB or fatigue.   Last echo 2018 showed normalized EF post CRT.  Notes state "If AAD needed in future, consider tikosyn"  He is currently on his way out of town until Sunday, has all meds with him, does not have his remote monitor.  Patient is aware that Dr. Mo is out until next week, but information would be reviewed by one of his partners in the meantime.  Pt was asked to continue current regimen unless contacted with new orders.   Reassured him he could continue with his weekend plans.              "

## 2020-07-24 ENCOUNTER — HOSPITAL ENCOUNTER (OUTPATIENT)
Dept: CARDIOLOGY | Facility: CLINIC | Age: 70
Discharge: HOME | End: 2020-07-24
Payer: MEDICARE

## 2020-07-24 ENCOUNTER — CLINICAL SUPPORT (OUTPATIENT)
Dept: CARDIOLOGY | Facility: HOSPITAL | Age: 70
End: 2020-07-24
Attending: INTERNAL MEDICINE
Payer: MEDICARE

## 2020-07-24 ENCOUNTER — OFFICE VISIT (OUTPATIENT)
Dept: ELECTROPHYSIOLOGY | Facility: CLINIC | Age: 70
End: 2020-07-24
Payer: MEDICARE

## 2020-07-24 VITALS
HEART RATE: 101 BPM | SYSTOLIC BLOOD PRESSURE: 132 MMHG | HEIGHT: 71 IN | WEIGHT: 258.38 LBS | BODY MASS INDEX: 36.17 KG/M2 | DIASTOLIC BLOOD PRESSURE: 82 MMHG

## 2020-07-24 DIAGNOSIS — I48.0 PAROXYSMAL ATRIAL FIBRILLATION: Primary | ICD-10-CM

## 2020-07-24 DIAGNOSIS — I42.0 DCM (DILATED CARDIOMYOPATHY): ICD-10-CM

## 2020-07-24 DIAGNOSIS — Z95.810 ICD (IMPLANTABLE CARDIOVERTER-DEFIBRILLATOR), BIVENTRICULAR, IN SITU: ICD-10-CM

## 2020-07-24 DIAGNOSIS — I49.8 OTHER SPECIFIED CARDIAC ARRHYTHMIAS: ICD-10-CM

## 2020-07-24 DIAGNOSIS — Z01.818 PRE-OP TESTING: ICD-10-CM

## 2020-07-24 DIAGNOSIS — I44.7 LBBB (LEFT BUNDLE BRANCH BLOCK): ICD-10-CM

## 2020-07-24 DIAGNOSIS — E78.2 MIXED HYPERLIPIDEMIA: ICD-10-CM

## 2020-07-24 DIAGNOSIS — I10 ESSENTIAL HYPERTENSION: ICD-10-CM

## 2020-07-24 PROCEDURE — 93005 RHYTHM STRIP: ICD-10-PCS | Mod: S$GLB,,, | Performed by: INTERNAL MEDICINE

## 2020-07-24 PROCEDURE — 99999 PR PBB SHADOW E&M-EST. PATIENT-LVL IV: ICD-10-PCS | Mod: PBBFAC,,, | Performed by: INTERNAL MEDICINE

## 2020-07-24 PROCEDURE — 93005 ELECTROCARDIOGRAM TRACING: CPT | Mod: S$GLB,,, | Performed by: INTERNAL MEDICINE

## 2020-07-24 PROCEDURE — 93284 PRGRMG EVAL IMPLANTABLE DFB: CPT | Mod: 26,,, | Performed by: INTERNAL MEDICINE

## 2020-07-24 PROCEDURE — 93284 CARDIAC DEVICE CHECK - IN CLINIC & HOSPITAL: ICD-10-PCS | Mod: 26,,, | Performed by: INTERNAL MEDICINE

## 2020-07-24 PROCEDURE — 99999 PR PBB SHADOW E&M-EST. PATIENT-LVL IV: CPT | Mod: PBBFAC,,, | Performed by: INTERNAL MEDICINE

## 2020-07-24 PROCEDURE — 93010 RHYTHM STRIP: ICD-10-PCS | Mod: S$GLB,,, | Performed by: INTERNAL MEDICINE

## 2020-07-24 PROCEDURE — 99215 OFFICE O/P EST HI 40 MIN: CPT | Mod: S$GLB,,, | Performed by: INTERNAL MEDICINE

## 2020-07-24 PROCEDURE — 93010 ELECTROCARDIOGRAM REPORT: CPT | Mod: S$GLB,,, | Performed by: INTERNAL MEDICINE

## 2020-07-24 PROCEDURE — 99215 PR OFFICE/OUTPT VISIT, EST, LEVL V, 40-54 MIN: ICD-10-PCS | Mod: S$GLB,,, | Performed by: INTERNAL MEDICINE

## 2020-07-24 PROCEDURE — 93284 PRGRMG EVAL IMPLANTABLE DFB: CPT

## 2020-07-24 NOTE — PROGRESS NOTES
"Subjective:    Patient ID:  Hussein Steinberg is a 70 y.o. male who presents for evaluation of Pacemaker Check    "Syeda Steinberg    Atrial Fibrillation  Symptoms are negative for chest pain, dizziness, palpitations, shortness of breath, syncope and weakness. Past medical history includes atrial fibrillation.      70 y.o. M  DCM, s/p biV ICD with great response (LVEF 60%+ 2018)  HTN on meds  HL on meds   PAF, on pradaxa  hx hemorrhagic CVA 2012    CRT-D 3/13 (Tonia device). Great response to CRT. Gen cahgne 9/2019.  Feeling well.  echo 5/18 60-65%  ICD shows 21% AMS. He says he can tell the difference, emeka over these past 4 days of AF. biVP only 84%    Hx of higher-dose amio, c/b night vision changes. These resolved after d/c'ing amio.    My interpretation of today's ECG is AF at 101 bpm    Review of Systems   Constitution: Positive for malaise/fatigue.   HENT: Negative.  Negative for ear pain and tinnitus.    Eyes: Negative for blurred vision.   Cardiovascular: Negative.  Negative for chest pain, dyspnea on exertion, near-syncope, palpitations and syncope.   Respiratory: Negative.  Negative for shortness of breath.    Endocrine: Negative.  Negative for polyuria.   Hematologic/Lymphatic: Does not bruise/bleed easily.   Skin: Negative.  Negative for rash.   Musculoskeletal: Negative.  Negative for joint pain and muscle weakness.   Gastrointestinal: Negative.  Negative for abdominal pain and change in bowel habit.   Genitourinary: Negative for frequency.   Neurological: Negative.  Negative for dizziness and weakness.   Psychiatric/Behavioral: Negative.  Negative for depression. The patient is not nervous/anxious.    Allergic/Immunologic: Negative for environmental allergies.        Objective:    Physical Exam   Constitutional: He is oriented to person, place, and time. He appears well-developed and well-nourished.   HENT:   Head: Normocephalic and atraumatic.   Eyes: Conjunctivae, EOM and lids are normal. No scleral " icterus.   Neck: Normal range of motion.   Cardiovascular: An irregularly irregular rhythm present. Tachycardia present.   Pulses:       Radial pulses are 2+ on the right side and 2+ on the left side.   Pulmonary/Chest: Effort normal and breath sounds normal. No accessory muscle usage. No tachypnea. No respiratory distress. He has no wheezes.   Abdominal: Soft. Bowel sounds are normal. He exhibits no distension. There is no hepatosplenomegaly. There is no abdominal tenderness.   Musculoskeletal: Normal range of motion.         General: No edema.   Neurological: He is alert and oriented to person, place, and time. He has normal reflexes. He exhibits normal muscle tone.   Skin: Skin is warm and dry. No rash noted.   Psychiatric: He has a normal mood and affect. His behavior is normal.   Nursing note and vitals reviewed.        Assessment:       1. Paroxysmal atrial fibrillation    2. DCM (dilated cardiomyopathy)    3. Essential hypertension    4. Mixed hyperlipidemia    5. ICD (implantable cardioverter-defibrillator), biventricular, in situ    6. LBBB (left bundle branch block)         Plan:       AF, resulting in sx that may be directly from the AF and/or from lack of biV pacing that results.    HERIBERTO/DCCV next week. 30 day monitor after that.  Discussed AADs tikosyn vs amio (vs ablation). After hearing pros/cons of all this, we'll restart amio (with load) following DCCV. If side effects recur, we could revisit tikosyn or ablation.    I discussed with patient risks, indications, benefits, and alternatives of the planned procedure. All questions were answered. Patient understands and wishes to proceed.

## 2020-07-28 ENCOUNTER — LAB VISIT (OUTPATIENT)
Dept: FAMILY MEDICINE | Facility: CLINIC | Age: 70
End: 2020-07-28
Payer: MEDICARE

## 2020-07-28 ENCOUNTER — PATIENT OUTREACH (OUTPATIENT)
Dept: OTHER | Facility: OTHER | Age: 70
End: 2020-07-28

## 2020-07-28 DIAGNOSIS — Z01.818 PRE-OP TESTING: ICD-10-CM

## 2020-07-28 PROCEDURE — U0003 INFECTIOUS AGENT DETECTION BY NUCLEIC ACID (DNA OR RNA); SEVERE ACUTE RESPIRATORY SYNDROME CORONAVIRUS 2 (SARS-COV-2) (CORONAVIRUS DISEASE [COVID-19]), AMPLIFIED PROBE TECHNIQUE, MAKING USE OF HIGH THROUGHPUT TECHNOLOGIES AS DESCRIBED BY CMS-2020-01-R: HCPCS

## 2020-07-29 LAB — SARS-COV-2 RNA RESP QL NAA+PROBE: NOT DETECTED

## 2020-07-30 ENCOUNTER — TELEPHONE (OUTPATIENT)
Dept: ELECTROPHYSIOLOGY | Facility: CLINIC | Age: 70
End: 2020-07-30

## 2020-07-30 NOTE — TELEPHONE ENCOUNTER
Spoke to patient      CONFIRMED procedure arrival time of 11 am     Reiterated instructions including:  -Directions to check in desk  -NPO after midnight night prior to procedure  -Confirmed compliance of Pradaxa  -Pre-procedure LABS reviewed   -COVID test negative   -Confirmed no recent fever, bleeding, infection or skin rash in the past 30 days  --Do not wear mascara day of procedure  -Bathe night prior and morning prior to procedure with Hibiclens solution or an antibacterial soap  -Reviewed current visitor policy    Pt verbalizes understanding of above and appreciates call.

## 2020-07-31 ENCOUNTER — HOSPITAL ENCOUNTER (OUTPATIENT)
Dept: CARDIOLOGY | Facility: HOSPITAL | Age: 70
Discharge: HOME | End: 2020-07-31
Attending: INTERNAL MEDICINE
Payer: MEDICARE

## 2020-07-31 ENCOUNTER — ANESTHESIA (OUTPATIENT)
Dept: MEDSURG UNIT | Facility: HOSPITAL | Age: 70
End: 2020-07-31
Payer: MEDICARE

## 2020-07-31 ENCOUNTER — HOSPITAL ENCOUNTER (OUTPATIENT)
Facility: HOSPITAL | Age: 70
Discharge: HOME | End: 2020-07-31
Attending: INTERNAL MEDICINE | Admitting: INTERNAL MEDICINE
Payer: MEDICARE

## 2020-07-31 ENCOUNTER — CLINICAL SUPPORT (OUTPATIENT)
Dept: CARDIOLOGY | Facility: HOSPITAL | Age: 70
End: 2020-07-31
Attending: INTERNAL MEDICINE
Payer: MEDICARE

## 2020-07-31 ENCOUNTER — ANESTHESIA EVENT (OUTPATIENT)
Dept: MEDSURG UNIT | Facility: HOSPITAL | Age: 70
End: 2020-07-31
Payer: MEDICARE

## 2020-07-31 VITALS
HEART RATE: 67 BPM | SYSTOLIC BLOOD PRESSURE: 108 MMHG | RESPIRATION RATE: 18 BRPM | BODY MASS INDEX: 35 KG/M2 | OXYGEN SATURATION: 94 % | WEIGHT: 250 LBS | TEMPERATURE: 97 F | HEIGHT: 71 IN | DIASTOLIC BLOOD PRESSURE: 69 MMHG

## 2020-07-31 DIAGNOSIS — I48.0 PAROXYSMAL ATRIAL FIBRILLATION: ICD-10-CM

## 2020-07-31 DIAGNOSIS — I48.91 ATRIAL FIBRILLATION: ICD-10-CM

## 2020-07-31 DIAGNOSIS — I48.0 PAROXYSMAL ATRIAL FIBRILLATION: Primary | ICD-10-CM

## 2020-07-31 LAB
AORTIC ROOT ANNULUS: 3.4 CM
BSA FOR ECHO PROCEDURE: 2.38 M2

## 2020-07-31 PROCEDURE — 93010 EKG 12-LEAD: ICD-10-PCS | Mod: 77,,, | Performed by: INTERNAL MEDICINE

## 2020-07-31 PROCEDURE — 93312 ECHO TRANSESOPHAGEAL: CPT | Mod: 26,,, | Performed by: INTERNAL MEDICINE

## 2020-07-31 PROCEDURE — D9220A PRA ANESTHESIA: Mod: ANES,,, | Performed by: STUDENT IN AN ORGANIZED HEALTH CARE EDUCATION/TRAINING PROGRAM

## 2020-07-31 PROCEDURE — 93271 ECG/MONITORING AND ANALYSIS: CPT

## 2020-07-31 PROCEDURE — 93005 ELECTROCARDIOGRAM TRACING: CPT

## 2020-07-31 PROCEDURE — 37000008 HC ANESTHESIA 1ST 15 MINUTES: Performed by: INTERNAL MEDICINE

## 2020-07-31 PROCEDURE — D9220A PRA ANESTHESIA: Mod: CRNA,,, | Performed by: NURSE ANESTHETIST, CERTIFIED REGISTERED

## 2020-07-31 PROCEDURE — 93010 ELECTROCARDIOGRAM REPORT: CPT | Mod: ,,, | Performed by: INTERNAL MEDICINE

## 2020-07-31 PROCEDURE — 37000009 HC ANESTHESIA EA ADD 15 MINS: Performed by: INTERNAL MEDICINE

## 2020-07-31 PROCEDURE — 92960 PR CARDIOVERSION, ELECTIVE;EXTERN: ICD-10-PCS | Mod: ,,, | Performed by: INTERNAL MEDICINE

## 2020-07-31 PROCEDURE — 92960 CARDIOVERSION ELECTRIC EXT: CPT | Performed by: INTERNAL MEDICINE

## 2020-07-31 PROCEDURE — 25000003 PHARM REV CODE 250: Performed by: NURSE PRACTITIONER

## 2020-07-31 PROCEDURE — 93005 ELECTROCARDIOGRAM TRACING: CPT | Mod: 59

## 2020-07-31 PROCEDURE — 25000003 PHARM REV CODE 250: Performed by: NURSE ANESTHETIST, CERTIFIED REGISTERED

## 2020-07-31 PROCEDURE — 93312 TRANSESOPHAGEAL ECHO (TEE) (CUPID ONLY): ICD-10-PCS | Mod: 26,,, | Performed by: INTERNAL MEDICINE

## 2020-07-31 PROCEDURE — 93272 ECG/REVIEW INTERPRET ONLY: CPT | Mod: ,,, | Performed by: INTERNAL MEDICINE

## 2020-07-31 PROCEDURE — 93325 DOPPLER ECHO COLOR FLOW MAPG: CPT

## 2020-07-31 PROCEDURE — D9220A PRA ANESTHESIA: ICD-10-PCS | Mod: ANES,,, | Performed by: STUDENT IN AN ORGANIZED HEALTH CARE EDUCATION/TRAINING PROGRAM

## 2020-07-31 PROCEDURE — 92960 CARDIOVERSION ELECTRIC EXT: CPT | Mod: ,,, | Performed by: INTERNAL MEDICINE

## 2020-07-31 PROCEDURE — D9220A PRA ANESTHESIA: ICD-10-PCS | Mod: CRNA,,, | Performed by: NURSE ANESTHETIST, CERTIFIED REGISTERED

## 2020-07-31 PROCEDURE — 93010 ELECTROCARDIOGRAM REPORT: CPT | Mod: 77,,, | Performed by: INTERNAL MEDICINE

## 2020-07-31 PROCEDURE — 63600175 PHARM REV CODE 636 W HCPCS: Performed by: NURSE ANESTHETIST, CERTIFIED REGISTERED

## 2020-07-31 PROCEDURE — 25000003 PHARM REV CODE 250: Performed by: INTERNAL MEDICINE

## 2020-07-31 PROCEDURE — 93010 EKG 12-LEAD: ICD-10-PCS | Mod: ,,, | Performed by: INTERNAL MEDICINE

## 2020-07-31 PROCEDURE — 93272 CARDIAC EVENT MONITOR (CUPID ONLY): ICD-10-PCS | Mod: ,,, | Performed by: INTERNAL MEDICINE

## 2020-07-31 PROCEDURE — 93320 DOPPLER ECHO COMPLETE: CPT | Mod: 26,,, | Performed by: INTERNAL MEDICINE

## 2020-07-31 PROCEDURE — 93320 TRANSESOPHAGEAL ECHO (TEE) (CUPID ONLY): ICD-10-PCS | Mod: 26,,, | Performed by: INTERNAL MEDICINE

## 2020-07-31 RX ORDER — PROPOFOL 10 MG/ML
VIAL (ML) INTRAVENOUS
Status: DISCONTINUED | OUTPATIENT
Start: 2020-07-31 | End: 2020-07-31

## 2020-07-31 RX ORDER — AMIODARONE HYDROCHLORIDE 200 MG/1
TABLET ORAL
Qty: 90 TABLET | Refills: 3 | Status: SHIPPED | OUTPATIENT
Start: 2020-07-31 | End: 2020-09-08

## 2020-07-31 RX ORDER — LIDOCAINE HYDROCHLORIDE 20 MG/ML
INJECTION INTRAVENOUS
Status: DISCONTINUED | OUTPATIENT
Start: 2020-07-31 | End: 2020-07-31

## 2020-07-31 RX ORDER — PROPOFOL 10 MG/ML
VIAL (ML) INTRAVENOUS CONTINUOUS PRN
Status: DISCONTINUED | OUTPATIENT
Start: 2020-07-31 | End: 2020-07-31

## 2020-07-31 RX ORDER — SILVER SULFADIAZINE 10 G/1000G
CREAM TOPICAL
Status: DISCONTINUED | OUTPATIENT
Start: 2020-07-31 | End: 2020-07-31 | Stop reason: HOSPADM

## 2020-07-31 RX ORDER — SODIUM CHLORIDE 0.9 % (FLUSH) 0.9 %
10 SYRINGE (ML) INJECTION
Status: DISCONTINUED | OUTPATIENT
Start: 2020-07-31 | End: 2020-07-31 | Stop reason: HOSPADM

## 2020-07-31 RX ORDER — PHENYLEPHRINE HYDROCHLORIDE 10 MG/ML
INJECTION INTRAVENOUS
Status: DISCONTINUED | OUTPATIENT
Start: 2020-07-31 | End: 2020-07-31

## 2020-07-31 RX ORDER — LIDOCAINE HYDROCHLORIDE 20 MG/ML
SOLUTION OROPHARYNGEAL
Status: DISCONTINUED | OUTPATIENT
Start: 2020-07-31 | End: 2020-07-31

## 2020-07-31 RX ADMIN — SODIUM CHLORIDE: 0.9 INJECTION, SOLUTION INTRAVENOUS at 01:07

## 2020-07-31 RX ADMIN — LIDOCAINE HYDROCHLORIDE 15 ML: 20 SOLUTION ORAL; TOPICAL at 01:07

## 2020-07-31 RX ADMIN — PROPOFOL 10 MG: 10 INJECTION, EMULSION INTRAVENOUS at 01:07

## 2020-07-31 RX ADMIN — PROPOFOL 100 MCG/KG/MIN: 10 INJECTION, EMULSION INTRAVENOUS at 01:07

## 2020-07-31 RX ADMIN — PROPOFOL 20 MG: 10 INJECTION, EMULSION INTRAVENOUS at 01:07

## 2020-07-31 RX ADMIN — PHENYLEPHRINE HYDROCHLORIDE 100 MCG: 10 INJECTION INTRAVENOUS at 01:07

## 2020-07-31 RX ADMIN — PROPOFOL 50 MG: 10 INJECTION, EMULSION INTRAVENOUS at 01:07

## 2020-07-31 RX ADMIN — LIDOCAINE HYDROCHLORIDE 50 MG: 20 INJECTION, SOLUTION INTRAVENOUS at 01:07

## 2020-07-31 NOTE — TRANSFER OF CARE
"Anesthesia Transfer of Care Note    Patient: Hussein Steinberg    Procedure(s) Performed: Procedure(s) (LRB):  CARDIOVERSION (N/A)  Transesophageal echo (HERIBERTO) intra-procedure log documentation (N/A)    Patient location: PACU    Anesthesia Type: general    Transport from OR: Transported from OR on 2-3 L/min O2 by NC with adequate spontaneous ventilation    Post pain: adequate analgesia    Post assessment: no apparent anesthetic complications and tolerated procedure well    Post vital signs: stable    Level of consciousness: awake, alert and oriented    Nausea/Vomiting: no nausea/vomiting    Transfer of care protocol was followed      Last vitals:   Visit Vitals  /77 (BP Location: Left arm, Patient Position: Lying)   Pulse 109   Temp 37.4 °C (99.4 °F) (Oral)   Resp 18   Ht 5' 11" (1.803 m)   Wt 113.4 kg (250 lb)   SpO2 (!) 94%   BMI 34.87 kg/m²     "

## 2020-07-31 NOTE — DISCHARGE SUMMARY
[facility]  Cardiac Electrophysiology  Discharge Summary      Patient Name: Hussein Steinberg  MRN: [id number]  Admission Date: 7/31/2020  Hospital Length of Stay: 0 days  Discharge Date and Time:  07/31/2020 2:31 PM  Attending Physician: Dejan Mo MD    Discharging Provider: Jazmin Pereira NP  Primary Care Physician: Lacey Rainey MD    HPI:   Hussein Steinberg is a 70 y.o. male who presents to short stay cardiac unit on 07/30/2020 for planned HERIBERTO guided DCCV with Dr. Mo.     Mr. Steinberg has known HTN, HLD, hemorrhagic CVA (2012), atrial fibrillation, and dilated cardiomyopathy.  He is s/p CRT-D (03/2013) with significant improvement in EF, from 30% (2013) to 60-65% (2018). Underwent ICD generator change in 09/2019.       Most recent device interrogation shows As/Vs/BiVp.  Underlying rhythm AF with RA pacing 11% and Bi-V pacing 84%. AMS x 771 with AF burden 21%. Mr. Steinberg reports feeling rhythm difference.  Previously treated on amiodarone, however complained of night vision changes. Amio discontinued and vision problems resolved.   At most recent EP visit with Dr. Mo, AF management discussed. Elected to proceed with HERIBERTO/DCCV. Discussed anti-arrhythmic drugs, tikosyn vs amiodarone.  Elected to restart amiodarone with load. Depending on recurrence of side effects, may consider tikosyn or ablation in the future.     Today, Mr. Steinberg reports feeling well. He denies any bleeding, overt shortness of breath, chest pains, rash, fevers, chills, or any other acute symptoms.       Anticoagulation: Pradaxa 150 mg BID, last dose taken on 7/31/20   Review of labs: Kidney function and LFTs normal. Hgb 12.8.   COVID 19 Test: NOT DETECTED on 7/28/20     EKG:   My independent visualization of most recent EKG is atrial fibrillation 98 bpm     Procedure(s) (LRB):  CARDIOVERSION (N/A)  Transesophageal echo (HERIBERTO) intra-procedure log documentation (N/A)     Indwelling Lines/Drains at time of  discharge:  Lines/Drains/Airways     None                 Hospital Course:  Mr. Steinberg underwent HERIBERTO without evidence of VIJAYA thrombus.  Proceeded with DCCV 200J x 2 shocks. He tolerated the procedure without any acute complications. Post-DCCV ECG showed ASbiVP rhythm at 63 bpm.   Mr. Steinberg recovered in PACU prior to being discharged home in stable condition.       Consults: Anesthesia       Pending Diagnostic Studies:     None          Final Active Diagnoses:    Diagnosis Date Noted POA    Atrial fibrillation [I48.91] 03/19/2013 Yes      Problems Resolved During this Admission:     Atrial fibrillation  s/p HERIBERTO guided DCCV with conversion from AF to an appropriate ASbiVP rhythm   - Continue home medications including pradaxa 150 mg BID and start amiodarone 400 mg BID x 14 days, followed by 200 mg daily   - 30 day event monitor   - Follow up with Dr. Mo in 4-5 weeks, following event monitor completion   - Discharge plans/instructions discussed with patient and spouse who verbalized understanding and agreement of plans of care. No further questions or concerns voiced at this time.       Discharged Condition: good    Disposition: Home    Follow Up:  Follow-up Information     Dejan Mo MD In 5 weeks.    Specialties: Electrophysiology, Cardiology  Why: s/p HERIBERTO/DCCV  Contact information:  [address] [facility] [address]  [phone]                 Patient Instructions:      Other restrictions (specify):   Order Comments: Medications  - Continue your anticoagulant: Pradaxa 150 mg twice daily  - Start antiarrhythmic: amiodarone 400 mg twice daily x 14 days, then 200 mg once daily    Diet  -You may resume oral intake after you are discharged, as long you have no swallowing difficulties.     Side effects:  -You may be drowsy for the remainder of the day from the sedation.     Because you have received sedation for this procedure:  -Limit activity for the remainder of the day.  -Do not smoke for at least 6  hours and until you are fully awake and alert.  -Do not drink alcoholic beverage for 24 hours.  -Do not drive for 24 hours.  -Defer important decision making until the following day.     Go to the Emergency Department if you develop:   -Bleeding  -Weakness or numbness  -Visual, gait or speech disturbance  -New chest pain, palpitations, shortness of breath, rapid heart beat, or fainting  -Fever     Call MD for:  temperature >100.4     Call MD for:  difficulty breathing, headache or visual disturbances     Call MD for:  persistent dizziness or light-headedness     Call MD for:  extreme fatigue       Medications:  Reconciled Home Medications:      Medication List      START taking these medications    amiodarone 200 MG Tab  Commonly known as: PACERONE  Take 2 tablets (400 mg) twice daily for 14 days, THEN take 1 tablet (200 mg) once daily.        CONTINUE taking these medications    acetaminophen 325 MG tablet  Commonly known as: TYLENOL  Take 2 tablets (650 mg total) by mouth every 6 (six) hours as needed (pain 1-4/10 pain scale).     amLODIPine 10 MG tablet  Commonly known as: NORVASC  Take 1 tablet (10 mg total) by mouth once daily.     candesartan 32 MG tablet  Commonly known as: ATACAND  Take 1 tablet (32 mg total) by mouth once daily.     CENTRUM SILVER ORAL  Take by mouth once daily.     dabigatran etexilate 150 mg Cap  Commonly known as: PRADAXA  TAKE 1 TABLET TWICE A DAY     ezetimibe 10 mg tablet  Commonly known as: ZETIA  Take 1 tablet (10 mg total) by mouth once daily.     metoprolol tartrate 100 MG tablet  Commonly known as: LOPRESSOR  Take 1 tablet (100 mg total) by mouth 2 (two) times daily.     nitroGLYCERIN 0.4 MG SL tablet  Commonly known as: NITROSTAT  Place under the tongue. 1 tablet, sublingual Sublingual .  take up to 3 tablets 5 minutes apart for chest pain     triamterene-hydrochlorothiazide 37.5-25 mg 37.5-25 mg per tablet  Commonly known as: MAXZIDE-25  TAKE 1 TABLET BY MOUTH EVERY DAY             Time spent on the discharge of patient: 20 minutes    Jazmin Pereira NP  Cardiac Electrophysiology  [facility]

## 2020-07-31 NOTE — SUBJECTIVE & OBJECTIVE
Past Medical History:   Diagnosis Date    Atrial fibrillation     Basal cell carcinoma     Bronchitis 3/20/2017    Cardiac arrest 2012    has pacemaker/defibrillator placed 3/19/13 - [facility]    Cataract     DCM (dilated cardiomyopathy) 6/15/2017    High cholesterol     History of intracerebral hemorrhage without residual deficit 8/23/2019    History of prostate cancer 6/23/2017    Hypertension     Kidney stone 10/2013    LBBB (left bundle branch block) 10/10/2012    LV dysfunction 10/10/2012    Prostate cancer     Treated by Dr. Rasmussen    Squamous cell carcinoma in situ of skin 2017    left arm removed    Stroke 09/2012    + hemorrhagic infarct to right occipital lobe after started on Plavix following cardiac event    SVT (supraventricular tachycardia) 10/10/2012       Past Surgical History:   Procedure Laterality Date    arthroscopic  knee    BASAL CELL CARCINOMA EXCISION  2018    removed from nose    CARDIAC PACEMAKER PLACEMENT  2013    COLONOSCOPY  2/12/2016    + polyp - repeat in 5 years- Dr. Calles    PROSTATECTOMY      SKIN CANCER EXCISION Left 10/2017    squamous cell carcinoma removed from left arm       Review of patient's allergies indicates:   Allergen Reactions    Olmesartan Diarrhea     Diarrhea and muscle aches since starting medication.     Lisinopril Other (See Comments)     Dry cough       No current facility-administered medications on file prior to encounter.      Current Outpatient Medications on File Prior to Encounter   Medication Sig    amLODIPine (NORVASC) 10 MG tablet Take 1 tablet (10 mg total) by mouth once daily.    candesartan (ATACAND) 32 MG tablet Take 1 tablet (32 mg total) by mouth once daily.    dabigatran etexilate (PRADAXA) 150 mg Cap TAKE 1 TABLET TWICE A DAY    ezetimibe (ZETIA) 10 mg tablet Take 1 tablet (10 mg total) by mouth once daily.    folic acid/multivit-min/lutein (CENTRUM SILVER ORAL) Take by mouth once daily.     metoprolol tartrate (LOPRESSOR) 100 MG tablet Take 1 tablet (100 mg total) by mouth 2 (two) times daily.    triamterene-hydrochlorothiazide 37.5-25 mg (MAXZIDE-25) 37.5-25 mg per tablet TAKE 1 TABLET BY MOUTH EVERY DAY    acetaminophen (TYLENOL) 325 MG tablet Take 2 tablets (650 mg total) by mouth every 6 (six) hours as needed (pain 1-4/10 pain scale).    nitroGLYCERIN (NITROSTAT) 0.4 MG SL tablet Place under the tongue. 1 tablet, sublingual Sublingual .  take up to 3 tablets 5 minutes apart for chest pain     Family History     Problem Relation (Age of Onset)    Cancer Father (56)    Cataracts Mother    Glaucoma Mother    Heart attack Brother (59), Maternal Grandfather, Paternal Grandfather    Heart disease Mother, Brother    Hypertension Mother    Macular degeneration Mother    No Known Problems Sister, Brother, Daughter, Daughter    Parkinsonism Brother (54)        Tobacco Use    Smoking status: Never Smoker    Smokeless tobacco: Never Used   Substance and Sexual Activity    Alcohol use: No     Frequency: Never     Binge frequency: Never    Drug use: No    Sexual activity: Yes     Partners: Female     Review of Systems   Constitution: Negative for chills and decreased appetite.   HENT: Positive for congestion. Negative for ear discharge.    Cardiovascular: Positive for irregular heartbeat. Negative for chest pain and leg swelling.   Respiratory: Negative for cough and shortness of breath.    Endocrine: Negative for cold intolerance and heat intolerance.   Skin: Negative for rash.   Musculoskeletal: Negative for arthritis and back pain.   Gastrointestinal: Negative for abdominal pain, constipation and diarrhea.   Genitourinary: Negative for dysuria and hematuria.   Neurological: Negative for dizziness and headaches.   Psychiatric/Behavioral: Negative for altered mental status.     Objective:     Vital Signs (Most Recent):  Temp: 99.4 °F (37.4 °C) (07/31/20 1108)  Pulse: 109 (07/31/20 1108)  Resp: 18  (07/31/20 1108)  BP: 115/77 (07/31/20 1111)  SpO2: (!) 94 % (07/31/20 1108) Vital Signs (24h Range):  Temp:  [99.4 °F (37.4 °C)] 99.4 °F (37.4 °C)  Pulse:  [109] 109  Resp:  [18] 18  SpO2:  [94 %] 94 %  BP: (115-119)/(77-82) 115/77     Weight: 113.4 kg (250 lb)  Body mass index is 34.87 kg/m².    SpO2: (!) 94 %       No intake or output data in the 24 hours ending 07/31/20 1253    Lines/Drains/Airways     Peripheral Intravenous Line                 Peripheral IV - Single Lumen 07/31/20 1124 20 G Right Hand less than 1 day                Physical Exam   Constitutional: He is oriented to person, place, and time. He appears well-nourished. No distress.   HENT:   Head: Normocephalic.   Eyes: Pupils are equal, round, and reactive to light.   Neck: No JVD present. No thyromegaly present.   Cardiovascular: Normal rate.   Murmur heard.  Pulmonary/Chest: Effort normal. No respiratory distress.   Musculoskeletal:         General: No edema.   Neurological: He is alert and oriented to person, place, and time.   Vitals reviewed.      Significant Imaging: Echocardiogram:   2D echo with color flow doppler:   Results for orders placed or performed during the hospital encounter of 05/01/18   2D echo with color flow doppler   Result Value Ref Range    QEF 60 55 - 65    Mitral Valve Regurgitation TRIVIAL TO MILD     Diastolic Dysfunction No     Est. PA Systolic Pressure 31.94     Tricuspid Valve Regurgitation TRIVIAL TO MILD     Narrative    Date of Procedure: 05/01/2018        TEST DESCRIPTION       Aorta: The aortic root is normal in size, measuring 3.0 cm at sinotubular junction and 3.2 cm at Sinuses of Valsalva. The proximal ascending aorta is normal in size, measuring 3.5 cm across.     Left Atrium: The left atrial volume index is normal, measuring 33.71 cc/m2.     Left Ventricle: The left ventricle is normal in size, with an end-diastolic diameter of 5.3 cm, and an end-systolic diameter of 3.9 cm. LV wall thickness is normal,  with the septum measuring 0.9 cm and the posterior wall measuring 0.8 cm across. Relative   wall thickness was normal at 0.30, and the LV mass index was 79.5 g/m2 consistent with normal left ventricular mass. There are no regional wall motion abnormalities. Left ventricular systolic function appears normal. Visually estimated ejection fraction   is 60-65%. The LV Doppler derived stroke volume equals 95.0 ccs.     Diastolic indices: E wave velocity 0.6 m/s, E/A ratio 0.9,  msec., E/e' ratio(avg) 8. Diastolic function is normal.     Right Atrium: The right atrium is normal in size, measuring 5.6 cm in length and 4.0 cm in width in the apical view.     Right Ventricle: The right ventricle is normal in size measuring 4.1 cm at the base in the apical right ventricle-focused view. Global right ventricular systolic function appears normal. Tricuspid annular plane systolic excursion (TAPSE) is 2.4 cm.   Tissue Doppler-derived tricuspid annular peak systolic velocity (S prime) is 13.8 cm/s. The estimated PA systolic pressure is 32 mmHg.     Aortic Valve:  The aortic valve is mildly sclerotic. The aortic valve is tri-leaflet in structure.     Mitral Valve:  There is trivial to mild mitral regurgitation. Mitral valve is normal in structure with normal leaflet mobility.     Tricuspid Valve:  There is trivial to mild tricuspid regurgitation. Tricuspid valve is normal in structure with normal leaflet mobility.     Pulmonary Valve:  Pulmonary valve is normal in structure with normal leaflet mobility.     IVC: IVC is normal in size and collapses > 50% with a sniff, suggesting normal right atrial pressure of 3 mmHg.     Intracavitary: There is no evidence of pericardial effusion, intracavity mass, thrombi, or vegetation.     pacemaker/catheter seen in the RA, RV.         CONCLUSIONS     1 - Normal left ventricular systolic function (EF 60-65%).     2 - Normal right ventricular systolic function .     3 - The estimated PA  systolic pressure is 32 mmHg.     4 - Trivial to mild mitral regurgitation.     5 - Trivial to mild tricuspid regurgitation.     6 - Pacemaker/catheter seen in the RA, RV.     7 - No wall motion abnormalities.     8 - Normal left ventricular diastolic function.             This document has been electronically    SIGNED BY: Karly West MD On: 05/01/2018 13:34    and Transthoracic echo (TTE) complete (Cupid Only): No results found for this or any previous visit.

## 2020-07-31 NOTE — HPI
70 year old male with prior history of Atrial fibrillation, HTN, LBBB,DCM, s/p biV ICD with great response (LVEF 60%+ 2018), prior history of intracerebral hemorrhage without residual deficit. He presented today for HERIBERTO/DCCV for his AFib with a tentative plan to initiate amiodarone loading after DCCV. No prior HERIBERTO the past. No GI pathology, or recent dental procedure.     Cardiac medications:  Amlodipine 10 mg PO daily; Candesartan 32 mg; dabigatran 150 mg PO BID; metoprolol tartrate 100 mg PO BID.   TTE 5/1/2018    CONCLUSIONS     1 - Normal left ventricular systolic function (EF 60-65%).     2 - Normal right ventricular systolic function .     3 - The estimated PA systolic pressure is 32 mmHg.     4 - Trivial to mild mitral regurgitation.     5 - Trivial to mild tricuspid regurgitation.     6 - Pacemaker/catheter seen in the RA, RV.     7 - No wall motion abnormalities.     8 - Normal left ventricular diastolic function    Dysphagia or odynophagia:  No  Liver Disease, esophageal disease, or known varices:  No  Upper GI Bleeding: No  Snoring:  No  Sleep Apnea:  No  Prior neck surgery or radiation:  No  History of anesthetic difficulties:  No  Family history of anesthetic difficulties:  No  Last oral intake:  10 hours ago  Able to move neck in all directions:  No

## 2020-07-31 NOTE — H&P
[facility] - Short Stay Cardiac Unit  Cardiology  History and Physical     Patient Name: Hussein Steinberg  MRN: [id number]  Admission Date: 7/31/2020  Code Status: Prior   Attending Provider: Dejan Mo MD   Primary Care Physician: Lacey Rainey MD  Principal Problem:<principal problem not specified>    Patient information was obtained from patient and ER records.     Subjective:     Chief Complaint:  atrial fibrillation     HPI:  70 year old male with prior history of Atrial fibrillation, HTN, LBBB,DCM, s/p biV ICD with great response (LVEF 60%+ 2018), prior history of intracerebral hemorrhage without residual deficit. He presented today for HERIBERTO/DCCV for his AFib with a tentative plan to initiate amiodarone loading after DCCV. No prior HERIBERTO the past. No GI pathology, or recent dental procedure.     Cardiac medications:  Amlodipine 10 mg PO daily; Candesartan 32 mg; dabigatran 150 mg PO BID; metoprolol tartrate 100 mg PO BID.   TTE 5/1/2018    CONCLUSIONS     1 - Normal left ventricular systolic function (EF 60-65%).     2 - Normal right ventricular systolic function .     3 - The estimated PA systolic pressure is 32 mmHg.     4 - Trivial to mild mitral regurgitation.     5 - Trivial to mild tricuspid regurgitation.     6 - Pacemaker/catheter seen in the RA, RV.     7 - No wall motion abnormalities.     8 - Normal left ventricular diastolic function    Dysphagia or odynophagia:  No  Liver Disease, esophageal disease, or known varices:  No  Upper GI Bleeding: No  Snoring:  No  Sleep Apnea:  No  Prior neck surgery or radiation:  No  History of anesthetic difficulties:  No  Family history of anesthetic difficulties:  No  Last oral intake:  10 hours ago  Able to move neck in all directions:  No        Past Medical History:   Diagnosis Date    Atrial fibrillation     Basal cell carcinoma     Bronchitis 3/20/2017    Cardiac arrest 2012    has pacemaker/defibrillator placed 3/19/13 - followed by  [facility]    Cataract     DCM (dilated cardiomyopathy) 6/15/2017    High cholesterol     History of intracerebral hemorrhage without residual deficit 8/23/2019    History of prostate cancer 6/23/2017    Hypertension     Kidney stone 10/2013    LBBB (left bundle branch block) 10/10/2012    LV dysfunction 10/10/2012    Prostate cancer     Treated by Dr. Rasmussen    Squamous cell carcinoma in situ of skin 2017    left arm removed    Stroke 09/2012    + hemorrhagic infarct to right occipital lobe after started on Plavix following cardiac event    SVT (supraventricular tachycardia) 10/10/2012       Past Surgical History:   Procedure Laterality Date    arthroscopic  knee    BASAL CELL CARCINOMA EXCISION  2018    removed from nose    CARDIAC PACEMAKER PLACEMENT  2013    COLONOSCOPY  2/12/2016    + polyp - repeat in 5 years- Dr. Calles    PROSTATECTOMY      SKIN CANCER EXCISION Left 10/2017    squamous cell carcinoma removed from left arm       Review of patient's allergies indicates:   Allergen Reactions    Olmesartan Diarrhea     Diarrhea and muscle aches since starting medication.     Lisinopril Other (See Comments)     Dry cough       No current facility-administered medications on file prior to encounter.      Current Outpatient Medications on File Prior to Encounter   Medication Sig    amLODIPine (NORVASC) 10 MG tablet Take 1 tablet (10 mg total) by mouth once daily.    candesartan (ATACAND) 32 MG tablet Take 1 tablet (32 mg total) by mouth once daily.    dabigatran etexilate (PRADAXA) 150 mg Cap TAKE 1 TABLET TWICE A DAY    ezetimibe (ZETIA) 10 mg tablet Take 1 tablet (10 mg total) by mouth once daily.    folic acid/multivit-min/lutein (CENTRUM SILVER ORAL) Take by mouth once daily.    metoprolol tartrate (LOPRESSOR) 100 MG tablet Take 1 tablet (100 mg total) by mouth 2 (two) times daily.    triamterene-hydrochlorothiazide 37.5-25 mg (MAXZIDE-25) 37.5-25 mg per tablet TAKE 1  TABLET BY MOUTH EVERY DAY    acetaminophen (TYLENOL) 325 MG tablet Take 2 tablets (650 mg total) by mouth every 6 (six) hours as needed (pain 1-4/10 pain scale).    nitroGLYCERIN (NITROSTAT) 0.4 MG SL tablet Place under the tongue. 1 tablet, sublingual Sublingual .  take up to 3 tablets 5 minutes apart for chest pain     Family History     Problem Relation (Age of Onset)    Cancer Father (56)    Cataracts Mother    Glaucoma Mother    Heart attack Brother (59), Maternal Grandfather, Paternal Grandfather    Heart disease Mother, Brother    Hypertension Mother    Macular degeneration Mother    No Known Problems Sister, Brother, Daughter, Daughter    Parkinsonism Brother (54)        Tobacco Use    Smoking status: Never Smoker    Smokeless tobacco: Never Used   Substance and Sexual Activity    Alcohol use: No     Frequency: Never     Binge frequency: Never    Drug use: No    Sexual activity: Yes     Partners: Female     Review of Systems   Constitution: Negative for chills and decreased appetite.   HENT: Positive for congestion. Negative for ear discharge.    Cardiovascular: Positive for irregular heartbeat. Negative for chest pain and leg swelling.   Respiratory: Negative for cough and shortness of breath.    Endocrine: Negative for cold intolerance and heat intolerance.   Skin: Negative for rash.   Musculoskeletal: Negative for arthritis and back pain.   Gastrointestinal: Negative for abdominal pain, constipation and diarrhea.   Genitourinary: Negative for dysuria and hematuria.   Neurological: Negative for dizziness and headaches.   Psychiatric/Behavioral: Negative for altered mental status.     Objective:     Vital Signs (Most Recent):  Temp: 99.4 °F (37.4 °C) (07/31/20 1108)  Pulse: 109 (07/31/20 1108)  Resp: 18 (07/31/20 1108)  BP: 115/77 (07/31/20 1111)  SpO2: (!) 94 % (07/31/20 1108) Vital Signs (24h Range):  Temp:  [99.4 °F (37.4 °C)] 99.4 °F (37.4 °C)  Pulse:  [109] 109  Resp:  [18] 18  SpO2:  [94 %] 94  %  BP: (115-119)/(77-82) 115/77     Weight: 113.4 kg (250 lb)  Body mass index is 34.87 kg/m².    SpO2: (!) 94 %       No intake or output data in the 24 hours ending 07/31/20 1253    Lines/Drains/Airways     Peripheral Intravenous Line                 Peripheral IV - Single Lumen 07/31/20 1124 20 G Right Hand less than 1 day                Physical Exam   Constitutional: He is oriented to person, place, and time. He appears well-nourished. No distress.   HENT:   Head: Normocephalic.   Eyes: Pupils are equal, round, and reactive to light.   Neck: No JVD present. No thyromegaly present.   Cardiovascular: Normal rate.   Murmur heard.  Pulmonary/Chest: Effort normal. No respiratory distress.   Musculoskeletal:         General: No edema.   Neurological: He is alert and oriented to person, place, and time.   Vitals reviewed.      Significant Imaging: Echocardiogram:   2D echo with color flow doppler:   Results for orders placed or performed during the hospital encounter of 05/01/18   2D echo with color flow doppler   Result Value Ref Range    QEF 60 55 - 65    Mitral Valve Regurgitation TRIVIAL TO MILD     Diastolic Dysfunction No     Est. PA Systolic Pressure 31.94     Tricuspid Valve Regurgitation TRIVIAL TO MILD     Narrative    Date of Procedure: 05/01/2018        TEST DESCRIPTION       Aorta: The aortic root is normal in size, measuring 3.0 cm at sinotubular junction and 3.2 cm at Sinuses of Valsalva. The proximal ascending aorta is normal in size, measuring 3.5 cm across.     Left Atrium: The left atrial volume index is normal, measuring 33.71 cc/m2.     Left Ventricle: The left ventricle is normal in size, with an end-diastolic diameter of 5.3 cm, and an end-systolic diameter of 3.9 cm. LV wall thickness is normal, with the septum measuring 0.9 cm and the posterior wall measuring 0.8 cm across. Relative   wall thickness was normal at 0.30, and the LV mass index was 79.5 g/m2 consistent with normal left ventricular  mass. There are no regional wall motion abnormalities. Left ventricular systolic function appears normal. Visually estimated ejection fraction   is 60-65%. The LV Doppler derived stroke volume equals 95.0 ccs.     Diastolic indices: E wave velocity 0.6 m/s, E/A ratio 0.9,  msec., E/e' ratio(avg) 8. Diastolic function is normal.     Right Atrium: The right atrium is normal in size, measuring 5.6 cm in length and 4.0 cm in width in the apical view.     Right Ventricle: The right ventricle is normal in size measuring 4.1 cm at the base in the apical right ventricle-focused view. Global right ventricular systolic function appears normal. Tricuspid annular plane systolic excursion (TAPSE) is 2.4 cm.   Tissue Doppler-derived tricuspid annular peak systolic velocity (S prime) is 13.8 cm/s. The estimated PA systolic pressure is 32 mmHg.     Aortic Valve:  The aortic valve is mildly sclerotic. The aortic valve is tri-leaflet in structure.     Mitral Valve:  There is trivial to mild mitral regurgitation. Mitral valve is normal in structure with normal leaflet mobility.     Tricuspid Valve:  There is trivial to mild tricuspid regurgitation. Tricuspid valve is normal in structure with normal leaflet mobility.     Pulmonary Valve:  Pulmonary valve is normal in structure with normal leaflet mobility.     IVC: IVC is normal in size and collapses > 50% with a sniff, suggesting normal right atrial pressure of 3 mmHg.     Intracavitary: There is no evidence of pericardial effusion, intracavity mass, thrombi, or vegetation.     pacemaker/catheter seen in the RA, RV.         CONCLUSIONS     1 - Normal left ventricular systolic function (EF 60-65%).     2 - Normal right ventricular systolic function .     3 - The estimated PA systolic pressure is 32 mmHg.     4 - Trivial to mild mitral regurgitation.     5 - Trivial to mild tricuspid regurgitation.     6 - Pacemaker/catheter seen in the RA, RV.     7 - No wall motion  abnormalities.     8 - Normal left ventricular diastolic function.             This document has been electronically    SIGNED BY: Karly West MD On: 05/01/2018 13:34    and Transthoracic echo (TTE) complete (Cupid Only): No results found for this or any previous visit.    Assessment and Plan:     Atrial fibrillation  Mr. Hussein Steinberg atrial fibrillation presented for HERIBERTO DCCV     1. HERIBERTO for possible cardioversion   -No absolute contraindications of esophageal stricture, tumor, perforation, laceration,or diverticulum and/or active GI bleed  -The risks, benefits & alternatives of the procedure were explained to the patient.   -The risks of transesophageal echo include but are not limited to:  Dental trauma, esophageal trauma/perforation, bleeding, laryngospasm/brochospasm, aspiration, sore throat/hoarseness, & dislodgement of the endotracheal tube/nasogastric tube (where applicable).    -The risks of moderate sedation include hypotension, respiratory depression, arrhythmias, bronchospasm, & death.    -Informed consent was obtained. The patient is agreeable to proceed with the procedure and all questions and concerns addressed.    Case discussed with an attending in echocardiography lab.     Further recommendations per attending addendum          VTE Risk Mitigation (From admission, onward)    None          Prince Byrd MD  Cardiology   [facility] - Short Stay Cardiac Unit

## 2020-07-31 NOTE — NURSING
Event monitor applied.  Discharge instructions and medlist given.  Patient and spouse verbalized understanding.  Denies need for escort off unit.  Off unit walking with spouse who will drive him home.

## 2020-07-31 NOTE — ASSESSMENT & PLAN NOTE
s/p HERIBERTO guided DCCV with conversion from AF to appropriate ASbiVP rhythm   - Continue home medications including pradaxa 150 mg BID and start amiodarone 400 mg BID x 14 days, followed by 200 mg daily   - 30 day event monitor   - Follow up with Dr. Mo in 4-5 weeks, following event monitor completion   - Discharge plans/instructions discussed with patient and spouse who verbalized understanding and agreement of plans of care. No further questions or concerns voiced at this time.

## 2020-07-31 NOTE — HOSPITAL COURSE
Mr. Steinberg underwent HERIBERTO without evidence of VIJAYA thrombus.  Proceeded with DCCV 200J x 2 shock, converting from AF to sinus rhythm. He tolerated the procedure without any acute complications.   Post-DCCV ECG showed ASbiVP rhythm.   Mr. Steinberg recovered in PACU prior to being discharged home in stable condition.

## 2020-07-31 NOTE — HPI
Hussein Steinberg is a 70 y.o. male who presents to short stay cardiac unit on 07/30/2020 for planned HERIBERTO guided DCCV with Dr. Mo.     Mr. Steinberg has known HTN, HLD, hemorrhagic CVA (2012), atrial fibrillation, and dilated cardiomyopathy.  He is s/p CRT-D (03/2013) with significant improvement in EF, from 30% (2013) to 60-65% (2018). Underwent ICD generator change in 09/2019.       Most recent device interrogation shows As/Vs/BiVp.  Underlying rhythm AF with RA pacing 11% and Bi-V pacing 84%. AMS x 771 with AF burden 21%. Mr. Steinberg reports feeling rhythm difference.  Previously treated on amiodarone, however complained of night vision changes. Amio discontinued and vision problems resolved.   At most recent EP visit with Dr. Mo, AF management discussed. Elected to proceed with HERIBERTO/DCCV. Discussed anti-arrhythmic drugs, tikosyn vs amiodarone.  Elected to restart amiodarone with load. Depending on recurrence of side effects, may consider tikosyn or ablation in the future.     Today, Mr. Steinberg reports feeling well. He denies any bleeding, overt shortness of breath, chest pains, rash, fevers, chills, or any other acute symptoms.       Anticoagulation: Pradaxa 150 mg BID, last dose taken on 7/31/20   Review of labs: Kidney function and LFTs normal. Hgb 12.8.   COVID 19 Test: NOT DETECTED on 7/28/20     EKG:   My independent visualization of most recent EKG is atrial fibrillation 98 bpm

## 2020-07-31 NOTE — ASSESSMENT & PLAN NOTE
Mr. Hussein Steinberg atrial fibrillation presented for HERIBERTO DCCV     1. HERIBERTO for possible cardioversion   -No absolute contraindications of esophageal stricture, tumor, perforation, laceration,or diverticulum and/or active GI bleed  -The risks, benefits & alternatives of the procedure were explained to the patient.   -The risks of transesophageal echo include but are not limited to:  Dental trauma, esophageal trauma/perforation, bleeding, laryngospasm/brochospasm, aspiration, sore throat/hoarseness, & dislodgement of the endotracheal tube/nasogastric tube (where applicable).    -The risks of moderate sedation include hypotension, respiratory depression, arrhythmias, bronchospasm, & death.    -Informed consent was obtained. The patient is agreeable to proceed with the procedure and all questions and concerns addressed.    Case discussed with an attending in echocardiography lab.     Further recommendations per attending addendum

## 2020-07-31 NOTE — ANESTHESIA PREPROCEDURE EVALUATION
07/31/2020  Hussein Steinberg is a 70 y.o., male.  Pre-operative evaluation for Procedure(s) (LRB):  CARDIOVERSION (N/A)  Transesophageal echo (HERIBERTO) intra-procedure log documentation (N/A)    Per Cardiology:   70 y.o. M  DCM, s/p biV ICD with great response (LVEF 60%+ 2018)  HTN on meds  HL on meds   PAF, on pradaxa  hx hemorrhagic CVA 2012    Hussein Steinberg is a 70 y.o. male     Patient Active Problem List   Diagnosis    Nuclear sclerosis - Both Eyes    Atrial fibrillation    Essential hypertension    LBBB (left bundle branch block)    ICD (implantable cardioverter-defibrillator), biventricular, in situ    Prostate cancer    Severe obesity (BMI 35.0-35.9 with comorbidity)    DCM (dilated cardiomyopathy)    Squamous cell carcinoma in situ of skin    Hyperlipidemia    Other insomnia    History of intracerebral hemorrhage without residual deficit       Review of patient's allergies indicates:   Allergen Reactions    Olmesartan Diarrhea     Diarrhea and muscle aches since starting medication.     Lisinopril Other (See Comments)     Dry cough       No current facility-administered medications on file prior to encounter.      Current Outpatient Medications on File Prior to Encounter   Medication Sig Dispense Refill    amLODIPine (NORVASC) 10 MG tablet Take 1 tablet (10 mg total) by mouth once daily. 90 tablet 3    candesartan (ATACAND) 32 MG tablet Take 1 tablet (32 mg total) by mouth once daily. 30 tablet 3    dabigatran etexilate (PRADAXA) 150 mg Cap TAKE 1 TABLET TWICE A  capsule 3    ezetimibe (ZETIA) 10 mg tablet Take 1 tablet (10 mg total) by mouth once daily. 90 tablet 3    folic acid/multivit-min/lutein (CENTRUM SILVER ORAL) Take by mouth once daily.      metoprolol tartrate (LOPRESSOR) 100 MG tablet Take 1 tablet (100 mg total) by mouth 2 (two) times daily. 180 tablet 3     triamterene-hydrochlorothiazide 37.5-25 mg (MAXZIDE-25) 37.5-25 mg per tablet TAKE 1 TABLET BY MOUTH EVERY DAY 90 tablet 3    acetaminophen (TYLENOL) 325 MG tablet Take 2 tablets (650 mg total) by mouth every 6 (six) hours as needed (pain 1-4/10 pain scale). 15 tablet 0    nitroGLYCERIN (NITROSTAT) 0.4 MG SL tablet Place under the tongue. 1 tablet, sublingual Sublingual .  take up to 3 tablets 5 minutes apart for chest pain         Past Surgical History:   Procedure Laterality Date    arthroscopic  knee    BASAL CELL CARCINOMA EXCISION  2018    removed from nose    CARDIAC PACEMAKER PLACEMENT  2013    COLONOSCOPY  2/12/2016    + polyp - repeat in 5 years- Dr. Calles    PROSTATECTOMY      SKIN CANCER EXCISION Left 10/2017    squamous cell carcinoma removed from left arm       Social History     Socioeconomic History    Marital status:      Spouse name: Not on file    Number of children: Not on file    Years of education: Not on file    Highest education level: Not on file   Occupational History    Occupation: administrative position   Social Needs    Financial resource strain: Not hard at all    Food insecurity     Worry: Never true     Inability: Never true    Transportation needs     Medical: No     Non-medical: No   Tobacco Use    Smoking status: Never Smoker    Smokeless tobacco: Never Used   Substance and Sexual Activity    Alcohol use: No     Frequency: Never     Binge frequency: Never    Drug use: No    Sexual activity: Yes     Partners: Female   Lifestyle    Physical activity     Days per week: 3 days     Minutes per session: 30 min    Stress: Rather much   Relationships    Social connections     Talks on phone: More than three times a week     Gets together: Twice a week     Attends [employer] service: Not on file     Active member of club or organization: Yes     Attends meetings of clubs or organizations: More than 4 times per year     Relationship status:    Other  Topics Concern    Not on file   Social History Narrative    Not on file         CBC: No results for input(s): WBC, RBC, HGB, HCT, PLT, MCV, MCH, MCHC in the last 72 hours.    CMP: No results for input(s): NA, K, CL, CO2, BUN, CREATININE, GLU, MG, PHOS, CALCIUM, ALBUMIN, PROT, ALKPHOS, ALT, AST, BILITOT in the last 72 hours.    INR  No results for input(s): PT, INR, PROTIME, APTT in the last 72 hours.        Echo 5/1/18  CONCLUSIONS     1 - Normal left ventricular systolic function (EF 60-65%).     2 - Normal right ventricular systolic function .     3 - The estimated PA systolic pressure is 32 mmHg.     4 - Trivial to mild mitral regurgitation.     5 - Trivial to mild tricuspid regurgitation.     6 - Pacemaker/catheter seen in the RA, RV.     7 - No wall motion abnormalities.     8 - Normal left ventricular diastolic function.       EKG:  Atrial fibrillation with frequent ventricular-paced complexes   Left bundle branch block   Abnormal ECG     2D Echo:  Results for orders placed or performed during the hospital encounter of 05/01/18   2D echo with color flow doppler   Result Value Ref Range    QEF 60 55 - 65    Mitral Valve Regurgitation TRIVIAL TO MILD     Diastolic Dysfunction No     Est. PA Systolic Pressure 31.94     Tricuspid Valve Regurgitation TRIVIAL TO MILD          Anesthesia Evaluation    I have reviewed the Patient Summary Reports.    I have reviewed the Nursing Notes. I have reviewed the NPO Status.   I have reviewed the Medications.     Review of Systems  Cardiovascular:   Hypertension Dysrhythmias    Renal/:   Chronic Renal Disease    Neurological:   CVA        Physical Exam  General:  Well nourished    Airway/Jaw/Neck:  Airway Findings: Mouth Opening: Normal General Airway Assessment: Adult  Mallampati: III  TM Distance: Normal, at least 6 cm            Mental Status:  Mental Status Findings:  Alert and Oriented, Cooperative         Anesthesia Plan  Type of Anesthesia, risks & benefits  discussed:  Anesthesia Type:  general  Patient's Preference:   Intra-op Monitoring Plan: standard ASA monitors  Intra-op Monitoring Plan Comments:   Post Op Pain Control Plan: multimodal analgesia  Post Op Pain Control Plan Comments:   Induction:   IV  Beta Blocker:  Patient is not currently on a Beta-Blocker (No further documentation required).       Informed Consent: Patient understands risks and agrees with Anesthesia plan.  Questions answered. Anesthesia consent signed with patient.  ASA Score: 3     Day of Surgery Review of History & Physical:            Ready For Surgery From Anesthesia Perspective.

## 2020-07-31 NOTE — ANESTHESIA POSTPROCEDURE EVALUATION
Anesthesia Post Evaluation    Patient: Hussein Steinberg    Procedure(s) Performed: Procedure(s) (LRB):  CARDIOVERSION (N/A)  Transesophageal echo (HERIBERTO) intra-procedure log documentation (N/A)    Final Anesthesia Type: general    Patient location during evaluation: PACU  Patient participation: Yes- Able to Participate  Level of consciousness: awake and alert, awake and oriented  Post-procedure vital signs: reviewed and stable  Pain management: adequate  Airway patency: patent    PONV status at discharge: No PONV  Anesthetic complications: no      Cardiovascular status: blood pressure returned to baseline, hemodynamically stable and stable  Respiratory status: unassisted, spontaneous ventilation and room air  Hydration status: euvolemic  Follow-up not needed.          Vitals Value Taken Time   /69 07/31/20 1431   Temp 36.2 °C (97.1 °F) 07/31/20 1415   Pulse 67 07/31/20 1443   Resp 34 07/31/20 1443   SpO2 93 % 07/31/20 1441   Vitals shown include unvalidated device data.      Event Time   Out of Recovery 14:48:00         Pain/Liberty Score: Liberty Score: 9 (7/31/2020  2:15 PM)

## 2020-08-03 ENCOUNTER — DOCUMENTATION ONLY (OUTPATIENT)
Dept: ELECTROPHYSIOLOGY | Facility: CLINIC | Age: 70
End: 2020-08-03

## 2020-08-03 NOTE — PROGRESS NOTES
Pt is post DCCV on 7/31/20  Recurrence of paroxysmal atrial fibrillation is noted.  1 episode on 8/1/20, 6 second duration.  Presenting egram ASBP.  Ventricular rates appear controlled.    Notable meds:  Amio 400 mg started 7/31/20  Pradaxa

## 2020-08-04 ENCOUNTER — OFFICE VISIT (OUTPATIENT)
Dept: FAMILY MEDICINE | Facility: CLINIC | Age: 70
End: 2020-08-04
Payer: MEDICARE

## 2020-08-04 ENCOUNTER — TELEPHONE (OUTPATIENT)
Dept: ELECTROPHYSIOLOGY | Facility: CLINIC | Age: 70
End: 2020-08-04

## 2020-08-04 VITALS
HEIGHT: 71 IN | RESPIRATION RATE: 16 BRPM | BODY MASS INDEX: 36.57 KG/M2 | HEART RATE: 72 BPM | DIASTOLIC BLOOD PRESSURE: 76 MMHG | TEMPERATURE: 98 F | WEIGHT: 261.25 LBS | SYSTOLIC BLOOD PRESSURE: 132 MMHG | OXYGEN SATURATION: 96 %

## 2020-08-04 DIAGNOSIS — G47.09 OTHER INSOMNIA: ICD-10-CM

## 2020-08-04 DIAGNOSIS — Z95.810 ICD (IMPLANTABLE CARDIOVERTER-DEFIBRILLATOR), BIVENTRICULAR, IN SITU: ICD-10-CM

## 2020-08-04 DIAGNOSIS — I48.0 PAROXYSMAL ATRIAL FIBRILLATION: ICD-10-CM

## 2020-08-04 DIAGNOSIS — I10 ESSENTIAL HYPERTENSION: Primary | ICD-10-CM

## 2020-08-04 DIAGNOSIS — I42.0 DCM (DILATED CARDIOMYOPATHY): ICD-10-CM

## 2020-08-04 DIAGNOSIS — E66.01 CLASS 2 SEVERE OBESITY DUE TO EXCESS CALORIES WITH SERIOUS COMORBIDITY AND BODY MASS INDEX (BMI) OF 36.0 TO 36.9 IN ADULT: ICD-10-CM

## 2020-08-04 DIAGNOSIS — Z78.9 STATIN INTOLERANCE: ICD-10-CM

## 2020-08-04 DIAGNOSIS — Z86.73 HISTORY OF CVA (CEREBROVASCULAR ACCIDENT): ICD-10-CM

## 2020-08-04 DIAGNOSIS — C61 PROSTATE CANCER: ICD-10-CM

## 2020-08-04 DIAGNOSIS — E78.2 MIXED HYPERLIPIDEMIA: ICD-10-CM

## 2020-08-04 PROCEDURE — 99999 PR PBB SHADOW E&M-EST. PATIENT-LVL IV: ICD-10-PCS | Mod: PBBFAC,,, | Performed by: INTERNAL MEDICINE

## 2020-08-04 PROCEDURE — 99214 PR OFFICE/OUTPT VISIT, EST, LEVL IV, 30-39 MIN: ICD-10-PCS | Mod: S$GLB,,, | Performed by: INTERNAL MEDICINE

## 2020-08-04 PROCEDURE — 99214 OFFICE O/P EST MOD 30 MIN: CPT | Mod: S$GLB,,, | Performed by: INTERNAL MEDICINE

## 2020-08-04 PROCEDURE — 99999 PR PBB SHADOW E&M-EST. PATIENT-LVL IV: CPT | Mod: PBBFAC,,, | Performed by: INTERNAL MEDICINE

## 2020-08-04 NOTE — PROGRESS NOTES
[facility] Note    Chief Complaint      Chief Complaint   Patient presents with    Follow-up     History of Present Illness      Hussein Steinberg is a 70 y.o. male who presents today for follow up of HTN.  Patient comes to appointment alone.    Problem List Items Addressed This Visit     Atrial fibrillation    Current Assessment & Plan     Sees Dr. Mo. On BB and amio, Pradaxa for AC.  UTD on eye exam on Dr. Hernandez.   S/P HERIBERTO guided DCCV with Dr. Mo on 7/30/2020.  No bleeding with Pradaxa.         Essential hypertension - Primary    Current Assessment & Plan     BP controlled on norvasc 10 mg, lopressor 100 mg BID, maxzide and candesartan 32 mg daily.  No CP/SOB.  Occasional evening BLE edema.  Had dry cough with lisinopril, diarrhea with olmesartan.         ICD (implantable cardioverter-defibrillator), biventricular, in situ    Prostate cancer    Current Assessment & Plan     Treated by Dr. Rasmussen, has not seen him last visit, sees PRN. No complaints at this time.  Occasional nocturia.         Class 2 severe obesity due to excess calories with serious comorbidity and body mass index (BMI) of 36.0 to 36.9 in adult    Current Assessment & Plan     Has not been exercising for the last few months, does some work in the yard.  Was riding bicycle. Diet has been all over the place.         DCM (dilated cardiomyopathy)    Current Assessment & Plan     Sees Dr. Mo, no CP/SOB.  Sleeps on 2 pillows.  On BB, ARB, pradaxa. Has AICD.         Hyperlipidemia    Current Assessment & Plan     Last lipid panel 8/2019, on zetia 10 mg.  Doesn't tolerate statins, had joint pains.         Other insomnia    Current Assessment & Plan     Melatonin gives him nightmares, trazodone doesn't work.  Sleeps max of 6 hours per night.  Wakes up in middle of night, lies there 1-1.5 hours.  Naps well in afternoons.         History of CVA (cerebrovascular accident)    Current Assessment & Plan     Stable on ASA,  Pradaxa for secondary prevention.         Statin intolerance          Health Maintenance   Topic Date Due    Lipid Panel  07/28/2025    TETANUS VACCINE  06/23/2026    Hepatitis C Screening  Completed    Pneumococcal Vaccine (65+ High/Highest Risk)  Completed    High Dose Statin  Discontinued       Past Medical History:   Diagnosis Date    Atrial fibrillation     Basal cell carcinoma     Bronchitis 3/20/2017    Cardiac arrest 2012    has pacemaker/defibrillator placed 3/19/13 - followed by [facility]    Cataract     DCM (dilated cardiomyopathy) 6/15/2017    High cholesterol     History of intracerebral hemorrhage without residual deficit 8/23/2019    History of prostate cancer 6/23/2017    Hypertension     Kidney stone 10/2013    LBBB (left bundle branch block) 10/10/2012    LV dysfunction 10/10/2012    Prostate cancer     Treated by Dr. Rasmussen    Squamous cell carcinoma in situ of skin 2017    left arm removed    Stroke 09/2012    + hemorrhagic infarct to right occipital lobe after started on Plavix following cardiac event    SVT (supraventricular tachycardia) 10/10/2012       Past Surgical History:   Procedure Laterality Date    arthroscopic  knee    BASAL CELL CARCINOMA EXCISION  2018    removed from nose    CARDIAC PACEMAKER PLACEMENT  2013    COLONOSCOPY  2/12/2016    + polyp - repeat in 5 years- Dr. Calles    PROSTATECTOMY      SKIN CANCER EXCISION Left 10/2017    squamous cell carcinoma removed from left arm    TREATMENT OF CARDIAC ARRHYTHMIA N/A 7/31/2020    Procedure: CARDIOVERSION;  Surgeon: Dejan Mo MD;  Location: [facility];  Service: Cardiology;  Laterality: N/A;  afib, HERIBERTO, DCCV, anes, DM, 3 Prep       family history includes Cancer (age of onset: 56) in his father; Cataracts in his mother; Glaucoma in his mother; Heart attack in his maternal grandfather and paternal grandfather; Heart attack (age of onset: 59) in his brother; Heart disease in his  brother and mother; Hypertension in his mother; Macular degeneration in his mother; No Known Problems in his brother, daughter, daughter, and sister; Parkinsonism (age of onset: 54) in his brother.     Social History     Tobacco Use    Smoking status: Never Smoker    Smokeless tobacco: Never Used   Substance Use Topics    Alcohol use: No     Frequency: Never     Binge frequency: Never    Drug use: No       Review of Systems   Constitutional: Negative for chills, fever and malaise/fatigue.   HENT: Negative for congestion, hearing loss and sore throat.    Eyes: Negative for blurred vision and discharge.   Respiratory: Negative for cough, shortness of breath and wheezing.    Cardiovascular: Negative for chest pain, palpitations, orthopnea and PND.   Gastrointestinal: Negative for blood in stool, constipation, diarrhea, nausea and vomiting.   Genitourinary: Negative for dysuria, hematuria and urgency.   Musculoskeletal: Negative for falls, myalgias and neck pain.   Skin: Negative for itching and rash.   Neurological: Negative for dizziness, weakness and headaches.   Endo/Heme/Allergies: Negative for polydipsia.        Outpatient Encounter Medications as of 8/4/2020   Medication Sig Dispense Refill    acetaminophen (TYLENOL) 325 MG tablet Take 2 tablets (650 mg total) by mouth every 6 (six) hours as needed (pain 1-4/10 pain scale). 15 tablet 0    amiodarone (PACERONE) 200 MG Tab Take 2 tablets (400 mg) twice daily for 14 days, THEN take 1 tablet (200 mg) once daily. 90 tablet 3    amLODIPine (NORVASC) 10 MG tablet Take 1 tablet (10 mg total) by mouth once daily. 90 tablet 3    candesartan (ATACAND) 32 MG tablet Take 1 tablet (32 mg total) by mouth once daily. 30 tablet 3    dabigatran etexilate (PRADAXA) 150 mg Cap TAKE 1 TABLET TWICE A  capsule 3    ezetimibe (ZETIA) 10 mg tablet Take 1 tablet (10 mg total) by mouth once daily. 90 tablet 3    folic acid/multivit-min/lutein (CENTRUM SILVER ORAL) Take by  "mouth once daily.      metoprolol tartrate (LOPRESSOR) 100 MG tablet Take 1 tablet (100 mg total) by mouth 2 (two) times daily. 180 tablet 3    nitroGLYCERIN (NITROSTAT) 0.4 MG SL tablet Place under the tongue. 1 tablet, sublingual Sublingual .  take up to 3 tablets 5 minutes apart for chest pain      triamterene-hydrochlorothiazide 37.5-25 mg (MAXZIDE-25) 37.5-25 mg per tablet TAKE 1 TABLET BY MOUTH EVERY DAY 90 tablet 3     No facility-administered encounter medications on file as of 8/4/2020.        Review of patient's allergies indicates:   Allergen Reactions    Olmesartan Diarrhea     Diarrhea and muscle aches since starting medication.     Lisinopril Other (See Comments)     Dry cough       Physical Exam      Vital Signs  Temp: 98.2 °F (36.8 °C)  Temp src: Oral  Pulse: 72  Resp: 16  SpO2: 96 %  BP: 132/76  BP Location: Left arm  Patient Position: Sitting  Height and Weight  Height: 5' 11" (180.3 cm)  Weight: 118.5 kg (261 lb 3.9 oz)  BSA (Calculated - sq m): 2.44 sq meters  BMI (Calculated): 36.5  Weight in (lb) to have BMI = 25: 178.9]    Physical Exam  Constitutional:       Appearance: He is well-developed.      Comments: Wearing heart monitor   HENT:      Head: Normocephalic and atraumatic.      Right Ear: External ear normal.      Left Ear: External ear normal.   Eyes:      General:         Right eye: No discharge.         Left eye: No discharge.   Neck:      Musculoskeletal: Normal range of motion.      Thyroid: No thyromegaly.   Cardiovascular:      Rate and Rhythm: Normal rate and regular rhythm.      Heart sounds: No murmur.   Pulmonary:      Effort: Pulmonary effort is normal. No respiratory distress.      Breath sounds: Normal breath sounds.   Abdominal:      General: Bowel sounds are normal. There is no distension.      Palpations: Abdomen is soft.      Tenderness: There is no abdominal tenderness.   Musculoskeletal: Normal range of motion.         General: No deformity.   Skin:     General: " Skin is warm and dry.      Findings: No rash.   Neurological:      Mental Status: He is alert and oriented to person, place, and time.   Psychiatric:         Behavior: Behavior normal.          Laboratory:  CBC:  Recent Labs   Lab Result Units 07/28/20  0740   WBC K/uL 9.47  8.85   RBC M/uL 5.52  5.50   Hemoglobin g/dL 15.6  15.4   Hematocrit % 47.6  47.2   Platelets K/uL 240  235   Mean Corpuscular Volume fL 86  86   Mean Corpuscular Hemoglobin pg 28.3  28.0   Mean Corpuscular Hemoglobin Conc g/dL 32.8  32.6     CMP:  Recent Labs   Lab Result Units 07/28/20  0740   Glucose mg/dL 109  106   Calcium mg/dL 9.7  9.6   Albumin g/dL 4.5   Total Protein g/dL 7.2   Sodium mmol/L 144  144   Potassium mmol/L 3.6  3.8   CO2 mmol/L 26  27   Chloride mmol/L 106  106   BUN, Bld mg/dL 21*  21*   Alkaline Phosphatase U/L 94   ALT U/L 28   AST U/L 36   Total Bilirubin mg/dL 0.6     URINALYSIS:  No results for input(s): COLORU, CLARITYU, SPECGRAV, PHUR, PROTEINUA, GLUCOSEU, BILIRUBINCON, BLOODU, WBCU, RBCU, BACTERIA, MUCUS, NITRITE, LEUKOCYTESUR, UROBILINOGEN, HYALINECASTS in the last 2160 hours.   LIPIDS:  Recent Labs   Lab Result Units 07/28/20  0740   HDL mg/dL 38*   Cholesterol mg/dL 188   Triglycerides mg/dL 156*   LDL Cholesterol mg/dL 118.8   Hdl/Cholesterol Ratio % 20.2   Non-HDL Cholesterol mg/dL 150   Total Cholesterol/HDL Ratio  4.9     TSH:  No results for input(s): TSH in the last 2160 hours.  A1C:  No results for input(s): HGBA1C in the last 2160 hours.    Radiology:  No new imaging    Assessment/Plan     Hussein Steinberg is a 70 y.o.male with:    1. Essential hypertension    2. Paroxysmal atrial fibrillation    3. DCM (dilated cardiomyopathy)    4. Mixed hyperlipidemia    5. ICD (implantable cardioverter-defibrillator), biventricular, in situ    6. Prostate cancer    7. Other insomnia    8. History of CVA (cerebrovascular accident)    9. Statin intolerance    10. Class 2 severe obesity due to excess  calories with serious comorbidity and body mass index (BMI) of 36.0 to 36.9 in adult     -Counseled on getting flu shot in Fall  -Due for c-scope in 2/2021  -Continue current medications and maintain follow up with specialists.  Return to clinic in 6 months.       Lacey Rainey MD  [facility]      Answers for HPI/ROS submitted by the patient on 8/3/2020   activity change: No  unexpected weight change: No  rhinorrhea: No  trouble swallowing: No  visual disturbance: No  chest tightness: No  polyuria: No  difficulty urinating: No  joint swelling: No  arthralgias: No  confusion: No  dysphoric mood: No

## 2020-08-04 NOTE — TELEPHONE ENCOUNTER
----- Message from Dejan Mo MD sent at 8/3/2020  3:54 PM CDT -----  ok thanks  ----- Message -----  From: Kar Lua RN  Sent: 8/3/2020  10:56 AM CDT  To: Dejan Mo MD    Pt is post DCCV on 7/31/20  Recurrence of paroxysmal atrial fibrillation is noted.  1 episode on 8/1/20, 6 second duration.  Presenting egram ASBP.  Ventricular rates appear controlled.    Notable meds:  Amio 400 mg started 7/31/20  Pradaxa

## 2020-08-04 NOTE — ASSESSMENT & PLAN NOTE
BP controlled on norvasc 10 mg, lopressor 100 mg BID, maxzide and candesartan 32 mg daily.  No CP/SOB.  Occasional evening BLE edema.  Had dry cough with lisinopril, diarrhea with olmesartan.  
Has not been exercising for the last few months, does some work in the yard.  Was riding bicycle. Diet has been all over the place.  
Last lipid panel 8/2019, on zetia 10 mg.  Doesn't tolerate statins, had joint pains.  
Melatonin gives him nightmares, trazodone doesn't work.  Sleeps max of 6 hours per night.  Wakes up in middle of night, lies there 1-1.5 hours.  Naps well in afternoons.  
Sees Dr. Mo, no CP/SOB.  Sleeps on 2 pillows.  On BB, ARB, pradaxa. Has AICD.  
Sees Dr. Mo. On BB and Marly soloriodaxa for AC.  UTD on eye exam on Dr. Hernandez.   S/P HERIBERTO guided DCCV with Dr. Mo on 7/30/2020.  No bleeding with Pradaxa.  
Stable on ASA, Pradaxa for secondary prevention.  
Treated by Dr. Rasmussen, has not seen him last visit, sees PRN. No complaints at this time.  Occasional nocturia.  
unknown

## 2020-08-18 ENCOUNTER — OFFICE VISIT (OUTPATIENT)
Dept: OPTOMETRY | Facility: CLINIC | Age: 70
End: 2020-08-18
Payer: MEDICARE

## 2020-08-18 DIAGNOSIS — Z98.890 HISTORY OF REFRACTIVE SURGERY: ICD-10-CM

## 2020-08-18 DIAGNOSIS — H18.003 VORTEX KERATOPATHY, BILATERAL: ICD-10-CM

## 2020-08-18 DIAGNOSIS — H52.7 REFRACTIVE ERRORS: ICD-10-CM

## 2020-08-18 DIAGNOSIS — H25.13 NUCLEAR SCLEROSIS, BILATERAL: Primary | ICD-10-CM

## 2020-08-18 DIAGNOSIS — H43.393 VITREOUS FLOATERS, BILATERAL: ICD-10-CM

## 2020-08-18 DIAGNOSIS — Z13.5 SCREENING FOR EYE CONDITION: ICD-10-CM

## 2020-08-18 DIAGNOSIS — H52.4 PRESBYOPIA OF BOTH EYES: ICD-10-CM

## 2020-08-18 PROCEDURE — 92014 PR EYE EXAM, EST PATIENT,COMPREHESV: ICD-10-PCS | Mod: S$GLB,,, | Performed by: OPTOMETRIST

## 2020-08-18 PROCEDURE — 99999 PR PBB SHADOW E&M-EST. PATIENT-LVL III: CPT | Mod: PBBFAC,,, | Performed by: OPTOMETRIST

## 2020-08-18 PROCEDURE — 92015 PR REFRACTION: ICD-10-PCS | Mod: S$GLB,,, | Performed by: OPTOMETRIST

## 2020-08-18 PROCEDURE — 92014 COMPRE OPH EXAM EST PT 1/>: CPT | Mod: S$GLB,,, | Performed by: OPTOMETRIST

## 2020-08-18 PROCEDURE — 99999 PR PBB SHADOW E&M-EST. PATIENT-LVL III: ICD-10-PCS | Mod: PBBFAC,,, | Performed by: OPTOMETRIST

## 2020-08-18 PROCEDURE — 92015 DETERMINE REFRACTIVE STATE: CPT | Mod: S$GLB,,, | Performed by: OPTOMETRIST

## 2020-08-18 NOTE — PATIENT INSTRUCTIONS
S/P RK refractive surgery in each eye.  Early nuclear sclerotic lens changes in both eyes.    History of CVA, with secondary left homonymous hemianopsia, per Dr. Skelton.  Prior diagnosis of bilateral optic atrophy, but very subtle presentation (at worst).    Few vitreous floaters in both eyes.  No evidence of retinal etiology.     Hyperopia with astigmatism in each eye, with very satisfactory correctable visual acuity in each eye.  Presbyopia.    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       New spectacle lens Rx issued for full-time wear.  Recheck in one year.

## 2020-08-18 NOTE — PROGRESS NOTES
HPI     Annual Exam      Additional comments: Saw Dr. Skelton a few months ago following a stroke.  States that has lost some visual field to the left, per visual field test   done.    No complaints, otherwise,               Comments     Patient's age: 70 y.o. WM  Occupation: [place] - retired [employer]  Approximate date of last eye examination: 01/24/2020-[place]  Name of last eye doctor seen: 08/13/2019-Dr Esqueda  City/State: [facility]  Wears glasses? Yes     If yes, wears  Full-time or part-time?  Full-time  Present glasses are: Bifocal, SV Distance, SV Reading?  Progressive lens  Approximate age of present glasses:  4+ years old   Got new glasses following last exam, or subsequently?:  No   Any problem with VA with glasses?  No complaints   Wears CLs?:  No  Headaches?  No  Eye pain/discomfort?  No                                                                                     Flashes?  No  Floaters?  No  Diplopia/Double vision?  No  Patient's Ocular History:         Any eye surgeries? No         Any eye injury?  No         Any treatment for eye disease?  No  Family history of eye disease?    Mother + Macular Degeneration    + Glaucoma    + Cataracts  Significant patient medical history:         1. Diabetes?  No   2. HBP?  Yes, controlled with medications               3. Other (describe):  Heart attack and stroke Sept, 2012   -pacemaker, prostate cancer    ! OTC eyedrops currently using:  None   ! Prescription eye meds currently using:  None   ! Any history of allergy/adverse reaction to any eye meds used   previously?  No    ! Any history of allergy/adverse reaction to eyedrops used during prior   eye exam(s)? No    ! Any history of allergy/adverse reaction to Novacaine or similar meds?   No   ! Any history of allergy/adverse reaction to Epinephrine or similar meds?   No    ! Patient okay with use of anesthetic eyedrops to check eye pressure?    Yes        ! Patient okay with use of  Rehab Medicine eyedrops to dilate pupils today?  Yes   !  Allergies/Medications/Medical History/Family History reviewed today?    Yes      PD =     Desired reading distance =                                                                        Last edited by Ray Esqueda, OD on 8/18/2020  3:02 PM. (History)            Assessment /Plan     For exam results, see Encounter Report.    1. Nuclear sclerosis, bilateral     2. Vortex keratopathy, bilateral     3. Screening for eye condition     4. History of refractive surgery     5. Vitreous floaters, bilateral     6. Refractive errors     7. Presbyopia of both eyes                    S/P RK refractive surgery in each eye.  Early nuclear sclerotic lens changes in both eyes.    History of CVA, with secondary left homonymous hemianopsia, per Dr. Skelton.  Prior diagnosis of bilateral optic atrophy, but very subtle presentation (at worst).    Few vitreous floaters in both eyes.  No evidence of retinal etiology.     Hyperopia with astigmatism in each eye, with very satisfactory correctable visual acuity in each eye.  Presbyopia.    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       New spectacle lens Rx issued for full-time wear.  Recheck in one year.

## 2020-08-28 NOTE — PROGRESS NOTES
"Mr. Steinberg is a patient of Dr. Mo and was last seen in clinic 7/24/2020.      Subjective:   Patient ID:  Hussein Steinberg is a 70 y.o. male who presents for follow-up of Atrial Fibrillation  .     HPI:    Mr. Steinberg is a 70 y.o. male with AF, DCM, CRT-D (2013), HTN, HLD, CVA (hemorrhagic) here for follow up after cardioversion.    Background:    "Syeda Steinberg    DCM, s/p biV ICD with great response (LVEF 60%+ 2018)  HTN on meds  HL on meds   PAF, on pradaxa  hx hemorrhagic CVA 2012    CRT-D 3/13 (Tonia device). Great response to CRT. Gen cahgne 9/2019.  Feeling well.  echo 5/18 60-65%  ICD shows 21% AMS. He says he can tell the difference, emeka over these past 4 days of AF. biVP only 84%    Hx of higher-dose amio, c/b night vision changes. These resolved after d/c'ing amio.  AF, resulting in sx that may be directly from the AF and/or from lack of biV pacing that results.  HERIBERTO/DCCV next week. 30 day monitor after that.  Discussed AADs tikosyn vs amio (vs ablation). After hearing pros/cons of all this, we'll restart amio (with load) following DCCV. If side effects recur, we could revisit tikosyn or ablation.    Update (09/08/2020):    7/31/2020: Cardioversion restored sinus rhythm with early recurrence of atrial fibrillation (ERAF). Cardioversion was successfully performed with restoration of normal sinus rhythm.    Pt is post DCCV on 7/31/20  Recurrence of paroxysmal atrial fibrillation is noted. 1 episode on 8/1/20, 6 second duration.  Presenting egram ASBP.  Ventricular rates appear controlled.  Notable meds:  Amio 400 mg started 7/31/20    Today he says he has been feeling well since his cardioversion. Mr. Steinberg denies chest pain with exertion or at rest, palpitations, SOB, GUNN, dizziness, or syncope.    He is currently taking pradaxa 150mg BID for stroke prophylaxis and denies significant bleeding episodes. He is currently being treated with amiodarone 200mg BID for rhythm control and metoprolol tartrate 100mg " BID for HR control.  Kidney function is stable, with a creatinine of 1.1 on 7/28/2020. LFTs ok 7/8/2020. TSH needs updating. Last PFTs 5/2019 with DLCO ratio of 87.    I have personally reviewed the patient's EKG today, which shows APVP at 60bpm. OH interval is 170. QRS is 134. QT is 484.    Recent Cardiac Tests:    HERIBERTO (7/31/2020):  · Abnormal emptying velocities. VIJAYA Definity used. No obvious thrombus present No thrombus is present in the appendage. Abnormal appendage velocities.  · Normal left ventricular systolic function. The estimated ejection fraction is 55%.  · Normal right ventricular systolic function.  · Mild mitral regurgitation.  · No plaque present.    Current Outpatient Medications   Medication Sig    acetaminophen (TYLENOL) 325 MG tablet Take 2 tablets (650 mg total) by mouth every 6 (six) hours as needed (pain 1-4/10 pain scale).    amiodarone (PACERONE) 200 MG Tab Take 2 tablets (400 mg) twice daily for 14 days, THEN take 1 tablet (200 mg) once daily.    amLODIPine (NORVASC) 10 MG tablet Take 1 tablet (10 mg total) by mouth once daily.    candesartan (ATACAND) 32 MG tablet Take 1 tablet (32 mg total) by mouth once daily.    dabigatran etexilate (PRADAXA) 150 mg Cap TAKE 1 TABLET TWICE A DAY    ezetimibe (ZETIA) 10 mg tablet Take 1 tablet (10 mg total) by mouth once daily.    folic acid/multivit-min/lutein (CENTRUM SILVER ORAL) Take by mouth once daily.    metoprolol tartrate (LOPRESSOR) 100 MG tablet Take 1 tablet (100 mg total) by mouth 2 (two) times daily.    nitroGLYCERIN (NITROSTAT) 0.4 MG SL tablet Place under the tongue. 1 tablet, sublingual Sublingual .  take up to 3 tablets 5 minutes apart for chest pain    triamterene-hydrochlorothiazide 37.5-25 mg (MAXZIDE-25) 37.5-25 mg per tablet TAKE 1 TABLET BY MOUTH EVERY DAY     No current facility-administered medications for this visit.        Review of Systems   Constitution: Negative for malaise/fatigue.   Cardiovascular: Negative for  "chest pain, dyspnea on exertion, irregular heartbeat, leg swelling and palpitations.   Respiratory: Negative for shortness of breath.    Hematologic/Lymphatic: Negative for bleeding problem.   Skin: Negative for rash.   Musculoskeletal: Negative for myalgias.   Gastrointestinal: Negative for hematemesis, hematochezia and nausea.   Genitourinary: Negative for hematuria.   Neurological: Negative for light-headedness.   Psychiatric/Behavioral: Negative for altered mental status.   Allergic/Immunologic: Negative for persistent infections.     Objective:        /78   Pulse 60   Ht 5' 11" (1.803 m)   Wt 118.9 kg (262 lb 2 oz)   BMI 36.56 kg/m²     Physical Exam   Constitutional: He is oriented to person, place, and time. He appears well-developed and well-nourished.   HENT:   Head: Normocephalic.   Nose: Nose normal.   Eyes: Pupils are equal, round, and reactive to light.   Cardiovascular: Normal rate, regular rhythm, S1 normal and S2 normal.   No murmur heard.  Pulses:       Radial pulses are 2+ on the right side and 2+ on the left side.   Pulmonary/Chest: Breath sounds normal. No respiratory distress.   Device to LUCW.   Abdominal: Normal appearance.   Musculoskeletal: Normal range of motion.         General: No edema.   Neurological: He is alert and oriented to person, place, and time.   Skin: Skin is warm and dry. No erythema.   Psychiatric: He has a normal mood and affect. His speech is normal and behavior is normal.   Nursing note and vitals reviewed.    Lab Results   Component Value Date     07/28/2020     07/28/2020    K 3.6 07/28/2020    K 3.8 07/28/2020    MG 2.5 09/14/2012    BUN 21 (H) 07/28/2020    BUN 21 (H) 07/28/2020    CREATININE 1.14 07/28/2020    CREATININE 1.14 07/28/2020    ALT 28 07/28/2020    AST 36 07/28/2020    HGB 15.6 07/28/2020    HGB 15.4 07/28/2020    HCT 47.6 07/28/2020    HCT 47.2 07/28/2020    TSH 5.21 (H) 05/28/2019    LDLCALC 118.8 07/28/2020       Recent Labs   Lab " 08/29/19  0808 07/28/20  0740   INR 1.1 1.3 H       Assessment:     1. ICD (implantable cardioverter-defibrillator), biventricular, in situ    2. Paroxysmal atrial fibrillation    3. DCM (dilated cardiomyopathy)    4. Essential hypertension    5. LBBB (left bundle branch block)    6. Long term current use of amiodarone      Plan:     In summary, Mr. Steinberg is a 70 y.o. male with AF, DCM, CRT-D (2013), HTN, HLD, CVA (hemorrhagic) here for follow up after cardioversion.  He is 5 weeks s/p cardioversion and initiation of amiodarone. Maintaining rhythm. APBiV pacing on EKG. He feels well. Will reduce amio to 200mg once daily. Update amio testing. He remains on pradaxa for CVA prophylaxis. HERIBERTO showed LVEF of 55%.     Amiodarone 200mg daily  TSH, PFTs  Continue other meds  Continue device checks  RTC 6 mo, sooner if needed.    *A copy of this note has been sent to Dr. Mo*    Follow up in about 6 months (around 3/8/2021).    ------------------------------------------------------------------    LAMIN Henderson, NP-C  Cardiac Electrophysiology

## 2020-09-04 ENCOUNTER — PATIENT OUTREACH (OUTPATIENT)
Dept: ADMINISTRATIVE | Facility: OTHER | Age: 70
End: 2020-09-04

## 2020-09-04 ENCOUNTER — CLINICAL SUPPORT (OUTPATIENT)
Dept: CARDIOLOGY | Facility: HOSPITAL | Age: 70
End: 2020-09-04
Payer: MEDICARE

## 2020-09-04 DIAGNOSIS — Z95.810 PRESENCE OF AUTOMATIC (IMPLANTABLE) CARDIAC DEFIBRILLATOR: ICD-10-CM

## 2020-09-04 PROCEDURE — 93295 DEV INTERROG REMOTE 1/2/MLT: CPT | Mod: ,,, | Performed by: INTERNAL MEDICINE

## 2020-09-04 PROCEDURE — 93295 CARDIAC DEVICE CHECK - REMOTE: ICD-10-PCS | Mod: ,,, | Performed by: INTERNAL MEDICINE

## 2020-09-04 PROCEDURE — 93296 REM INTERROG EVL PM/IDS: CPT | Performed by: INTERNAL MEDICINE

## 2020-09-08 ENCOUNTER — OFFICE VISIT (OUTPATIENT)
Dept: ELECTROPHYSIOLOGY | Facility: CLINIC | Age: 70
End: 2020-09-08
Payer: MEDICARE

## 2020-09-08 VITALS
SYSTOLIC BLOOD PRESSURE: 132 MMHG | HEIGHT: 71 IN | DIASTOLIC BLOOD PRESSURE: 78 MMHG | HEART RATE: 60 BPM | BODY MASS INDEX: 36.7 KG/M2 | WEIGHT: 262.13 LBS

## 2020-09-08 DIAGNOSIS — I48.0 PAROXYSMAL ATRIAL FIBRILLATION: ICD-10-CM

## 2020-09-08 DIAGNOSIS — I42.0 DCM (DILATED CARDIOMYOPATHY): ICD-10-CM

## 2020-09-08 DIAGNOSIS — Z79.899 LONG TERM CURRENT USE OF AMIODARONE: ICD-10-CM

## 2020-09-08 DIAGNOSIS — Z95.810 ICD (IMPLANTABLE CARDIOVERTER-DEFIBRILLATOR), BIVENTRICULAR, IN SITU: Primary | ICD-10-CM

## 2020-09-08 DIAGNOSIS — I10 ESSENTIAL HYPERTENSION: ICD-10-CM

## 2020-09-08 DIAGNOSIS — I44.7 LBBB (LEFT BUNDLE BRANCH BLOCK): ICD-10-CM

## 2020-09-08 PROCEDURE — 99214 PR OFFICE/OUTPT VISIT, EST, LEVL IV, 30-39 MIN: ICD-10-PCS | Mod: S$GLB,,, | Performed by: NURSE PRACTITIONER

## 2020-09-08 PROCEDURE — 99999 PR PBB SHADOW E&M-EST. PATIENT-LVL IV: CPT | Mod: PBBFAC,,, | Performed by: NURSE PRACTITIONER

## 2020-09-08 PROCEDURE — 93005 ELECTROCARDIOGRAM TRACING: CPT | Mod: S$GLB,,, | Performed by: INTERNAL MEDICINE

## 2020-09-08 PROCEDURE — 93010 ELECTROCARDIOGRAM REPORT: CPT | Mod: S$GLB,,, | Performed by: INTERNAL MEDICINE

## 2020-09-08 PROCEDURE — 93005 RHYTHM STRIP: ICD-10-PCS | Mod: S$GLB,,, | Performed by: INTERNAL MEDICINE

## 2020-09-08 PROCEDURE — 99999 PR PBB SHADOW E&M-EST. PATIENT-LVL IV: ICD-10-PCS | Mod: PBBFAC,,, | Performed by: NURSE PRACTITIONER

## 2020-09-08 PROCEDURE — 93010 RHYTHM STRIP: ICD-10-PCS | Mod: S$GLB,,, | Performed by: INTERNAL MEDICINE

## 2020-09-08 PROCEDURE — 99214 OFFICE O/P EST MOD 30 MIN: CPT | Mod: S$GLB,,, | Performed by: NURSE PRACTITIONER

## 2020-09-08 RX ORDER — AMIODARONE HYDROCHLORIDE 200 MG/1
200 TABLET ORAL DAILY
Qty: 90 TABLET | Refills: 3 | Status: SHIPPED | OUTPATIENT
Start: 2020-09-08 | End: 2021-06-07

## 2020-09-08 NOTE — PROGRESS NOTES
09/08/2020 Reviewed chart.  Patient seeing Cardiology today with /78.  Had cardioversion on 7/31/2020 and started on amiodarone.  Will delay my f/u call 2 weeks.    Last 5 Patient Entered Readings                                      Current 30 Day Average: 132/79     Recent Readings 9/7/2020 9/7/2020 8/29/2020 8/21/2020 8/13/2020    SBP (mmHg) 135 149 130 134 129    DBP (mmHg) 79 86 75 76 79    Pulse 59 62 61 61 60

## 2020-09-10 NOTE — PROGRESS NOTES
Digital Medicine: Health  Follow-Up    The history is provided by the patient.             Reason for review: Blood pressure not at goal        Topics Covered on Call: physical activity and Diet    Additional Follow-up details: Pt attributes BP spike on 9/7 to being on vacation and missing a dose of his HTN meds.         Diet-no change to diet    No change to diet.  Patient reports eating or drinking the following: Patient states that he continues to work on limiting salt intake. His wife does not cook with salt, but he states that they eat some canned vegetables in an attempt to limit trips to the grocery.     Intervention(s): help with shopping and reducing processed foods      Physical Activity-Change      Additional physical activity details: Patient states that he has not been riding his bike, but he has been trying to stay active by working in his garden.      Medication Adherence-Medication adherence was asssessed.  Patient continue taking medication as prescribed.          Patient states that he went on vacation and did not bring enough of his medication with him, causing him to miss a dose.  Substance, Sleep, Stress-Not assessed      Continue current diet/physical activity routine. Encouraged patient to continue to work on staying active.   Provided patient education. Recommended switching from canned to frozen vegetables.       Addressed any questions or concerns and patient has my contact information if needed prior to next outreach. Patient verbalizes understanding.      Explained the importance of self-monitoring and medication adherence. Encouraged the patient to communicate with their health  for lifestyle modifications to help improve or maintain a healthy lifestyle.            There are no preventive care reminders to display for this patient.    Last 5 Patient Entered Readings                                      Current 30 Day Average: 132/79     Recent Readings 9/7/2020 9/7/2020  8/29/2020 8/21/2020 8/13/2020    SBP (mmHg) 135 149 130 134 129    DBP (mmHg) 79 86 75 76 79    Pulse 59 62 61 61 60

## 2020-09-22 ENCOUNTER — PATIENT OUTREACH (OUTPATIENT)
Dept: OTHER | Facility: OTHER | Age: 70
End: 2020-09-22

## 2020-09-22 NOTE — PROGRESS NOTES
"Digital Medicine: Clinician Follow-Up    09/22/2020 Spoke to patient close to goal for Program.  He has "no explanation" for higher readings on 9/7 or 9/15. BP at Cardiology appt was 132/78 on 9/8.   Encouraged him to relax more and perhaps take them when grandchildren are not running around in his house.  He states that he is feeling well.  He is motivated to begin exercising more since his PCP noted a 2 pound weight gain at last visit when he is already overweight.      The history is provided by the patient.   Follow-up reason(s): routine follow up.     Hypertension    Patient readings are stable       Last 5 Patient Entered Readings                                      Current 30 Day Average: 136/80     Recent Readings 9/17/2020 9/15/2020 9/7/2020 9/7/2020 8/29/2020    SBP (mmHg) 134 145 135 149 130    DBP (mmHg) 80 81 79 86 75    Pulse 60 60 59 62 61               Screenings    ASSESSMENT(S)  Patients BP average is 136/80 mmHg, which is above goal. Patient's BP goal is less than or equal to 130/80 per 2017 ACC/AHA Hypertension Guidelines.     Hypertension Plan  Continue current therapy.  Continue current diet/physical activity routine.       Patient verbalizes understanding.            Hypertension Medications             amLODIPine (NORVASC) 10 MG tablet Take 1 tablet (10 mg total) by mouth once daily.    candesartan (ATACAND) 32 MG tablet Take 1 tablet (32 mg total) by mouth once daily.    metoprolol tartrate (LOPRESSOR) 100 MG tablet Take 1 tablet (100 mg total) by mouth 2 (two) times daily.    triamterene-hydrochlorothiazide 37.5-25 mg (MAXZIDE-25) 37.5-25 mg per tablet TAKE 1 TABLET BY MOUTH EVERY DAY                "

## 2020-11-03 ENCOUNTER — PATIENT MESSAGE (OUTPATIENT)
Dept: ADMINISTRATIVE | Facility: OTHER | Age: 70
End: 2020-11-03

## 2020-11-06 ENCOUNTER — PATIENT OUTREACH (OUTPATIENT)
Dept: OTHER | Facility: OTHER | Age: 70
End: 2020-11-06

## 2020-11-06 NOTE — PROGRESS NOTES
Digital Medicine: Health  Follow-Up    The history is provided by the patient.             Reason for review: Blood pressure at goal        Topics Covered on Call: physical activity and Diet            Diet-Change      Dietary Improvements:Patient states that he has not been eating out due to COVID, which he knows has helped decrease his sodium intake. He states that he has also started buying frozen vegetables instead of canned vegetables.               Physical Activity-Change      He added walking to His physical activity routine.        Additional physical activity details: Patient states that he has been making more of a point to walk to hep meet his exercise goals and help with wt loss.       Medication Adherence-Medication Adherence not addressed.      Substance, Sleep, Stress-Not assessed      Continue current diet/physical activity routine. Praised patient for healthy habits and encouraged to continue.        Addressed patient questions and patient has my contact information if needed prior to next outreach. Patient verbalizes understanding.      Explained the importance of self-monitoring and medication adherence. Encouraged the patient to communicate with their health  for lifestyle modifications to help improve or maintain a healthy lifestyle.               There are no preventive care reminders to display for this patient.      Last 5 Patient Entered Readings                                      Current 30 Day Average: 129/73     Recent Readings 11/5/2020 10/28/2020 10/20/2020 10/9/2020 10/4/2020    SBP (mmHg) 119 139 122 134 148    DBP (mmHg) 68 75 72 78 85    Pulse 63 60 60 60 59

## 2020-12-03 ENCOUNTER — CLINICAL SUPPORT (OUTPATIENT)
Dept: CARDIOLOGY | Facility: HOSPITAL | Age: 70
End: 2020-12-03
Payer: MEDICARE

## 2020-12-03 DIAGNOSIS — I47.20 VENTRICULAR TACHYCARDIA: ICD-10-CM

## 2020-12-03 DIAGNOSIS — I48.91 UNSPECIFIED ATRIAL FIBRILLATION: ICD-10-CM

## 2020-12-03 DIAGNOSIS — I50.42 CHRONIC COMBINED SYSTOLIC (CONGESTIVE) AND DIASTOLIC (CONGESTIVE) HEART FAILURE: ICD-10-CM

## 2020-12-03 DIAGNOSIS — I42.9 CARDIOMYOPATHY, UNSPECIFIED: ICD-10-CM

## 2020-12-03 DIAGNOSIS — Z95.810 PRESENCE OF AUTOMATIC (IMPLANTABLE) CARDIAC DEFIBRILLATOR: ICD-10-CM

## 2020-12-03 PROCEDURE — 93295 DEV INTERROG REMOTE 1/2/MLT: CPT | Mod: ,,, | Performed by: INTERNAL MEDICINE

## 2020-12-03 PROCEDURE — 93295 CARDIAC DEVICE CHECK - REMOTE: ICD-10-PCS | Mod: ,,, | Performed by: INTERNAL MEDICINE

## 2020-12-03 PROCEDURE — 93296 REM INTERROG EVL PM/IDS: CPT | Performed by: INTERNAL MEDICINE

## 2020-12-15 NOTE — PROGRESS NOTES
12/15/2020 Reviewed chart.  Patient still close to goal for the Program.  No med changes needed so no call from me today.    Last 5 Patient Entered Readings                                      Current 30 Day Average: 135/75     Recent Readings 12/7/2020 11/30/2020 11/22/2020 11/14/2020 11/5/2020    SBP (mmHg) 128 142 134 113 119    DBP (mmHg) 73 75 77 63 68    Pulse 60 64 60 60 63        Hypertension Medications             amLODIPine (NORVASC) 10 MG tablet Take 1 tablet (10 mg total) by mouth once daily.    candesartan (ATACAND) 32 MG tablet Take 1 tablet (32 mg total) by mouth once daily.    metoprolol tartrate (LOPRESSOR) 100 MG tablet Take 1 tablet (100 mg total) by mouth 2 (two) times daily.    triamterene-hydrochlorothiazide 37.5-25 mg (MAXZIDE-25) 37.5-25 mg per tablet TAKE 1 TABLET BY MOUTH EVERY DAY

## 2020-12-17 DIAGNOSIS — I10 ESSENTIAL HYPERTENSION: ICD-10-CM

## 2020-12-17 RX ORDER — CANDESARTAN 32 MG/1
32 TABLET ORAL DAILY
Qty: 90 TABLET | Refills: 1 | Status: SHIPPED | OUTPATIENT
Start: 2020-12-17 | End: 2021-06-14 | Stop reason: SDUPTHER

## 2020-12-21 ENCOUNTER — PATIENT MESSAGE (OUTPATIENT)
Dept: ELECTROPHYSIOLOGY | Facility: CLINIC | Age: 70
End: 2020-12-21

## 2020-12-21 DIAGNOSIS — I48.19 PERSISTENT ATRIAL FIBRILLATION: ICD-10-CM

## 2020-12-21 RX ORDER — DABIGATRAN ETEXILATE 150 MG/1
150 CAPSULE ORAL 2 TIMES DAILY
Qty: 180 CAPSULE | Refills: 3 | Status: SHIPPED | OUTPATIENT
Start: 2020-12-21 | End: 2020-12-24 | Stop reason: SDUPTHER

## 2020-12-24 ENCOUNTER — PATIENT MESSAGE (OUTPATIENT)
Dept: ELECTROPHYSIOLOGY | Facility: CLINIC | Age: 70
End: 2020-12-24

## 2020-12-24 DIAGNOSIS — I48.19 PERSISTENT ATRIAL FIBRILLATION: ICD-10-CM

## 2020-12-28 RX ORDER — DABIGATRAN ETEXILATE 150 MG/1
150 CAPSULE ORAL 2 TIMES DAILY
Qty: 180 CAPSULE | Refills: 3 | Status: SHIPPED | OUTPATIENT
Start: 2020-12-28 | End: 2021-09-24 | Stop reason: SDUPTHER

## 2021-01-10 ENCOUNTER — IMMUNIZATION (OUTPATIENT)
Dept: INTERNAL MEDICINE | Facility: CLINIC | Age: 71
End: 2021-01-10
Payer: MEDICARE

## 2021-01-10 DIAGNOSIS — Z23 NEED FOR VACCINATION: ICD-10-CM

## 2021-01-10 PROCEDURE — 91300 COVID-19, MRNA, LNP-S, PF, 30 MCG/0.3 ML DOSE VACCINE: CPT | Mod: PBBFAC | Performed by: FAMILY MEDICINE

## 2021-01-11 ENCOUNTER — DOCUMENTATION ONLY (OUTPATIENT)
Dept: CARDIOLOGY | Facility: HOSPITAL | Age: 71
End: 2021-01-11

## 2021-01-31 ENCOUNTER — IMMUNIZATION (OUTPATIENT)
Dept: INTERNAL MEDICINE | Facility: CLINIC | Age: 71
End: 2021-01-31
Payer: MEDICARE

## 2021-01-31 DIAGNOSIS — Z23 NEED FOR VACCINATION: Primary | ICD-10-CM

## 2021-01-31 PROCEDURE — 0002A COVID-19, MRNA, LNP-S, PF, 30 MCG/0.3 ML DOSE VACCINE: CPT | Mod: PBBFAC | Performed by: FAMILY MEDICINE

## 2021-01-31 PROCEDURE — 91300 COVID-19, MRNA, LNP-S, PF, 30 MCG/0.3 ML DOSE VACCINE: CPT | Mod: PBBFAC | Performed by: FAMILY MEDICINE

## 2021-02-09 ENCOUNTER — OFFICE VISIT (OUTPATIENT)
Dept: FAMILY MEDICINE | Facility: CLINIC | Age: 71
End: 2021-02-09
Payer: MEDICARE

## 2021-02-09 VITALS
HEART RATE: 80 BPM | WEIGHT: 262.69 LBS | BODY MASS INDEX: 36.77 KG/M2 | TEMPERATURE: 98 F | SYSTOLIC BLOOD PRESSURE: 118 MMHG | DIASTOLIC BLOOD PRESSURE: 74 MMHG | HEIGHT: 71 IN | OXYGEN SATURATION: 95 %

## 2021-02-09 DIAGNOSIS — Z12.11 SCREENING FOR MALIGNANT NEOPLASM OF COLON: ICD-10-CM

## 2021-02-09 DIAGNOSIS — I48.0 PAROXYSMAL ATRIAL FIBRILLATION: ICD-10-CM

## 2021-02-09 DIAGNOSIS — I42.0 DCM (DILATED CARDIOMYOPATHY): ICD-10-CM

## 2021-02-09 DIAGNOSIS — Z79.899 ON AMIODARONE THERAPY: ICD-10-CM

## 2021-02-09 DIAGNOSIS — G47.09 OTHER INSOMNIA: ICD-10-CM

## 2021-02-09 DIAGNOSIS — C61 PROSTATE CANCER: Primary | ICD-10-CM

## 2021-02-09 DIAGNOSIS — I10 ESSENTIAL HYPERTENSION: ICD-10-CM

## 2021-02-09 DIAGNOSIS — E78.2 MIXED HYPERLIPIDEMIA: ICD-10-CM

## 2021-02-09 DIAGNOSIS — Z78.9 STATIN INTOLERANCE: ICD-10-CM

## 2021-02-09 DIAGNOSIS — E66.01 CLASS 2 SEVERE OBESITY DUE TO EXCESS CALORIES WITH SERIOUS COMORBIDITY AND BODY MASS INDEX (BMI) OF 36.0 TO 36.9 IN ADULT: ICD-10-CM

## 2021-02-09 DIAGNOSIS — Z86.73 HISTORY OF CVA (CEREBROVASCULAR ACCIDENT): ICD-10-CM

## 2021-02-09 PROCEDURE — 99214 OFFICE O/P EST MOD 30 MIN: CPT | Mod: S$GLB,,, | Performed by: INTERNAL MEDICINE

## 2021-02-09 PROCEDURE — 99999 PR PBB SHADOW E&M-EST. PATIENT-LVL III: CPT | Mod: PBBFAC,,, | Performed by: INTERNAL MEDICINE

## 2021-02-09 PROCEDURE — 99999 PR PBB SHADOW E&M-EST. PATIENT-LVL III: ICD-10-PCS | Mod: PBBFAC,,, | Performed by: INTERNAL MEDICINE

## 2021-02-09 PROCEDURE — 99214 PR OFFICE/OUTPT VISIT, EST, LEVL IV, 30-39 MIN: ICD-10-PCS | Mod: S$GLB,,, | Performed by: INTERNAL MEDICINE

## 2021-02-09 RX ORDER — AMLODIPINE BESYLATE 10 MG/1
10 TABLET ORAL DAILY
Qty: 90 TABLET | Refills: 3 | Status: SHIPPED | OUTPATIENT
Start: 2021-02-09 | End: 2021-09-29 | Stop reason: SDUPTHER

## 2021-03-03 ENCOUNTER — CLINICAL SUPPORT (OUTPATIENT)
Dept: CARDIOLOGY | Facility: HOSPITAL | Age: 71
End: 2021-03-03
Payer: MEDICARE

## 2021-03-03 DIAGNOSIS — Z95.810 PRESENCE OF AUTOMATIC (IMPLANTABLE) CARDIAC DEFIBRILLATOR: ICD-10-CM

## 2021-03-03 DIAGNOSIS — I47.20 VENTRICULAR TACHYCARDIA: ICD-10-CM

## 2021-03-03 DIAGNOSIS — I48.91 UNSPECIFIED ATRIAL FIBRILLATION: ICD-10-CM

## 2021-03-03 DIAGNOSIS — I42.9 CARDIOMYOPATHY, UNSPECIFIED: ICD-10-CM

## 2021-03-03 PROCEDURE — 93295 DEV INTERROG REMOTE 1/2/MLT: CPT | Mod: ,,, | Performed by: INTERNAL MEDICINE

## 2021-03-03 PROCEDURE — 93296 REM INTERROG EVL PM/IDS: CPT | Performed by: INTERNAL MEDICINE

## 2021-03-03 PROCEDURE — 93295 CARDIAC DEVICE CHECK - REMOTE: ICD-10-PCS | Mod: ,,, | Performed by: INTERNAL MEDICINE

## 2021-05-11 ENCOUNTER — TELEPHONE (OUTPATIENT)
Dept: FAMILY MEDICINE | Facility: CLINIC | Age: 71
End: 2021-05-11

## 2021-05-11 DIAGNOSIS — Z12.11 SCREENING FOR MALIGNANT NEOPLASM OF COLON: Primary | ICD-10-CM

## 2021-05-18 ENCOUNTER — TELEPHONE (OUTPATIENT)
Dept: ENDOSCOPY | Facility: HOSPITAL | Age: 71
End: 2021-05-18

## 2021-06-01 ENCOUNTER — CLINICAL SUPPORT (OUTPATIENT)
Dept: CARDIOLOGY | Facility: HOSPITAL | Age: 71
End: 2021-06-01
Payer: MEDICARE

## 2021-06-01 DIAGNOSIS — Z95.810 PRESENCE OF AUTOMATIC (IMPLANTABLE) CARDIAC DEFIBRILLATOR: ICD-10-CM

## 2021-06-01 PROCEDURE — 93296 REM INTERROG EVL PM/IDS: CPT | Performed by: INTERNAL MEDICINE

## 2021-06-01 PROCEDURE — 93295 DEV INTERROG REMOTE 1/2/MLT: CPT | Mod: ,,, | Performed by: INTERNAL MEDICINE

## 2021-06-01 PROCEDURE — 93295 CARDIAC DEVICE CHECK - REMOTE: ICD-10-PCS | Mod: ,,, | Performed by: INTERNAL MEDICINE

## 2021-06-07 RX ORDER — AMIODARONE HYDROCHLORIDE 200 MG/1
200 TABLET ORAL DAILY
Qty: 30 TABLET | Refills: 1 | Status: SHIPPED | OUTPATIENT
Start: 2021-06-07 | End: 2021-09-29 | Stop reason: SDUPTHER

## 2021-06-15 ENCOUNTER — OFFICE VISIT (OUTPATIENT)
Dept: ELECTROPHYSIOLOGY | Facility: CLINIC | Age: 71
End: 2021-06-15
Payer: MEDICARE

## 2021-06-15 ENCOUNTER — CLINICAL SUPPORT (OUTPATIENT)
Dept: CARDIOLOGY | Facility: HOSPITAL | Age: 71
End: 2021-06-15
Attending: INTERNAL MEDICINE
Payer: MEDICARE

## 2021-06-15 ENCOUNTER — HOSPITAL ENCOUNTER (OUTPATIENT)
Dept: PULMONOLOGY | Facility: CLINIC | Age: 71
Discharge: HOME | End: 2021-06-15
Payer: MEDICARE

## 2021-06-15 ENCOUNTER — HOSPITAL ENCOUNTER (OUTPATIENT)
Dept: CARDIOLOGY | Facility: CLINIC | Age: 71
Discharge: HOME | End: 2021-06-15
Payer: MEDICARE

## 2021-06-15 ENCOUNTER — PATIENT OUTREACH (OUTPATIENT)
Dept: ADMINISTRATIVE | Facility: OTHER | Age: 71
End: 2021-06-15

## 2021-06-15 VITALS
WEIGHT: 256.81 LBS | HEIGHT: 71 IN | DIASTOLIC BLOOD PRESSURE: 78 MMHG | SYSTOLIC BLOOD PRESSURE: 124 MMHG | HEART RATE: 60 BPM | BODY MASS INDEX: 35.95 KG/M2

## 2021-06-15 DIAGNOSIS — Z79.899 LONG TERM CURRENT USE OF AMIODARONE: ICD-10-CM

## 2021-06-15 DIAGNOSIS — Z86.73 HISTORY OF CVA (CEREBROVASCULAR ACCIDENT): ICD-10-CM

## 2021-06-15 DIAGNOSIS — I48.0 PAROXYSMAL ATRIAL FIBRILLATION: ICD-10-CM

## 2021-06-15 DIAGNOSIS — I42.0 DCM (DILATED CARDIOMYOPATHY): ICD-10-CM

## 2021-06-15 DIAGNOSIS — I10 ESSENTIAL HYPERTENSION: ICD-10-CM

## 2021-06-15 DIAGNOSIS — I44.7 LBBB (LEFT BUNDLE BRANCH BLOCK): ICD-10-CM

## 2021-06-15 DIAGNOSIS — Z91.89 AT RISK FOR AMIODARONE TOXICITY WITH LONG TERM USE: ICD-10-CM

## 2021-06-15 DIAGNOSIS — Z95.810 ICD (IMPLANTABLE CARDIOVERTER-DEFIBRILLATOR), BIVENTRICULAR, IN SITU: Primary | ICD-10-CM

## 2021-06-15 DIAGNOSIS — Z79.899 AT RISK FOR AMIODARONE TOXICITY WITH LONG TERM USE: ICD-10-CM

## 2021-06-15 LAB
DLCO SINGLE BREATH LLN: 20.9
DLCO SINGLE BREATH PRE REF: 69.3 %
DLCO SINGLE BREATH REF: 27.83
DLCOC SBVA LLN: 2.68
DLCOC SBVA REF: 3.8
DLCOC SINGLE BREATH LLN: 20.9
DLCOC SINGLE BREATH REF: 27.83
DLCOCSBVAULN: 4.92
DLCOCSINGLEBREATHULN: 34.76
DLCOSINGLEBREATHULN: 34.76
DLCOVA LLN: 2.68
DLCOVA PRE REF: 103.4 %
DLCOVA PRE: 3.93 ML/(MIN*MMHG*L) (ref 2.68–4.92)
DLCOVA REF: 3.8
DLCOVAULN: 4.92
FEF 25 75 LLN: 1.05
FEF 25 75 PRE REF: 123.4 %
FEF 25 75 REF: 2.45
FEV05 LLN: 1.58
FEV05 REF: 2.72
FEV1 FVC LLN: 62
FEV1 FVC PRE REF: 106.9 %
FEV1 FVC REF: 76
FEV1 LLN: 2.35
FEV1 PRE REF: 93 %
FEV1 REF: 3.27
FVC LLN: 3.22
FVC PRE REF: 86.5 %
FVC REF: 4.35
IVC PRE: 4.18 L (ref 3.22–5.5)
IVC SINGLE BREATH LLN: 3.22
IVC SINGLE BREATH PRE REF: 96.2 %
IVC SINGLE BREATH REF: 4.35
IVCSINGLEBREATHULN: 5.5
PEF LLN: 6.1
PEF PRE REF: 102.1 %
PEF REF: 8.49
PHYSICIAN COMMENT: ABNORMAL
PRE DLCO: 19.28 ML/(MIN*MMHG) (ref 20.9–34.76)
PRE FEF 25 75: 3.02 L/S (ref 1.05–4.43)
PRE FET 100: 7.24 SEC
PRE FEV05 REF: 92.4 %
PRE FEV1 FVC: 80.88 % (ref 62.1–87.63)
PRE FEV1: 3.04 L (ref 2.35–4.13)
PRE FEV5: 2.51 L (ref 1.58–3.85)
PRE FVC: 3.76 L (ref 3.22–5.5)
PRE PEF: 8.66 L/S (ref 6.1–10.87)
VA PRE: 4.91 L (ref 7.18–7.18)
VA SINGLE BREATH LLN: 7.18
VA SINGLE BREATH PRE REF: 68.4 %
VA SINGLE BREATH REF: 7.18
VASINGLEBREATHULN: 7.18

## 2021-06-15 PROCEDURE — 94010 BREATHING CAPACITY TEST: CPT | Mod: S$GLB,,, | Performed by: INTERNAL MEDICINE

## 2021-06-15 PROCEDURE — 93005 ELECTROCARDIOGRAM TRACING: CPT | Mod: S$GLB,,, | Performed by: INTERNAL MEDICINE

## 2021-06-15 PROCEDURE — 93005 RHYTHM STRIP: ICD-10-PCS | Mod: S$GLB,,, | Performed by: INTERNAL MEDICINE

## 2021-06-15 PROCEDURE — 99214 OFFICE O/P EST MOD 30 MIN: CPT | Mod: S$GLB,,, | Performed by: NURSE PRACTITIONER

## 2021-06-15 PROCEDURE — 93284 CARDIAC DEVICE CHECK - IN CLINIC & HOSPITAL: ICD-10-PCS | Mod: 26,,, | Performed by: INTERNAL MEDICINE

## 2021-06-15 PROCEDURE — 93284 PRGRMG EVAL IMPLANTABLE DFB: CPT

## 2021-06-15 PROCEDURE — 94729 PR C02/MEMBANE DIFFUSE CAPACITY: ICD-10-PCS | Mod: S$GLB,,, | Performed by: INTERNAL MEDICINE

## 2021-06-15 PROCEDURE — 93284 PRGRMG EVAL IMPLANTABLE DFB: CPT | Mod: 26,,, | Performed by: INTERNAL MEDICINE

## 2021-06-15 PROCEDURE — 93010 ELECTROCARDIOGRAM REPORT: CPT | Mod: S$GLB,,, | Performed by: INTERNAL MEDICINE

## 2021-06-15 PROCEDURE — 94729 DIFFUSING CAPACITY: CPT | Mod: S$GLB,,, | Performed by: INTERNAL MEDICINE

## 2021-06-15 PROCEDURE — 99999 PR PBB SHADOW E&M-EST. PATIENT-LVL IV: CPT | Mod: PBBFAC,,, | Performed by: NURSE PRACTITIONER

## 2021-06-15 PROCEDURE — 93010 RHYTHM STRIP: ICD-10-PCS | Mod: S$GLB,,, | Performed by: INTERNAL MEDICINE

## 2021-06-15 PROCEDURE — 94010 BREATHING CAPACITY TEST: ICD-10-PCS | Mod: S$GLB,,, | Performed by: INTERNAL MEDICINE

## 2021-06-15 PROCEDURE — 99999 PR PBB SHADOW E&M-EST. PATIENT-LVL IV: ICD-10-PCS | Mod: PBBFAC,,, | Performed by: NURSE PRACTITIONER

## 2021-06-15 PROCEDURE — 99214 PR OFFICE/OUTPT VISIT, EST, LEVL IV, 30-39 MIN: ICD-10-PCS | Mod: S$GLB,,, | Performed by: NURSE PRACTITIONER

## 2021-07-16 ENCOUNTER — ANESTHESIA EVENT (OUTPATIENT)
Dept: ENDOSCOPY | Facility: HOSPITAL | Age: 71
End: 2021-07-16
Payer: MEDICARE

## 2021-07-19 ENCOUNTER — HOSPITAL ENCOUNTER (OUTPATIENT)
Facility: HOSPITAL | Age: 71
Discharge: HOME | End: 2021-07-19
Attending: COLON & RECTAL SURGERY | Admitting: COLON & RECTAL SURGERY
Payer: MEDICARE

## 2021-07-19 ENCOUNTER — ANESTHESIA (OUTPATIENT)
Dept: ENDOSCOPY | Facility: HOSPITAL | Age: 71
End: 2021-07-19
Payer: MEDICARE

## 2021-07-19 VITALS
OXYGEN SATURATION: 100 % | HEART RATE: 60 BPM | HEIGHT: 71 IN | WEIGHT: 250 LBS | BODY MASS INDEX: 35 KG/M2 | DIASTOLIC BLOOD PRESSURE: 73 MMHG | RESPIRATION RATE: 16 BRPM | SYSTOLIC BLOOD PRESSURE: 120 MMHG | TEMPERATURE: 98 F

## 2021-07-19 DIAGNOSIS — Z86.010 HISTORY OF COLON POLYPS: ICD-10-CM

## 2021-07-19 PROBLEM — Z86.0100 HISTORY OF COLON POLYPS: Status: ACTIVE | Noted: 2021-07-19

## 2021-07-19 PROCEDURE — 25000003 PHARM REV CODE 250: Performed by: COLON & RECTAL SURGERY

## 2021-07-19 PROCEDURE — 37000008 HC ANESTHESIA 1ST 15 MINUTES: Performed by: COLON & RECTAL SURGERY

## 2021-07-19 PROCEDURE — 63600175 PHARM REV CODE 636 W HCPCS: Performed by: NURSE ANESTHETIST, CERTIFIED REGISTERED

## 2021-07-19 PROCEDURE — G0105 COLORECTAL SCRN; HI RISK IND: HCPCS | Mod: ,,, | Performed by: COLON & RECTAL SURGERY

## 2021-07-19 PROCEDURE — G0105 COLORECTAL SCRN; HI RISK IND: ICD-10-PCS | Mod: ,,, | Performed by: COLON & RECTAL SURGERY

## 2021-07-19 PROCEDURE — E9220 PRA ENDO ANESTHESIA: HCPCS | Mod: ,,, | Performed by: NURSE ANESTHETIST, CERTIFIED REGISTERED

## 2021-07-19 PROCEDURE — E9220 PRA ENDO ANESTHESIA: ICD-10-PCS | Mod: ,,, | Performed by: NURSE ANESTHETIST, CERTIFIED REGISTERED

## 2021-07-19 PROCEDURE — 37000009 HC ANESTHESIA EA ADD 15 MINS: Performed by: COLON & RECTAL SURGERY

## 2021-07-19 PROCEDURE — G0105 COLORECTAL SCRN; HI RISK IND: HCPCS | Performed by: COLON & RECTAL SURGERY

## 2021-07-19 PROCEDURE — 25000003 PHARM REV CODE 250: Performed by: NURSE ANESTHETIST, CERTIFIED REGISTERED

## 2021-07-19 RX ORDER — ONDANSETRON 8 MG/1
8 TABLET, ORALLY DISINTEGRATING ORAL EVERY 8 HOURS PRN
Status: DISCONTINUED | OUTPATIENT
Start: 2021-07-19 | End: 2021-07-19 | Stop reason: HOSPADM

## 2021-07-19 RX ORDER — SODIUM CHLORIDE 9 MG/ML
INJECTION, SOLUTION INTRAVENOUS CONTINUOUS
Status: DISCONTINUED | OUTPATIENT
Start: 2021-07-19 | End: 2021-07-19 | Stop reason: HOSPADM

## 2021-07-19 RX ORDER — PROPOFOL 10 MG/ML
VIAL (ML) INTRAVENOUS
Status: DISCONTINUED | OUTPATIENT
Start: 2021-07-19 | End: 2021-07-19

## 2021-07-19 RX ORDER — PROPOFOL 10 MG/ML
VIAL (ML) INTRAVENOUS CONTINUOUS PRN
Status: DISCONTINUED | OUTPATIENT
Start: 2021-07-19 | End: 2021-07-19

## 2021-07-19 RX ORDER — LIDOCAINE HYDROCHLORIDE 20 MG/ML
INJECTION INTRAVENOUS
Status: DISCONTINUED | OUTPATIENT
Start: 2021-07-19 | End: 2021-07-19

## 2021-07-19 RX ADMIN — PROPOFOL 40 MG: 10 INJECTION, EMULSION INTRAVENOUS at 08:07

## 2021-07-19 RX ADMIN — SODIUM CHLORIDE: 0.9 INJECTION, SOLUTION INTRAVENOUS at 08:07

## 2021-07-19 RX ADMIN — Medication 150 MCG/KG/MIN: at 08:07

## 2021-07-19 RX ADMIN — LIDOCAINE HYDROCHLORIDE 50 MG: 20 INJECTION, SOLUTION INTRAVENOUS at 08:07

## 2021-07-19 RX ADMIN — SODIUM CHLORIDE: 0.9 INJECTION, SOLUTION INTRAVENOUS at 07:07

## 2021-08-10 ENCOUNTER — OFFICE VISIT (OUTPATIENT)
Dept: FAMILY MEDICINE | Facility: CLINIC | Age: 71
End: 2021-08-10
Payer: MEDICARE

## 2021-08-10 VITALS
WEIGHT: 261.69 LBS | TEMPERATURE: 99 F | DIASTOLIC BLOOD PRESSURE: 82 MMHG | HEART RATE: 60 BPM | HEIGHT: 71 IN | BODY MASS INDEX: 36.64 KG/M2 | SYSTOLIC BLOOD PRESSURE: 134 MMHG | OXYGEN SATURATION: 96 %

## 2021-08-10 DIAGNOSIS — G47.09 OTHER INSOMNIA: ICD-10-CM

## 2021-08-10 DIAGNOSIS — I10 ESSENTIAL HYPERTENSION: Primary | ICD-10-CM

## 2021-08-10 DIAGNOSIS — I48.0 PAROXYSMAL ATRIAL FIBRILLATION: ICD-10-CM

## 2021-08-10 DIAGNOSIS — Z78.9 STATIN INTOLERANCE: ICD-10-CM

## 2021-08-10 DIAGNOSIS — R79.89 ELEVATED TSH: ICD-10-CM

## 2021-08-10 DIAGNOSIS — Z95.810 ICD (IMPLANTABLE CARDIOVERTER-DEFIBRILLATOR), BIVENTRICULAR, IN SITU: ICD-10-CM

## 2021-08-10 DIAGNOSIS — E78.2 MIXED HYPERLIPIDEMIA: ICD-10-CM

## 2021-08-10 DIAGNOSIS — Z86.73 HISTORY OF CVA (CEREBROVASCULAR ACCIDENT): ICD-10-CM

## 2021-08-10 DIAGNOSIS — I42.0 DCM (DILATED CARDIOMYOPATHY): ICD-10-CM

## 2021-08-10 DIAGNOSIS — C61 PROSTATE CANCER: ICD-10-CM

## 2021-08-10 DIAGNOSIS — E66.01 CLASS 2 SEVERE OBESITY DUE TO EXCESS CALORIES WITH SERIOUS COMORBIDITY AND BODY MASS INDEX (BMI) OF 36.0 TO 36.9 IN ADULT: ICD-10-CM

## 2021-08-10 DIAGNOSIS — E03.8 SUBCLINICAL HYPOTHYROIDISM: ICD-10-CM

## 2021-08-10 PROCEDURE — 99214 OFFICE O/P EST MOD 30 MIN: CPT | Mod: S$GLB,,, | Performed by: INTERNAL MEDICINE

## 2021-08-10 PROCEDURE — 99999 PR PBB SHADOW E&M-EST. PATIENT-LVL III: ICD-10-PCS | Mod: PBBFAC,,, | Performed by: INTERNAL MEDICINE

## 2021-08-10 PROCEDURE — 99214 PR OFFICE/OUTPT VISIT, EST, LEVL IV, 30-39 MIN: ICD-10-PCS | Mod: S$GLB,,, | Performed by: INTERNAL MEDICINE

## 2021-08-10 PROCEDURE — 99999 PR PBB SHADOW E&M-EST. PATIENT-LVL III: CPT | Mod: PBBFAC,,, | Performed by: INTERNAL MEDICINE

## 2021-08-10 RX ORDER — ZOLPIDEM TARTRATE 5 MG/1
5 TABLET ORAL NIGHTLY PRN
Qty: 30 TABLET | Refills: 5 | Status: SHIPPED | OUTPATIENT
Start: 2021-08-10 | End: 2021-08-16

## 2021-08-16 ENCOUNTER — PATIENT MESSAGE (OUTPATIENT)
Dept: FAMILY MEDICINE | Facility: CLINIC | Age: 71
End: 2021-08-16

## 2021-08-16 DIAGNOSIS — G47.9 SLEEP DISORDER: Primary | ICD-10-CM

## 2021-08-17 ENCOUNTER — PATIENT MESSAGE (OUTPATIENT)
Dept: FAMILY MEDICINE | Facility: CLINIC | Age: 71
End: 2021-08-17

## 2021-08-30 ENCOUNTER — CLINICAL SUPPORT (OUTPATIENT)
Dept: CARDIOLOGY | Facility: HOSPITAL | Age: 71
End: 2021-08-30
Payer: MEDICARE

## 2021-08-30 DIAGNOSIS — I48.91 UNSPECIFIED ATRIAL FIBRILLATION: ICD-10-CM

## 2021-08-30 DIAGNOSIS — Z95.810 PRESENCE OF AUTOMATIC (IMPLANTABLE) CARDIAC DEFIBRILLATOR: ICD-10-CM

## 2021-08-30 DIAGNOSIS — I42.9 CARDIOMYOPATHY, UNSPECIFIED: ICD-10-CM

## 2021-08-30 PROCEDURE — 93295 DEV INTERROG REMOTE 1/2/MLT: CPT | Mod: ,,, | Performed by: INTERNAL MEDICINE

## 2021-08-30 PROCEDURE — 93296 REM INTERROG EVL PM/IDS: CPT | Performed by: INTERNAL MEDICINE

## 2021-08-30 PROCEDURE — 93295 CARDIAC DEVICE CHECK - REMOTE: ICD-10-PCS | Mod: ,,, | Performed by: INTERNAL MEDICINE

## 2021-09-24 DIAGNOSIS — I48.19 PERSISTENT ATRIAL FIBRILLATION: ICD-10-CM

## 2021-09-24 DIAGNOSIS — I10 ESSENTIAL HYPERTENSION: ICD-10-CM

## 2021-09-24 RX ORDER — DABIGATRAN ETEXILATE 150 MG/1
150 CAPSULE ORAL 2 TIMES DAILY
Qty: 180 CAPSULE | Refills: 3 | Status: SHIPPED | OUTPATIENT
Start: 2021-09-24 | End: 2021-09-29 | Stop reason: SDUPTHER

## 2021-09-24 RX ORDER — CANDESARTAN 32 MG/1
32 TABLET ORAL DAILY
Qty: 90 TABLET | Refills: 1 | Status: SHIPPED | OUTPATIENT
Start: 2021-09-24 | End: 2021-09-29 | Stop reason: SDUPTHER

## 2021-09-27 ENCOUNTER — OFFICE VISIT (OUTPATIENT)
Dept: FAMILY MEDICINE | Facility: CLINIC | Age: 71
End: 2021-09-27
Payer: MEDICARE

## 2021-09-27 VITALS
HEART RATE: 65 BPM | TEMPERATURE: 98 F | OXYGEN SATURATION: 95 % | SYSTOLIC BLOOD PRESSURE: 112 MMHG | BODY MASS INDEX: 35.36 KG/M2 | DIASTOLIC BLOOD PRESSURE: 60 MMHG | WEIGHT: 253.5 LBS

## 2021-09-27 DIAGNOSIS — J40 BRONCHITIS: Primary | ICD-10-CM

## 2021-09-27 PROCEDURE — 99214 OFFICE O/P EST MOD 30 MIN: CPT | Mod: S$GLB,,, | Performed by: INTERNAL MEDICINE

## 2021-09-27 PROCEDURE — 99999 PR PBB SHADOW E&M-EST. PATIENT-LVL III: ICD-10-PCS | Mod: PBBFAC,,, | Performed by: INTERNAL MEDICINE

## 2021-09-27 PROCEDURE — 99214 PR OFFICE/OUTPT VISIT, EST, LEVL IV, 30-39 MIN: ICD-10-PCS | Mod: S$GLB,,, | Performed by: INTERNAL MEDICINE

## 2021-09-27 PROCEDURE — 99999 PR PBB SHADOW E&M-EST. PATIENT-LVL III: CPT | Mod: PBBFAC,,, | Performed by: INTERNAL MEDICINE

## 2021-09-27 RX ORDER — ALBUTEROL SULFATE 90 UG/1
2 AEROSOL, METERED RESPIRATORY (INHALATION) EVERY 6 HOURS PRN
Qty: 18 G | Refills: 0 | Status: SHIPPED | OUTPATIENT
Start: 2021-09-27 | End: 2022-02-15

## 2021-09-27 RX ORDER — BENZONATATE 200 MG/1
200 CAPSULE ORAL 3 TIMES DAILY PRN
Qty: 30 CAPSULE | Refills: 0 | Status: SHIPPED | OUTPATIENT
Start: 2021-09-27 | End: 2021-10-07

## 2021-09-27 RX ORDER — PROMETHAZINE HYDROCHLORIDE AND CODEINE PHOSPHATE 6.25; 1 MG/5ML; MG/5ML
5 SOLUTION ORAL EVERY 4 HOURS PRN
Qty: 118 ML | Refills: 0 | Status: SHIPPED | OUTPATIENT
Start: 2021-09-27 | End: 2021-10-07

## 2021-09-29 ENCOUNTER — TELEPHONE (OUTPATIENT)
Dept: FAMILY MEDICINE | Facility: CLINIC | Age: 71
End: 2021-09-29

## 2021-09-29 DIAGNOSIS — I48.19 PERSISTENT ATRIAL FIBRILLATION: ICD-10-CM

## 2021-09-29 DIAGNOSIS — E78.2 MIXED HYPERLIPIDEMIA: ICD-10-CM

## 2021-09-29 RX ORDER — AMIODARONE HYDROCHLORIDE 200 MG/1
200 TABLET ORAL DAILY
Qty: 90 TABLET | Refills: 3 | Status: SHIPPED | OUTPATIENT
Start: 2021-09-29 | End: 2021-09-29 | Stop reason: SDUPTHER

## 2021-09-29 RX ORDER — AMIODARONE HYDROCHLORIDE 200 MG/1
200 TABLET ORAL DAILY
Qty: 90 TABLET | Refills: 3 | Status: SHIPPED | OUTPATIENT
Start: 2021-09-29 | End: 2022-12-21 | Stop reason: SDUPTHER

## 2021-09-29 RX ORDER — DABIGATRAN ETEXILATE 150 MG/1
150 CAPSULE ORAL 2 TIMES DAILY
Qty: 180 CAPSULE | Refills: 3 | Status: SHIPPED | OUTPATIENT
Start: 2021-09-29 | End: 2021-09-29 | Stop reason: SDUPTHER

## 2021-09-29 RX ORDER — DABIGATRAN ETEXILATE 150 MG/1
150 CAPSULE ORAL 2 TIMES DAILY
Qty: 180 CAPSULE | Refills: 3 | Status: SHIPPED | OUTPATIENT
Start: 2021-09-29 | End: 2022-12-21 | Stop reason: SDUPTHER

## 2021-09-29 RX ORDER — EZETIMIBE 10 MG/1
10 TABLET ORAL DAILY
Qty: 90 TABLET | Refills: 3 | Status: SHIPPED | OUTPATIENT
Start: 2021-09-29 | End: 2022-02-07 | Stop reason: SDUPTHER

## 2021-10-05 ENCOUNTER — IMMUNIZATION (OUTPATIENT)
Dept: FAMILY MEDICINE | Facility: CLINIC | Age: 71
End: 2021-10-05
Payer: MEDICARE

## 2021-10-05 DIAGNOSIS — Z23 NEED FOR VACCINATION: Primary | ICD-10-CM

## 2021-10-05 PROCEDURE — 0003A COVID-19, MRNA, LNP-S, PF, 30 MCG/0.3 ML DOSE VACCINE: CPT | Mod: PBBFAC | Performed by: FAMILY MEDICINE

## 2021-10-05 PROCEDURE — 91300 COVID-19, MRNA, LNP-S, PF, 30 MCG/0.3 ML DOSE VACCINE: CPT | Mod: PBBFAC | Performed by: FAMILY MEDICINE

## 2021-10-12 DIAGNOSIS — I48.0 PAROXYSMAL ATRIAL FIBRILLATION: Primary | ICD-10-CM

## 2021-10-13 ENCOUNTER — PATIENT MESSAGE (OUTPATIENT)
Dept: ADMINISTRATIVE | Facility: OTHER | Age: 71
End: 2021-10-13
Payer: MEDICARE

## 2021-10-18 ENCOUNTER — TELEPHONE (OUTPATIENT)
Dept: SLEEP MEDICINE | Facility: CLINIC | Age: 71
End: 2021-10-18

## 2021-10-19 ENCOUNTER — OFFICE VISIT (OUTPATIENT)
Dept: SLEEP MEDICINE | Facility: CLINIC | Age: 71
End: 2021-10-19
Payer: MEDICARE

## 2021-10-19 VITALS
WEIGHT: 253 LBS | DIASTOLIC BLOOD PRESSURE: 80 MMHG | HEART RATE: 63 BPM | SYSTOLIC BLOOD PRESSURE: 131 MMHG | BODY MASS INDEX: 35.29 KG/M2

## 2021-10-19 DIAGNOSIS — G47.9 SLEEP DISORDER: ICD-10-CM

## 2021-10-19 DIAGNOSIS — G47.00 INSOMNIA, UNSPECIFIED TYPE: ICD-10-CM

## 2021-10-19 DIAGNOSIS — G47.30 SLEEP APNEA, UNSPECIFIED TYPE: Primary | ICD-10-CM

## 2021-10-19 PROCEDURE — 99203 PR OFFICE/OUTPT VISIT, NEW, LEVL III, 30-44 MIN: ICD-10-PCS | Mod: S$GLB,,, | Performed by: PSYCHIATRY & NEUROLOGY

## 2021-10-19 PROCEDURE — 99999 PR PBB SHADOW E&M-EST. PATIENT-LVL IV: ICD-10-PCS | Mod: PBBFAC,,, | Performed by: PSYCHIATRY & NEUROLOGY

## 2021-10-19 PROCEDURE — 99999 PR PBB SHADOW E&M-EST. PATIENT-LVL IV: CPT | Mod: PBBFAC,,, | Performed by: PSYCHIATRY & NEUROLOGY

## 2021-10-19 PROCEDURE — 99203 OFFICE O/P NEW LOW 30 MIN: CPT | Mod: S$GLB,,, | Performed by: PSYCHIATRY & NEUROLOGY

## 2021-10-19 RX ORDER — TRAZODONE HYDROCHLORIDE 100 MG/1
100 TABLET ORAL NIGHTLY
Qty: 30 TABLET | Refills: 11 | Status: SHIPPED | OUTPATIENT
Start: 2021-10-19 | End: 2022-10-24

## 2021-11-28 ENCOUNTER — CLINICAL SUPPORT (OUTPATIENT)
Dept: CARDIOLOGY | Facility: HOSPITAL | Age: 71
End: 2021-11-28
Payer: MEDICARE

## 2021-11-28 DIAGNOSIS — I42.9 CARDIOMYOPATHY, UNSPECIFIED: ICD-10-CM

## 2021-11-28 DIAGNOSIS — Z95.810 PRESENCE OF AUTOMATIC (IMPLANTABLE) CARDIAC DEFIBRILLATOR: ICD-10-CM

## 2021-11-28 DIAGNOSIS — I48.91 UNSPECIFIED ATRIAL FIBRILLATION: ICD-10-CM

## 2021-11-28 PROCEDURE — 93296 REM INTERROG EVL PM/IDS: CPT | Performed by: INTERNAL MEDICINE

## 2021-11-28 PROCEDURE — 93295 CARDIAC DEVICE CHECK - REMOTE: ICD-10-PCS | Mod: ,,, | Performed by: INTERNAL MEDICINE

## 2021-11-28 PROCEDURE — 93295 DEV INTERROG REMOTE 1/2/MLT: CPT | Mod: ,,, | Performed by: INTERNAL MEDICINE

## 2021-12-07 ENCOUNTER — HOSPITAL ENCOUNTER (OUTPATIENT)
Dept: CARDIOLOGY | Facility: HOSPITAL | Age: 71
Discharge: HOME | End: 2021-12-07
Attending: NURSE PRACTITIONER
Payer: MEDICARE

## 2021-12-07 ENCOUNTER — HOSPITAL ENCOUNTER (OUTPATIENT)
Dept: PULMONOLOGY | Facility: CLINIC | Age: 71
Discharge: HOME | End: 2021-12-07
Payer: MEDICARE

## 2021-12-07 VITALS
HEIGHT: 71 IN | SYSTOLIC BLOOD PRESSURE: 132 MMHG | HEART RATE: 60 BPM | DIASTOLIC BLOOD PRESSURE: 78 MMHG | WEIGHT: 253 LBS | BODY MASS INDEX: 35.42 KG/M2

## 2021-12-07 DIAGNOSIS — I42.0 DCM (DILATED CARDIOMYOPATHY): ICD-10-CM

## 2021-12-07 DIAGNOSIS — Z79.899 LONG TERM CURRENT USE OF AMIODARONE: ICD-10-CM

## 2021-12-07 LAB
ASCENDING AORTA: 4.1 CM
AV INDEX (PROSTH): 0.67
AV MEAN GRADIENT: 4 MMHG
AV PEAK GRADIENT: 7 MMHG
AV VALVE AREA: 3.28 CM2
AV VELOCITY RATIO: 0.71
BSA FOR ECHO PROCEDURE: 2.4 M2
CV ECHO LV RWT: 0.36 CM
DLCO ADJ PRE: 24.87 ML/(MIN*MMHG)
DLCO SINGLE BREATH LLN: 20.79
DLCO SINGLE BREATH PRE REF: 89.7 %
DLCO SINGLE BREATH REF: 27.72
DLCOC SBVA LLN: 2.67
DLCOC SBVA PRE REF: 102.6 %
DLCOC SBVA REF: 3.8
DLCOC SINGLE BREATH LLN: 20.79
DLCOC SINGLE BREATH PRE REF: 89.7 %
DLCOC SINGLE BREATH REF: 27.72
DLCOCSBVAULN: 4.92
DLCOCSINGLEBREATHULN: 34.64
DLCOSINGLEBREATHULN: 34.64
DLCOVA LLN: 2.67
DLCOVA PRE REF: 102.6 %
DLCOVA PRE: 3.89 ML/(MIN*MMHG*L)
DLCOVA REF: 3.8
DLCOVAULN: 4.92
DLVAADJ PRE: 3.89 ML/(MIN*MMHG*L)
DOP CALC AO PEAK VEL: 1.35 M/S
DOP CALC AO VTI: 27.56 CM
DOP CALC LVOT AREA: 4.9 CM2
DOP CALC LVOT DIAMETER: 2.49 CM
DOP CALC LVOT PEAK VEL: 0.96 M/S
DOP CALC LVOT STROKE VOLUME: 90.43 CM3
DOP CALCLVOT PEAK VEL VTI: 18.58 CM
E WAVE DECELERATION TIME: 191.33 MSEC
E/A RATIO: 2.24
E/E' RATIO: 10.13 M/S
ECHO LV POSTERIOR WALL: 0.95 CM (ref 0.6–1.1)
EJECTION FRACTION: 60 %
FEF 25 75 LLN: 1.03
FEF 25 75 PRE REF: 110 %
FEF 25 75 REF: 2.41
FEV05 LLN: 1.57
FEV05 REF: 2.71
FEV1 FVC LLN: 62
FEV1 FVC PRE REF: 103.5 %
FEV1 FVC REF: 76
FEV1 LLN: 2.32
FEV1 PRE REF: 93.2 %
FEV1 REF: 3.24
FRACTIONAL SHORTENING: 37 % (ref 28–44)
FVC LLN: 3.18
FVC PRE REF: 89.6 %
FVC REF: 4.31
INTERVENTRICULAR SEPTUM: 0.97 CM (ref 0.6–1.1)
IVC PRE: 4.25 L
IVC SINGLE BREATH LLN: 3.18
IVC SINGLE BREATH PRE REF: 98.7 %
IVC SINGLE BREATH REF: 4.31
IVCSINGLEBREATHULN: 5.45
IVRT: 77.07 MSEC
LA MAJOR: 6.74 CM
LA MINOR: 6.76 CM
LA WIDTH: 4.93 CM
LEFT ATRIUM SIZE: 4.65 CM
LEFT ATRIUM VOLUME INDEX: 56.5 ML/M2
LEFT ATRIUM VOLUME: 131.53 CM3
LEFT INTERNAL DIMENSION IN SYSTOLE: 3.31 CM (ref 2.1–4)
LEFT VENTRICLE DIASTOLIC VOLUME INDEX: 56.62 ML/M2
LEFT VENTRICLE DIASTOLIC VOLUME: 131.93 ML
LEFT VENTRICLE MASS INDEX: 80 G/M2
LEFT VENTRICLE SYSTOLIC VOLUME INDEX: 19 ML/M2
LEFT VENTRICLE SYSTOLIC VOLUME: 44.38 ML
LEFT VENTRICULAR INTERNAL DIMENSION IN DIASTOLE: 5.24 CM (ref 3.5–6)
LEFT VENTRICULAR MASS: 186.29 G
LV LATERAL E/E' RATIO: 8.44 M/S
LV SEPTAL E/E' RATIO: 12.67 M/S
MV A" WAVE DURATION": 7.42 MSEC
MV PEAK A VEL: 0.34 M/S
MV PEAK E VEL: 0.76 M/S
MV STENOSIS PRESSURE HALF TIME: 55.49 MS
MV VALVE AREA P 1/2 METHOD: 3.96 CM2
PEF LLN: 6.08
PEF PRE REF: 117.9 %
PEF REF: 8.45
PHYSICIAN COMMENT: NORMAL
PISA TR MAX VEL: 2.57 M/S
PRE DLCO: 24.87 ML/(MIN*MMHG)
PRE FEF 25 75: 2.65 L/S
PRE FET 100: 6.3 SEC
PRE FEV05 REF: 91 %
PRE FEV1 FVC: 78.2 %
PRE FEV1: 3.02 L
PRE FEV5: 2.46 L
PRE FVC: 3.86 L
PRE PEF: 9.97 L/S
PULM VEIN S/D RATIO: 0.6
PV PEAK D VEL: 0.45 M/S
PV PEAK S VEL: 0.27 M/S
RA MAJOR: 6.38 CM
RA PRESSURE: 15 MMHG
RA WIDTH: 4.84 CM
RIGHT VENTRICULAR END-DIASTOLIC DIMENSION: 4.1 CM
RV TISSUE DOPPLER FREE WALL SYSTOLIC VELOCITY 1 (APICAL 4 CHAMBER VIEW): 11.99 CM/S
SINUS: 3.32 CM
STJ: 2.9 CM
TDI LATERAL: 0.09 M/S
TDI SEPTAL: 0.06 M/S
TDI: 0.08 M/S
TR MAX PG: 26 MMHG
TRICUSPID ANNULAR PLANE SYSTOLIC EXCURSION: 2.38 CM
TV REST PULMONARY ARTERY PRESSURE: 41 MMHG
VA PRE: 6.39 L
VA SINGLE BREATH PRE REF: 89.3 %
VA SINGLE BREATH REF: 7.15

## 2021-12-07 PROCEDURE — 93306 TTE W/DOPPLER COMPLETE: CPT

## 2021-12-07 PROCEDURE — 93306 TTE W/DOPPLER COMPLETE: CPT | Mod: 26,,, | Performed by: INTERNAL MEDICINE

## 2021-12-07 PROCEDURE — 94010 BREATHING CAPACITY TEST: CPT | Mod: S$GLB,,, | Performed by: INTERNAL MEDICINE

## 2021-12-07 PROCEDURE — 93306 ECHO (CUPID ONLY): ICD-10-PCS | Mod: 26,,, | Performed by: INTERNAL MEDICINE

## 2021-12-07 PROCEDURE — 94729 PR C02/MEMBANE DIFFUSE CAPACITY: ICD-10-PCS | Mod: S$GLB,,, | Performed by: INTERNAL MEDICINE

## 2021-12-07 PROCEDURE — 94010 BREATHING CAPACITY TEST: ICD-10-PCS | Mod: S$GLB,,, | Performed by: INTERNAL MEDICINE

## 2021-12-07 PROCEDURE — 94729 DIFFUSING CAPACITY: CPT | Mod: S$GLB,,, | Performed by: INTERNAL MEDICINE

## 2021-12-15 ENCOUNTER — CLINICAL SUPPORT (OUTPATIENT)
Dept: CARDIOLOGY | Facility: HOSPITAL | Age: 71
End: 2021-12-15
Attending: INTERNAL MEDICINE
Payer: MEDICARE

## 2021-12-15 ENCOUNTER — HOSPITAL ENCOUNTER (OUTPATIENT)
Dept: CARDIOLOGY | Facility: CLINIC | Age: 71
Discharge: HOME | End: 2021-12-15
Attending: INTERNAL MEDICINE
Payer: MEDICARE

## 2021-12-15 ENCOUNTER — OFFICE VISIT (OUTPATIENT)
Dept: ELECTROPHYSIOLOGY | Facility: CLINIC | Age: 71
End: 2021-12-15
Payer: MEDICARE

## 2021-12-15 ENCOUNTER — OFFICE VISIT (OUTPATIENT)
Dept: OPTOMETRY | Facility: CLINIC | Age: 71
End: 2021-12-15
Payer: MEDICARE

## 2021-12-15 VITALS
HEIGHT: 71 IN | HEART RATE: 62 BPM | WEIGHT: 254.63 LBS | BODY MASS INDEX: 35.65 KG/M2 | DIASTOLIC BLOOD PRESSURE: 80 MMHG | SYSTOLIC BLOOD PRESSURE: 120 MMHG

## 2021-12-15 DIAGNOSIS — Z86.73 HISTORY OF CVA (CEREBROVASCULAR ACCIDENT): ICD-10-CM

## 2021-12-15 DIAGNOSIS — I48.0 PAROXYSMAL ATRIAL FIBRILLATION: ICD-10-CM

## 2021-12-15 DIAGNOSIS — H18.003 VORTEX KERATOPATHY, BILATERAL: ICD-10-CM

## 2021-12-15 DIAGNOSIS — I48.0 PAROXYSMAL ATRIAL FIBRILLATION: Primary | ICD-10-CM

## 2021-12-15 DIAGNOSIS — I42.0 DCM (DILATED CARDIOMYOPATHY): ICD-10-CM

## 2021-12-15 DIAGNOSIS — H26.9 CORTICAL CATARACT OF BOTH EYES: ICD-10-CM

## 2021-12-15 DIAGNOSIS — I10 ESSENTIAL HYPERTENSION: ICD-10-CM

## 2021-12-15 DIAGNOSIS — H25.13 NUCLEAR SCLEROSIS, BILATERAL: Primary | ICD-10-CM

## 2021-12-15 DIAGNOSIS — Z98.890 HISTORY OF REFRACTIVE SURGERY: ICD-10-CM

## 2021-12-15 DIAGNOSIS — Z95.810 ICD (IMPLANTABLE CARDIOVERTER-DEFIBRILLATOR), BIVENTRICULAR, IN SITU: ICD-10-CM

## 2021-12-15 DIAGNOSIS — I44.7 LBBB (LEFT BUNDLE BRANCH BLOCK): ICD-10-CM

## 2021-12-15 DIAGNOSIS — Z13.5 SCREENING FOR EYE CONDITION: ICD-10-CM

## 2021-12-15 DIAGNOSIS — Z79.899 LONG TERM CURRENT USE OF AMIODARONE: ICD-10-CM

## 2021-12-15 DIAGNOSIS — H52.4 PRESBYOPIA OF BOTH EYES: ICD-10-CM

## 2021-12-15 DIAGNOSIS — H52.7 REFRACTIVE ERRORS: ICD-10-CM

## 2021-12-15 PROCEDURE — 92015 PR REFRACTION: ICD-10-PCS | Mod: S$GLB,,, | Performed by: OPTOMETRIST

## 2021-12-15 PROCEDURE — 92014 PR EYE EXAM, EST PATIENT,COMPREHESV: ICD-10-PCS | Mod: S$GLB,,, | Performed by: OPTOMETRIST

## 2021-12-15 PROCEDURE — 92015 DETERMINE REFRACTIVE STATE: CPT | Mod: S$GLB,,, | Performed by: OPTOMETRIST

## 2021-12-15 PROCEDURE — 93284 CARDIAC DEVICE CHECK - IN CLINIC & HOSPITAL: ICD-10-PCS | Mod: 26,,, | Performed by: INTERNAL MEDICINE

## 2021-12-15 PROCEDURE — 99999 PR PBB SHADOW E&M-EST. PATIENT-LVL III: ICD-10-PCS | Mod: PBBFAC,,, | Performed by: NURSE PRACTITIONER

## 2021-12-15 PROCEDURE — 92014 COMPRE OPH EXAM EST PT 1/>: CPT | Mod: S$GLB,,, | Performed by: OPTOMETRIST

## 2021-12-15 PROCEDURE — 99999 PR PBB SHADOW E&M-EST. PATIENT-LVL III: CPT | Mod: PBBFAC,,, | Performed by: OPTOMETRIST

## 2021-12-15 PROCEDURE — 93005 ELECTROCARDIOGRAM TRACING: CPT | Mod: S$GLB,,, | Performed by: INTERNAL MEDICINE

## 2021-12-15 PROCEDURE — 99214 OFFICE O/P EST MOD 30 MIN: CPT | Mod: S$GLB,,, | Performed by: NURSE PRACTITIONER

## 2021-12-15 PROCEDURE — 93005 RHYTHM STRIP: ICD-10-PCS | Mod: S$GLB,,, | Performed by: INTERNAL MEDICINE

## 2021-12-15 PROCEDURE — 99214 PR OFFICE/OUTPT VISIT, EST, LEVL IV, 30-39 MIN: ICD-10-PCS | Mod: S$GLB,,, | Performed by: NURSE PRACTITIONER

## 2021-12-15 PROCEDURE — 93010 ELECTROCARDIOGRAM REPORT: CPT | Mod: S$GLB,,, | Performed by: INTERNAL MEDICINE

## 2021-12-15 PROCEDURE — 93284 PRGRMG EVAL IMPLANTABLE DFB: CPT | Mod: 26,,, | Performed by: INTERNAL MEDICINE

## 2021-12-15 PROCEDURE — 99999 PR PBB SHADOW E&M-EST. PATIENT-LVL III: CPT | Mod: PBBFAC,,, | Performed by: NURSE PRACTITIONER

## 2021-12-15 PROCEDURE — 99999 PR PBB SHADOW E&M-EST. PATIENT-LVL III: ICD-10-PCS | Mod: PBBFAC,,, | Performed by: OPTOMETRIST

## 2021-12-15 PROCEDURE — 93010 RHYTHM STRIP: ICD-10-PCS | Mod: S$GLB,,, | Performed by: INTERNAL MEDICINE

## 2021-12-15 PROCEDURE — 93284 PRGRMG EVAL IMPLANTABLE DFB: CPT

## 2021-12-20 ENCOUNTER — PATIENT MESSAGE (OUTPATIENT)
Dept: ELECTROPHYSIOLOGY | Facility: CLINIC | Age: 71
End: 2021-12-20
Payer: MEDICARE

## 2021-12-22 ENCOUNTER — PATIENT MESSAGE (OUTPATIENT)
Dept: OTHER | Facility: OTHER | Age: 71
End: 2021-12-22
Payer: MEDICARE

## 2021-12-30 ENCOUNTER — PATIENT MESSAGE (OUTPATIENT)
Dept: PHARMACY | Facility: CLINIC | Age: 71
End: 2021-12-30
Payer: MEDICARE

## 2022-01-13 ENCOUNTER — TELEPHONE (OUTPATIENT)
Dept: SLEEP MEDICINE | Facility: CLINIC | Age: 72
End: 2022-01-13
Payer: MEDICARE

## 2022-01-13 DIAGNOSIS — G47.30 SLEEP APNEA, UNSPECIFIED TYPE: Primary | ICD-10-CM

## 2022-01-13 NOTE — TELEPHONE ENCOUNTER
Notified him that ins had denied HST, he had been awaiting call from sleep lab. So ordering PSG (should cover given his comorbidites) and see what ins says. shouldn ot take mor than about 2 wks to hear from sleep lab

## 2022-01-21 ENCOUNTER — PATIENT MESSAGE (OUTPATIENT)
Dept: SLEEP MEDICINE | Facility: CLINIC | Age: 72
End: 2022-01-21
Payer: MEDICARE

## 2022-02-02 ENCOUNTER — PATIENT MESSAGE (OUTPATIENT)
Dept: SLEEP MEDICINE | Facility: CLINIC | Age: 72
End: 2022-02-02
Payer: MEDICARE

## 2022-02-15 ENCOUNTER — OFFICE VISIT (OUTPATIENT)
Dept: INTERNAL MEDICINE | Facility: CLINIC | Age: 72
End: 2022-02-15
Payer: MEDICARE

## 2022-02-15 ENCOUNTER — LAB VISIT (OUTPATIENT)
Dept: LAB | Facility: HOSPITAL | Age: 72
End: 2022-02-15
Attending: INTERNAL MEDICINE
Payer: MEDICARE

## 2022-02-15 VITALS
DIASTOLIC BLOOD PRESSURE: 76 MMHG | TEMPERATURE: 98 F | WEIGHT: 253.63 LBS | HEART RATE: 59 BPM | BODY MASS INDEX: 35.38 KG/M2 | OXYGEN SATURATION: 96 % | SYSTOLIC BLOOD PRESSURE: 122 MMHG

## 2022-02-15 DIAGNOSIS — I10 ESSENTIAL HYPERTENSION: ICD-10-CM

## 2022-02-15 DIAGNOSIS — Z78.9 STATIN INTOLERANCE: ICD-10-CM

## 2022-02-15 DIAGNOSIS — G47.30 SLEEP APNEA, UNSPECIFIED TYPE: Primary | ICD-10-CM

## 2022-02-15 DIAGNOSIS — E66.01 CLASS 2 SEVERE OBESITY DUE TO EXCESS CALORIES WITH SERIOUS COMORBIDITY AND BODY MASS INDEX (BMI) OF 35.0 TO 35.9 IN ADULT: ICD-10-CM

## 2022-02-15 DIAGNOSIS — I48.0 PAROXYSMAL ATRIAL FIBRILLATION: ICD-10-CM

## 2022-02-15 DIAGNOSIS — I10 ESSENTIAL HYPERTENSION, BENIGN: ICD-10-CM

## 2022-02-15 DIAGNOSIS — Z86.73 HISTORY OF CVA (CEREBROVASCULAR ACCIDENT): ICD-10-CM

## 2022-02-15 DIAGNOSIS — E78.2 MIXED HYPERLIPIDEMIA: Primary | ICD-10-CM

## 2022-02-15 DIAGNOSIS — G47.09 OTHER INSOMNIA: ICD-10-CM

## 2022-02-15 DIAGNOSIS — I42.0 DCM (DILATED CARDIOMYOPATHY): ICD-10-CM

## 2022-02-15 DIAGNOSIS — Z95.810 ICD (IMPLANTABLE CARDIOVERTER-DEFIBRILLATOR), BIVENTRICULAR, IN SITU: ICD-10-CM

## 2022-02-15 LAB
T4 FREE SERPL-MCNC: 1.06 NG/DL (ref 0.71–1.51)
TSH SERPL DL<=0.005 MIU/L-ACNC: 5.1 UIU/ML (ref 0.4–4)

## 2022-02-15 PROCEDURE — 3288F FALL RISK ASSESSMENT DOCD: CPT | Mod: CPTII,S$GLB,, | Performed by: INTERNAL MEDICINE

## 2022-02-15 PROCEDURE — 3078F PR MOST RECENT DIASTOLIC BLOOD PRESSURE < 80 MM HG: ICD-10-PCS | Mod: CPTII,S$GLB,, | Performed by: INTERNAL MEDICINE

## 2022-02-15 PROCEDURE — 1159F PR MEDICATION LIST DOCUMENTED IN MEDICAL RECORD: ICD-10-PCS | Mod: CPTII,S$GLB,, | Performed by: INTERNAL MEDICINE

## 2022-02-15 PROCEDURE — 1126F PR PAIN SEVERITY QUANTIFIED, NO PAIN PRESENT: ICD-10-PCS | Mod: CPTII,S$GLB,, | Performed by: INTERNAL MEDICINE

## 2022-02-15 PROCEDURE — 3074F PR MOST RECENT SYSTOLIC BLOOD PRESSURE < 130 MM HG: ICD-10-PCS | Mod: CPTII,S$GLB,, | Performed by: INTERNAL MEDICINE

## 2022-02-15 PROCEDURE — 3008F BODY MASS INDEX DOCD: CPT | Mod: CPTII,S$GLB,, | Performed by: INTERNAL MEDICINE

## 2022-02-15 PROCEDURE — 99999 PR PBB SHADOW E&M-EST. PATIENT-LVL III: CPT | Mod: PBBFAC,,, | Performed by: INTERNAL MEDICINE

## 2022-02-15 PROCEDURE — 36415 COLL VENOUS BLD VENIPUNCTURE: CPT | Performed by: INTERNAL MEDICINE

## 2022-02-15 PROCEDURE — 1101F PR PT FALLS ASSESS DOC 0-1 FALLS W/OUT INJ PAST YR: ICD-10-PCS | Mod: CPTII,S$GLB,, | Performed by: INTERNAL MEDICINE

## 2022-02-15 PROCEDURE — 3074F SYST BP LT 130 MM HG: CPT | Mod: CPTII,S$GLB,, | Performed by: INTERNAL MEDICINE

## 2022-02-15 PROCEDURE — 99214 PR OFFICE/OUTPT VISIT, EST, LEVL IV, 30-39 MIN: ICD-10-PCS | Mod: S$GLB,,, | Performed by: INTERNAL MEDICINE

## 2022-02-15 PROCEDURE — 84443 ASSAY THYROID STIM HORMONE: CPT | Performed by: INTERNAL MEDICINE

## 2022-02-15 PROCEDURE — 84439 ASSAY OF FREE THYROXINE: CPT | Performed by: INTERNAL MEDICINE

## 2022-02-15 PROCEDURE — 99214 OFFICE O/P EST MOD 30 MIN: CPT | Mod: S$GLB,,, | Performed by: INTERNAL MEDICINE

## 2022-02-15 PROCEDURE — 3008F PR BODY MASS INDEX (BMI) DOCUMENTED: ICD-10-PCS | Mod: CPTII,S$GLB,, | Performed by: INTERNAL MEDICINE

## 2022-02-15 PROCEDURE — 3078F DIAST BP <80 MM HG: CPT | Mod: CPTII,S$GLB,, | Performed by: INTERNAL MEDICINE

## 2022-02-15 PROCEDURE — 99999 PR PBB SHADOW E&M-EST. PATIENT-LVL III: ICD-10-PCS | Mod: PBBFAC,,, | Performed by: INTERNAL MEDICINE

## 2022-02-15 PROCEDURE — 3288F PR FALLS RISK ASSESSMENT DOCUMENTED: ICD-10-PCS | Mod: CPTII,S$GLB,, | Performed by: INTERNAL MEDICINE

## 2022-02-15 PROCEDURE — 1126F AMNT PAIN NOTED NONE PRSNT: CPT | Mod: CPTII,S$GLB,, | Performed by: INTERNAL MEDICINE

## 2022-02-15 PROCEDURE — 1159F MED LIST DOCD IN RCRD: CPT | Mod: CPTII,S$GLB,, | Performed by: INTERNAL MEDICINE

## 2022-02-15 PROCEDURE — 1101F PT FALLS ASSESS-DOCD LE1/YR: CPT | Mod: CPTII,S$GLB,, | Performed by: INTERNAL MEDICINE

## 2022-02-15 RX ORDER — AMLODIPINE BESYLATE 10 MG/1
10 TABLET ORAL DAILY
Qty: 90 TABLET | Refills: 3 | Status: SHIPPED | OUTPATIENT
Start: 2022-02-15 | End: 2022-12-21 | Stop reason: SDUPTHER

## 2022-02-15 RX ORDER — CANDESARTAN 32 MG/1
32 TABLET ORAL DAILY
Qty: 90 TABLET | Refills: 3 | Status: SHIPPED | OUTPATIENT
Start: 2022-02-15 | End: 2022-12-21 | Stop reason: SDUPTHER

## 2022-02-15 RX ORDER — TRIAMTERENE/HYDROCHLOROTHIAZID 37.5-25 MG
1 TABLET ORAL DAILY
Qty: 90 TABLET | Refills: 3 | Status: SHIPPED | OUTPATIENT
Start: 2022-02-15 | End: 2022-12-21 | Stop reason: SDUPTHER

## 2022-02-15 RX ORDER — METOPROLOL TARTRATE 100 MG/1
100 TABLET ORAL 2 TIMES DAILY
Qty: 180 TABLET | Refills: 3 | Status: SHIPPED | OUTPATIENT
Start: 2022-02-15 | End: 2022-12-21 | Stop reason: SDUPTHER

## 2022-02-15 NOTE — PROGRESS NOTES
[facility] Note    Chief Complaint      Chief Complaint   Patient presents with    Follow-up     History of Present Illness      Hussein Steinberg is a 71 y.o. male who presents today for follow up HTN/HLD.  Patient comes to appointment alone.    Problem List Items Addressed This Visit     Atrial fibrillation    Current Assessment & Plan     Sees Dr. Mo, no CP/SOB.  Sleeps on 2 pillows.  On BB, ARB, pradaxa. Has AICD.         Relevant Orders    TSH    T4, Free    TSH    T4, Free    Essential hypertension    Current Assessment & Plan     BP controlled on norvasc 10 mg, lopressor 100 mg BID, maxzide and candesartan 32 mg daily.  No CP/SOB.  Occasional evening BLE edema.  Had dry cough with lisinopril, diarrhea with olmesartan.         Relevant Medications    candesartan (ATACAND) 32 MG tablet    triamterene-hydrochlorothiazide 37.5-25 mg (MAXZIDE-25) 37.5-25 mg per tablet    amLODIPine (NORVASC) 10 MG tablet    metoprolol tartrate (LOPRESSOR) 100 MG tablet    ICD (implantable cardioverter-defibrillator), biventricular, in situ    Current Assessment & Plan     Battery replaced 9/2019 with Dr. Mo.  No issues at present.         Class 2 severe obesity due to excess calories with serious comorbidity and body mass index (BMI) of 35.0 to 35.9 in adult    Current Assessment & Plan     Just started walking. Lost some weight since last visit.         DCM (dilated cardiomyopathy)    Current Assessment & Plan     Sees Dr. Mo, no CP/SOB.  Sleeps on 2 pillows.  On BB, ARB, pradaxa. Has AICD.         Hyperlipidemia - Primary    Current Assessment & Plan     Stable on zetia 10 mg.  Doesn't tolerate statins, had joint pains.  The 10-year ASCVD risk score (Rosalino ERVIN Jr., et al., 2013) is: 19.7%    Values used to calculate the score:      Age: 71 years      Sex: Male      Is Non- : No      Diabetic: No      Tobacco smoker: No      Systolic Blood Pressure: 120 mmHg      Is BP treated:  Yes      HDL Cholesterol: 43 mg/dL      Total Cholesterol: 168 mg/dL           Relevant Orders    CBC Auto Differential    Lipid Panel    Comprehensive Metabolic Panel    Other insomnia    Current Assessment & Plan     Stable on Trazodone 100 mg daily.  Sleep has been bad since last visit.  No problems going to sleep, has to get up to go to RR.    Sleeps max of 6 hours per night.  Naps well in afternoons.         History of CVA (cerebrovascular accident)    Current Assessment & Plan     Stable on ASA, Pradaxa for secondary prevention.         Statin intolerance      Other Visit Diagnoses     Essential hypertension, benign              Health Maintenance   Topic Date Due    Lipid Panel  08/09/2026    TETANUS VACCINE  05/13/2031    Hepatitis C Screening  Completed    High Dose Statin  Discontinued       Past Medical History:   Diagnosis Date    Atrial fibrillation     Basal cell carcinoma     Bronchitis 3/20/2017    Cardiac arrest 2012    has pacemaker/defibrillator placed 3/19/13 - [facility]    Cataract     DCM (dilated cardiomyopathy) 6/15/2017    High cholesterol     History of intracerebral hemorrhage without residual deficit 8/23/2019    History of prostate cancer 6/23/2017    Hypertension     Kidney stone 10/2013    LBBB (left bundle branch block) 10/10/2012    LV dysfunction 10/10/2012    Prostate cancer     Treated by Dr. Rasmussen    Squamous cell carcinoma in situ of skin 2017    left arm removed    Stroke 09/2012    + hemorrhagic infarct to right occipital lobe after started on Plavix following cardiac event    SVT (supraventricular tachycardia) 10/10/2012       Past Surgical History:   Procedure Laterality Date    arthroscopic  knee    BASAL CELL CARCINOMA EXCISION  2018    removed from nose    Basil cell   2022    CARDIAC PACEMAKER PLACEMENT  2013    and defibrillator    COLONOSCOPY  2/12/2016    + polyp - repeat in 5 years- Dr. Calles    COLONOSCOPY N/A  7/19/2021    Procedure: COLONOSCOPY;  Surgeon: Troy Bryson MD;  [facility]  Service: Endoscopy;  Laterality: N/A;  pacemaker/AICD-[facility]  fully vaccinated-GT     ok to hold Pradaxa 2 days per Dr Mo  Miralax prep    EYE SURGERY  1992    PROSTATECTOMY      SKIN CANCER EXCISION Left 10/2017    squamous cell carcinoma removed from left arm    TREATMENT OF CARDIAC ARRHYTHMIA N/A 7/31/2020    Procedure: CARDIOVERSION;  Surgeon: Dejan Mo MD;  Location: [facility] EP LAB;  Service: Cardiology;  Laterality: N/A;  afib, HERIBERTO, DCCV, anes, DM, 3 Prep    VASECTOMY  1988       family history includes Cancer (age of onset: 56) in his father; Cataracts in his mother; Glaucoma in his mother; Heart attack in his maternal grandfather and paternal grandfather; Heart attack (age of onset: 59) in his brother; Heart disease in his brother and mother; Hypertension in his mother; Macular degeneration in his mother; No Known Problems in his brother, daughter, daughter, and sister; Parkinsonism (age of onset: 54) in his brother.    Social History     Tobacco Use    Smoking status: Never Smoker    Smokeless tobacco: Never Used   Substance Use Topics    Alcohol use: Never    Drug use: Never       Review of Systems   Constitutional: Negative for chills and fever.   HENT: Negative for sore throat.    Respiratory: Negative for cough and shortness of breath.    Cardiovascular: Negative for chest pain and palpitations.   Gastrointestinal: Negative for constipation, diarrhea, nausea and vomiting.   Genitourinary: Negative for dysuria and hematuria.   Musculoskeletal: Negative for falls.   Neurological: Negative for headaches.        Outpatient Encounter Medications as of 2/15/2022   Medication Sig Dispense Refill    acetaminophen (TYLENOL) 325 MG tablet Take 2 tablets (650 mg total) by mouth every 6 (six) hours as needed (pain 1-4/10 pain scale). 15 tablet 0    amiodarone (PACERONE) 200 MG Tab Take 1 tablet  (200 mg total) by mouth once daily. 90 tablet 3    dabigatran etexilate (PRADAXA) 150 mg Cap Take 1 capsule (150 mg total) by mouth 2 (two) times a day. TAKE 1 TABLET TWICE A  capsule 3    ezetimibe (ZETIA) 10 mg tablet TAKE 1 TABLET BY MOUTH EVERY DAY 90 tablet 3    folic acid/multivit-min/lutein (CENTRUM SILVER ORAL) Take by mouth once daily.      traZODone (DESYREL) 100 MG tablet Take 1 tablet (100 mg total) by mouth every evening. 30 tablet 11    [DISCONTINUED] amLODIPine (NORVASC) 10 MG tablet Take 1 tablet (10 mg total) by mouth once daily. 90 tablet 3    [DISCONTINUED] candesartan (ATACAND) 32 MG tablet Take 1 tablet (32 mg total) by mouth once daily. 90 tablet 1    [DISCONTINUED] metoprolol tartrate (LOPRESSOR) 100 MG tablet Take 1 tablet (100 mg total) by mouth 2 (two) times daily. 180 tablet 1    [DISCONTINUED] triamterene-hydrochlorothiazide 37.5-25 mg (MAXZIDE-25) 37.5-25 mg per tablet Take 1 tablet by mouth once daily. 90 tablet 1    amLODIPine (NORVASC) 10 MG tablet Take 1 tablet (10 mg total) by mouth once daily. 90 tablet 3    candesartan (ATACAND) 32 MG tablet Take 1 tablet (32 mg total) by mouth once daily. 90 tablet 3    metoprolol tartrate (LOPRESSOR) 100 MG tablet Take 1 tablet (100 mg total) by mouth 2 (two) times daily. 180 tablet 3    triamterene-hydrochlorothiazide 37.5-25 mg (MAXZIDE-25) 37.5-25 mg per tablet Take 1 tablet by mouth once daily. 90 tablet 3    [DISCONTINUED] albuterol (VENTOLIN HFA) 90 mcg/actuation inhaler Inhale 2 puffs into the lungs every 6 (six) hours as needed for Wheezing. Rescue (Patient not taking: Reported on 2/15/2022) 18 g 0     No facility-administered encounter medications on file as of 2/15/2022.        Review of patient's allergies indicates:   Allergen Reactions    Olmesartan Diarrhea     Diarrhea and muscle aches since starting medication.     Lisinopril Other (See Comments)     Dry cough       Physical Exam      Vital Signs  Temp: 98.3  °F (36.8 °C)  Pulse: (!) 59  SpO2: 96 %  BP: 122/76  Pain Score: 0-No pain  Height and Weight  Weight: 115.1 kg (253 lb 10.2 oz)]  Body mass index is 35.38 kg/m².    Physical Exam  Constitutional:       Appearance: He is well-developed.   HENT:      Head: Normocephalic and atraumatic.   Neck:      Thyroid: No thyromegaly.   Cardiovascular:      Rate and Rhythm: Normal rate and regular rhythm.      Heart sounds: No murmur heard.      Pulmonary:      Effort: Pulmonary effort is normal. No respiratory distress.      Breath sounds: Normal breath sounds.   Abdominal:      General: There is no distension.      Palpations: Abdomen is soft.      Tenderness: There is no abdominal tenderness.   Skin:     General: Skin is warm and dry.   Neurological:      Mental Status: He is alert and oriented to person, place, and time.   Psychiatric:         Behavior: Behavior normal.          Laboratory:  CBC:  No results for input(s): WBC, RBC, HGB, HCT, PLT, MCV, MCH, MCHC in the last 2160 hours.  CMP:  No results for input(s): GLU, CALCIUM, ALBUMIN, PROT, NA, K, CO2, CL, BUN, ALKPHOS, ALT, AST, BILITOT in the last 2160 hours.    Invalid input(s): CREATININ  URINALYSIS:  No results for input(s): COLORU, CLARITYU, SPECGRAV, PHUR, PROTEINUA, GLUCOSEU, BILIRUBINCON, BLOODU, WBCU, RBCU, BACTERIA, MUCUS, NITRITE, LEUKOCYTESUR, UROBILINOGEN, HYALINECASTS in the last 2160 hours.   LIPIDS:  No results for input(s): TSH, HDL, CHOL, TRIG, LDLCALC, CHOLHDL, NONHDLCHOL, TOTALCHOLEST in the last 2160 hours.  TSH:  No results for input(s): TSH in the last 2160 hours.  A1C:  No results for input(s): HGBA1C in the last 2160 hours.    Radiology:  No results found in the last 30 days.     Assessment/Plan     Hussein Steinberg is a 71 y.o.male with:    1. Mixed hyperlipidemia  - CBC Auto Differential; Future  - Lipid Panel; Future  - Comprehensive Metabolic Panel; Future    2. History of CVA (cerebrovascular accident)    3. Essential hypertension  -  candesartan (ATACAND) 32 MG tablet; Take 1 tablet (32 mg total) by mouth once daily.  Dispense: 90 tablet; Refill: 3  - triamterene-hydrochlorothiazide 37.5-25 mg (MAXZIDE-25) 37.5-25 mg per tablet; Take 1 tablet by mouth once daily.  Dispense: 90 tablet; Refill: 3  - amLODIPine (NORVASC) 10 MG tablet; Take 1 tablet (10 mg total) by mouth once daily.  Dispense: 90 tablet; Refill: 3  - metoprolol tartrate (LOPRESSOR) 100 MG tablet; Take 1 tablet (100 mg total) by mouth 2 (two) times daily.  Dispense: 180 tablet; Refill: 3    4. Paroxysmal atrial fibrillation  - TSH; Future  - T4, Free; Future  - TSH; Future  - T4, Free; Future    5. Statin intolerance    6. Essential hypertension, benign    7. Class 2 severe obesity due to excess calories with serious comorbidity and body mass index (BMI) of 35.0 to 35.9 in adult    8. Other insomnia    9. DCM (dilated cardiomyopathy)    10. ICD (implantable cardioverter-defibrillator), biventricular, in situ      -Continue current medications and maintain follow up with specialists.    -Follow up in about 6 months (around 8/15/2022) for follow up of medical problems.       Lacey Rainey MD  [facility]

## 2022-02-15 NOTE — ASSESSMENT & PLAN NOTE
BP controlled on norvasc 10 mg, lopressor 100 mg BID, maxzide and candesartan 32 mg daily.  No CP/SOB.  Occasional evening BLE edema.  Had dry cough with lisinopril, diarrhea with olmesartan.

## 2022-02-15 NOTE — ASSESSMENT & PLAN NOTE
Stable on Trazodone 100 mg daily.  Sleep has been bad since last visit.  No problems going to sleep, has to get up to go to RR.    Sleeps max of 6 hours per night.  Naps well in afternoons.

## 2022-02-15 NOTE — ASSESSMENT & PLAN NOTE
Stable on zetia 10 mg.  Doesn't tolerate statins, had joint pains.  The 10-year ASCVD risk score (Rosalino MUELLER Jr., et al., 2013) is: 19.7%    Values used to calculate the score:      Age: 71 years      Sex: Male      Is Non- : No      Diabetic: No      Tobacco smoker: No      Systolic Blood Pressure: 120 mmHg      Is BP treated: Yes      HDL Cholesterol: 43 mg/dL      Total Cholesterol: 168 mg/dL

## 2022-02-24 ENCOUNTER — TELEPHONE (OUTPATIENT)
Dept: SLEEP MEDICINE | Facility: OTHER | Age: 72
End: 2022-02-24
Payer: MEDICARE

## 2022-02-25 ENCOUNTER — HOSPITAL ENCOUNTER (OUTPATIENT)
Dept: SLEEP MEDICINE | Facility: OTHER | Age: 72
Discharge: HOME | End: 2022-02-25
Attending: PSYCHIATRY & NEUROLOGY
Payer: MEDICARE

## 2022-02-25 DIAGNOSIS — G47.33 OSA (OBSTRUCTIVE SLEEP APNEA): ICD-10-CM

## 2022-02-25 DIAGNOSIS — G47.30 SLEEP APNEA, UNSPECIFIED TYPE: ICD-10-CM

## 2022-02-25 PROCEDURE — 95806 PR SLEEP STUDY, UNATTENDED, SIMUL RECORD HR/O2 SAT/RESP FLOW/RESP EFFT: ICD-10-PCS | Mod: 26,,, | Performed by: PSYCHIATRY & NEUROLOGY

## 2022-02-25 PROCEDURE — 95806 SLEEP STUDY UNATT&RESP EFFT: CPT | Mod: 26,,, | Performed by: PSYCHIATRY & NEUROLOGY

## 2022-02-25 PROCEDURE — 95800 SLP STDY UNATTENDED: CPT

## 2022-02-26 ENCOUNTER — CLINICAL SUPPORT (OUTPATIENT)
Dept: CARDIOLOGY | Facility: HOSPITAL | Age: 72
End: 2022-02-26
Payer: MEDICARE

## 2022-02-26 ENCOUNTER — PATIENT MESSAGE (OUTPATIENT)
Dept: SLEEP MEDICINE | Facility: CLINIC | Age: 72
End: 2022-02-26
Payer: MEDICARE

## 2022-02-26 DIAGNOSIS — I42.9 CARDIOMYOPATHY, UNSPECIFIED: ICD-10-CM

## 2022-02-26 DIAGNOSIS — I48.91 UNSPECIFIED ATRIAL FIBRILLATION: ICD-10-CM

## 2022-02-26 DIAGNOSIS — Z95.810 PRESENCE OF AUTOMATIC (IMPLANTABLE) CARDIAC DEFIBRILLATOR: ICD-10-CM

## 2022-02-26 PROCEDURE — 93296 REM INTERROG EVL PM/IDS: CPT | Performed by: INTERNAL MEDICINE

## 2022-03-03 ENCOUNTER — PATIENT MESSAGE (OUTPATIENT)
Dept: PHARMACY | Facility: CLINIC | Age: 72
End: 2022-03-03
Payer: MEDICARE

## 2022-03-06 NOTE — PROCEDURES
PHYSICIAN INTERPRETATION AND COMMENTS: Findings are consistent with severe, obstructive sleep apnea (YUMIKO) (G47.33),  by overall AHI (apnea hypopnea index). 2. There is elevated concern for sleep related hypoxemia or hypoventilation, based  on finding of large %time SpO2 <90%. This study was technically adequate to allow for interpretation.  CLINICAL HISTORY: 71 year old male presented with: 17.5 inch neck, BMI of 34.9, an Marlette sleepiness score of 8, history of  hypertension, heart disease, history of stroke and symptoms of nocturnal snoring. Based on the clinical history, the  patient has a high pre-test probability of having Severe YUMIKO.  SLEEP STUDY FINDINGS: Patient underwent a 1 night Home Sleep Test and by behavioral criteria, slept for approximately  5.96 hours, with a sleep latency of 7 minutes and a sleep efficiency of 85.1%. Severe sleep disordered breathing (AHI=45) is  noted based on a 4% hypopnea desaturation criteria. The patient slept supine 87% of the night based on valid recording  time of 5.96 hours and is 1.3 times as likely to have apneas/hypopneas when supine. The apneas/hypopneas are  accompanied by severe oxygen desaturation (percent time below 90% SpO2: 35.4%, Min SpO2: 68.8%). The average  desaturation across all sleep disordered breathing events is 5.1%. The mean pulse rate is 60 BPM.  TREATMENT CONSIDERATIONS: Consider nasal continuous positive airway pressure (CPAP) as the initial treatment choice  for severe obstructive sleep apnea. Consider an attended in-lab CPAP titration study, given the possibility of co-morbid  sleep related hypoventilation. If CPAP trial is unsuccessful, refer to Sleep Clinic for further evaluation and management.  DISEASE MANAGEMENT CONSIDERATIONS: Definitive treatment for YUMIKO is recommended. Consider Sleep Clinic referral for  YUMIKO management.

## 2022-03-08 ENCOUNTER — TELEPHONE (OUTPATIENT)
Dept: SLEEP MEDICINE | Facility: CLINIC | Age: 72
End: 2022-03-08
Payer: MEDICARE

## 2022-03-08 DIAGNOSIS — G47.33 OSA (OBSTRUCTIVE SLEEP APNEA): Primary | ICD-10-CM

## 2022-03-08 NOTE — TELEPHONE ENCOUNTER
Staff reached out to pt to inform him of his recent sleep study results.     Please inform the patient -study confirmed severe Obstructive sleep apnea (YUMIKO);  we are ordering CPAP through [employer] durable medical equipment as currently this seems to be the fastest route to get the best machine on the market.     [facility] will call him once they process the order.     If he would like to discuss study in detail, he can schedule virtual visit, or reach out to me through My chart.     Pt confirmed.

## 2022-03-08 NOTE — TELEPHONE ENCOUNTER
Please inform the patient -study confirmed severe Obstructive sleep apnea (YUMIKO);  we are ordering CPAP through [facility] durable medical equipment as currently this seems to be the fastest route to get the best machine on the market.    [facility] will call him once they process the order.    If he would like to discuss study in detail, he can schedule virtual visit, or reach out to me through My chart      Thank you!        [facility] - [phone]; [phone]  [address] [place] [address] [phone]; [phone]  [address] [place] [address]  [phone]          Thank you!

## 2022-04-11 ENCOUNTER — PATIENT MESSAGE (OUTPATIENT)
Dept: ADMINISTRATIVE | Facility: OTHER | Age: 72
End: 2022-04-11
Payer: MEDICARE

## 2022-05-11 ENCOUNTER — TELEPHONE (OUTPATIENT)
Dept: ELECTROPHYSIOLOGY | Facility: CLINIC | Age: 72
End: 2022-05-11
Payer: MEDICARE

## 2022-05-11 NOTE — TELEPHONE ENCOUNTER
Atrial fibrillation noted during device remote check:    Overall burden:  15%    In current episode 49 hours at time of transmission today at 2am.    Ventricular rates:   Controlled      Anticoagulation status:  Pradaxa    Patient symptoms:  Asymptomatic.  Compliant with OAC, Amiodarone 200mg QD and Lopressor 100mg BID.  BP checked last week and was 132/80.    Has clinic follow up 6/21/22 with Neelam.    Leaving to go out of town today, won't return until 5/17/22.

## 2022-05-27 ENCOUNTER — CLINICAL SUPPORT (OUTPATIENT)
Dept: CARDIOLOGY | Facility: HOSPITAL | Age: 72
End: 2022-05-27
Payer: MEDICARE

## 2022-05-27 DIAGNOSIS — I48.91 UNSPECIFIED ATRIAL FIBRILLATION: ICD-10-CM

## 2022-05-27 DIAGNOSIS — Z95.810 PRESENCE OF AUTOMATIC (IMPLANTABLE) CARDIAC DEFIBRILLATOR: ICD-10-CM

## 2022-05-27 DIAGNOSIS — I42.9 CARDIOMYOPATHY, UNSPECIFIED: ICD-10-CM

## 2022-05-27 DIAGNOSIS — I50.9 HEART FAILURE, UNSPECIFIED: ICD-10-CM

## 2022-05-27 PROCEDURE — 93295 DEV INTERROG REMOTE 1/2/MLT: CPT | Mod: ,,, | Performed by: INTERNAL MEDICINE

## 2022-05-27 PROCEDURE — 93296 REM INTERROG EVL PM/IDS: CPT | Performed by: INTERNAL MEDICINE

## 2022-05-27 PROCEDURE — 93295 CARDIAC DEVICE CHECK - REMOTE: ICD-10-PCS | Mod: ,,, | Performed by: INTERNAL MEDICINE

## 2022-06-03 ENCOUNTER — TELEPHONE (OUTPATIENT)
Dept: ELECTROPHYSIOLOGY | Facility: CLINIC | Age: 72
End: 2022-06-03
Payer: MEDICARE

## 2022-06-03 NOTE — TELEPHONE ENCOUNTER
Atrial fibrillation noted during device remote check:    Overall burden: 21%    In current episode since Monday May 30th.    Ventricular rates:   Controlled        Anticoagulation status:  Pradaxa    Patient symptoms:  Asymptomatic.  Compliant with Amiodarone 200mg QD and Lopressor 100mg BID.  Last BP taken at home over weekend 135/75.

## 2022-06-17 NOTE — PROGRESS NOTES
"Mr. Steinberg is a patient of Dr. Mo and was last seen in clinic 12/15/2021.      Subjective:   Patient ID:  Hussein Steinberg is a 72 y.o. male who presents for follow-up of Atrial Fibrillation and CRT-D  .     HPI:    Mr. Steinberg is a 72 y.o. male with AF, DCM, CRT-D (2013), HTN, HLD, CVA (hemorrhagic) here for follow up.    Background:    "Syeda Steinberg    DCM, s/p biV ICD with great response (LVEF 60%+ 2018)  HTN on meds  HL on meds   PAF, on pradaxa  hx hemorrhagic CVA 2012    CRT-D 3/13 (Tonia device). Great response to CRT. Gen cahgne 9/2019.  Feeling well.  echo 5/18 60-65%  ICD shows 21% AMS. He says he can tell the difference, emeka over these past 4 days of AF. BiVP only 84%    Hx of higher-dose amio, c/b night vision changes. These resolved after d/c'ing amio.  AF, resulting in sx that may be directly from the AF and/or from lack of biV pacing that results.  HERIBERTO/DCCV next week. 30 day monitor after that.  Discussed AADs tikosyn vs amio (vs ablation). After hearing pros/cons of all this, we'll restart amio (with load) following DCCV. If side effects recur, we could revisit tikosyn or ablation.    7/31/2020: Cardioversion restored sinus rhythm with early recurrence of atrial fibrillation (ERAF). Cardioversion was successfully performed with restoration of normal sinus rhythm.  9/8/2020: He is 5 weeks s/p cardioversion and initiation of amiodarone. Maintaining rhythm. APBiV pacing on EKG. He feels well. Will reduce amio to 200mg once daily. Update amio testing. He remains on pradaxa for CVA prophylaxis. HERIBERTO showed LVEF of 55%.     6/15/2021: Mr. Steinberg is doing well from a device perspective with stable lead and device function. No arrhythmia noted. On amiodarone. LFTs and TSH scheduled for August. PFTs today show slight decrease in diffusion capacity - will update these in 6 mo. We discussed possibly switching to tikosyn or ablation. He would like to stay the course if possible. On pradaxa for CVA prophylaxis. " 100% biventricular pacing and he feels well. No CHF symptoms. He feels well.    12/15/2021: Mr. Steinberg is doing well from a device perspective with stable lead and device function. Some RA lead noise which is chronic and stable.  No arrhythmia noted. On amiodarone. Amio testing all WNL. Plan is to consider tikosyn vs ablation if he no longer tolerates amiodarone in the future. He continues to prefer to stay the course for now.  100% biventricular pacing with narrow QRS. No CHF symptoms. Echo 12/2021 shows preserved LVEF. He feels well.    Update (06/21/2022):    6/11/2022: Atrial fibrillation noted during device remote check:  Overall burden: 21%  In current episode since Monday May 30th.  Ventricular rates:   Controlled      Anticoagulation status:  Pradaxa  Patient symptoms:  Asymptomatic.  Compliant with Amiodarone 200mg QD and Lopressor 100mg BID.  Last BP taken at home over weekend 135/75    Today he says he never felt his AF. Feeling stable overall. No increased GUNN, palps, CP, syncope.    He is currently taking pradaxa 150mg BID for stroke prophylaxis and denies significant bleeding episodes. He is currently being treated with amiodarone 200mg daily for rhythm control and lopressor 100mg BID for HR control.  Kidney function is stable, with a creatinine of 1 on 8/9/2021. LFTs WNL 8/2021. TSH slightly elevated with normal free T4 4/2022. PFTs 12/7/2021 show normal DLCO.    Device Interrogation (6/21/2022) reveals an intrinsic SB with stable lead and device function. AF burden 6.7%, longest/last episode 5 days on 5/30/2022 arrhythmias or treated episodes were noted.  He paces 56% in the RA and 99% in the BiV. Estimated battery longevity 4.5 years.     I have personally reviewed the patient's EKG today, which shows APVP at 61bpm. SC interval is 170. QRS is 130. QTc is 457.    Relevant Cardiac Test Results:    2D Echo (12/7/2021):  · The left ventricle is normal in size with normal systolic function. The estimated  ejection fraction is 60%.  · There is abnormal septal wall motion consistent with right ventricular pacemaker.  · Normal right ventricular size with normal right ventricular systolic function.  · Indeterminate left ventricular diastolic function.  · Biatrial enlargement.  · Mild to moderate tricuspid regurgitation.  · The estimated PA systolic pressure is 41 mmHg.  · Elevated central venous pressure (15 mmHg).  · The ascending aorta is mildly dilated.    Current Outpatient Medications   Medication Sig    acetaminophen (TYLENOL) 325 MG tablet Take 2 tablets (650 mg total) by mouth every 6 (six) hours as needed (pain 1-4/10 pain scale).    amiodarone (PACERONE) 200 MG Tab Take 1 tablet (200 mg total) by mouth once daily.    amLODIPine (NORVASC) 10 MG tablet Take 1 tablet (10 mg total) by mouth once daily.    candesartan (ATACAND) 32 MG tablet Take 1 tablet (32 mg total) by mouth once daily.    dabigatran etexilate (PRADAXA) 150 mg Cap Take 1 capsule (150 mg total) by mouth 2 (two) times a day. TAKE 1 TABLET TWICE A DAY    ezetimibe (ZETIA) 10 mg tablet TAKE 1 TABLET BY MOUTH EVERY DAY    folic acid/multivit-min/lutein (CENTRUM SILVER ORAL) Take by mouth once daily.    metoprolol tartrate (LOPRESSOR) 100 MG tablet Take 1 tablet (100 mg total) by mouth 2 (two) times daily.    triamterene-hydrochlorothiazide 37.5-25 mg (MAXZIDE-25) 37.5-25 mg per tablet Take 1 tablet by mouth once daily.    traZODone (DESYREL) 100 MG tablet Take 1 tablet (100 mg total) by mouth every evening. (Patient not taking: Reported on 6/21/2022)     No current facility-administered medications for this visit.       Review of Systems   Constitutional: Negative for malaise/fatigue.   Cardiovascular: Negative for chest pain, dyspnea on exertion, irregular heartbeat, leg swelling and palpitations.   Respiratory: Negative for shortness of breath.    Hematologic/Lymphatic: Negative for bleeding problem.   Skin: Negative for rash.  "  Musculoskeletal: Negative for myalgias.   Gastrointestinal: Negative for hematemesis, hematochezia and nausea.   Genitourinary: Negative for hematuria.   Neurological: Negative for light-headedness.   Psychiatric/Behavioral: Negative for altered mental status.   Allergic/Immunologic: Negative for persistent infections.     Objective:        /72   Pulse 61   Ht 5' 11" (1.803 m)   Wt 117 kg (257 lb 15 oz)   BMI 35.98 kg/m²     Physical Exam  Vitals and nursing note reviewed.   Constitutional:       Appearance: Normal appearance. He is well-developed.   HENT:      Head: Normocephalic.      Nose: Nose normal.   Eyes:      Pupils: Pupils are equal, round, and reactive to light.   Cardiovascular:      Rate and Rhythm: Normal rate and regular rhythm.   Pulmonary:      Effort: No respiratory distress.      Breath sounds: Normal breath sounds.   Chest:      Comments: Device to LUCW.     Musculoskeletal:         General: Normal range of motion.   Skin:     General: Skin is warm and dry.      Findings: No erythema.   Neurological:      Mental Status: He is alert and oriented to person, place, and time.   Psychiatric:         Speech: Speech normal.         Behavior: Behavior normal.       Lab Results   Component Value Date     08/09/2021    K 3.6 08/09/2021    MG 2.5 09/14/2012    BUN 18 08/09/2021    CREATININE 1.02 08/09/2021    ALT 32 08/09/2021    AST 30 08/09/2021    HGB 15.1 08/09/2021    HCT 46.9 08/09/2021    TSH 5.098 (H) 02/15/2022    LDLCALC 107.8 08/09/2021       Recent Labs   Lab 08/29/19  0808 07/28/20  0740   INR 1.1 1.3 H         Assessment:     1. Persistent atrial fibrillation    2. DCM (dilated cardiomyopathy)    3. Essential hypertension    4. LBBB (left bundle branch block)    5. History of CVA (cerebrovascular accident)    6. ICD (implantable cardioverter-defibrillator), biventricular, in situ    7. Long term current use of amiodarone    8. YUMIKO (obstructive sleep apnea)      Plan:     In " summary, Mr. Steinberg is a 72 y.o. male with AF, DCM, CRT-D (2013), HTN, HLD, CVA (hemorrhagic) here for follow up.  Mr. Steinberg is doing well from a device perspective with stable lead and device function. 99% biventricular pacing. On pradaxa. Did have some breakthrough AF last month despite amiodarone, none since 5/30. No provoking factors. Asymptomatic.  We had a long discussion about next steps should his AF % increase and affect his biventricular pacing. His EF is 60% per echo 12/2021 having recovered after CRT therapy. If biv pacing decreases in context of more AF, will likely proceed with ablation. Will continue to monitor for now. Amio testing WNL.    Continue current medication regimen and device settings.   Follow up in device clinic as scheduled.   Follow up in EP clinic in 6 mo with amio tests, sooner as needed.     *A copy of this note has been sent to Dr. Mo*    Follow up in about 6 months (around 12/21/2022).    ------------------------------------------------------------------    LAMIN Henderson, NP-C  Cardiac Electrophysiology

## 2022-06-21 ENCOUNTER — OFFICE VISIT (OUTPATIENT)
Dept: ELECTROPHYSIOLOGY | Facility: CLINIC | Age: 72
End: 2022-06-21
Payer: MEDICARE

## 2022-06-21 ENCOUNTER — HOSPITAL ENCOUNTER (OUTPATIENT)
Dept: CARDIOLOGY | Facility: CLINIC | Age: 72
Discharge: HOME | End: 2022-06-21
Payer: MEDICARE

## 2022-06-21 ENCOUNTER — CLINICAL SUPPORT (OUTPATIENT)
Dept: CARDIOLOGY | Facility: HOSPITAL | Age: 72
End: 2022-06-21
Attending: INTERNAL MEDICINE
Payer: MEDICARE

## 2022-06-21 VITALS
HEIGHT: 71 IN | SYSTOLIC BLOOD PRESSURE: 137 MMHG | DIASTOLIC BLOOD PRESSURE: 72 MMHG | WEIGHT: 257.94 LBS | HEART RATE: 61 BPM | BODY MASS INDEX: 36.11 KG/M2

## 2022-06-21 DIAGNOSIS — G47.33 OSA (OBSTRUCTIVE SLEEP APNEA): ICD-10-CM

## 2022-06-21 DIAGNOSIS — I48.19 PERSISTENT ATRIAL FIBRILLATION: Primary | ICD-10-CM

## 2022-06-21 DIAGNOSIS — Z95.810 ICD (IMPLANTABLE CARDIOVERTER-DEFIBRILLATOR), BIVENTRICULAR, IN SITU: ICD-10-CM

## 2022-06-21 DIAGNOSIS — I42.0 DCM (DILATED CARDIOMYOPATHY): ICD-10-CM

## 2022-06-21 DIAGNOSIS — I48.0 PAROXYSMAL ATRIAL FIBRILLATION: ICD-10-CM

## 2022-06-21 DIAGNOSIS — I10 ESSENTIAL HYPERTENSION: ICD-10-CM

## 2022-06-21 DIAGNOSIS — Z79.899 LONG TERM CURRENT USE OF AMIODARONE: ICD-10-CM

## 2022-06-21 DIAGNOSIS — I44.7 LBBB (LEFT BUNDLE BRANCH BLOCK): ICD-10-CM

## 2022-06-21 DIAGNOSIS — Z86.73 HISTORY OF CVA (CEREBROVASCULAR ACCIDENT): ICD-10-CM

## 2022-06-21 PROCEDURE — 3008F PR BODY MASS INDEX (BMI) DOCUMENTED: ICD-10-PCS | Mod: CPTII,S$GLB,, | Performed by: NURSE PRACTITIONER

## 2022-06-21 PROCEDURE — 99214 PR OFFICE/OUTPT VISIT, EST, LEVL IV, 30-39 MIN: ICD-10-PCS | Mod: S$GLB,,, | Performed by: NURSE PRACTITIONER

## 2022-06-21 PROCEDURE — 3008F BODY MASS INDEX DOCD: CPT | Mod: CPTII,S$GLB,, | Performed by: NURSE PRACTITIONER

## 2022-06-21 PROCEDURE — 99214 OFFICE O/P EST MOD 30 MIN: CPT | Mod: S$GLB,,, | Performed by: NURSE PRACTITIONER

## 2022-06-21 PROCEDURE — 99999 PR PBB SHADOW E&M-EST. PATIENT-LVL IV: CPT | Mod: PBBFAC,,, | Performed by: NURSE PRACTITIONER

## 2022-06-21 PROCEDURE — 3078F DIAST BP <80 MM HG: CPT | Mod: CPTII,S$GLB,, | Performed by: NURSE PRACTITIONER

## 2022-06-21 PROCEDURE — 93284 CARDIAC DEVICE CHECK - IN CLINIC & HOSPITAL: ICD-10-PCS | Mod: 26,,, | Performed by: INTERNAL MEDICINE

## 2022-06-21 PROCEDURE — 3288F FALL RISK ASSESSMENT DOCD: CPT | Mod: CPTII,S$GLB,, | Performed by: NURSE PRACTITIONER

## 2022-06-21 PROCEDURE — 99999 PR PBB SHADOW E&M-EST. PATIENT-LVL IV: ICD-10-PCS | Mod: PBBFAC,,, | Performed by: NURSE PRACTITIONER

## 2022-06-21 PROCEDURE — 3288F PR FALLS RISK ASSESSMENT DOCUMENTED: ICD-10-PCS | Mod: CPTII,S$GLB,, | Performed by: NURSE PRACTITIONER

## 2022-06-21 PROCEDURE — 93284 PRGRMG EVAL IMPLANTABLE DFB: CPT | Mod: 26,,, | Performed by: INTERNAL MEDICINE

## 2022-06-21 PROCEDURE — 93005 ELECTROCARDIOGRAM TRACING: CPT | Mod: S$GLB,,, | Performed by: INTERNAL MEDICINE

## 2022-06-21 PROCEDURE — 4010F ACE/ARB THERAPY RXD/TAKEN: CPT | Mod: CPTII,S$GLB,, | Performed by: NURSE PRACTITIONER

## 2022-06-21 PROCEDURE — 1159F MED LIST DOCD IN RCRD: CPT | Mod: CPTII,S$GLB,, | Performed by: NURSE PRACTITIONER

## 2022-06-21 PROCEDURE — 1159F PR MEDICATION LIST DOCUMENTED IN MEDICAL RECORD: ICD-10-PCS | Mod: CPTII,S$GLB,, | Performed by: NURSE PRACTITIONER

## 2022-06-21 PROCEDURE — 93010 RHYTHM STRIP: ICD-10-PCS | Mod: S$GLB,,, | Performed by: INTERNAL MEDICINE

## 2022-06-21 PROCEDURE — 93005 RHYTHM STRIP: ICD-10-PCS | Mod: S$GLB,,, | Performed by: INTERNAL MEDICINE

## 2022-06-21 PROCEDURE — 1101F PR PT FALLS ASSESS DOC 0-1 FALLS W/OUT INJ PAST YR: ICD-10-PCS | Mod: CPTII,S$GLB,, | Performed by: NURSE PRACTITIONER

## 2022-06-21 PROCEDURE — 3075F PR MOST RECENT SYSTOLIC BLOOD PRESS GE 130-139MM HG: ICD-10-PCS | Mod: CPTII,S$GLB,, | Performed by: NURSE PRACTITIONER

## 2022-06-21 PROCEDURE — 1160F PR REVIEW ALL MEDS BY PRESCRIBER/CLIN PHARMACIST DOCUMENTED: ICD-10-PCS | Mod: CPTII,S$GLB,, | Performed by: NURSE PRACTITIONER

## 2022-06-21 PROCEDURE — 1101F PT FALLS ASSESS-DOCD LE1/YR: CPT | Mod: CPTII,S$GLB,, | Performed by: NURSE PRACTITIONER

## 2022-06-21 PROCEDURE — 1126F PR PAIN SEVERITY QUANTIFIED, NO PAIN PRESENT: ICD-10-PCS | Mod: CPTII,S$GLB,, | Performed by: NURSE PRACTITIONER

## 2022-06-21 PROCEDURE — 4010F PR ACE/ARB THEARPY RXD/TAKEN: ICD-10-PCS | Mod: CPTII,S$GLB,, | Performed by: NURSE PRACTITIONER

## 2022-06-21 PROCEDURE — 93010 ELECTROCARDIOGRAM REPORT: CPT | Mod: S$GLB,,, | Performed by: INTERNAL MEDICINE

## 2022-06-21 PROCEDURE — 3078F PR MOST RECENT DIASTOLIC BLOOD PRESSURE < 80 MM HG: ICD-10-PCS | Mod: CPTII,S$GLB,, | Performed by: NURSE PRACTITIONER

## 2022-06-21 PROCEDURE — 1126F AMNT PAIN NOTED NONE PRSNT: CPT | Mod: CPTII,S$GLB,, | Performed by: NURSE PRACTITIONER

## 2022-06-21 PROCEDURE — 3075F SYST BP GE 130 - 139MM HG: CPT | Mod: CPTII,S$GLB,, | Performed by: NURSE PRACTITIONER

## 2022-06-21 PROCEDURE — 93284 PRGRMG EVAL IMPLANTABLE DFB: CPT

## 2022-06-21 PROCEDURE — 1160F RVW MEDS BY RX/DR IN RCRD: CPT | Mod: CPTII,S$GLB,, | Performed by: NURSE PRACTITIONER

## 2022-07-05 ENCOUNTER — OFFICE VISIT (OUTPATIENT)
Dept: OPTOMETRY | Facility: CLINIC | Age: 72
End: 2022-07-05
Payer: MEDICARE

## 2022-07-05 DIAGNOSIS — H25.13 NUCLEAR SCLEROSIS, BILATERAL: Primary | ICD-10-CM

## 2022-07-05 DIAGNOSIS — Z98.890 HISTORY OF REFRACTIVE SURGERY: ICD-10-CM

## 2022-07-05 DIAGNOSIS — H18.003 VORTEX KERATOPATHY, BILATERAL: ICD-10-CM

## 2022-07-05 DIAGNOSIS — H26.9 CORTICAL CATARACT OF BOTH EYES: ICD-10-CM

## 2022-07-05 DIAGNOSIS — H52.4 PRESBYOPIA OF BOTH EYES: ICD-10-CM

## 2022-07-05 DIAGNOSIS — Z13.5 SCREENING FOR EYE CONDITION: ICD-10-CM

## 2022-07-05 DIAGNOSIS — H52.03 HYPEROPIA OF BOTH EYES WITH ASTIGMATISM: ICD-10-CM

## 2022-07-05 DIAGNOSIS — H52.203 HYPEROPIA OF BOTH EYES WITH ASTIGMATISM: ICD-10-CM

## 2022-07-05 PROCEDURE — 92015 DETERMINE REFRACTIVE STATE: CPT | Mod: S$GLB,,, | Performed by: OPTOMETRIST

## 2022-07-05 PROCEDURE — 3288F PR FALLS RISK ASSESSMENT DOCUMENTED: ICD-10-PCS | Mod: CPTII,S$GLB,, | Performed by: OPTOMETRIST

## 2022-07-05 PROCEDURE — 1101F PR PT FALLS ASSESS DOC 0-1 FALLS W/OUT INJ PAST YR: ICD-10-PCS | Mod: CPTII,S$GLB,, | Performed by: OPTOMETRIST

## 2022-07-05 PROCEDURE — 99999 PR PBB SHADOW E&M-EST. PATIENT-LVL III: CPT | Mod: PBBFAC,,, | Performed by: OPTOMETRIST

## 2022-07-05 PROCEDURE — 92014 PR EYE EXAM, EST PATIENT,COMPREHESV: ICD-10-PCS | Mod: S$GLB,,, | Performed by: OPTOMETRIST

## 2022-07-05 PROCEDURE — 1101F PT FALLS ASSESS-DOCD LE1/YR: CPT | Mod: CPTII,S$GLB,, | Performed by: OPTOMETRIST

## 2022-07-05 PROCEDURE — 1159F MED LIST DOCD IN RCRD: CPT | Mod: CPTII,S$GLB,, | Performed by: OPTOMETRIST

## 2022-07-05 PROCEDURE — 99999 PR PBB SHADOW E&M-EST. PATIENT-LVL III: ICD-10-PCS | Mod: PBBFAC,,, | Performed by: OPTOMETRIST

## 2022-07-05 PROCEDURE — 92014 COMPRE OPH EXAM EST PT 1/>: CPT | Mod: S$GLB,,, | Performed by: OPTOMETRIST

## 2022-07-05 PROCEDURE — 3288F FALL RISK ASSESSMENT DOCD: CPT | Mod: CPTII,S$GLB,, | Performed by: OPTOMETRIST

## 2022-07-05 PROCEDURE — 92015 PR REFRACTION: ICD-10-PCS | Mod: S$GLB,,, | Performed by: OPTOMETRIST

## 2022-07-05 PROCEDURE — 1159F PR MEDICATION LIST DOCUMENTED IN MEDICAL RECORD: ICD-10-PCS | Mod: CPTII,S$GLB,, | Performed by: OPTOMETRIST

## 2022-07-05 PROCEDURE — 1126F PR PAIN SEVERITY QUANTIFIED, NO PAIN PRESENT: ICD-10-PCS | Mod: CPTII,S$GLB,, | Performed by: OPTOMETRIST

## 2022-07-05 PROCEDURE — 4010F ACE/ARB THERAPY RXD/TAKEN: CPT | Mod: CPTII,S$GLB,, | Performed by: OPTOMETRIST

## 2022-07-05 PROCEDURE — 4010F PR ACE/ARB THEARPY RXD/TAKEN: ICD-10-PCS | Mod: CPTII,S$GLB,, | Performed by: OPTOMETRIST

## 2022-07-05 PROCEDURE — 1126F AMNT PAIN NOTED NONE PRSNT: CPT | Mod: CPTII,S$GLB,, | Performed by: OPTOMETRIST

## 2022-07-05 NOTE — PROGRESS NOTES
"HPI     eye examination and refraction       Additional comments: General eye examination and refraction.  Notes [facility] in OD "fuzzy", especially with the left eye occluded.  Wears glasses full-time               Comments     Patient's age: 72 y.o. 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 Esqueda:   City/State: [facility]  Wears glasses? Yes     If yes, wears  Full-time or part-time?  Full-time  Present glasses are: Bifocal, SV Distance, SV Reading?  Progressive lens  Approximate age of present glasses:  6 month   Got new glasses following last exam, or subsequently?:  yes    Any problem with VA with glasses? No   Wears CLs?:  No  Headaches?  No  Eye pain/discomfort?  No                                                                                     Flashes?  No  Floaters?  No  Diplopia/Double vision?  No  Patient's Ocular History:         Any eye surgeries? No         Any eye injury?  No         Any treatment for eye disease?  No  Family history of eye disease?    Mother + Macular Degeneration    + Glaucoma    + Cataracts  Significant patient medical history:         1. Diabetes?  No   2. HBP?  Yes, controlled with medications               3. Other (describe):  Heart attack and stroke Sept, 2012   -pacemaker, prostate cancer    ! OTC eyedrops currently using:  None   ! Prescription eye meds currently using:  None   ! Any history of allergy/adverse reaction to any eye meds used   previously?  No    ! Any history of allergy/adverse reaction to eyedrops used during prior   eye exam(s)? No    ! Any history of allergy/adverse reaction to Novacaine or similar meds?   No   ! Any history of allergy/adverse reaction to Epinephrine or similar meds?   No    ! Patient okay with use of anesthetic eyedrops to check eye pressure?    Yes        ! Patient okay with use of eyedrops to dilate pupils today?  Yes   !  Allergies/Medications/Medical " "History/Family History reviewed today?    Yes      PD =   64/60  Desired reading distance = 19"                                                                        Last edited by Ray Esqueda, OD on 7/5/2022  1:28 PM. (History)            Assessment /Plan     For exam results, see Encounter Report.    1. Nuclear sclerosis, bilateral     2. Cortical cataract of both eyes     3. Vortex keratopathy, bilateral     4. Screening for eye condition     5. History of refractive surgery     6. Hyperopia of both eyes with astigmatism     7. Presbyopia of both eyes                  S/P RK refractive surgery in each eye.    Nuclear sclerot/cortical cataract in both eyes.  Still no need for cataract surgery in either eye.     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    No impact on visual acuity.      History of CVA, with secondary left homonymous hemianopsia, per Dr. Skelton.  Full peripheral visual field to HM in all quadrants on confrontation visual field test in each eye.  Prior diagnosis of bilateral optic atrophy, but very subtle presentation (at worst).     Few vitreous floaters in both eyes.  No evidence of retinal etiology.      Hyperopia with astigmatism in each eye, with very satisfactory correctable visual acuity in each eye.  Presbyopia.       New spectacle lens Rx issued for full-time wear.  Recheck in six months.          "

## 2022-07-05 NOTE — PATIENT INSTRUCTIONS
S/P RK refractive surgery in each eye.    Nuclear sclerot/cortical cataract in both eyes.  Still no need for cataract surgery in either eye.     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    No impact on visual acuity.      History of CVA, with secondary left homonymous hemianopsia, per Dr. Skelton.  Full peripheral visual field to HM in all quadrants on confrontation visual field test in each eye.  Prior diagnosis of bilateral optic atrophy, but very subtle presentation (at worst).     Few vitreous floaters in both eyes.  No evidence of retinal etiology.      Hyperopia with astigmatism in each eye, with very satisfactory correctable visual acuity in each eye.  Presbyopia.       New spectacle lens Rx issued for full-time wear.  Recheck in six months.

## 2022-07-06 ENCOUNTER — PATIENT MESSAGE (OUTPATIENT)
Dept: OPTOMETRY | Facility: CLINIC | Age: 72
End: 2022-07-06
Payer: MEDICARE

## 2022-07-08 ENCOUNTER — PATIENT MESSAGE (OUTPATIENT)
Dept: INTERNAL MEDICINE | Facility: CLINIC | Age: 72
End: 2022-07-08
Payer: MEDICARE

## 2022-07-12 ENCOUNTER — TELEPHONE (OUTPATIENT)
Dept: SLEEP MEDICINE | Facility: CLINIC | Age: 72
End: 2022-07-12
Payer: MEDICARE

## 2022-07-12 NOTE — TELEPHONE ENCOUNTER
----- Message from Nadia Nolasco MA sent at 7/12/2022 10:07 AM CDT -----  Hi,    Dr. Barrett would like pt scheduled for cpap f/u and it can be virtual. Thanks!    Nadia Nolasco

## 2022-07-17 ENCOUNTER — PATIENT MESSAGE (OUTPATIENT)
Dept: ADMINISTRATIVE | Facility: OTHER | Age: 72
End: 2022-07-17
Payer: MEDICARE

## 2022-07-26 ENCOUNTER — PATIENT MESSAGE (OUTPATIENT)
Dept: ADMINISTRATIVE | Facility: OTHER | Age: 72
End: 2022-07-26
Payer: MEDICARE

## 2022-08-25 ENCOUNTER — CLINICAL SUPPORT (OUTPATIENT)
Dept: CARDIOLOGY | Facility: HOSPITAL | Age: 72
End: 2022-08-25
Payer: MEDICARE

## 2022-08-25 DIAGNOSIS — Z95.810 PRESENCE OF AUTOMATIC (IMPLANTABLE) CARDIAC DEFIBRILLATOR: ICD-10-CM

## 2022-08-25 DIAGNOSIS — I48.91 UNSPECIFIED ATRIAL FIBRILLATION: ICD-10-CM

## 2022-08-25 PROCEDURE — 93296 REM INTERROG EVL PM/IDS: CPT | Performed by: INTERNAL MEDICINE

## 2022-09-19 ENCOUNTER — OFFICE VISIT (OUTPATIENT)
Dept: SLEEP MEDICINE | Facility: CLINIC | Age: 72
End: 2022-09-19
Payer: MEDICARE

## 2022-09-19 VITALS
SYSTOLIC BLOOD PRESSURE: 132 MMHG | WEIGHT: 261.38 LBS | BODY MASS INDEX: 36.46 KG/M2 | HEART RATE: 60 BPM | DIASTOLIC BLOOD PRESSURE: 67 MMHG

## 2022-09-19 DIAGNOSIS — G47.33 OSA (OBSTRUCTIVE SLEEP APNEA): Primary | ICD-10-CM

## 2022-09-19 PROCEDURE — 99214 PR OFFICE/OUTPT VISIT, EST, LEVL IV, 30-39 MIN: ICD-10-PCS | Mod: S$GLB,,, | Performed by: PSYCHIATRY & NEUROLOGY

## 2022-09-19 PROCEDURE — 3078F DIAST BP <80 MM HG: CPT | Mod: CPTII,S$GLB,, | Performed by: PSYCHIATRY & NEUROLOGY

## 2022-09-19 PROCEDURE — 99999 PR PBB SHADOW E&M-EST. PATIENT-LVL III: CPT | Mod: PBBFAC,,, | Performed by: PSYCHIATRY & NEUROLOGY

## 2022-09-19 PROCEDURE — 3288F FALL RISK ASSESSMENT DOCD: CPT | Mod: CPTII,S$GLB,, | Performed by: PSYCHIATRY & NEUROLOGY

## 2022-09-19 PROCEDURE — 1101F PR PT FALLS ASSESS DOC 0-1 FALLS W/OUT INJ PAST YR: ICD-10-PCS | Mod: CPTII,S$GLB,, | Performed by: PSYCHIATRY & NEUROLOGY

## 2022-09-19 PROCEDURE — 1126F AMNT PAIN NOTED NONE PRSNT: CPT | Mod: CPTII,S$GLB,, | Performed by: PSYCHIATRY & NEUROLOGY

## 2022-09-19 PROCEDURE — 3288F PR FALLS RISK ASSESSMENT DOCUMENTED: ICD-10-PCS | Mod: CPTII,S$GLB,, | Performed by: PSYCHIATRY & NEUROLOGY

## 2022-09-19 PROCEDURE — 3075F SYST BP GE 130 - 139MM HG: CPT | Mod: CPTII,S$GLB,, | Performed by: PSYCHIATRY & NEUROLOGY

## 2022-09-19 PROCEDURE — 99214 OFFICE O/P EST MOD 30 MIN: CPT | Mod: S$GLB,,, | Performed by: PSYCHIATRY & NEUROLOGY

## 2022-09-19 PROCEDURE — 1126F PR PAIN SEVERITY QUANTIFIED, NO PAIN PRESENT: ICD-10-PCS | Mod: CPTII,S$GLB,, | Performed by: PSYCHIATRY & NEUROLOGY

## 2022-09-19 PROCEDURE — 1159F PR MEDICATION LIST DOCUMENTED IN MEDICAL RECORD: ICD-10-PCS | Mod: CPTII,S$GLB,, | Performed by: PSYCHIATRY & NEUROLOGY

## 2022-09-19 PROCEDURE — 3008F BODY MASS INDEX DOCD: CPT | Mod: CPTII,S$GLB,, | Performed by: PSYCHIATRY & NEUROLOGY

## 2022-09-19 PROCEDURE — 99999 PR PBB SHADOW E&M-EST. PATIENT-LVL III: ICD-10-PCS | Mod: PBBFAC,,, | Performed by: PSYCHIATRY & NEUROLOGY

## 2022-09-19 PROCEDURE — 3008F PR BODY MASS INDEX (BMI) DOCUMENTED: ICD-10-PCS | Mod: CPTII,S$GLB,, | Performed by: PSYCHIATRY & NEUROLOGY

## 2022-09-19 PROCEDURE — 3075F PR MOST RECENT SYSTOLIC BLOOD PRESS GE 130-139MM HG: ICD-10-PCS | Mod: CPTII,S$GLB,, | Performed by: PSYCHIATRY & NEUROLOGY

## 2022-09-19 PROCEDURE — 4010F PR ACE/ARB THEARPY RXD/TAKEN: ICD-10-PCS | Mod: CPTII,S$GLB,, | Performed by: PSYCHIATRY & NEUROLOGY

## 2022-09-19 PROCEDURE — 1159F MED LIST DOCD IN RCRD: CPT | Mod: CPTII,S$GLB,, | Performed by: PSYCHIATRY & NEUROLOGY

## 2022-09-19 PROCEDURE — 3078F PR MOST RECENT DIASTOLIC BLOOD PRESSURE < 80 MM HG: ICD-10-PCS | Mod: CPTII,S$GLB,, | Performed by: PSYCHIATRY & NEUROLOGY

## 2022-09-19 PROCEDURE — 1101F PT FALLS ASSESS-DOCD LE1/YR: CPT | Mod: CPTII,S$GLB,, | Performed by: PSYCHIATRY & NEUROLOGY

## 2022-09-19 PROCEDURE — 4010F ACE/ARB THERAPY RXD/TAKEN: CPT | Mod: CPTII,S$GLB,, | Performed by: PSYCHIATRY & NEUROLOGY

## 2022-09-19 NOTE — PROGRESS NOTES
EPWORTH SLEEPINESS SCALE TOTAL SCORE  2022 10/13/2021   Total score 6 11       Hussein Steinberg is a 72 y.o. male seen today for CPAP follow up. Last seen on 10/19/2021    The patient reports improved sleep continuity and daytime sleepiness on PAP. ESS today is 624.  Denies break through snoring.  No dry mouth.   No aerophagia or air hunger. No significant mask leaks and discomfort.    REsmed via [facility]    APAP 4 to 20; 90% was 12  2022 - 2022  : 1950  Age: 72 years  [facility]  Compliance Report  Usage 2022 - 2022  Usage days 29/30 days (97%)  >= 4 hours 29 days (97%)  < 4 hours 0 days (0%)  Usage hours 271 hours 37 minutes  Average usage (total days) 9 hours 3 minutes  Average usage (days used) 9 hours 22 minutes  Median usage (days used) 9 hours 28 minutes  Total used hours (value since last reset - 2022) 782 hours  AirSense 11 AutoSet  Serial number [id number]  Mode AutoSet  Min Pressure 6 cmH2O  Max Pressure 20 cmH2O  EPR Fulltime  EPR level 3  Response Standard  Therapy  Pressure - cmH2O Median: 10.6 95th percentile: 12.8 Maximum: 14.3  Leaks - L/min Median: 7.4 95th percentile: 22.9 Maximum: 43.0  Events per hour AI: 3.3 HI: 0.7 AHI: 4.0  Apnea Index Central: 0.3 Obstructive: 2.7 Unknown: 0.3  RERA Index 0.1  Cheyne-Hinds respiration (average duration per night) 0 minutes (0%    Bedtime: 10  Sleep latency: fast  Nocturnal awakenings:2 (was 4 before CPAP) Returns to sleep in fast  Wake up time: 8    Medications pertinent to sleep disorders:nono  Previously tried medications:    DME:   SLEEP STUDIES:       Sleep studies  2022:  Severe sleep disordered breathing (AHI=45) is  noted based on a 4% hypopnea desaturation criteria. The patient slept supine 87% of the night based on valid recording  time of 5.96 hours and is 1.3 times as likely to have apneas/hypopneas when supine. The apneas/hypopneas are  accompanied by severe oxygen  desaturation (percent time below 90% SpO2: 35.4%, Min SpO2: 68.8%). The average  desaturation across all sleep disordered breathing events is 5.1%. The mean pulse rate is 60 BPM.    Hussein Steinberg  reports symptoms concerning for parasomnia except for occasional somniloquy.  The patient  denies auxiliary symptoms of narcolepsy including sleep onset paralysis, hypnagogic hallucinations, sleep attacks and cataplexy.    Hussein Steinberg denied symptoms concerning for RLS; nocturnal leg movements have not been noticed.   The patient does not experience sleep related leg cramps.       Medications pertinent to sleep  disorders taken currently: Tylenol PM - sometimes works.  Previous  medications taken  for sleep disorders:  Melatonin, Benadryl, Trazodone - does not recall the dose - did not work.     Occupation:retired  Bed partner: his wife  Exercise routine: active   Caffeine:  1 beverages per day  Alcohol:no    EPWORTH SLEEPINESS SCALE TOTAL SCORE  9/12/2022 10/13/2021   Total score 6 11         EPWORTH SLEEPINESS SCALE 10/13/2021   Sitting and reading 1   Watching TV 3   Sitting, inactive in a public place (e.g. a theatre or a meeting) 0   As a passenger in a car for an hour without a break 2   Lying down to rest in the afternoon when circumstances permit 3   Sitting and talking to someone 0   Sitting quietly after a lunch without alcohol 2   In a car, while stopped for a few minutes in traffic 0   Total score 11       No flowsheet data found.  GAD7 8/20/2019 1/22/2019   1. Feeling nervous, anxious, or on edge? 0 0   2. Not being able to stop or control worrying? 0 0       EPWORTH SLEEPINESS SCALE 10/13/2021   Sitting and reading 1   Watching TV 3   Sitting, inactive in a public place (e.g. a theatre or a meeting) 0   As a passenger in a car for an hour without a break 2   Lying down to rest in the afternoon when circumstances permit 3   Sitting and talking to someone 0   Sitting quietly after a lunch without  alcohol 2   In a car, while stopped for a few minutes in traffic 0   Total score 11     Sleep Clinic New Patient 10/13/2021   What time do you go to bed on a week day? (Give a range) 10 PM   What time do you go to bed on a day off? (Give a range) 9:00 - 10:00 PM   How long does it take you to fall asleep? (Give a range) 30+ minutes   On average, how many times per night do you wake up? 4   How long does it take you to fall back into sleep? (Give a range) Sometimes up to an hour   What time do you wake up to start your day on a week day? (Give a range) 7:30 - 9:00   What time do you wake up to start your day on a day off? (Give a range) 7:30-9:00   What time do you get out of bed? (Give a range) 7:30-9:00   On average, how many hours do you sleep? 6   On average, how many naps do you take per day? 1   Rate your sleep quality from 0 to 5 (0-poor, 5-great). 1   Have you experienced:  N/a   How much weight have you lost or gained (in lbs.) in the last year? 0   On average, how many times per night do you go to the bathroom?  3   Have you ever had a sleep study/CPAP machine/surgery for sleep apnea? No   Have you ever had a CPAP machine for sleep apnea? No   Have you ever had surgery for sleep apnea? No       Sleep Clinic ROS  10/13/2021   Difficulty breathing through the nose?  Yes   Sore throat or dry mouth in the morning? Yes   Irregular or very fast heart beat?  Yes   Shortness of breath?  No   Acid reflux? No   Body aches and pains?  Yes   Morning headaches? No   Dizziness? No   Mood changes?  No   Do you exercise?  Yes   Do you feel like moving your legs a lot?  No               PAST MEDICAL HISTORY:    Active Ambulatory Problems     Diagnosis Date Noted    Nuclear sclerosis - Both Eyes 08/28/2012    Atrial fibrillation 03/19/2013    Essential hypertension 10/08/2012    LBBB (left bundle branch block) 03/19/2013    ICD (implantable cardioverter-defibrillator), biventricular, in situ 03/19/2013    Prostate cancer      Class 2 severe obesity due to excess calories with serious comorbidity and body mass index (BMI) of 35.0 to 35.9 in adult 12/06/2016    DCM (dilated cardiomyopathy) 06/15/2017    Squamous cell carcinoma in situ of skin 01/01/2017    Hyperlipidemia 06/19/2018    Other insomnia 08/20/2019    History of intracerebral hemorrhage without residual deficit 08/23/2019    History of CVA (cerebrovascular accident) 08/04/2020    Statin intolerance 08/04/2020    Long term current use of amiodarone     History of colon polyps 07/19/2021    YUMIKO (obstructive sleep apnea)      Resolved Ambulatory Problems     Diagnosis Date Noted    Cerebral embolism with cerebral infarction 03/19/2013    SVT (supraventricular tachycardia) 10/10/2012    LV dysfunction 03/19/2013    HTN (hypertension) 03/19/2013    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    Dyspnea 04/07/2014    Lipid disorder 11/06/2015    Need for vaccination for Strep pneumoniae 11/06/2015    Bronchitis 03/20/2017    History of prostate cancer 06/23/2017     Past Medical History:   Diagnosis Date    Basal cell carcinoma     Cardiac arrest 2012    Cataract     High cholesterol     Hypertension     Kidney stone 10/2013    Stroke 09/2012                PAST SURGICAL HISTORY:    Past Surgical History:   Procedure Laterality Date    arthroscopic  knee    BASAL CELL CARCINOMA EXCISION  2018    removed from nose    Basil cell   2022    CARDIAC PACEMAKER PLACEMENT  2013    and defibrillator    COLONOSCOPY  2/12/2016    + polyp - repeat in 5 years- Dr. Calles    COLONOSCOPY N/A 7/19/2021    Procedure: COLONOSCOPY;  Surgeon: Troy Bryson MD;  Location: [facility] ([address] [place] [address]);  Service: Endoscopy;  Laterality: N/A;  pacemaker/AICD-[facility]  fully vaccinated-GT     ok to hold Pradaxa 2 days per Dr Chepe Moscoso prep    EYE SURGERY  1992    PROSTATECTOMY      SKIN CANCER EXCISION Left 10/2017    squamous cell carcinoma removed from left arm    TREATMENT OF CARDIAC ARRHYTHMIA N/A 7/31/2020     Procedure: CARDIOVERSION;  Surgeon: Dejan Mo MD;  Location: [facility] EP LAB;  Service: Cardiology;  Laterality: N/A;  afib, HERIBERTO, DCCV, anes, DM, 3 Prep    VASECTOMY  1988         FAMILY HISTORY:                Family History   Problem Relation Age of Onset    Macular degeneration Mother     Cataracts Mother     Hypertension Mother     Glaucoma Mother     Heart disease Mother         09/2017 passed  age 95 of heart attack    Cancer Father 56        Leukemia    Heart attack Brother 59        had stent placed    Heart disease Brother     Heart attack Maternal Grandfather     Heart attack Paternal Grandfather     No Known Problems Sister     Parkinsonism Brother 54    No Known Problems Brother     No Known Problems Daughter     No Known Problems Daughter     Amblyopia Neg Hx     Blindness Neg Hx     Diabetes Neg Hx     Retinal detachment Neg Hx     Strabismus Neg Hx     Stroke Neg Hx     Thyroid disease Neg Hx        SOCIAL HISTORY:          Tobacco:   Social History     Tobacco Use   Smoking Status Never   Smokeless Tobacco Never       alcohol use:    Social History     Substance and Sexual Activity   Alcohol Use Never                   ALLERGIES:    Review of patient's allergies indicates:   Allergen Reactions    Olmesartan Diarrhea     Diarrhea and muscle aches since starting medication.     Lisinopril Other (See Comments)     Dry cough       CURRENT MEDICATIONS:    Current Outpatient Medications   Medication Sig Dispense Refill    acetaminophen (TYLENOL) 325 MG tablet Take 2 tablets (650 mg total) by mouth every 6 (six) hours as needed (pain 1-4/10 pain scale). 15 tablet 0    amiodarone (PACERONE) 200 MG Tab Take 1 tablet (200 mg total) by mouth once daily. 90 tablet 3    amLODIPine (NORVASC) 10 MG tablet Take 1 tablet (10 mg total) by mouth once daily. 90 tablet 3    candesartan (ATACAND) 32 MG tablet Take 1 tablet (32 mg total) by mouth once daily. 90 tablet 3    dabigatran etexilate (PRADAXA) 150 mg Cap Take  1 capsule (150 mg total) by mouth 2 (two) times a day. TAKE 1 TABLET TWICE A  capsule 3    ezetimibe (ZETIA) 10 mg tablet TAKE 1 TABLET BY MOUTH EVERY DAY 90 tablet 3    folic acid/multivit-min/lutein (CENTRUM SILVER ORAL) Take by mouth once daily.      metoprolol tartrate (LOPRESSOR) 100 MG tablet Take 1 tablet (100 mg total) by mouth 2 (two) times daily. 180 tablet 3    triamterene-hydrochlorothiazide 37.5-25 mg (MAXZIDE-25) 37.5-25 mg per tablet Take 1 tablet by mouth once daily. 90 tablet 3    traZODone (DESYREL) 100 MG tablet Take 1 tablet (100 mg total) by mouth every evening. 30 tablet 11     No current facility-administered medications for this visit.                        PHYSICAL EXAM:  /67 (BP Location: Right arm, Patient Position: Sitting, BP Method: Large (Automatic))   Pulse 60   Wt 118.6 kg (261 lb 6.4 oz)   BMI 36.46 kg/m²   GENERAL: Normal development, well groomed.  HEENT:   HEENT:  Conjunctivae are non-erythematous; Pupils equal, round, and reactive to light; Nose is symmetrical; Nasal mucosa is pink and moist; Septum is midline; Inferior turbinates are normal; Nasal airflow is normal; Posterior pharynx is pink; Modified Mallampati:III-IV; Posterior palate is low; Tonsils not visualized; Uvula is elongated;Tongue is normal; Dentition is fair; No TMJ tenderness; Jaw opening and protrusion without click and without discomfort.  NECK: Supple. Neck circumference is 15 inches. No thyromegaly. No palpable nodes.     SKIN: On face and neck: No abrasions, no rashes, no lesions.  No subcutaneous nodules are palpable.  RESPIRATORY: Chest is clear to auscultation.  Normal chest expansion and non-labored breathing at rest.  CARDIOVASCULAR: Normal S1, S2.  No murmurs, gallops or rubs. No carotid bruits bilaterally.  No edema. No clubbing. No cyanosis.    NEURO: Oriented to time, place and person. Normal attention span and concentration. Gait normal.    PSYCH: Affect is full. Mood is  "normal  MUSCULOSKELETAL: Moves 4 extremities. Gait normal.           ASSESSMENT:    1. YUMIKO - severe The patient symptomatically has  excessive daytime sleepiness, snoring, excessive daytime fatigue, interrupted sleep and nocturia  with exam findings of "a crowded oral airway and elevated body mass index. The patient has medical co-morbidities of atrial fibrillation, hypertension and history of stroke,  which can be worsened by YUMIKO. This warrants further investigation for possible obstructive sleep apnea. Benefiting from CPAP use in terms of sleep continuity and daytime sleepiness.   The patient is  CPAP compliant and finds CPAP very beneficial in terms of sleep continuity/helping with his daytime sleepiness and fatigue. .           PLAN:    DIncrease APAP from 6-20 to 7-20 to help further reduce AHI which is above 5 on some nightst.         The patient is a amanda risk for obesity hypoventilation  Syndrome  given excessive BMI (nearly 40); high risk airway comromised which can be a safety issue due to inadequate titration with auto PAP.      During our discussion today, we talked about the etiology of  YUMIKO as well as the potential ramifications of untreated sleep apnea, which could include daytime sleepiness, hypertension, heart disease and/or stroke.  We discussed potential treatment options, which could include weight loss, body positioning, continuous positive airway pressure (CPAP), or referral for surgical consideration. Meanwhile, he  is urged to avoid supine sleep, weight gain and alcoholic beverages since all of these can worsen YUMIKO.     The patient was given open opportunity to ask questions and/or express concerns about treatment plan. Two point patient identifier confirmed.       Precautions: The patient was advised to abstain from driving should he feel sleepy or drowsy.    Follow up: MD after the sleep study has been completed.     31-minute visit. >50% spent counseling patient and coordination of " care.  The patient was  cautioned against drowsy driving.

## 2022-10-24 ENCOUNTER — OFFICE VISIT (OUTPATIENT)
Dept: INTERNAL MEDICINE | Facility: CLINIC | Age: 72
End: 2022-10-24
Payer: MEDICARE

## 2022-10-24 VITALS
OXYGEN SATURATION: 94 % | DIASTOLIC BLOOD PRESSURE: 82 MMHG | TEMPERATURE: 99 F | BODY MASS INDEX: 36.62 KG/M2 | HEART RATE: 69 BPM | SYSTOLIC BLOOD PRESSURE: 130 MMHG | WEIGHT: 261.56 LBS | HEIGHT: 71 IN

## 2022-10-24 DIAGNOSIS — Z95.810 ICD (IMPLANTABLE CARDIOVERTER-DEFIBRILLATOR), BIVENTRICULAR, IN SITU: ICD-10-CM

## 2022-10-24 DIAGNOSIS — Z79.899 LONG TERM CURRENT USE OF AMIODARONE: ICD-10-CM

## 2022-10-24 DIAGNOSIS — T46.6X5A MYALGIA DUE TO STATIN: ICD-10-CM

## 2022-10-24 DIAGNOSIS — M79.10 MYALGIA DUE TO STATIN: ICD-10-CM

## 2022-10-24 DIAGNOSIS — I42.0 DCM (DILATED CARDIOMYOPATHY): ICD-10-CM

## 2022-10-24 DIAGNOSIS — G47.33 OSA (OBSTRUCTIVE SLEEP APNEA): ICD-10-CM

## 2022-10-24 DIAGNOSIS — Z86.73 HISTORY OF CVA (CEREBROVASCULAR ACCIDENT): ICD-10-CM

## 2022-10-24 DIAGNOSIS — E66.01 CLASS 2 SEVERE OBESITY DUE TO EXCESS CALORIES WITH SERIOUS COMORBIDITY AND BODY MASS INDEX (BMI) OF 36.0 TO 36.9 IN ADULT: ICD-10-CM

## 2022-10-24 DIAGNOSIS — I48.19 PERSISTENT ATRIAL FIBRILLATION: ICD-10-CM

## 2022-10-24 DIAGNOSIS — I10 ESSENTIAL HYPERTENSION: ICD-10-CM

## 2022-10-24 DIAGNOSIS — Z85.46 HISTORY OF PROSTATE CANCER: ICD-10-CM

## 2022-10-24 DIAGNOSIS — R79.89 ELEVATED TSH: ICD-10-CM

## 2022-10-24 DIAGNOSIS — E78.2 MIXED HYPERLIPIDEMIA: ICD-10-CM

## 2022-10-24 PROCEDURE — 4010F PR ACE/ARB THEARPY RXD/TAKEN: ICD-10-PCS | Mod: CPTII,S$GLB,, | Performed by: INTERNAL MEDICINE

## 2022-10-24 PROCEDURE — 1159F PR MEDICATION LIST DOCUMENTED IN MEDICAL RECORD: ICD-10-PCS | Mod: CPTII,S$GLB,, | Performed by: INTERNAL MEDICINE

## 2022-10-24 PROCEDURE — 3075F SYST BP GE 130 - 139MM HG: CPT | Mod: CPTII,S$GLB,, | Performed by: INTERNAL MEDICINE

## 2022-10-24 PROCEDURE — 99999 PR PBB SHADOW E&M-EST. PATIENT-LVL III: CPT | Mod: PBBFAC,,, | Performed by: INTERNAL MEDICINE

## 2022-10-24 PROCEDURE — 1126F AMNT PAIN NOTED NONE PRSNT: CPT | Mod: CPTII,S$GLB,, | Performed by: INTERNAL MEDICINE

## 2022-10-24 PROCEDURE — 4010F ACE/ARB THERAPY RXD/TAKEN: CPT | Mod: CPTII,S$GLB,, | Performed by: INTERNAL MEDICINE

## 2022-10-24 PROCEDURE — 99999 PR PBB SHADOW E&M-EST. PATIENT-LVL III: ICD-10-PCS | Mod: PBBFAC,,, | Performed by: INTERNAL MEDICINE

## 2022-10-24 PROCEDURE — 3075F PR MOST RECENT SYSTOLIC BLOOD PRESS GE 130-139MM HG: ICD-10-PCS | Mod: CPTII,S$GLB,, | Performed by: INTERNAL MEDICINE

## 2022-10-24 PROCEDURE — 1159F MED LIST DOCD IN RCRD: CPT | Mod: CPTII,S$GLB,, | Performed by: INTERNAL MEDICINE

## 2022-10-24 PROCEDURE — 99214 OFFICE O/P EST MOD 30 MIN: CPT | Mod: S$GLB,,, | Performed by: INTERNAL MEDICINE

## 2022-10-24 PROCEDURE — 99214 PR OFFICE/OUTPT VISIT, EST, LEVL IV, 30-39 MIN: ICD-10-PCS | Mod: S$GLB,,, | Performed by: INTERNAL MEDICINE

## 2022-10-24 PROCEDURE — 3079F PR MOST RECENT DIASTOLIC BLOOD PRESSURE 80-89 MM HG: ICD-10-PCS | Mod: CPTII,S$GLB,, | Performed by: INTERNAL MEDICINE

## 2022-10-24 PROCEDURE — 1126F PR PAIN SEVERITY QUANTIFIED, NO PAIN PRESENT: ICD-10-PCS | Mod: CPTII,S$GLB,, | Performed by: INTERNAL MEDICINE

## 2022-10-24 PROCEDURE — 3079F DIAST BP 80-89 MM HG: CPT | Mod: CPTII,S$GLB,, | Performed by: INTERNAL MEDICINE

## 2022-10-24 NOTE — ASSESSMENT & PLAN NOTE
Treated by Dr. Rasmussen, last seen several years ago. Sees PRN. No complaints at this time.  Occasional nocturia.

## 2022-10-24 NOTE — ASSESSMENT & PLAN NOTE
Stable on zetia 10 mg.  Doesn't tolerate statins, had joint pains.  The 10-year ASCVD risk score (José BIRCH, et al., 2019) is: 24.4%    Values used to calculate the score:      Age: 72 years      Sex: Male      Is Non- : No      Diabetic: No      Tobacco smoker: No      Systolic Blood Pressure: 132 mmHg      Is BP treated: Yes      HDL Cholesterol: 47 mg/dL      Total Cholesterol: 180 mg/dL

## 2022-10-24 NOTE — PROGRESS NOTES
[facility] Note    Chief Complaint      Chief Complaint   Patient presents with    Annual Exam    Follow-up       History of Present Illness      Hussein Steinberg is a 72 y.o. male who presents today for follow up HTN/HLD.  Patient comes to appointment alone.  EP: David Moho: Dheeraj Sleep: [place]    Problem List Items Addressed This Visit       Atrial fibrillation    Current Assessment & Plan     Sees Dr. Mo, no CP/SOB.  Sleeps on 2 pillows.  On BB, ARB, pradaxa. Has AICD.         Essential hypertension    Current Assessment & Plan     BP controlled on norvasc 10 mg, lopressor 100 mg BID, maxzide and candesartan 32 mg daily.  No CP/SOB.  Occasional evening BLE edema.  Had dry cough with lisinopril, diarrhea with olmesartan.         ICD (implantable cardioverter-defibrillator), biventricular, in situ    Current Assessment & Plan     Battery replaced 9/2019 with Dr. Mo.  No issues at present.         Class 2 severe obesity due to excess calories with serious comorbidity and body mass index (BMI) of 36.0 to 36.9 in adult    Current Assessment & Plan     Has not been walking lately.  Diet has been about the same.         DCM (dilated cardiomyopathy)    Current Assessment & Plan     Sees Dr. Mo, no CP/SOB.  Sleeps on 2 pillows.  On BB, ARB, pradaxa. Has AICD.         History of prostate cancer    Current Assessment & Plan     Treated by Dr. Rasmussen, last seen several years ago. Sees PRN. No complaints at this time.  Occasional nocturia.         Hyperlipidemia    Current Assessment & Plan     Stable on zetia 10 mg.  Doesn't tolerate statins, had joint pains.  The 10-year ASCVD risk score (José BIRCH, et al., 2019) is: 24.4%    Values used to calculate the score:      Age: 72 years      Sex: Male      Is Non- : No      Diabetic: No      Tobacco smoker: No      Systolic Blood Pressure: 132 mmHg      Is BP treated: Yes      HDL Cholesterol: 47 mg/dL      Total  Cholesterol: 180 mg/dL           History of CVA (cerebrovascular accident)    Current Assessment & Plan     Stable on ASA, Pradaxa for secondary prevention.         Myalgia due to statin    Current Assessment & Plan     Does not tolerate statin         Long term current use of amiodarone    Current Assessment & Plan     UTD on eye exam.         YUMIKO (obstructive sleep apnea)    Current Assessment & Plan     Just got CPAP, using every night.  Sees sleep med.         Elevated TSH    Current Assessment & Plan     Mild elevation of TSH with normal free T4 on 8/2022 labs            Health Maintenance   Topic Date Due    Lipid Panel  08/19/2027    TETANUS VACCINE  05/13/2031    Hepatitis C Screening  Completed       Past Medical History:   Diagnosis Date    Atrial fibrillation     Basal cell carcinoma     Bronchitis 3/20/2017    Cardiac arrest 2012    has pacemaker/defibrillator placed 3/19/13 - [facility]    Cataract     DCM (dilated cardiomyopathy) 6/15/2017    High cholesterol     History of intracerebral hemorrhage without residual deficit 8/23/2019    History of prostate cancer 6/23/2017    Hypertension     Kidney stone 10/2013    LBBB (left bundle branch block) 10/10/2012    LV dysfunction 10/10/2012    Prostate cancer     Treated by Dr. Rasmussen    Squamous cell carcinoma in situ of skin 2017    left arm removed    Stroke 09/2012    + hemorrhagic infarct to right occipital lobe after started on Plavix following cardiac event    SVT (supraventricular tachycardia) 10/10/2012       Past Surgical History:   Procedure Laterality Date    arthroscopic  knee    BASAL CELL CARCINOMA EXCISION  2018    removed from nose    Basil cell   2022    CARDIAC PACEMAKER PLACEMENT  2013    and defibrillator    COLONOSCOPY  2/12/2016    + polyp - repeat in 5 years- Dr. Calles    COLONOSCOPY N/A 7/19/2021    Procedure: COLONOSCOPY;  Surgeon: Troy Bryson MD;  Location: [facility] ([address] [place] [address]);  Service:  Endoscopy;  Laterality: N/A;  pacemaker/AICD-[facility]  fully vaccinated-GT     ok to hold Pradaxa 2 days per Dr Mo  Mirbrandy prep    EYE SURGERY  1992    PROSTATECTOMY      SKIN CANCER EXCISION Left 10/2017    squamous cell carcinoma removed from left arm    TREATMENT OF CARDIAC ARRHYTHMIA N/A 7/31/2020    Procedure: CARDIOVERSION;  Surgeon: Dejan Mo MD;  Location: [facility] beds at the [facility];  Service: Cardiology;  Laterality: N/A;  afib, HERIBERTO, DCCV, anes, DM, 3 Prep    VASECTOMY  1988       family history includes Cancer in his father; Cataracts in his mother; Glaucoma in his mother; Heart attack in his maternal grandfather and paternal grandfather; Heart attack (age of onset: 59) in his brother; Heart disease in his brother and mother; Hypertension in his mother; Macular degeneration in his mother; No Known Problems in his brother, daughter, daughter, and sister; Parkinsonism (age of onset: 54) in his brother.    Social History     Tobacco Use    Smoking status: Never    Smokeless tobacco: Never   Substance Use Topics    Alcohol use: Never    Drug use: Never       Review of Systems   Constitutional:  Negative for chills and fever.   HENT:  Negative for hearing loss and sore throat.    Eyes:  Negative for discharge.   Respiratory:  Negative for cough, shortness of breath and wheezing.    Cardiovascular:  Negative for chest pain and palpitations.   Gastrointestinal:  Negative for blood in stool, constipation, diarrhea, nausea and vomiting.   Genitourinary:  Negative for dysuria, hematuria and urgency.   Musculoskeletal:  Negative for falls and neck pain.   Neurological:  Negative for weakness and headaches.   Endo/Heme/Allergies:  Negative for polydipsia.      Outpatient Encounter Medications as of 10/24/2022   Medication Sig Dispense Refill    acetaminophen (TYLENOL) 325 MG tablet Take 2 tablets (650 mg total) by mouth every 6 (six) hours as needed (pain 1-4/10 pain scale). 15 tablet 0    amiodarone (PACERONE) 200 MG Tab  "Take 1 tablet (200 mg total) by mouth once daily. 90 tablet 3    amLODIPine (NORVASC) 10 MG tablet Take 1 tablet (10 mg total) by mouth once daily. 90 tablet 3    candesartan (ATACAND) 32 MG tablet Take 1 tablet (32 mg total) by mouth once daily. 90 tablet 3    dabigatran etexilate (PRADAXA) 150 mg Cap Take 1 capsule (150 mg total) by mouth 2 (two) times a day. TAKE 1 TABLET TWICE A  capsule 3    ezetimibe (ZETIA) 10 mg tablet TAKE 1 TABLET BY MOUTH EVERY DAY 90 tablet 3    folic acid/multivit-min/lutein (CENTRUM SILVER ORAL) Take by mouth once daily.      metoprolol tartrate (LOPRESSOR) 100 MG tablet Take 1 tablet (100 mg total) by mouth 2 (two) times daily. 180 tablet 3    triamterene-hydrochlorothiazide 37.5-25 mg (MAXZIDE-25) 37.5-25 mg per tablet Take 1 tablet by mouth once daily. 90 tablet 3    [DISCONTINUED] traZODone (DESYREL) 100 MG tablet Take 1 tablet (100 mg total) by mouth every evening. 30 tablet 11     No facility-administered encounter medications on file as of 10/24/2022.        Review of patient's allergies indicates:   Allergen Reactions    Olmesartan Diarrhea     Diarrhea and muscle aches since starting medication.     Lisinopril Other (See Comments)     Dry cough       Physical Exam      Vital Signs  Temp: 98.5 °F (36.9 °C)  Pulse: 69  SpO2: (!) 94 %  BP: 130/82  Pain Score: 0-No pain  Height and Weight  Height: 5' 11" (180.3 cm)  Weight: 118.6 kg (261 lb 9.2 oz)  BSA (Calculated - sq m): 2.44 sq meters  BMI (Calculated): 36.5  Weight in (lb) to have BMI = 25: 178.9]  Body mass index is 36.48 kg/m².    Physical Exam  Constitutional:       Appearance: He is well-developed.   HENT:      Head: Normocephalic and atraumatic.   Neck:      Thyroid: No thyromegaly.   Cardiovascular:      Rate and Rhythm: Normal rate and regular rhythm.      Heart sounds: No murmur heard.  Pulmonary:      Effort: Pulmonary effort is normal. No respiratory distress.      Breath sounds: Normal breath sounds. "   Abdominal:      General: There is no distension.      Palpations: Abdomen is soft.      Tenderness: There is no abdominal tenderness.   Skin:     General: Skin is warm and dry.   Neurological:      Mental Status: He is alert and oriented to person, place, and time.   Psychiatric:         Behavior: Behavior normal.        Laboratory:  CBC:  Recent Labs   Lab Result Units 08/19/22  0745   WBC K/uL 7.36   RBC M/uL 5.40   Hemoglobin g/dL 15.1   Hematocrit % 47.0   Platelets K/uL 223   MCV fL 87   MCH pg 28.0   MCHC g/dL 32.1     CMP:  Recent Labs   Lab Result Units 08/19/22  0745   Glucose mg/dL 97   Calcium mg/dL 9.1   Albumin g/dL 4.7  4.7   Total Protein g/dL 7.4  7.4   Sodium mmol/L 144   Potassium mmol/L 4.0   CO2 mmol/L 30*   Chloride mmol/L 100   BUN mg/dL 16   Alkaline Phosphatase U/L 94  94   ALT U/L 25  25   AST U/L 27  27   Total Bilirubin mg/dL 0.8  0.8     URINALYSIS:  No results for input(s): COLORU, CLARITYU, SPECGRAV, PHUR, PROTEINUA, GLUCOSEU, BILIRUBINCON, BLOODU, WBCU, RBCU, BACTERIA, MUCUS, NITRITE, LEUKOCYTESUR, UROBILINOGEN, HYALINECASTS in the last 2160 hours.   LIPIDS:  Recent Labs   Lab Result Units 08/19/22  0745   TSH uIU/mL 9.430*   HDL mg/dL 47   Cholesterol mg/dL 180   Triglycerides mg/dL 91   LDL Cholesterol mg/dL 114.8   HDL/Cholesterol Ratio % 26.1   Non-HDL Cholesterol mg/dL 133   Total Cholesterol/HDL Ratio  3.8     TSH:  Recent Labs   Lab Result Units 08/19/22  0745   TSH uIU/mL 9.430*     A1C:  No results for input(s): HGBA1C in the last 2160 hours.    Radiology:  No results found in the last 30 days.     Assessment/Plan     Hussein Steinberg is a 72 y.o.male with:    1. Essential hypertension    2. Persistent atrial fibrillation    3. History of CVA (cerebrovascular accident)    4. Mixed hyperlipidemia    5. Myalgia due to statin    6. Class 2 severe obesity due to excess calories with serious comorbidity and body mass index (BMI) of 36.0 to 36.9 in adult    7. History of  prostate cancer    8. ICD (implantable cardioverter-defibrillator), biventricular, in situ    9. DCM (dilated cardiomyopathy)    10. Elevated TSH    11. YUMIKO (obstructive sleep apnea)    12. Long term current use of amiodarone    -counseled on increasing exercise  -will get bivalent COVID booster  -Continue current medications and maintain follow up with specialists.    -Follow up in about 1 year (around 10/24/2023) for follow up of medical problems.       Lacey Rainey MD  [facility]                Answers submitted by the patient for this visit:  Review of Systems Questionnaire (Submitted on 10/17/2022)  activity change: No  unexpected weight change: No  rhinorrhea: No  trouble swallowing: No  visual disturbance: No  chest tightness: No  polyuria: No  difficulty urinating: No  joint swelling: No  arthralgias: No  confusion: No  dysphoric mood: No

## 2022-11-23 ENCOUNTER — CLINICAL SUPPORT (OUTPATIENT)
Dept: CARDIOLOGY | Facility: HOSPITAL | Age: 72
End: 2022-11-23
Payer: MEDICARE

## 2022-11-23 ENCOUNTER — TELEPHONE (OUTPATIENT)
Dept: ELECTROPHYSIOLOGY | Facility: CLINIC | Age: 72
End: 2022-11-23

## 2022-11-23 DIAGNOSIS — Z95.810 PRESENCE OF AUTOMATIC (IMPLANTABLE) CARDIAC DEFIBRILLATOR: ICD-10-CM

## 2022-11-23 PROCEDURE — 93295 DEV INTERROG REMOTE 1/2/MLT: CPT | Mod: ,,, | Performed by: INTERNAL MEDICINE

## 2022-11-23 PROCEDURE — 93295 CARDIAC DEVICE CHECK - REMOTE: ICD-10-PCS | Mod: ,,, | Performed by: INTERNAL MEDICINE

## 2022-11-23 PROCEDURE — 93296 REM INTERROG EVL PM/IDS: CPT | Performed by: INTERNAL MEDICINE

## 2022-12-15 ENCOUNTER — PATIENT MESSAGE (OUTPATIENT)
Dept: RESEARCH | Facility: CLINIC | Age: 72
End: 2022-12-15
Payer: MEDICARE

## 2022-12-23 ENCOUNTER — PATIENT MESSAGE (OUTPATIENT)
Dept: INTERNAL MEDICINE | Facility: CLINIC | Age: 72
End: 2022-12-23
Payer: MEDICARE

## 2022-12-23 ENCOUNTER — PATIENT MESSAGE (OUTPATIENT)
Dept: ELECTROPHYSIOLOGY | Facility: CLINIC | Age: 72
End: 2022-12-23
Payer: MEDICARE

## 2023-01-21 ENCOUNTER — PATIENT MESSAGE (OUTPATIENT)
Dept: ADMINISTRATIVE | Facility: OTHER | Age: 73
End: 2023-01-21
Payer: MEDICARE

## 2023-01-25 ENCOUNTER — OFFICE VISIT (OUTPATIENT)
Dept: ELECTROPHYSIOLOGY | Facility: CLINIC | Age: 73
End: 2023-01-25
Payer: MEDICARE

## 2023-01-25 ENCOUNTER — CLINICAL SUPPORT (OUTPATIENT)
Dept: CARDIOLOGY | Facility: HOSPITAL | Age: 73
End: 2023-01-25
Attending: INTERNAL MEDICINE
Payer: MEDICARE

## 2023-01-25 ENCOUNTER — HOSPITAL ENCOUNTER (OUTPATIENT)
Dept: CARDIOLOGY | Facility: CLINIC | Age: 73
Discharge: HOME | End: 2023-01-25
Payer: MEDICARE

## 2023-01-25 VITALS
DIASTOLIC BLOOD PRESSURE: 80 MMHG | HEIGHT: 71 IN | WEIGHT: 258.63 LBS | HEART RATE: 60 BPM | SYSTOLIC BLOOD PRESSURE: 125 MMHG | BODY MASS INDEX: 36.21 KG/M2

## 2023-01-25 DIAGNOSIS — I44.7 LBBB (LEFT BUNDLE BRANCH BLOCK): ICD-10-CM

## 2023-01-25 DIAGNOSIS — E66.01 CLASS 2 SEVERE OBESITY DUE TO EXCESS CALORIES WITH SERIOUS COMORBIDITY AND BODY MASS INDEX (BMI) OF 36.0 TO 36.9 IN ADULT: ICD-10-CM

## 2023-01-25 DIAGNOSIS — I48.19 PERSISTENT ATRIAL FIBRILLATION: ICD-10-CM

## 2023-01-25 DIAGNOSIS — R06.02 SHORTNESS OF BREATH: ICD-10-CM

## 2023-01-25 DIAGNOSIS — I48.0 PAROXYSMAL ATRIAL FIBRILLATION: ICD-10-CM

## 2023-01-25 DIAGNOSIS — I42.0 DCM (DILATED CARDIOMYOPATHY): ICD-10-CM

## 2023-01-25 DIAGNOSIS — R79.89 ELEVATED TSH: ICD-10-CM

## 2023-01-25 DIAGNOSIS — Z86.79 HISTORY OF INTRACEREBRAL HEMORRHAGE WITHOUT RESIDUAL DEFICIT: ICD-10-CM

## 2023-01-25 DIAGNOSIS — Z95.810 ICD (IMPLANTABLE CARDIOVERTER-DEFIBRILLATOR), BIVENTRICULAR, IN SITU: ICD-10-CM

## 2023-01-25 DIAGNOSIS — I10 ESSENTIAL HYPERTENSION: ICD-10-CM

## 2023-01-25 DIAGNOSIS — E78.2 MIXED HYPERLIPIDEMIA: ICD-10-CM

## 2023-01-25 DIAGNOSIS — Z79.899 LONG TERM CURRENT USE OF AMIODARONE: ICD-10-CM

## 2023-01-25 DIAGNOSIS — Z86.73 HISTORY OF CVA (CEREBROVASCULAR ACCIDENT): Primary | ICD-10-CM

## 2023-01-25 DIAGNOSIS — G47.33 OSA (OBSTRUCTIVE SLEEP APNEA): ICD-10-CM

## 2023-01-25 PROCEDURE — 3074F SYST BP LT 130 MM HG: CPT | Mod: CPTII,S$GLB,, | Performed by: INTERNAL MEDICINE

## 2023-01-25 PROCEDURE — 93010 ELECTROCARDIOGRAM REPORT: CPT | Mod: S$GLB,,, | Performed by: INTERNAL MEDICINE

## 2023-01-25 PROCEDURE — 93284 PRGRMG EVAL IMPLANTABLE DFB: CPT

## 2023-01-25 PROCEDURE — 3288F FALL RISK ASSESSMENT DOCD: CPT | Mod: CPTII,S$GLB,, | Performed by: INTERNAL MEDICINE

## 2023-01-25 PROCEDURE — 99214 OFFICE O/P EST MOD 30 MIN: CPT | Mod: S$GLB,,, | Performed by: INTERNAL MEDICINE

## 2023-01-25 PROCEDURE — 1126F PR PAIN SEVERITY QUANTIFIED, NO PAIN PRESENT: ICD-10-PCS | Mod: CPTII,S$GLB,, | Performed by: INTERNAL MEDICINE

## 2023-01-25 PROCEDURE — 93005 RHYTHM STRIP: ICD-10-PCS | Mod: S$GLB,,, | Performed by: INTERNAL MEDICINE

## 2023-01-25 PROCEDURE — 3079F DIAST BP 80-89 MM HG: CPT | Mod: CPTII,S$GLB,, | Performed by: INTERNAL MEDICINE

## 2023-01-25 PROCEDURE — 93005 ELECTROCARDIOGRAM TRACING: CPT | Mod: S$GLB,,, | Performed by: INTERNAL MEDICINE

## 2023-01-25 PROCEDURE — 1101F PR PT FALLS ASSESS DOC 0-1 FALLS W/OUT INJ PAST YR: ICD-10-PCS | Mod: CPTII,S$GLB,, | Performed by: INTERNAL MEDICINE

## 2023-01-25 PROCEDURE — 3288F PR FALLS RISK ASSESSMENT DOCUMENTED: ICD-10-PCS | Mod: CPTII,S$GLB,, | Performed by: INTERNAL MEDICINE

## 2023-01-25 PROCEDURE — 3008F BODY MASS INDEX DOCD: CPT | Mod: CPTII,S$GLB,, | Performed by: INTERNAL MEDICINE

## 2023-01-25 PROCEDURE — 99999 PR PBB SHADOW E&M-EST. PATIENT-LVL III: CPT | Mod: PBBFAC,,, | Performed by: INTERNAL MEDICINE

## 2023-01-25 PROCEDURE — 1126F AMNT PAIN NOTED NONE PRSNT: CPT | Mod: CPTII,S$GLB,, | Performed by: INTERNAL MEDICINE

## 2023-01-25 PROCEDURE — 93284 CARDIAC DEVICE CHECK - IN CLINIC & HOSPITAL: ICD-10-PCS | Mod: 26,,, | Performed by: INTERNAL MEDICINE

## 2023-01-25 PROCEDURE — 3074F PR MOST RECENT SYSTOLIC BLOOD PRESSURE < 130 MM HG: ICD-10-PCS | Mod: CPTII,S$GLB,, | Performed by: INTERNAL MEDICINE

## 2023-01-25 PROCEDURE — 93010 RHYTHM STRIP: ICD-10-PCS | Mod: S$GLB,,, | Performed by: INTERNAL MEDICINE

## 2023-01-25 PROCEDURE — 1159F MED LIST DOCD IN RCRD: CPT | Mod: CPTII,S$GLB,, | Performed by: INTERNAL MEDICINE

## 2023-01-25 PROCEDURE — 93284 PRGRMG EVAL IMPLANTABLE DFB: CPT | Mod: 26,,, | Performed by: INTERNAL MEDICINE

## 2023-01-25 PROCEDURE — 1160F PR REVIEW ALL MEDS BY PRESCRIBER/CLIN PHARMACIST DOCUMENTED: ICD-10-PCS | Mod: CPTII,S$GLB,, | Performed by: INTERNAL MEDICINE

## 2023-01-25 PROCEDURE — 1159F PR MEDICATION LIST DOCUMENTED IN MEDICAL RECORD: ICD-10-PCS | Mod: CPTII,S$GLB,, | Performed by: INTERNAL MEDICINE

## 2023-01-25 PROCEDURE — 99999 PR PBB SHADOW E&M-EST. PATIENT-LVL III: ICD-10-PCS | Mod: PBBFAC,,, | Performed by: INTERNAL MEDICINE

## 2023-01-25 PROCEDURE — 3079F PR MOST RECENT DIASTOLIC BLOOD PRESSURE 80-89 MM HG: ICD-10-PCS | Mod: CPTII,S$GLB,, | Performed by: INTERNAL MEDICINE

## 2023-01-25 PROCEDURE — 1101F PT FALLS ASSESS-DOCD LE1/YR: CPT | Mod: CPTII,S$GLB,, | Performed by: INTERNAL MEDICINE

## 2023-01-25 PROCEDURE — 99214 PR OFFICE/OUTPT VISIT, EST, LEVL IV, 30-39 MIN: ICD-10-PCS | Mod: S$GLB,,, | Performed by: INTERNAL MEDICINE

## 2023-01-25 PROCEDURE — 3008F PR BODY MASS INDEX (BMI) DOCUMENTED: ICD-10-PCS | Mod: CPTII,S$GLB,, | Performed by: INTERNAL MEDICINE

## 2023-01-25 PROCEDURE — 1160F RVW MEDS BY RX/DR IN RCRD: CPT | Mod: CPTII,S$GLB,, | Performed by: INTERNAL MEDICINE

## 2023-01-25 NOTE — PROGRESS NOTES
"  Subjective:   Patient ID:  Hussein Steinberg is a 72 y.o. male who presents for follow-up of PAF, NICM.     Mr. Steinberg is a 72 y.o. male with AF, DCM, CRT-D (2013), HTN, HLD, CVA (hemorrhagic) here for follow up.    "Andre" Idris    DCM, s/p biV ICD with great response (LVEF 60%+ 2018)  HTN on meds  HL on meds   PAF, on pradaxa  hx hemorrhagic CVA 2012    CRT-D 3/13 (Tonia device). Great response to CRT. Gen change 9/2019.    Hx of higher-dose amio, c/b night vision changes. These resolved after d/c'ing amio.  AF, resulting in sx that may be directly from the AF and/or from lack of biV pacing that results.  HERIBERTO/DCCV next week. 30 day monitor after that.  Discussed AADs tikosyn vs amio (vs ablation). After hearing pros/cons of all this, we'll restart amio (with load) following DCCV. If side effects recur, we could revisit tikosyn or ablation.  9/8/2020: He is 5 weeks s/p cardioversion and initiation of amiodarone. Maintaining rhythm.HERIBERTO showed LVEF of 55%.   6/15/2021: Mr. Steinberg is doing well from a device perspective with stable lead and device function. No arrhythmia noted. On amiodarone.   12/15/2021: Mr. Steinberg is doing well from a device perspective with stable lead and device function. Some RA lead noise which is chronic and stable.  No arrhythmia noted. On amiodarone. Amio testing all WNL. Plan is to consider tikosyn vs ablation if he no longer tolerates amiodarone in the future. He continues to prefer to stay the course for now.    Update (01/25/2023):  Feeling very well. Max AMS on ICD 34s. BiVP >99%  thyroid, LFTs, PFTs ok    My interpretation of today's ECG is APbiVP    Current Outpatient Medications   Medication Sig    acetaminophen (TYLENOL) 325 MG tablet Take 2 tablets (650 mg total) by mouth every 6 (six) hours as needed (pain 1-4/10 pain scale).    amiodarone (PACERONE) 200 MG Tab Take 1 tablet (200 mg total) by mouth once daily.    amLODIPine (NORVASC) 10 MG tablet Take 1 tablet (10 mg total) by mouth " "once daily.    candesartan (ATACAND) 32 MG tablet Take 1 tablet (32 mg total) by mouth once daily.    dabigatran etexilate (PRADAXA) 150 mg Cap Take 1 capsule (150 mg total) by mouth 2 (two) times a day. TAKE 1 TABLET TWICE A DAY    ezetimibe (ZETIA) 10 mg tablet TAKE 1 TABLET BY MOUTH EVERY DAY    folic acid/multivit-min/lutein (CENTRUM SILVER ORAL) Take by mouth once daily.    metoprolol tartrate (LOPRESSOR) 100 MG tablet Take 1 tablet (100 mg total) by mouth 2 (two) times daily.    triamterene-hydrochlorothiazide 37.5-25 mg (MAXZIDE-25) 37.5-25 mg per tablet Take 1 tablet by mouth once daily.    traZODone (DESYREL) 100 MG tablet Take 1 tablet (100 mg total) by mouth every evening. (Patient not taking: Reported on 6/21/2022)     No current facility-administered medications for this visit.       Review of Systems   Constitutional: Negative for malaise/fatigue.   Cardiovascular:  Negative for chest pain, dyspnea on exertion, irregular heartbeat, leg swelling and palpitations.   Respiratory:  Negative for shortness of breath.    Hematologic/Lymphatic: Negative for bleeding problem.   Skin:  Negative for rash.   Musculoskeletal:  Negative for myalgias.   Gastrointestinal:  Negative for hematemesis, hematochezia and nausea.   Genitourinary:  Negative for hematuria.   Neurological:  Negative for light-headedness.   Psychiatric/Behavioral:  Negative for altered mental status.    Allergic/Immunologic: Negative for persistent infections.   Objective:        /80   Pulse 60   Ht 5' 11" (1.803 m)   Wt 117.3 kg (258 lb 9.6 oz)   BMI 36.07 kg/m²     Physical Exam  Vitals and nursing note reviewed.   Constitutional:       Appearance: Normal appearance. He is well-developed.   HENT:      Head: Normocephalic and atraumatic.      Nose: Nose normal.   Eyes:      General: Lids are normal. No scleral icterus.     Conjunctiva/sclera: Conjunctivae normal.      Pupils: Pupils are equal, round, and reactive to light.   Neck:     "  Thyroid: No thyromegaly.      Vascular: No JVD.      Trachea: No tracheal deviation.   Cardiovascular:      Rate and Rhythm: Normal rate and regular rhythm.      Pulses:           Radial pulses are 2+ on the right side and 2+ on the left side.   Pulmonary:      Effort: Pulmonary effort is normal. No tachypnea, accessory muscle usage or respiratory distress.      Breath sounds: No wheezing.   Chest:      Comments: Device to LUCW.     Abdominal:      General: There is no distension.   Musculoskeletal:         General: Normal range of motion.      Cervical back: Normal range of motion.   Skin:     General: Skin is warm and dry.      Findings: No erythema.   Neurological:      Mental Status: He is alert and oriented to person, place, and time.      Motor: No abnormal muscle tone.      Deep Tendon Reflexes: Reflexes are normal and symmetric.   Psychiatric:         Speech: Speech normal.         Behavior: Behavior normal.     Lab Results   Component Value Date     08/19/2022    K 4.0 08/19/2022    MG 2.5 09/14/2012    BUN 16 08/19/2022    CREATININE 1.18 08/19/2022    ALT 25 08/19/2022    ALT 25 08/19/2022    AST 27 08/19/2022    AST 27 08/19/2022    HGB 15.1 08/19/2022    HCT 47.0 08/19/2022    TSH 9.430 (H) 08/19/2022    LDLCALC 114.8 08/19/2022       Recent Labs   Lab 07/28/20  0740   INR 1.3 H           Assessment:     1. History of CVA (cerebrovascular accident)    2. History of intracerebral hemorrhage without residual deficit    3. Persistent atrial fibrillation    4. DCM (dilated cardiomyopathy)    5. Essential hypertension    6. Mixed hyperlipidemia    7. ICD (implantable cardioverter-defibrillator), biventricular, in situ    8. LBBB (left bundle branch block)    9. Long term current use of amiodarone    10. Class 2 severe obesity due to excess calories with serious comorbidity and body mass index (BMI) of 36.0 to 36.9 in adult    11. Elevated TSH    12. YUMIKO (obstructive sleep apnea)    13. Shortness of  breath      Plan:     In summary, Mr. Steinberg is a 72 y.o. male with AF, DCM, CRT-D (2013), HTN, HLD, CVA (hemorrhagic) here for follow up.    Doing well. No long episodes of AF (and some shorter ones were A lead noise).  Increased RR today due to large % HRs binned at LRL.  Return in 1 year with echo and amio tests, or earlier prn.

## 2023-02-07 ENCOUNTER — TELEPHONE (OUTPATIENT)
Dept: ELECTROPHYSIOLOGY | Facility: CLINIC | Age: 73
End: 2023-02-07
Payer: MEDICARE

## 2023-02-07 ENCOUNTER — PATIENT MESSAGE (OUTPATIENT)
Dept: ELECTROPHYSIOLOGY | Facility: CLINIC | Age: 73
End: 2023-02-07
Payer: MEDICARE

## 2023-02-07 DIAGNOSIS — I48.19 PERSISTENT ATRIAL FIBRILLATION: Primary | ICD-10-CM

## 2023-02-07 NOTE — TELEPHONE ENCOUNTER
Merlin alert received for Long AT/AF episode, episode in progress since 2/4/23 @ 7:10 pm. Called and spoke with patient who reports feeling more short of breath than normal, but no other symptoms. Verified medication compliance, specifically metoprolol, amiodarone, and pradaxa. BP's have been running around 120's/70's.

## 2023-02-08 ENCOUNTER — PATIENT MESSAGE (OUTPATIENT)
Dept: OTHER | Facility: OTHER | Age: 73
End: 2023-02-08
Payer: MEDICARE

## 2023-02-09 ENCOUNTER — TELEPHONE (OUTPATIENT)
Dept: ELECTROPHYSIOLOGY | Facility: CLINIC | Age: 73
End: 2023-02-09
Payer: MEDICARE

## 2023-02-13 ENCOUNTER — HOSPITAL ENCOUNTER (OUTPATIENT)
Facility: HOSPITAL | Age: 73
Discharge: HOME | End: 2023-02-13
Attending: INTERNAL MEDICINE | Admitting: INTERNAL MEDICINE
Payer: MEDICARE

## 2023-02-13 VITALS
HEIGHT: 71 IN | DIASTOLIC BLOOD PRESSURE: 87 MMHG | HEART RATE: 62 BPM | SYSTOLIC BLOOD PRESSURE: 150 MMHG | OXYGEN SATURATION: 95 % | RESPIRATION RATE: 18 BRPM | TEMPERATURE: 98 F | BODY MASS INDEX: 35 KG/M2 | WEIGHT: 250 LBS

## 2023-02-13 DIAGNOSIS — I48.0 PAROXYSMAL ATRIAL FIBRILLATION: Primary | ICD-10-CM

## 2023-02-13 DIAGNOSIS — I48.19 PERSISTENT ATRIAL FIBRILLATION: ICD-10-CM

## 2023-02-13 DIAGNOSIS — Z79.01 CURRENT USE OF ANTICOAGULANT THERAPY: ICD-10-CM

## 2023-02-13 PROCEDURE — 93010 EKG 12-LEAD: ICD-10-PCS | Mod: ,,, | Performed by: INTERNAL MEDICINE

## 2023-02-13 PROCEDURE — 99499 NO LOS: ICD-10-PCS | Mod: ,,, | Performed by: INTERNAL MEDICINE

## 2023-02-13 PROCEDURE — 99499 UNLISTED E&M SERVICE: CPT | Mod: ,,, | Performed by: INTERNAL MEDICINE

## 2023-02-13 PROCEDURE — 93005 ELECTROCARDIOGRAM TRACING: CPT

## 2023-02-13 PROCEDURE — 93010 ELECTROCARDIOGRAM REPORT: CPT | Mod: ,,, | Performed by: INTERNAL MEDICINE

## 2023-02-13 RX ORDER — SILVER SULFADIAZINE 10 G/1000G
CREAM TOPICAL 2 TIMES DAILY PRN
Status: CANCELLED | OUTPATIENT
Start: 2023-02-13

## 2023-02-13 RX ORDER — SODIUM CHLORIDE 0.9 % (FLUSH) 0.9 %
10 SYRINGE (ML) INJECTION
Status: CANCELLED | OUTPATIENT
Start: 2023-02-13

## 2023-02-13 NOTE — ASSESSMENT & PLAN NOTE
Presents today for HERIBERTO/DCCV--- EKG and device interrogation show Apaced/BiVpaced rhythm-- HERIBERTO/DCCV canceled.  Taking Eliquis for stroke prophylaxis. Last dose this morning.

## 2023-02-13 NOTE — DISCHARGE SUMMARY
[facility] - Short Stay Cardiac Unit  Cardiac Electrophysiology  Discharge Summary      Patient Name: Hussein Steinberg  MRN: [id number]  Admission Date: 2/13/2023  Hospital Length of Stay: 0 days  Discharge Date and Time: 2/13/2023  1:07 PM  Attending Physician: RANDY Mo MD   Discharging Provider: Idalmis Campuzano NP  Primary Care Physician: Lacey Rainey MD    HPI:   Hussein Steinberg 72 y.o. presents for HERIBERTO/DCCV.  He has a history of AF, DCM, CRT-D (2013), HTN, HLD, CVA (hemorrhagic) here for follow up.     DCM, s/p biV ICD with great response (LVEF 60%+ 2018)  HTN on meds  HL on meds   PAF, on pradaxa  hx hemorrhagic CVA 2012    On 2/7/23-- Device RN received an alert for Long AT/AF episode, episode in progress since 2/4/23 @ 7:10 pm. Device RN Called and spoke with patient who reported feeling more short of breath than normal, but no other symptoms. Verified medication compliance, specifically metoprolol, amiodarone, and pradaxa. BP's have been running around 120's/70's--- as a result patient was scheduled for HERIBERTO/DCCV today.           Procedure(s) (LRB):  Cardioversion or Defibrillation (N/A)  Transesophageal echo (HERIBERTO) intra-procedure log documentation (N/A)       Electrophysiology Procedure    Result Date: 2/13/2023  Aborted invasive cardiology procedure- see Procedure Log link for details.    Indwelling Lines/Drains at time of discharge:  Lines/Drains/Airways     None                 Hospital Course:  Presents for HERIBERTO/DCCV. Admit EKG: A paced/ BiV paced rhythm; 62 bpm; device interrogation shows A paced/ BiV paced rhythm.  Procedure canceled as no longer Afib. D/c'd home.   Taking Eliquis for stroke prophylaxis. Last dose this morning.        Goals of Care Treatment Preferences:  Code Status: Full Code    Significant Diagnostic Studies: Cardiac Graphics: ECG: Admit EKG: A paced rhythm; Device interrogation: A paced/BiV paced rhythm-- Procedure canceled    Pending Diagnostic Studies:     None          Final  Active Diagnoses:    Diagnosis Date Noted POA    PRINCIPAL PROBLEM:  Atrial fibrillation [I48.91] 03/19/2013 Yes    Current use of anticoagulant therapy [Z79.01] 02/13/2023 Not Applicable      Problems Resolved During this Admission:     * Atrial fibrillation  Presents today for HERIBERTO/DCCV--- EKG and device interrogation show Apaced/BiVpaced rhythm-- HERIBERTO/DCCV canceled.  Continue Pradaxa for stroke prophylaxis.  Continue amiodarone  Continue remote device transmissions as instructed  F/u Dr. Mo in 6 months or sooner if needed      Discharged Condition: stable    Disposition: Home or Self Care    Follow Up:   Follow-up Information     Dejan Mo MD. Schedule an appointment as soon as possible for a visit in 6 month(s).    Specialties: Electrophysiology, Cardiology  Why: Follow up  [facility] information:  [facility] [address]  [phone]                       Patient Instructions:      Diet Cardiac     Notify your health care provider if you experience any of the following:  severe uncontrolled pain     Notify your health care provider if you experience any of the following:  persistent dizziness, light-headedness, or visual disturbances     Notify your health care provider if you experience any of the following:  increased confusion or weakness     Activity as tolerated     Medications:  Reconciled Home Medications:      Medication List      CONTINUE taking these medications    acetaminophen 325 MG tablet  Commonly known as: TYLENOL  Take 2 tablets (650 mg total) by mouth every 6 (six) hours as needed (pain 1-4/10 pain scale).     amiodarone 200 MG Tab  Commonly known as: PACERONE  Take 1 tablet (200 mg total) by mouth once daily.     amLODIPine 10 MG tablet  Commonly known as: NORVASC  Take 1 tablet (10 mg total) by mouth once daily.     candesartan 32 MG tablet  Commonly known as: ATACAND  Take 1 tablet (32 mg total) by mouth once daily.     CENTRUM SILVER ORAL  Take by mouth once  daily.     chondroitin sulfate A sodium 400 mg Cap  Take by mouth.     dabigatran etexilate 150 mg Cap  Commonly known as: PRADAXA  Take 1 capsule (150 mg total) by mouth 2 (two) times a day     ezetimibe 10 mg tablet  Commonly known as: ZETIA  Take 1 tablet (10 mg total) by mouth once daily.     metoprolol tartrate 100 MG tablet  Commonly known as: LOPRESSOR  Take 1 tablet (100 mg total) by mouth 2 (two) times daily.     triamterene-hydrochlorothiazide 37.5-25 mg 37.5-25 mg per tablet  Commonly known as: MAXZIDE-25  Take 1 tablet by mouth once daily.            Time spent on the discharge of patient: 20 minutes    Idalmis Campuzano NP  Cardiac Electrophysiology  [facility] - Short Stay Cardiac Unit

## 2023-02-13 NOTE — HPI
Hussein Steinberg 72 y.o. presents for HERIBERTO/DCCV.  He has a history of AF, DCM, CRT-D (2013), HTN, HLD, CVA (hemorrhagic) here for follow up.     DCM, s/p biV ICD with great response (LVEF 60%+ 2018)  HTN on meds  HL on meds   PAF, on pradaxa  hx hemorrhagic CVA 2012    On 2/7/23-- Device RN received an alert for Long AT/AF episode, episode in progress since 2/4/23 @ 7:10 pm. Device RN Called and spoke with patient who reported feeling more short of breath than normal, but no other symptoms. Verified medication compliance, specifically metoprolol, amiodarone, and pradaxa. BP's have been running around 120's/70's--- as a result patient was scheduled for HERIBERTO/DCCV today.

## 2023-02-13 NOTE — ASSESSMENT & PLAN NOTE
Presents today for HERIBERTO/DCCV--- EKG and device interrogation show Apaced/BiVpaced rhythm-- HERIBERTO/DCCV canceled.  Continue Pradaxa for stroke prophylaxis.  Continue amiodarone  Continue remote device transmissions as instructed  F/u Dr. Mo in 6 months or sooner if needed

## 2023-02-13 NOTE — SUBJECTIVE & OBJECTIVE
Past Medical History:   Diagnosis Date    Anticoagulant long-term use     Atrial fibrillation     Basal cell carcinoma     Bronchitis 03/20/2017    Cardiac arrest 2012    has pacemaker/defibrillator placed 3/19/13 - followed by [facility]ologist    Cataract     Current use of anticoagulant therapy 02/13/2023    Current use of anticoagulant therapy 02/13/2023    DCM (dilated cardiomyopathy) 06/15/2017    High cholesterol     History of intracerebral hemorrhage without residual deficit 08/23/2019    History of prostate cancer 06/23/2017    Hypertension     Kidney stone 10/2013    LBBB (left bundle branch block) 10/10/2012    LV dysfunction 10/10/2012    Prostate cancer     Treated by Dr. Rasmussen    Squamous cell carcinoma in situ of skin 2017    left arm removed    Stroke 09/2012    + hemorrhagic infarct to right occipital lobe after started on Plavix following cardiac event    SVT (supraventricular tachycardia) 10/10/2012       Past Surgical History:   Procedure Laterality Date    arthroscopic  knee    BASAL CELL CARCINOMA EXCISION  2018    removed from nose    Basil cell   2022    CARDIAC PACEMAKER PLACEMENT  2013    and defibrillator    COLONOSCOPY  2/12/2016    + polyp - repeat in 5 years- Dr. Calles    COLONOSCOPY N/A 7/19/2021    Procedure: COLONOSCOPY;  Surgeon: Troy Bryson MD;  [facility]  Service: Endoscopy;  Laterality: N/A;  pacemaker/AICD-[employer]  fully vaccinated-GT     ok to hold Pradaxa 2 days per Dr Mo  Miralax prep    EYE SURGERY  1992    PROSTATECTOMY      SKIN CANCER EXCISION Left 10/2017    squamous cell carcinoma removed from left arm    TREATMENT OF CARDIAC ARRHYTHMIA N/A 7/31/2020    Procedure: CARDIOVERSION;  Surgeon: Dejan Mo MD;  Location: [facility] EP LAB;  Service: Cardiology;  Laterality: N/A;  afib, HERIBERTO, DCCV, anes, DM, 3 Prep    VASECTOMY  1988       Review of patient's allergies indicates:   Allergen Reactions    Olmesartan Diarrhea     Diarrhea  and muscle aches since starting medication.     Lisinopril Other (See Comments)     Dry cough       No current facility-administered medications on file prior to encounter.     Current Outpatient Medications on File Prior to Encounter   Medication Sig    amiodarone (PACERONE) 200 MG Tab Take 1 tablet (200 mg total) by mouth once daily.    amLODIPine (NORVASC) 10 MG tablet Take 1 tablet (10 mg total) by mouth once daily.    candesartan (ATACAND) 32 MG tablet Take 1 tablet (32 mg total) by mouth once daily.    dabigatran etexilate (PRADAXA) 150 mg Cap Take 1 capsule (150 mg total) by mouth 2 (two) times a day    ezetimibe (ZETIA) 10 mg tablet Take 1 tablet (10 mg total) by mouth once daily.    folic acid/multivit-min/lutein (CENTRUM SILVER ORAL) Take by mouth once daily.    metoprolol tartrate (LOPRESSOR) 100 MG tablet Take 1 tablet (100 mg total) by mouth 2 (two) times daily.    triamterene-hydrochlorothiazide 37.5-25 mg (MAXZIDE-25) 37.5-25 mg per tablet Take 1 tablet by mouth once daily.    acetaminophen (TYLENOL) 325 MG tablet Take 2 tablets (650 mg total) by mouth every 6 (six) hours as needed (pain 1-4/10 pain scale).    chondroitin sulfate A sodium 400 mg Cap Take by mouth.     Family History       Problem Relation (Age of Onset)    Cancer Father    Cataracts Mother    Glaucoma Mother    Heart attack Brother (59), Maternal Grandfather, Paternal Grandfather    Heart disease Mother, Brother    Hypertension Mother    Macular degeneration Mother    No Known Problems Sister, Brother, Daughter, Daughter    Parkinsonism Brother (54)          Tobacco Use    Smoking status: Never    Smokeless tobacco: Never   Substance and Sexual Activity    Alcohol use: Never    Drug use: Never    Sexual activity: Yes     Partners: Female     Birth control/protection: Partner-Vasectomy     Review of Systems   Constitutional: Negative for fever and malaise/fatigue.   Cardiovascular:  Negative for chest pain, dyspnea on exertion,  irregular heartbeat and leg swelling.   Respiratory:  Negative for shortness of breath.    Skin:  Negative for rash.   Neurological:  Negative for light-headedness.   Objective:     Vital Signs (Most Recent):  Temp: 98.4 °F (36.9 °C) (02/13/23 1224)  Pulse: 62 (02/13/23 1224)  Resp: 18 (02/13/23 1224)  BP: (!) 150/87 (02/13/23 1225)  SpO2: 95 % (02/13/23 1224)   Vital Signs (24h Range):  Temp:  [98.4 °F (36.9 °C)] 98.4 °F (36.9 °C)  Pulse:  [62] 62  Resp:  [18] 18  SpO2:  [95 %] 95 %  BP: (142-150)/(77-87) 150/87       Weight: 113.4 kg (250 lb)  Body mass index is 34.87 kg/m².    SpO2: 95 %       Physical Exam  Vitals and nursing note reviewed.   Constitutional:       General: He is not in acute distress.     Appearance: He is well-developed. He is not ill-appearing.   Cardiovascular:      Rate and Rhythm: Normal rate and regular rhythm.      Pulses:           Radial pulses are 2+ on the right side and 2+ on the left side.   Pulmonary:      Effort: Pulmonary effort is normal. No respiratory distress.   Musculoskeletal:      Right lower leg: Normal. No swelling. No edema.      Left lower leg: Normal. No swelling. No edema.   Neurological:      Mental Status: He is alert.   Psychiatric:         Behavior: Behavior is cooperative.       Significant Labs:   Results for orders placed or performed in visit on 02/09/23   APTT   Result Value Ref Range    aPTT 40.7 (H) 21.0 - 32.0 sec   Basic Metabolic Panel   Result Value Ref Range    Sodium 145 136 - 145 mmol/L    Potassium 3.8 3.5 - 5.1 mmol/L    Chloride 99 95 - 110 mmol/L    CO2 34 (H) 23 - 29 mmol/L    Glucose 119 (H) 70 - 110 mg/dL    BUN 24 (H) 2 - 20 mg/dL    Creatinine 1.10 0.50 - 1.40 mg/dL    Calcium 9.4 8.7 - 10.5 mg/dL    Anion Gap 12 8 - 16 mmol/L    eGFR >60.0 >60 mL/min/1.73 m^2   CBC Without Differential   Result Value Ref Range    WBC 7.53 3.90 - 12.70 K/uL    RBC 5.87 4.60 - 6.20 M/uL    Hemoglobin 16.1 14.0 - 18.0 g/dL    Hematocrit 50.5 40.0 - 54.0 %     MCV 86 82 - 98 fL    MCH 27.4 27.0 - 31.0 pg    MCHC 31.9 (L) 32.0 - 36.0 g/dL    RDW 13.4 11.5 - 14.5 %    Platelets 237 150 - 450 K/uL    MPV 11.1 9.2 - 12.9 fL   Protime-INR   Result Value Ref Range    Prothrombin Time 12.1 9.0 - 12.5 sec    INR 1.1 0.8 - 1.2        Significant Imaging: EKG: A-BiV paced; 62 bpm; device interrogation shows APaced/BiVpaced

## 2023-02-13 NOTE — H&P
[facility] - Short Stay Cardiac Unit  Cardiac Electrophysiology  History and Physical     Admission Date: 2/13/2023  Code Status: Prior   Attending Provider: Dejan Mo MD   Principal Problem:Atrial fibrillation    Subjective:     Chief Complaint:  Presents for HERIBERTO/DCCV     HPI:  Hussein Steinberg 72 y.o. presents for HERIBERTO/DCCV.  He has a history of AF, DCM, CRT-D (2013), HTN, HLD, CVA (hemorrhagic) here for follow up.     DCM, s/p biV ICD with great response (LVEF 60%+ 2018)  HTN on meds  HL on meds   PAF, on pradaxa  hx hemorrhagic CVA 2012    On 2/7/23-- Device RN received an alert for Long AT/AF episode, episode in progress since 2/4/23 @ 7:10 pm. Device RN Called and spoke with patient who reported feeling more short of breath than normal, but no other symptoms. Verified medication compliance, specifically metoprolol, amiodarone, and pradaxa. BP's have been running around 120's/70's--- as a result patient was scheduled for HERIBERTO/DCCV today.           Past Medical History:   Diagnosis Date    Anticoagulant long-term use     Atrial fibrillation     Basal cell carcinoma     Bronchitis 03/20/2017    Cardiac arrest 2012    has pacemaker/defibrillator placed 3/19/13 - followed by [facility]ologist    Cataract     Current use of anticoagulant therapy 02/13/2023    Current use of anticoagulant therapy 02/13/2023    DCM (dilated cardiomyopathy) 06/15/2017    High cholesterol     History of intracerebral hemorrhage without residual deficit 08/23/2019    History of prostate cancer 06/23/2017    Hypertension     Kidney stone 10/2013    LBBB (left bundle branch block) 10/10/2012    LV dysfunction 10/10/2012    Prostate cancer     Treated by Dr. Rasmussen    Squamous cell carcinoma in situ of skin 2017    left arm removed    Stroke 09/2012    + hemorrhagic infarct to right occipital lobe after started on Plavix following cardiac event    SVT (supraventricular tachycardia) 10/10/2012       Past Surgical  History:   Procedure Laterality Date    arthroscopic  knee    BASAL CELL CARCINOMA EXCISION  2018    removed from nose    Basil cell   2022    CARDIAC PACEMAKER PLACEMENT  2013    and defibrillator    COLONOSCOPY  2/12/2016    + polyp - repeat in 5 years- Dr. Calles    COLONOSCOPY N/A 7/19/2021    Procedure: COLONOSCOPY;  Surgeon: Troy Bryson MD;  Location: [facility] ENDO (4TH FLR);  Service: Endoscopy;  Laterality: N/A;  pacemaker/AICD-[employer] [facility]  fully vaccinated-GT     ok to hold Pradaxa 2 days per Dr Mo  Mirbrandy prep    EYE SURGERY  1992    PROSTATECTOMY      SKIN CANCER EXCISION Left 10/2017    squamous cell carcinoma removed from left arm    TREATMENT OF CARDIAC ARRHYTHMIA N/A 7/31/2020    Procedure: CARDIOVERSION;  Surgeon: Dejan Mo MD;  Location: [facility] EP LAB;  Service: Cardiology;  Laterality: N/A;  afib, HERIBERTO, DCCV, anes, DM, 3 Prep    VASECTOMY  1988       Review of patient's allergies indicates:   Allergen Reactions    Olmesartan Diarrhea     Diarrhea and muscle aches since starting medication.     Lisinopril Other (See Comments)     Dry cough       No current facility-administered medications on file prior to encounter.     Current Outpatient Medications on File Prior to Encounter   Medication Sig    amiodarone (PACERONE) 200 MG Tab Take 1 tablet (200 mg total) by mouth once daily.    amLODIPine (NORVASC) 10 MG tablet Take 1 tablet (10 mg total) by mouth once daily.    candesartan (ATACAND) 32 MG tablet Take 1 tablet (32 mg total) by mouth once daily.    dabigatran etexilate (PRADAXA) 150 mg Cap Take 1 capsule (150 mg total) by mouth 2 (two) times a day    ezetimibe (ZETIA) 10 mg tablet Take 1 tablet (10 mg total) by mouth once daily.    folic acid/multivit-min/lutein (CENTRUM SILVER ORAL) Take by mouth once daily.    metoprolol tartrate (LOPRESSOR) 100 MG tablet Take 1 tablet (100 mg total) by mouth 2 (two) times daily.    triamterene-hydrochlorothiazide 37.5-25 mg  (MAXZIDE-25) 37.5-25 mg per tablet Take 1 tablet by mouth once daily.    acetaminophen (TYLENOL) 325 MG tablet Take 2 tablets (650 mg total) by mouth every 6 (six) hours as needed (pain 1-4/10 pain scale).    chondroitin sulfate A sodium 400 mg Cap Take by mouth.     Family History       Problem Relation (Age of Onset)    Cancer Father    Cataracts Mother    Glaucoma Mother    Heart attack Brother (59), Maternal Grandfather, Paternal Grandfather    Heart disease Mother, Brother    Hypertension Mother    Macular degeneration Mother    No Known Problems Sister, Brother, Daughter, Daughter    Parkinsonism Brother (54)          Tobacco Use    Smoking status: Never    Smokeless tobacco: Never   Substance and Sexual Activity    Alcohol use: Never    Drug use: Never    Sexual activity: Yes     Partners: Female     Birth control/protection: Partner-Vasectomy     Review of Systems   Constitutional: Negative for fever and malaise/fatigue.   Cardiovascular:  Negative for chest pain, dyspnea on exertion, irregular heartbeat and leg swelling.   Respiratory:  Negative for shortness of breath.    Skin:  Negative for rash.   Neurological:  Negative for light-headedness.   Objective:     Vital Signs (Most Recent):  Temp: 98.4 °F (36.9 °C) (02/13/23 1224)  Pulse: 62 (02/13/23 1224)  Resp: 18 (02/13/23 1224)  BP: (!) 150/87 (02/13/23 1225)  SpO2: 95 % (02/13/23 1224)   Vital Signs (24h Range):  Temp:  [98.4 °F (36.9 °C)] 98.4 °F (36.9 °C)  Pulse:  [62] 62  Resp:  [18] 18  SpO2:  [95 %] 95 %  BP: (142-150)/(77-87) 150/87       Weight: 113.4 kg (250 lb)  Body mass index is 34.87 kg/m².    SpO2: 95 %       Physical Exam  Vitals and nursing note reviewed.   Constitutional:       General: He is not in acute distress.     Appearance: He is well-developed. He is not ill-appearing.   Cardiovascular:      Rate and Rhythm: Normal rate and regular rhythm.      Pulses:           Radial pulses are 2+ on the right side and 2+ on the left  side.   Pulmonary:      Effort: Pulmonary effort is normal. No respiratory distress.   Musculoskeletal:      Right lower leg: Normal. No swelling. No edema.      Left lower leg: Normal. No swelling. No edema.   Neurological:      Mental Status: He is alert.   Psychiatric:         Behavior: Behavior is cooperative.       Significant Labs:   Results for orders placed or performed in visit on 02/09/23   APTT   Result Value Ref Range    aPTT 40.7 (H) 21.0 - 32.0 sec   Basic Metabolic Panel   Result Value Ref Range    Sodium 145 136 - 145 mmol/L    Potassium 3.8 3.5 - 5.1 mmol/L    Chloride 99 95 - 110 mmol/L    CO2 34 (H) 23 - 29 mmol/L    Glucose 119 (H) 70 - 110 mg/dL    BUN 24 (H) 2 - 20 mg/dL    Creatinine 1.10 0.50 - 1.40 mg/dL    Calcium 9.4 8.7 - 10.5 mg/dL    Anion Gap 12 8 - 16 mmol/L    eGFR >60.0 >60 mL/min/1.73 m^2   CBC Without Differential   Result Value Ref Range    WBC 7.53 3.90 - 12.70 K/uL    RBC 5.87 4.60 - 6.20 M/uL    Hemoglobin 16.1 14.0 - 18.0 g/dL    Hematocrit 50.5 40.0 - 54.0 %    MCV 86 82 - 98 fL    MCH 27.4 27.0 - 31.0 pg    MCHC 31.9 (L) 32.0 - 36.0 g/dL    RDW 13.4 11.5 - 14.5 %    Platelets 237 150 - 450 K/uL    MPV 11.1 9.2 - 12.9 fL   Protime-INR   Result Value Ref Range    Prothrombin Time 12.1 9.0 - 12.5 sec    INR 1.1 0.8 - 1.2        Significant Imaging: EKG: A-BiV paced; 62 bpm; device interrogation shows APaced/BiVpaced    Assessment and Plan:     * Atrial fibrillation  Presents today for HERIBERTO/DCCV--- EKG and device interrogation show Apaced/BiVpaced rhythm-- HERIBERTO/DCCV canceled.  Taking Eliquis for stroke prophylaxis. Last dose this morning.     Current use of anticoagulant therapy  Taking pradaxa for stroke prophylaxis for Afib    HERIBERTO/DCCV canceled-- EKG shows Apaced/BiVpaced rhythm-- device interrogation shows A paced/BiVpaced rhythm    Idalmis E Tatianna, NP  Cardiac Electrophysiology  [facility] - Short Stay Cardiac Unit

## 2023-02-13 NOTE — HOSPITAL COURSE
Presents for HERIBERTO/DCCV. Admit EKG: A paced/ BiV paced rhythm; 62 bpm; device interrogation shows A paced/ BiV paced rhythm.  Procedure canceled as no longer Afib. D/c'd home.   Taking Eliquis for stroke prophylaxis. Last dose this morning.

## 2023-02-17 ENCOUNTER — PATIENT MESSAGE (OUTPATIENT)
Dept: ELECTROPHYSIOLOGY | Facility: CLINIC | Age: 73
End: 2023-02-17
Payer: MEDICARE

## 2023-02-17 ENCOUNTER — TELEPHONE (OUTPATIENT)
Dept: ELECTROPHYSIOLOGY | Facility: CLINIC | Age: 73
End: 2023-02-17
Payer: MEDICARE

## 2023-02-17 NOTE — TELEPHONE ENCOUNTER
Merlin alert received for AF in progress 67 hours since 2/14/23, v rates controlled.  On Pradaxa , Lopressor 100mg po BID and Amiodaone 200 mg daily  Pt had come in for DCCV on 2/13/23 but cancelled due to no longer in AF at that time.      Pt is asymptomatic  Will forward to Dr. Mo          ______

## 2023-02-21 ENCOUNTER — CLINICAL SUPPORT (OUTPATIENT)
Dept: CARDIOLOGY | Facility: HOSPITAL | Age: 73
End: 2023-02-21
Payer: MEDICARE

## 2023-02-21 DIAGNOSIS — Z95.810 PRESENCE OF AUTOMATIC (IMPLANTABLE) CARDIAC DEFIBRILLATOR: ICD-10-CM

## 2023-02-21 PROCEDURE — 93296 REM INTERROG EVL PM/IDS: CPT | Performed by: INTERNAL MEDICINE

## 2023-02-23 ENCOUNTER — OFFICE VISIT (OUTPATIENT)
Dept: OPTOMETRY | Facility: CLINIC | Age: 73
End: 2023-02-23
Payer: MEDICARE

## 2023-02-23 DIAGNOSIS — H18.003 VORTEX KERATOPATHY, BILATERAL: ICD-10-CM

## 2023-02-23 DIAGNOSIS — H26.9 CORTICAL CATARACT OF BOTH EYES: ICD-10-CM

## 2023-02-23 DIAGNOSIS — H52.203 HYPEROPIA OF BOTH EYES WITH ASTIGMATISM: ICD-10-CM

## 2023-02-23 DIAGNOSIS — Z98.890 HISTORY OF REFRACTIVE SURGERY: ICD-10-CM

## 2023-02-23 DIAGNOSIS — H52.4 PRESBYOPIA OF BOTH EYES: ICD-10-CM

## 2023-02-23 DIAGNOSIS — H25.13 NUCLEAR SCLEROSIS, BILATERAL: Primary | ICD-10-CM

## 2023-02-23 DIAGNOSIS — H52.03 HYPEROPIA OF BOTH EYES WITH ASTIGMATISM: ICD-10-CM

## 2023-02-23 PROCEDURE — 1159F PR MEDICATION LIST DOCUMENTED IN MEDICAL RECORD: ICD-10-PCS | Mod: CPTII,S$GLB,, | Performed by: OPTOMETRIST

## 2023-02-23 PROCEDURE — 1159F MED LIST DOCD IN RCRD: CPT | Mod: CPTII,S$GLB,, | Performed by: OPTOMETRIST

## 2023-02-23 PROCEDURE — 4010F PR ACE/ARB THEARPY RXD/TAKEN: ICD-10-PCS | Mod: CPTII,S$GLB,, | Performed by: OPTOMETRIST

## 2023-02-23 PROCEDURE — 99999 PR PBB SHADOW E&M-EST. PATIENT-LVL II: ICD-10-PCS | Mod: PBBFAC,,, | Performed by: OPTOMETRIST

## 2023-02-23 PROCEDURE — 92015 PR REFRACTION: ICD-10-PCS | Mod: S$GLB,,, | Performed by: OPTOMETRIST

## 2023-02-23 PROCEDURE — 4010F ACE/ARB THERAPY RXD/TAKEN: CPT | Mod: CPTII,S$GLB,, | Performed by: OPTOMETRIST

## 2023-02-23 PROCEDURE — 92014 COMPRE OPH EXAM EST PT 1/>: CPT | Mod: S$GLB,,, | Performed by: OPTOMETRIST

## 2023-02-23 PROCEDURE — 92014 PR EYE EXAM, EST PATIENT,COMPREHESV: ICD-10-PCS | Mod: S$GLB,,, | Performed by: OPTOMETRIST

## 2023-02-23 PROCEDURE — 92015 DETERMINE REFRACTIVE STATE: CPT | Mod: S$GLB,,, | Performed by: OPTOMETRIST

## 2023-02-23 PROCEDURE — 99999 PR PBB SHADOW E&M-EST. PATIENT-LVL II: CPT | Mod: PBBFAC,,, | Performed by: OPTOMETRIST

## 2023-02-23 NOTE — PROGRESS NOTES
HPI     eye examination             Comments: Eye examination and refraction.  Prior diagnosis of left homonymous hemianopia (per consult with Dr. Skelton) in 01/2020          Comments    Patient's age: 72 y.o. WM   Occupation: [employer] in [place] - retired [employer]   Approximate date of last eye examination: 07/05/2022   Last saw Dr. Esqueda:   City/State: [facility]   Wears glasses? Yes      If yes, wears  Full-time or part-time?  Full-time   Present glasses are: Bifocal, SV Distance, SV Reading?  Progressive lens -   and single vision reading/piano glasses   Approximate age of present glasses:  July 2022   Got new glasses following last exam, or subsequently?:  yes    Any problem with VA with glasses? No - overall, VA with glasses okay.  Wears CLs?:  No   Headaches?  No   Eye pain/discomfort?  No                                                                                     Flashes?  No   Floaters?  No   Diplopia/Double vision?  No   Patient's Ocular History:          Any eye surgeries? No          Any eye injury?  No          Any treatment for eye disease?  No   Family history of eye disease?     Mother + Macular Degeneration   + Glaucoma   + Cataracts   Significant patient medical history:         1. Diabetes?  No           2. HBP?  Yes, controlled with medications               3. Other (describe):  Heart attack and stroke Sept, 2012   -pacemaker, prostate cancer    ! OTC eyedrops currently using:  None    ! Prescription eye meds currently using:  None    ! Any history of allergy/adverse reaction to any eye meds used   previously?  No    ! Any history of allergy/adverse reaction to eyedrops used during prior   eye exam(s)? No    ! Any history of allergy/adverse reaction to Novacaine or similar meds?   No    ! Any history of allergy/adverse reaction to Epinephrine or similar   meds? No    ! Patient okay with use of anesthetic eyedrops to check eye pressure?    Yes        ! Patient okay with use of  "eyedrops to dilate pupils today?  Yes    !  Allergies/Medications/Medical History/Family History reviewed today?    Yes       PD =   64/60   Desired reading distance = 19"                           Last edited by Ray Esqueda, OD on 2/23/2023  8:17 AM.            Assessment /Plan     For exam results, see Encounter Report.    1. Nuclear sclerosis, bilateral        2. Cortical cataract of both eyes        3. Vortex keratopathy, bilateral        4. History of refractive surgery        5. Hyperopia of both eyes with astigmatism        6. Presbyopia of both eyes                         S/P RK refractive surgery in each eye.     Nuclear sclerot/cortical cataract in both eyes.  Still no need for cataract surgery in either eye.      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    No impact on visual acuity.      History of CVA, with secondary left homonymous hemianopsia, per Dr. Skelton.  Full peripheral visual field to HM in all quadrants on confrontation visual field test in each eye today.  Prior diagnosis of bilateral optic atrophy, but very subtle presentation (at worst).     Few vitreous floaters in both eyes.  No evidence of retinal etiology.      Resultant hyperopia with astigmatism in each eye, with very satisfactory correctable visual acuity in each eye.  Presbyopia.       New spectacle lens Rx issued for full-time wear.  Recheck in six months.          "

## 2023-02-23 NOTE — PATIENT INSTRUCTIONS
S/P RK refractive surgery in each eye.     Nuclear sclerot/cortical cataract in both eyes.  Still no need for cataract surgery in either eye.      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    No impact on visual acuity.      History of CVA, with secondary left homonymous hemianopsia, per Dr. Skelton.  Full peripheral visual field to HM in all quadrants on confrontation visual field test in each eye today.  Prior diagnosis of bilateral optic atrophy, but very subtle presentation (at worst).     Few vitreous floaters in both eyes.  No evidence of retinal etiology.      Resultant hyperopia with astigmatism in each eye, with very satisfactory correctable visual acuity in each eye.  Presbyopia.       New spectacle lens Rx issued for full-time wear.  Recheck in six months.

## 2023-02-27 ENCOUNTER — TELEPHONE (OUTPATIENT)
Dept: ELECTROPHYSIOLOGY | Facility: CLINIC | Age: 73
End: 2023-02-27
Payer: MEDICARE

## 2023-02-27 ENCOUNTER — PATIENT MESSAGE (OUTPATIENT)
Dept: ELECTROPHYSIOLOGY | Facility: CLINIC | Age: 73
End: 2023-02-27
Payer: MEDICARE

## 2023-02-27 NOTE — TELEPHONE ENCOUNTER
Pt called the [facility] and stated he was returning a call to Kristan (Sr Device Tech).  Please see message sent this afternoon. Pt stated he has been feeling well and the he was working out in his yard on yesterday and felt fine. Informed the pt that Dr. Mo has been updated with current AF status.  Also informed the pt that should Dr. Mo have any recommendations he will receive a return call.  Otherwise, the [facility] will continue to monitor. Understanding was verbalized.  Pt appreciated the call.     Message sent to Device Tech.

## 2023-02-28 DIAGNOSIS — I48.0 PAF (PAROXYSMAL ATRIAL FIBRILLATION): Primary | ICD-10-CM

## 2023-04-17 NOTE — H&P (VIEW-ONLY)
"Mr. Steinberg is a patient of Dr. Mo and was last seen in clinic 1/25/2023.      Subjective:   Patient ID:  Hussein Steinberg is a 73 y.o. male who presents for follow up of ICD and Atrial Fibrillation  .     HPI:    Mr. Steinberg is a 73 y.o. male with AF, DCM, CRT-D (2013), HTN, HLD, CVA (hemorrhagic) here for follow up.    Background:    "Andre" Idris    DCM, s/p biV ICD with great response (LVEF 60%+ 2018)  HTN on meds  HL on meds   PAF, on pradaxa  hx hemorrhagic CVA 2012    CRT-D 3/13 (Tonia device). Great response to CRT. Gen change 9/2019.    Hx of higher-dose amio, c/b night vision changes. These resolved after d/c'ing amio.  AF, resulting in sx that may be directly from the AF and/or from lack of biV pacing that results.  HERIBERTO/DCCV next week. 30 day monitor after that.  Discussed AADs tikosyn vs amio (vs ablation). After hearing pros/cons of all this, we'll restart amio (with load) following DCCV. If side effects recur, we could revisit tikosyn or ablation.  9/8/2020: He is 5 weeks s/p cardioversion and initiation of amiodarone. Maintaining rhythm.HERIBERTO showed LVEF of 55%.   6/15/2021: Mr. Steinberg is doing well from a device perspective with stable lead and device function. No arrhythmia noted. On amiodarone.   12/15/2021: Mr. Steinberg is doing well from a device perspective with stable lead and device function. Some RA lead noise which is chronic and stable.  No arrhythmia noted. On amiodarone. Amio testing all WNL. Plan is to consider tikosyn vs ablation if he no longer tolerates amiodarone in the future. He continues to prefer to stay the course for now.    Update (01/25/2023):  Feeling very well. Max AMS on ICD 34s. BiVP >99%  thyroid, LFTs, PFTs ok  ECG is APbiVP  In summary, Mr. Steinberg is a 72 y.o. male with AF, DCM, CRT-D (2013), HTN, HLD, CVA (hemorrhagic) here for follow up.  Doing well. No long episodes of AF (and some shorter ones were A lead noise).  Increased RR today due to large % HRs binned at " LRL.  Return in 1 year with echo and amio tests, or earlier prn.    Update (04/18/2023):    He was in AF from 2/14/23-2/18/23, then resumed 2/25/23 - remains in progress at this time.     Today he reports worsening GUNN and fatigue for the past ~2 months. Some fatigue, no cp, palps, syncope.    He is currently taking pradaxa 150mg BID for stroke prophylaxis and denies significant bleeding episodes. He is currently being treated with amiodarone 200mg daily for rhythm control and lopressor 100mg BID for HR control.  Kidney function is stable, with a creatinine of 1.1 on 2/9/2023.    Device Interrogation (4/18/2023) reveals an intrinsic AF with stable lead and device function. AF since 2/25/2023, v rates controlled. He paces 26% in the RA and 100% in the BiV. Estimated battery longevity 3.7 years.     I have personally reviewed the patient's EKG today, which shows AFBP at 80bpm. QRS is 194. QTc is 567.    Relevant Cardiac Test Results:    2D Echo (12/2021):  The left ventricle is normal in size with normal systolic function. The estimated ejection fraction is 60%.  There is abnormal septal wall motion consistent with right ventricular pacemaker.  Normal right ventricular size with normal right ventricular systolic function.  Indeterminate left ventricular diastolic function.  Biatrial enlargement.  Mild to moderate tricuspid regurgitation.  The estimated PA systolic pressure is 41 mmHg.  Elevated central venous pressure (15 mmHg).  The ascending aorta is mildly dilated.    Current Outpatient Medications   Medication Sig    acetaminophen (TYLENOL) 325 MG tablet Take 2 tablets (650 mg total) by mouth every 6 (six) hours as needed (pain 1-4/10 pain scale).    amiodarone (PACERONE) 200 MG Tab Take 1 tablet (200 mg total) by mouth once daily.    amLODIPine (NORVASC) 10 MG tablet Take 1 tablet (10 mg total) by mouth once daily.    candesartan (ATACAND) 32 MG tablet Take 1 tablet (32 mg total) by mouth once daily.     "chondroitin sulfate A sodium 400 mg Cap Take by mouth.    dabigatran etexilate (PRADAXA) 150 mg Cap Take 1 capsule (150 mg total) by mouth 2 (two) times a day    ezetimibe (ZETIA) 10 mg tablet Take 1 tablet (10 mg total) by mouth once daily.    folic acid/multivit-min/lutein (CENTRUM SILVER ORAL) Take by mouth once daily.    metoprolol tartrate (LOPRESSOR) 100 MG tablet Take 1 tablet (100 mg total) by mouth 2 (two) times daily.    triamterene-hydrochlorothiazide 37.5-25 mg (MAXZIDE-25) 37.5-25 mg per tablet Take 1 tablet by mouth once daily.     No current facility-administered medications for this visit.       Review of Systems   Constitutional: Positive for malaise/fatigue.   Cardiovascular:  Positive for dyspnea on exertion. Negative for chest pain, irregular heartbeat, leg swelling and palpitations.   Respiratory:  Negative for shortness of breath.    Hematologic/Lymphatic: Negative for bleeding problem.   Skin:  Negative for rash.   Musculoskeletal:  Negative for myalgias.   Gastrointestinal:  Negative for hematemesis, hematochezia and nausea.   Genitourinary:  Negative for hematuria.   Neurological:  Negative for light-headedness.   Psychiatric/Behavioral:  Negative for altered mental status.    Allergic/Immunologic: Negative for persistent infections.     Objective:          /80   Pulse 80   Ht 5' 11" (1.803 m)   Wt 119.2 kg (262 lb 12.6 oz)   BMI 36.65 kg/m²     Physical Exam  Vitals and nursing note reviewed.   Constitutional:       Appearance: Normal appearance. He is well-developed.   HENT:      Head: Normocephalic.      Nose: Nose normal.   Eyes:      Pupils: Pupils are equal, round, and reactive to light.   Cardiovascular:      Rate and Rhythm: Normal rate and regular rhythm.   Pulmonary:      Effort: No respiratory distress.      Breath sounds: Normal breath sounds.   Chest:      Comments: ICD  Musculoskeletal:         General: Normal range of motion.   Skin:     General: Skin is warm and " dry.      Findings: No erythema.   Neurological:      Mental Status: He is alert and oriented to person, place, and time.   Psychiatric:         Speech: Speech normal.         Behavior: Behavior normal.         Lab Results   Component Value Date     02/09/2023    K 3.8 02/09/2023    MG 2.5 09/14/2012    BUN 24 (H) 02/09/2023    CREATININE 1.10 02/09/2023    ALT 25 08/19/2022    ALT 25 08/19/2022    AST 27 08/19/2022    AST 27 08/19/2022    HGB 16.1 02/09/2023    HCT 50.5 02/09/2023    TSH 9.430 (H) 08/19/2022    LDLCALC 114.8 08/19/2022       Recent Labs   Lab 07/28/20  0740 02/09/23 0919   INR 1.3 H 1.1       Assessment:     1. Longstanding persistent atrial fibrillation    2. LBBB (left bundle branch block)    3. Essential hypertension    4. DCM (dilated cardiomyopathy)    5. Current use of anticoagulant therapy    6. History of CVA (cerebrovascular accident)    7. ICD (implantable cardioverter-defibrillator), biventricular, in situ    8. Long term current use of amiodarone    9. YUMIKO (obstructive sleep apnea)        Plan:     In summary, Mr. Steinberg is a 73 y.o. male with AF, DCM, CRT-D (2013), HTN, HLD, CVA (hemorrhagic) here for follow up.  He had breakthrough AF despite amiodarone in mid-Feb and DCCV planned but canceled when he reverted back to SR. A few days afterward he reverted back into persistent AF. Initially he denied significant symptoms per device note but says he is feeling progressively GUNN and fatigued. Remains on amiodarone. We discussed options. It is reasonable to proceed with DCCV to determine whether symptoms are related to AF and plan for PVI if his symptoms improve after cardioversion. If his symptoms do not improve, would stop amiodarone to see if fatigue improved. On pradaxa. 100% biventricular pacing. Device function WNL.    HERIBERTO/DCCV confirm with Dr. Mo (UPDATE: Proceed with HERIBERTO/DCCV + Bardy afterward or if pt presents in SR).  Continue current meds and device checks  RTC as  scheduled    *A copy of this note has been sent to Dr. Mo*    Follow up as scheduled.    ------------------------------------------------------------------    LAMIN Henderson, NP-C  Cardiac Electrophysiology

## 2023-04-18 ENCOUNTER — HOSPITAL ENCOUNTER (OUTPATIENT)
Dept: CARDIOLOGY | Facility: CLINIC | Age: 73
Discharge: HOME | End: 2023-04-18
Payer: MEDICARE

## 2023-04-18 ENCOUNTER — OFFICE VISIT (OUTPATIENT)
Dept: ELECTROPHYSIOLOGY | Facility: CLINIC | Age: 73
End: 2023-04-18
Payer: MEDICARE

## 2023-04-18 ENCOUNTER — CLINICAL SUPPORT (OUTPATIENT)
Dept: CARDIOLOGY | Facility: HOSPITAL | Age: 73
End: 2023-04-18
Attending: INTERNAL MEDICINE
Payer: MEDICARE

## 2023-04-18 VITALS
DIASTOLIC BLOOD PRESSURE: 80 MMHG | BODY MASS INDEX: 36.79 KG/M2 | WEIGHT: 262.81 LBS | HEART RATE: 80 BPM | SYSTOLIC BLOOD PRESSURE: 130 MMHG | HEIGHT: 71 IN

## 2023-04-18 DIAGNOSIS — I44.7 LBBB (LEFT BUNDLE BRANCH BLOCK): ICD-10-CM

## 2023-04-18 DIAGNOSIS — I48.0 PAF (PAROXYSMAL ATRIAL FIBRILLATION): ICD-10-CM

## 2023-04-18 DIAGNOSIS — Z79.899 LONG TERM CURRENT USE OF AMIODARONE: ICD-10-CM

## 2023-04-18 DIAGNOSIS — I48.11 LONGSTANDING PERSISTENT ATRIAL FIBRILLATION: Primary | ICD-10-CM

## 2023-04-18 DIAGNOSIS — Z86.73 HISTORY OF CVA (CEREBROVASCULAR ACCIDENT): ICD-10-CM

## 2023-04-18 DIAGNOSIS — I42.0 DCM (DILATED CARDIOMYOPATHY): ICD-10-CM

## 2023-04-18 DIAGNOSIS — Z95.810 ICD (IMPLANTABLE CARDIOVERTER-DEFIBRILLATOR), BIVENTRICULAR, IN SITU: ICD-10-CM

## 2023-04-18 DIAGNOSIS — G47.33 OSA (OBSTRUCTIVE SLEEP APNEA): ICD-10-CM

## 2023-04-18 DIAGNOSIS — Z79.01 CURRENT USE OF ANTICOAGULANT THERAPY: ICD-10-CM

## 2023-04-18 DIAGNOSIS — I10 ESSENTIAL HYPERTENSION: ICD-10-CM

## 2023-04-18 PROCEDURE — 99214 PR OFFICE/OUTPT VISIT, EST, LEVL IV, 30-39 MIN: ICD-10-PCS | Mod: S$GLB,,, | Performed by: NURSE PRACTITIONER

## 2023-04-18 PROCEDURE — 3075F SYST BP GE 130 - 139MM HG: CPT | Mod: CPTII,S$GLB,, | Performed by: NURSE PRACTITIONER

## 2023-04-18 PROCEDURE — 99999 PR PBB SHADOW E&M-EST. PATIENT-LVL III: CPT | Mod: PBBFAC,,, | Performed by: NURSE PRACTITIONER

## 2023-04-18 PROCEDURE — 1159F MED LIST DOCD IN RCRD: CPT | Mod: CPTII,S$GLB,, | Performed by: NURSE PRACTITIONER

## 2023-04-18 PROCEDURE — 93005 RHYTHM STRIP: ICD-10-PCS | Mod: S$GLB,,, | Performed by: INTERNAL MEDICINE

## 2023-04-18 PROCEDURE — 1101F PR PT FALLS ASSESS DOC 0-1 FALLS W/OUT INJ PAST YR: ICD-10-PCS | Mod: CPTII,S$GLB,, | Performed by: NURSE PRACTITIONER

## 2023-04-18 PROCEDURE — 4010F ACE/ARB THERAPY RXD/TAKEN: CPT | Mod: CPTII,S$GLB,, | Performed by: NURSE PRACTITIONER

## 2023-04-18 PROCEDURE — 93010 ELECTROCARDIOGRAM REPORT: CPT | Mod: S$GLB,,, | Performed by: INTERNAL MEDICINE

## 2023-04-18 PROCEDURE — 4010F PR ACE/ARB THEARPY RXD/TAKEN: ICD-10-PCS | Mod: CPTII,S$GLB,, | Performed by: NURSE PRACTITIONER

## 2023-04-18 PROCEDURE — 93284 PRGRMG EVAL IMPLANTABLE DFB: CPT | Mod: 26,,, | Performed by: INTERNAL MEDICINE

## 2023-04-18 PROCEDURE — 93284 CARDIAC DEVICE CHECK - IN CLINIC & HOSPITAL: ICD-10-PCS | Mod: 26,,, | Performed by: INTERNAL MEDICINE

## 2023-04-18 PROCEDURE — 93284 PRGRMG EVAL IMPLANTABLE DFB: CPT

## 2023-04-18 PROCEDURE — 3288F PR FALLS RISK ASSESSMENT DOCUMENTED: ICD-10-PCS | Mod: CPTII,S$GLB,, | Performed by: NURSE PRACTITIONER

## 2023-04-18 PROCEDURE — 1126F AMNT PAIN NOTED NONE PRSNT: CPT | Mod: CPTII,S$GLB,, | Performed by: NURSE PRACTITIONER

## 2023-04-18 PROCEDURE — 3075F PR MOST RECENT SYSTOLIC BLOOD PRESS GE 130-139MM HG: ICD-10-PCS | Mod: CPTII,S$GLB,, | Performed by: NURSE PRACTITIONER

## 2023-04-18 PROCEDURE — 3079F DIAST BP 80-89 MM HG: CPT | Mod: CPTII,S$GLB,, | Performed by: NURSE PRACTITIONER

## 2023-04-18 PROCEDURE — 93005 ELECTROCARDIOGRAM TRACING: CPT | Mod: S$GLB,,, | Performed by: INTERNAL MEDICINE

## 2023-04-18 PROCEDURE — 1160F RVW MEDS BY RX/DR IN RCRD: CPT | Mod: CPTII,S$GLB,, | Performed by: NURSE PRACTITIONER

## 2023-04-18 PROCEDURE — 1159F PR MEDICATION LIST DOCUMENTED IN MEDICAL RECORD: ICD-10-PCS | Mod: CPTII,S$GLB,, | Performed by: NURSE PRACTITIONER

## 2023-04-18 PROCEDURE — 3079F PR MOST RECENT DIASTOLIC BLOOD PRESSURE 80-89 MM HG: ICD-10-PCS | Mod: CPTII,S$GLB,, | Performed by: NURSE PRACTITIONER

## 2023-04-18 PROCEDURE — 93010 RHYTHM STRIP: ICD-10-PCS | Mod: S$GLB,,, | Performed by: INTERNAL MEDICINE

## 2023-04-18 PROCEDURE — 99214 OFFICE O/P EST MOD 30 MIN: CPT | Mod: S$GLB,,, | Performed by: NURSE PRACTITIONER

## 2023-04-18 PROCEDURE — 99999 PR PBB SHADOW E&M-EST. PATIENT-LVL III: ICD-10-PCS | Mod: PBBFAC,,, | Performed by: NURSE PRACTITIONER

## 2023-04-18 PROCEDURE — 3008F PR BODY MASS INDEX (BMI) DOCUMENTED: ICD-10-PCS | Mod: CPTII,S$GLB,, | Performed by: NURSE PRACTITIONER

## 2023-04-18 PROCEDURE — 1160F PR REVIEW ALL MEDS BY PRESCRIBER/CLIN PHARMACIST DOCUMENTED: ICD-10-PCS | Mod: CPTII,S$GLB,, | Performed by: NURSE PRACTITIONER

## 2023-04-18 PROCEDURE — 1101F PT FALLS ASSESS-DOCD LE1/YR: CPT | Mod: CPTII,S$GLB,, | Performed by: NURSE PRACTITIONER

## 2023-04-18 PROCEDURE — 3288F FALL RISK ASSESSMENT DOCD: CPT | Mod: CPTII,S$GLB,, | Performed by: NURSE PRACTITIONER

## 2023-04-18 PROCEDURE — 3008F BODY MASS INDEX DOCD: CPT | Mod: CPTII,S$GLB,, | Performed by: NURSE PRACTITIONER

## 2023-04-18 PROCEDURE — 1126F PR PAIN SEVERITY QUANTIFIED, NO PAIN PRESENT: ICD-10-PCS | Mod: CPTII,S$GLB,, | Performed by: NURSE PRACTITIONER

## 2023-04-18 NOTE — Clinical Note
Pt with CRT-D, AF had breakthrough AF despite amio and HERIBERTO/DCCV planned but aborted when he reverted back to SR. He went back into AF a few days later and reports worsening fatigue and GUNN since then. OK to proceed with HERIBERTO/DCCV to see if his symptoms improve? (Then PVI if symptom improvement vs stop amio if no improvement)? thx

## 2023-04-19 ENCOUNTER — PATIENT MESSAGE (OUTPATIENT)
Dept: ELECTROPHYSIOLOGY | Facility: CLINIC | Age: 73
End: 2023-04-19
Payer: MEDICARE

## 2023-04-19 DIAGNOSIS — I48.11 LONGSTANDING PERSISTENT ATRIAL FIBRILLATION: Primary | ICD-10-CM

## 2023-04-20 ENCOUNTER — TELEPHONE (OUTPATIENT)
Dept: ELECTROPHYSIOLOGY | Facility: CLINIC | Age: 73
End: 2023-04-20
Payer: MEDICARE

## 2023-04-20 NOTE — TELEPHONE ENCOUNTER
Spoke to patient    CONFIRMED procedure arrival time of 7am    Reiterated instructions including:  -Directions to check in desk  -NPO after midnight night prior to procedure  -High importance of HOLDING n/a  -Confirmed compliance of pradaxa BID  -Pre-procedure LABS reviewed no alerts noted   -Confirmed absence or presence of implanted device/stimulator [facility] CRT-D  -Confirmed no fever, cough, or shortness of breath in the past 30 days  -Reviewed current visitor policy    Patient verbalized understanding of above and appreciated the call.

## 2023-04-24 ENCOUNTER — ANESTHESIA EVENT (OUTPATIENT)
Dept: MEDSURG UNIT | Facility: HOSPITAL | Age: 73
End: 2023-04-24
Payer: MEDICARE

## 2023-04-24 ENCOUNTER — HOSPITAL ENCOUNTER (OUTPATIENT)
Dept: CARDIOLOGY | Facility: HOSPITAL | Age: 73
Discharge: HOME | End: 2023-04-24
Attending: INTERNAL MEDICINE
Payer: MEDICARE

## 2023-04-24 ENCOUNTER — HOSPITAL ENCOUNTER (OUTPATIENT)
Facility: HOSPITAL | Age: 73
Discharge: HOME | End: 2023-04-24
Attending: INTERNAL MEDICINE | Admitting: INTERNAL MEDICINE
Payer: MEDICARE

## 2023-04-24 ENCOUNTER — RESEARCH ENCOUNTER (OUTPATIENT)
Dept: RESEARCH | Facility: HOSPITAL | Age: 73
End: 2023-04-24
Payer: MEDICARE

## 2023-04-24 ENCOUNTER — CLINICAL SUPPORT (OUTPATIENT)
Dept: CARDIOLOGY | Facility: HOSPITAL | Age: 73
End: 2023-04-24
Attending: INTERNAL MEDICINE
Payer: MEDICARE

## 2023-04-24 ENCOUNTER — ANESTHESIA (OUTPATIENT)
Dept: MEDSURG UNIT | Facility: HOSPITAL | Age: 73
End: 2023-04-24
Payer: MEDICARE

## 2023-04-24 VITALS
DIASTOLIC BLOOD PRESSURE: 98 MMHG | SYSTOLIC BLOOD PRESSURE: 135 MMHG | WEIGHT: 259 LBS | HEIGHT: 71 IN | BODY MASS INDEX: 36.26 KG/M2

## 2023-04-24 VITALS
HEIGHT: 71 IN | OXYGEN SATURATION: 97 % | TEMPERATURE: 98 F | HEART RATE: 60 BPM | RESPIRATION RATE: 24 BRPM | DIASTOLIC BLOOD PRESSURE: 67 MMHG | SYSTOLIC BLOOD PRESSURE: 106 MMHG | BODY MASS INDEX: 36.28 KG/M2 | WEIGHT: 259.13 LBS

## 2023-04-24 DIAGNOSIS — Z79.01 CURRENT USE OF ANTICOAGULANT THERAPY: ICD-10-CM

## 2023-04-24 DIAGNOSIS — I48.11 LONGSTANDING PERSISTENT ATRIAL FIBRILLATION: ICD-10-CM

## 2023-04-24 DIAGNOSIS — I48.91 ATRIAL FIBRILLATION: ICD-10-CM

## 2023-04-24 DIAGNOSIS — I48.11 LONGSTANDING PERSISTENT ATRIAL FIBRILLATION: Primary | ICD-10-CM

## 2023-04-24 LAB — BSA FOR ECHO PROCEDURE: 2.43 M2

## 2023-04-24 PROCEDURE — 93010 ELECTROCARDIOGRAM REPORT: CPT | Mod: ,,, | Performed by: INTERNAL MEDICINE

## 2023-04-24 PROCEDURE — D9220A PRA ANESTHESIA: Mod: ANES,,, | Performed by: ANESTHESIOLOGY

## 2023-04-24 PROCEDURE — 25000003 PHARM REV CODE 250: Performed by: NURSE ANESTHETIST, CERTIFIED REGISTERED

## 2023-04-24 PROCEDURE — 25000003 PHARM REV CODE 250: Mod: Q1 | Performed by: NURSE ANESTHETIST, CERTIFIED REGISTERED

## 2023-04-24 PROCEDURE — D9220A PRA ANESTHESIA: ICD-10-PCS | Mod: CRNA,,, | Performed by: NURSE ANESTHETIST, CERTIFIED REGISTERED

## 2023-04-24 PROCEDURE — 63600175 PHARM REV CODE 636 W HCPCS: Mod: Q1 | Performed by: NURSE ANESTHETIST, CERTIFIED REGISTERED

## 2023-04-24 PROCEDURE — 93325 TRANSESOPHAGEAL ECHO (TEE) (CUPID ONLY): ICD-10-PCS | Mod: 26,,, | Performed by: INTERNAL MEDICINE

## 2023-04-24 PROCEDURE — 93010 EKG 12-LEAD: ICD-10-PCS | Mod: ,,, | Performed by: INTERNAL MEDICINE

## 2023-04-24 PROCEDURE — 93248 CV CARDIAC MONITOR - 3-15 DAY ADULT (CUPID ONLY): ICD-10-PCS | Mod: ,,, | Performed by: INTERNAL MEDICINE

## 2023-04-24 PROCEDURE — D9220A PRA ANESTHESIA: ICD-10-PCS | Mod: ANES,,, | Performed by: ANESTHESIOLOGY

## 2023-04-24 PROCEDURE — 93320 DOPPLER ECHO COMPLETE: CPT | Mod: 26,,, | Performed by: INTERNAL MEDICINE

## 2023-04-24 PROCEDURE — 25000003 PHARM REV CODE 250: Performed by: INTERNAL MEDICINE

## 2023-04-24 PROCEDURE — 93320 TRANSESOPHAGEAL ECHO (TEE) (CUPID ONLY): ICD-10-PCS | Mod: 26,,, | Performed by: INTERNAL MEDICINE

## 2023-04-24 PROCEDURE — 93325 DOPPLER ECHO COLOR FLOW MAPG: CPT | Mod: Q1

## 2023-04-24 PROCEDURE — 37000009 HC ANESTHESIA EA ADD 15 MINS: Performed by: INTERNAL MEDICINE

## 2023-04-24 PROCEDURE — 92960 CARDIOVERSION ELECTRIC EXT: CPT | Mod: Q1 | Performed by: INTERNAL MEDICINE

## 2023-04-24 PROCEDURE — 93312 TRANSESOPHAGEAL ECHO (TEE) (CUPID ONLY): ICD-10-PCS | Mod: 26,,, | Performed by: INTERNAL MEDICINE

## 2023-04-24 PROCEDURE — D9220A PRA ANESTHESIA: Mod: CRNA,,, | Performed by: NURSE ANESTHETIST, CERTIFIED REGISTERED

## 2023-04-24 PROCEDURE — 92960 CARDIOVERSION ELECTRIC EXT: CPT | Mod: ,,, | Performed by: INTERNAL MEDICINE

## 2023-04-24 PROCEDURE — 37000008 HC ANESTHESIA 1ST 15 MINUTES: Performed by: INTERNAL MEDICINE

## 2023-04-24 PROCEDURE — 93312 ECHO TRANSESOPHAGEAL: CPT | Mod: 26,,, | Performed by: INTERNAL MEDICINE

## 2023-04-24 PROCEDURE — 93005 ELECTROCARDIOGRAM TRACING: CPT | Mod: 59,Q1

## 2023-04-24 PROCEDURE — 92960 PR CARDIOVERSION, ELECTIVE;EXTERN: ICD-10-PCS | Mod: ,,, | Performed by: INTERNAL MEDICINE

## 2023-04-24 PROCEDURE — 93325 DOPPLER ECHO COLOR FLOW MAPG: CPT | Mod: 26,,, | Performed by: INTERNAL MEDICINE

## 2023-04-24 PROCEDURE — 93005 ELECTROCARDIOGRAM TRACING: CPT

## 2023-04-24 PROCEDURE — 93248 EXT ECG>7D<15D REV&INTERPJ: CPT | Mod: ,,, | Performed by: INTERNAL MEDICINE

## 2023-04-24 RX ORDER — PROPOFOL 10 MG/ML
VIAL (ML) INTRAVENOUS
Status: DISCONTINUED | OUTPATIENT
Start: 2023-04-24 | End: 2023-04-24

## 2023-04-24 RX ORDER — FENTANYL CITRATE 50 UG/ML
25 INJECTION, SOLUTION INTRAMUSCULAR; INTRAVENOUS EVERY 5 MIN PRN
Status: DISCONTINUED | OUTPATIENT
Start: 2023-04-24 | End: 2023-04-24 | Stop reason: HOSPADM

## 2023-04-24 RX ORDER — SILVER SULFADIAZINE 10 G/1000G
CREAM TOPICAL 2 TIMES DAILY PRN
Status: DISCONTINUED | OUTPATIENT
Start: 2023-04-24 | End: 2023-04-24 | Stop reason: HOSPADM

## 2023-04-24 RX ORDER — SILVER SULFADIAZINE 10 G/1000G
CREAM TOPICAL
Status: DISCONTINUED | OUTPATIENT
Start: 2023-04-24 | End: 2023-04-24 | Stop reason: HOSPADM

## 2023-04-24 RX ORDER — MIDAZOLAM HYDROCHLORIDE 1 MG/ML
INJECTION, SOLUTION INTRAMUSCULAR; INTRAVENOUS
Status: DISCONTINUED | OUTPATIENT
Start: 2023-04-24 | End: 2023-04-24

## 2023-04-24 RX ORDER — LIDOCAINE HYDROCHLORIDE 20 MG/ML
SOLUTION OROPHARYNGEAL
Status: DISCONTINUED | OUTPATIENT
Start: 2023-04-24 | End: 2023-04-24

## 2023-04-24 RX ORDER — LIDOCAINE HYDROCHLORIDE 20 MG/ML
INJECTION INTRAVENOUS
Status: DISCONTINUED | OUTPATIENT
Start: 2023-04-24 | End: 2023-04-24

## 2023-04-24 RX ORDER — ONDANSETRON 2 MG/ML
4 INJECTION INTRAMUSCULAR; INTRAVENOUS ONCE AS NEEDED
Status: DISCONTINUED | OUTPATIENT
Start: 2023-04-24 | End: 2023-04-24 | Stop reason: HOSPADM

## 2023-04-24 RX ORDER — DIPHENHYDRAMINE HYDROCHLORIDE 50 MG/ML
25 INJECTION INTRAMUSCULAR; INTRAVENOUS EVERY 6 HOURS PRN
Status: DISCONTINUED | OUTPATIENT
Start: 2023-04-24 | End: 2023-04-24 | Stop reason: HOSPADM

## 2023-04-24 RX ORDER — HYDROMORPHONE HYDROCHLORIDE 1 MG/ML
0.2 INJECTION, SOLUTION INTRAMUSCULAR; INTRAVENOUS; SUBCUTANEOUS EVERY 5 MIN PRN
Status: DISCONTINUED | OUTPATIENT
Start: 2023-04-24 | End: 2023-04-24 | Stop reason: HOSPADM

## 2023-04-24 RX ADMIN — GLYCOPYRROLATE 0.2 MG: 0.2 INJECTION, SOLUTION INTRAMUSCULAR; INTRAVENOUS at 09:04

## 2023-04-24 RX ADMIN — PROPOFOL 40 MG: 10 INJECTION, EMULSION INTRAVENOUS at 09:04

## 2023-04-24 RX ADMIN — LIDOCAINE HYDROCHLORIDE 3 ML: 20 SOLUTION ORAL; TOPICAL at 09:04

## 2023-04-24 RX ADMIN — SODIUM CHLORIDE: 0.9 INJECTION, SOLUTION INTRAVENOUS at 09:04

## 2023-04-24 RX ADMIN — MIDAZOLAM HYDROCHLORIDE 2 MG: 1 INJECTION, SOLUTION INTRAMUSCULAR; INTRAVENOUS at 09:04

## 2023-04-24 RX ADMIN — LIDOCAINE HYDROCHLORIDE 50 MG: 20 INJECTION INTRAVENOUS at 09:04

## 2023-04-24 NOTE — ANESTHESIA PREPROCEDURE EVALUATION
04/24/2023  Hussein Steinberg is a 73 y.o., male.  Patient Active Problem List   Diagnosis    Nuclear sclerosis - Both Eyes    Longstanding persistent atrial fibrillation    Essential hypertension    LBBB (left bundle branch block)    ICD (implantable cardioverter-defibrillator), biventricular, in situ    Class 2 severe obesity due to excess calories with serious comorbidity and body mass index (BMI) of 36.0 to 36.9 in adult    DCM (dilated cardiomyopathy)    History of prostate cancer    Squamous cell carcinoma in situ of skin    Hyperlipidemia    Other insomnia    History of intracerebral hemorrhage without residual deficit    History of CVA (cerebrovascular accident)    Myalgia due to statin    Long term current use of amiodarone    History of colon polyps    YUMIKO (obstructive sleep apnea)    Elevated TSH    Current use of anticoagulant therapy           Pre-op Assessment          Review of Systems      Physical Exam    Airway:  No airway management difficulties anticipated  Dental:No active dental issues noted  Chest/Lungs:  Clear to auscultation    Heart:  Rate: Normal  Rhythm: Regular Rhythm  Sounds: Normal        Anesthesia Plan  Type of Anesthesia, risks & benefits discussed:    Anesthesia Type: Gen Natural Airway, Gen ETT  Informed Consent: Informed consent signed with the Patient and all parties understand the risks and agree with anesthesia plan.  All questions answered.   ASA Score: 3  Anesthesia Plan Notes: Chart reviewed. Patient seen and examined. Anesthesia plan discussed and questions answered. E-consent signed. Jake Sierra MD    Ready For Surgery From Anesthesia Perspective.     .

## 2023-04-24 NOTE — DISCHARGE SUMMARY
[facility] - Cardiology  Cardiac Electrophysiology  Discharge Summary      Patient Name: Hussein Steinberg  MRN: [id number]  Admission Date: 4/24/2023  Hospital Length of Stay: 0 days  Discharge Date and Time: 4/24/2023 10:45 AM  Attending Physician: Dejan Mo MD    Discharging Provider: Idalmis Campuzano NP  Primary Care Physician: Lacey Rainey MD    HPI:   Hussein Steinberg 73 y.o. male presents for HERIBERTO/DCCV.  He has history of   DCM, s/p biV ICD with great response (LVEF 60%+ 2018)  HTN on meds  HL on meds   PAF, on pradaxa  hx hemorrhagic CVA 2012     CRT-D 3/13 (Tonia device). Great response to CRT. Gen change 9/2019.     Hx of higher-dose amio, c/b night vision changes. These resolved after d/c'ing amio.  AF, resulting in sx that may be directly from the AF and/or from lack of biV pacing that results.  HERIBERTO/DCCV next week. 30 day monitor after that.  Discussed AADs tikosyn vs amio (vs ablation). After hearing pros/cons of all this, we'll restart amio (with load) following DCCV. If side effects recur, we could revisit tikosyn or ablation.  9/8/2020: He is 5 weeks s/p cardioversion and initiation of amiodarone. Maintaining rhythm.HERIBERTO showed LVEF of 55%.   6/15/2021: Mr. Steinberg is doing well from a device perspective with stable lead and device function. No arrhythmia noted. On amiodarone.   12/15/2021: Mr. Steinberg is doing well from a device perspective with stable lead and device function. Some RA lead noise which is chronic and stable.  No arrhythmia noted. On amiodarone. Amio testing all WNL. Plan is to consider tikosyn vs ablation if he no longer tolerates amiodarone in the future. He continues to prefer to stay the course for now.     Update (01/25/2023):  Feeling very well. Max AMS on ICD 34s. BiVP >99%  thyroid, LFTs, PFTs ok  ECG is APbiVP  In summary, Mr. Steinberg is a 72 y.o. male with AF, DCM, CRT-D (2013), HTN, HLD, CVA (hemorrhagic) here for follow up.  Doing well. No long episodes of AF (and some  shorter ones were A lead noise).  Increased RR today due to large % HRs binned at LRL.  Return in 1 year with echo and amio tests, or earlier prn.     Update (04/18/2023):     He was in AF from 2/14/23-2/18/23, then resumed 2/25/23 - remains in progress at this time.      Today he reports worsening GUNN and fatigue for the past ~2 months. Some fatigue, no cp, palps, syncope.     He is currently taking pradaxa 150mg BID for stroke prophylaxis and denies significant bleeding episodes. He is currently being treated with amiodarone 200mg daily for rhythm control and lopressor 100mg BID for HR control.  Kidney function is stable, with a creatinine of 1.1 on 2/9/2023.     Device Interrogation (4/18/2023) reveals an intrinsic AF with stable lead and device function. AF since 2/25/2023, v rates controlled. He paces 26% in the RA and 100% in the BiV. Estimated battery longevity 3.7 years.      I have personally reviewed the patient's EKG today, which shows AFBP at 80bpm. QRS is 194. QTc is 567.     Relevant Cardiac Test Results:     2D Echo (12/2021):   The left ventricle is normal in size with normal systolic function. The estimated ejection fraction is 60%.   There is abnormal septal wall motion consistent with right ventricular pacemaker.   Normal right ventricular size with normal right ventricular systolic function.   Indeterminate left ventricular diastolic function.   Biatrial enlargement.   Mild to moderate tricuspid regurgitation.   The estimated PA systolic pressure is 41 mmHg.   Elevated central venous pressure (15 mmHg).   The ascending aorta is mildly dilated.         Procedure(s) (LRB):  Cardioversion or Defibrillation (N/A)  Transesophageal echo (HERIBERTO) intra-procedure log documentation (N/A)     Transesophageal echo (HERIBERTO)    Result Date: 4/24/2023  · Limited HERIBERTO as patient became hypoxic · Normal appearing left atrial appendage. No thrombus is present in the appendage. Abnormal appendage velocities.  contrast was used.      Hospital Course:  S/p  HERIBERTO/DCCV  for atrial fibrillation. HERIBERTO: VIJAYA: no clot.  Underwent successful DCCV with conversion to sinus rhythm. Post DCCV EKG: AV paced; underlying sinus; ventricular rate: 60 bpm. Tolerated procedure.  Awake and alert. Without complaints.  D/c instructions provided to patient who verbalized understanding.        Goals of Care Treatment Preferences:  Code Status: Full Code    Consults: Cardiology for HERIBERTO    Significant Diagnostic Studies: Cardiac Graphics: ECG: post DCCV: AV paced; 60 bpm; underlying rhythm-- sinus    Pending Diagnostic Studies:     None          Final Active Diagnoses:    Diagnosis Date Noted POA    PRINCIPAL PROBLEM:  Longstanding persistent atrial fibrillation [I48.11] 03/19/2013 Yes      Problems Resolved During this Admission:     Cardiac/Vascular  * Longstanding persistent atrial fibrillation  S/p HERIBERTO/DCCV.   Continue Pradaxa and Amiodarone  Bardy x 2 weeks  GHULAM Ayala NP in 4 weeks      Discharged Condition: stable    Disposition: Home or Self Care    Follow Up:   Follow-up Information     Neelam Ayala NP. Schedule an appointment as soon as possible for a visit in 4 week(s).    Specialty: Cardiology  Why: Follow up post cardioversion  Contact information:  [facility] [id number]  [phone]                       Patient Instructions:      Diet Cardiac     No driving until:   Order Comments: As instructed by Anesthesia post sedation     No dressing needed     Notify your health care provider if you experience any of the following:  temperature >100.4     Notify your health care provider if you experience any of the following:  persistent nausea and vomiting or diarrhea     Notify your health care provider if you experience any of the following:  severe uncontrolled pain     Notify your health care provider if you experience any of the following:  redness, tenderness, or signs of infection (pain, swelling, redness,  odor or green/yellow discharge around incision site)     Notify your health care provider if you experience any of the following:  difficulty breathing or increased cough     Notify your health care provider if you experience any of the following:  severe persistent headache     Notify your health care provider if you experience any of the following:  worsening rash     Notify your health care provider if you experience any of the following:  persistent dizziness, light-headedness, or visual disturbances     Notify your health care provider if you experience any of the following:  increased confusion or weakness     Activity as tolerated     Medications:  Reconciled Home Medications:      Medication List      CONTINUE taking these medications    acetaminophen 325 MG tablet  Commonly known as: TYLENOL  Take 2 tablets (650 mg total) by mouth every 6 (six) hours as needed (pain 1-4/10 pain scale).     amiodarone 200 MG Tab  Commonly known as: PACERONE  Take 1 tablet (200 mg total) by mouth once daily.     amLODIPine 10 MG tablet  Commonly known as: NORVASC  Take 1 tablet (10 mg total) by mouth once daily.     candesartan 32 MG tablet  Commonly known as: ATACAND  Take 1 tablet (32 mg total) by mouth once daily.     CENTRUM SILVER ORAL  Take by mouth once daily.     chondroitin sulfate A sodium 400 mg Cap  Take by mouth.     ezetimibe 10 mg tablet  Commonly known as: ZETIA  Take 1 tablet (10 mg total) by mouth once daily.     metoprolol tartrate 100 MG tablet  Commonly known as: LOPRESSOR  Take 1 tablet (100 mg total) by mouth 2 (two) times daily.     PRADAXA 150 mg Cap  Generic drug: dabigatran etexilate  Take 1 capsule (150 mg total) by mouth 2 (two) times a day     triamterene-hydrochlorothiazide 37.5-25 mg 37.5-25 mg per tablet  Commonly known as: MAXZIDE-25  Take 1 tablet by mouth once daily.            Time spent on the discharge of patient: 20 minutes    Idalmis Campuzano NP  Cardiac Electrophysiology  [facility] -  Cardiology

## 2023-04-24 NOTE — HPI
Hussein Steinberg 73 y.o. male presents for HERIBERTO/DCCV.  He has history of   DCM, s/p biV ICD with great response (LVEF 60%+ 2018)  HTN on meds  HL on meds   PAF, on pradaxa  hx hemorrhagic CVA 2012     CRT-D 3/13 (Tonia device). Great response to CRT. Gen change 9/2019.     Hx of higher-dose amio, c/b night vision changes. These resolved after d/c'ing amio.  AF, resulting in sx that may be directly from the AF and/or from lack of biV pacing that results.  HERIBERTO/DCCV next week. 30 day monitor after that.  Discussed AADs tikosyn vs amio (vs ablation). After hearing pros/cons of all this, we'll restart amio (with load) following DCCV. If side effects recur, we could revisit tikosyn or ablation.  9/8/2020: He is 5 weeks s/p cardioversion and initiation of amiodarone. Maintaining rhythm.HERIBERTO showed LVEF of 55%.   6/15/2021: Mr. Steinberg is doing well from a device perspective with stable lead and device function. No arrhythmia noted. On amiodarone.   12/15/2021: Mr. Steinberg is doing well from a device perspective with stable lead and device function. Some RA lead noise which is chronic and stable.  No arrhythmia noted. On amiodarone. Amio testing all WNL. Plan is to consider tikosyn vs ablation if he no longer tolerates amiodarone in the future. He continues to prefer to stay the course for now.     Update (01/25/2023):  Feeling very well. Max AMS on ICD 34s. BiVP >99%  thyroid, LFTs, PFTs ok  ECG is APbiVP  In summary, Mr. Steinberg is a 72 y.o. male with AF, DCM, CRT-D (2013), HTN, HLD, CVA (hemorrhagic) here for follow up.  Doing well. No long episodes of AF (and some shorter ones were A lead noise).  Increased RR today due to large % HRs binned at LRL.  Return in 1 year with echo and amio tests, or earlier prn.     Update (04/18/2023):     He was in AF from 2/14/23-2/18/23, then resumed 2/25/23 - remains in progress at this time.      Today he reports worsening GUNN and fatigue for the past ~2 months. Some fatigue, no cp, palps,  syncope.     He is currently taking pradaxa 150mg BID for stroke prophylaxis and denies significant bleeding episodes. He is currently being treated with amiodarone 200mg daily for rhythm control and lopressor 100mg BID for HR control.  Kidney function is stable, with a creatinine of 1.1 on 2/9/2023.     Device Interrogation (4/18/2023) reveals an intrinsic AF with stable lead and device function. AF since 2/25/2023, v rates controlled. He paces 26% in the RA and 100% in the BiV. Estimated battery longevity 3.7 years.      I have personally reviewed the patient's EKG today, which shows AFBP at 80bpm. QRS is 194. QTc is 567.     Relevant Cardiac Test Results:     2D Echo (12/2021):  The left ventricle is normal in size with normal systolic function. The estimated ejection fraction is 60%.  There is abnormal septal wall motion consistent with right ventricular pacemaker.  Normal right ventricular size with normal right ventricular systolic function.  Indeterminate left ventricular diastolic function.  Biatrial enlargement.  Mild to moderate tricuspid regurgitation.  The estimated PA systolic pressure is 41 mmHg.  Elevated central venous pressure (15 mmHg).  The ascending aorta is mildly dilated.

## 2023-04-24 NOTE — ASSESSMENT & PLAN NOTE
S/p HERIBERTO/DCCV.   Continue Pradaxa and Amiodarone  Bardy x 2 weeks  GHULAM Ayala NP in 4 weeks

## 2023-04-24 NOTE — LETTER
2023    Hussein Steinberg  [address] [place] [address]       Patient Instructions  HERIBERTO (Transesophageal Echocardiogram) with Cardioversion     Personal Assistance  The [facility] pre-services team will be submitting to your medical insurance carrier for authorization. However, sometimes there are financial obligations such as co-pays, out of pocket deductibles, and/or payments required.  It is the patient's responsibility to assure their procedure is financially cleared in advance.  Our expert financial counselors are available to provide patients with assistance for personalized cost estimates.  Please reach out to determine if you have a financial responsibility with regards to your procedures.   Call [phone] to speak with an [facility] financial counselor   Live Chat- accessed through [email or web address] or the [facility] patient portal   Email- request a personalized estimate through email at [email or web address] messaging- accessed through the [facility] patient portal      Pre-Procedure Testin2023 at 2:40 p.m.   - Blood Work has been scheduled to be done:  [facility] [place] [address]  · You do NOT need to fast for this blood work.         Important Medication Information:    · You may take your usual morning medications with a sip of water on the day of your procedure.   · Continue to take your PRADAXA twice daily as usual. Do NOT miss any doses.         Day of Procedure:    2023 at 7 a.m.      [facility] -   [address] [place] [id number]  Please report to Cardiology Waiting Room (Same Day Surgery) on the 3rd floor of the Hospital,     · Do not eat or drink anything after 12 midnight on the night before your procedure.  · Please follow important medication instructions as listed above.   · Please do not wear makeup, especially mascara, when arriving for your  procedure.  · You will be going home after your procedure.  · You will need someone to drive you home from the hospital due to anesthesia  · [facility] has moved to an optional mask policy in most areas. If you would like your care team to wear a mask, please don't hesitate to ask.   · All visitors must wait in a socially distant manner until a member of the medical team provides an update at the conclusion of the procedure/surgery.  · Your support person should provide the staff with a valid cell phone number so that he or she can receive automated updates.      Directions to Cardiology Waiting Room  If you park in the Parking Garage:  Take elevators to the 2nd floor  Walk up ramp and turn right by Gold Elevators  Take elevator to the 3rd floor  Upon exiting the elevator, turn away from the clinic areas  Walk long valencia around to front of hospital to area with windows overlooking [facility]  Check in at Reception Desk  OR  If family is dropping you off:  Have them drop you off at the front of the Hospital  (Near the ER, where all the flags are hung).  Take the E elevators to the 3rd floor.  Check in at the Reception Desk in the waiting room.      Any need to reschedule or cancel procedures, or any questions regarding your procedures should be addressed directly with the Arrhythmia Department Nurses at the following phone number: [phone].

## 2023-04-24 NOTE — SUBJECTIVE & OBJECTIVE
Past Medical History:   Diagnosis Date    Anticoagulant long-term use     Atrial fibrillation     Basal cell carcinoma     Bronchitis 03/20/2017    Cardiac arrest 2012    has pacemaker/defibrillator placed 3/19/13 - followed by [facility]ologist    Cataract     Current use of anticoagulant therapy 02/13/2023    Current use of anticoagulant therapy 02/13/2023    DCM (dilated cardiomyopathy) 06/15/2017    High cholesterol     History of intracerebral hemorrhage without residual deficit 08/23/2019    History of prostate cancer 06/23/2017    Hypertension     Kidney stone 10/2013    LBBB (left bundle branch block) 10/10/2012    LV dysfunction 10/10/2012    Prostate cancer     Treated by Dr. Rasmussen    Squamous cell carcinoma in situ of skin 2017    left arm removed    Stroke 09/2012    + hemorrhagic infarct to right occipital lobe after started on Plavix following cardiac event    SVT (supraventricular tachycardia) 10/10/2012       Past Surgical History:   Procedure Laterality Date    arthroscopic  knee    BASAL CELL CARCINOMA EXCISION  2018    removed from nose    Basil cell   2022    CARDIAC PACEMAKER PLACEMENT  2013    and defibrillator    COLONOSCOPY  2/12/2016    + polyp - repeat in 5 years- Dr. Calles    COLONOSCOPY N/A 7/19/2021    Procedure: COLONOSCOPY;  Surgeon: Troy Bryson MD;  [facility]  Service: Endoscopy;  Laterality: N/A;  pacemaker/AICD-[employer]  fully vaccinated-GT     ok to hold Pradaxa 2 days per Dr Mo  Miralax prep    EYE SURGERY  1992    PROSTATECTOMY      SKIN CANCER EXCISION Left 10/2017    squamous cell carcinoma removed from left arm    TREATMENT OF CARDIAC ARRHYTHMIA N/A 7/31/2020    Procedure: CARDIOVERSION;  Surgeon: Dejan Mo MD;  Location: [facility] EP LAB;  Service: Cardiology;  Laterality: N/A;  afib, HERIBERTO, DCCV, anes, DM, 3 Prep    VASECTOMY  1988       Review of patient's allergies indicates:   Allergen Reactions    Olmesartan Diarrhea     Diarrhea  and muscle aches since starting medication.     Lisinopril Other (See Comments)     Dry cough       No current facility-administered medications on file prior to encounter.     Current Outpatient Medications on File Prior to Encounter   Medication Sig    acetaminophen (TYLENOL) 325 MG tablet Take 2 tablets (650 mg total) by mouth every 6 (six) hours as needed (pain 1-4/10 pain scale).    amiodarone (PACERONE) 200 MG Tab Take 1 tablet (200 mg total) by mouth once daily.    amLODIPine (NORVASC) 10 MG tablet Take 1 tablet (10 mg total) by mouth once daily.    candesartan (ATACAND) 32 MG tablet Take 1 tablet (32 mg total) by mouth once daily.    chondroitin sulfate A sodium 400 mg Cap Take by mouth.    dabigatran etexilate (PRADAXA) 150 mg Cap Take 1 capsule (150 mg total) by mouth 2 (two) times a day    ezetimibe (ZETIA) 10 mg tablet Take 1 tablet (10 mg total) by mouth once daily.    folic acid/multivit-min/lutein (CENTRUM SILVER ORAL) Take by mouth once daily.    metoprolol tartrate (LOPRESSOR) 100 MG tablet Take 1 tablet (100 mg total) by mouth 2 (two) times daily.    triamterene-hydrochlorothiazide 37.5-25 mg (MAXZIDE-25) 37.5-25 mg per tablet Take 1 tablet by mouth once daily.     Family History       Problem Relation (Age of Onset)    Cancer Father    Cataracts Mother    Glaucoma Mother    Heart attack Brother (59), Maternal Grandfather, Paternal Grandfather    Heart disease Mother, Brother    Hypertension Mother    Macular degeneration Mother    No Known Problems Sister, Brother, Daughter, Daughter    Parkinsonism Brother (54)          Tobacco Use    Smoking status: Never    Smokeless tobacco: Never   Substance and Sexual Activity    Alcohol use: Never    Drug use: Never    Sexual activity: Yes     Partners: Female     Birth control/protection: Partner-Vasectomy     Review of Systems   All other systems reviewed and are negative.  Objective:     Vital Signs (Most Recent):  Temp: 98.1 °F (36.7 °C) (04/24/23  0700)  Pulse: 80 (04/24/23 0700)  Resp: 16 (04/24/23 0700)  BP: 130/80 (04/24/23 0721)  SpO2: 96 % (04/24/23 0700) Vital Signs (24h Range):  Temp:  [98.1 °F (36.7 °C)] 98.1 °F (36.7 °C)  Pulse:  [80] 80  Resp:  [16] 16  SpO2:  [96 %] 96 %  BP: (130)/(80-91) 130/80     Weight: 113.4 kg (250 lb)  Body mass index is 34.87 kg/m².    SpO2: 96 %       No intake or output data in the 24 hours ending 04/24/23 0813    Lines/Drains/Airways       Peripheral Intravenous Line  Duration                  Peripheral IV - Single Lumen 04/24/23 0745 20 G Distal;Posterior;Right Forearm <1 day                    Physical Exam  Constitutional:       Appearance: Normal appearance. He is obese.   HENT:      Head: Normocephalic.      Mouth/Throat:      Mouth: Mucous membranes are moist.   Eyes:      Extraocular Movements: Extraocular movements intact.   Cardiovascular:      Rate and Rhythm: Normal rate and regular rhythm.      Comments: ICD in L upper chest  Pulmonary:      Effort: Pulmonary effort is normal. No respiratory distress.   Abdominal:      Palpations: Abdomen is soft.   Musculoskeletal:      Right lower leg: No edema.      Left lower leg: No edema.   Skin:     General: Skin is warm.   Neurological:      Mental Status: He is alert and oriented to person, place, and time.       Significant Labs: BMP: No results for input(s): GLU, NA, K, CL, CO2, BUN, CREATININE, CALCIUM, MG in the last 48 hours., CMP No results for input(s): NA, K, CL, CO2, GLU, BUN, CREATININE, CALCIUM, PROT, ALBUMIN, BILITOT, ALKPHOS, AST, ALT, ANIONGAP, ESTGFRAFRICA, EGFRNONAA in the last 48 hours., CBC No results for input(s): WBC, HGB, HCT, PLT in the last 48 hours., and INR No results for input(s): INR, PROTIME in the last 48 hours.    Significant Imaging:   Reviewed

## 2023-04-24 NOTE — PROGRESS NOTES
Study title: Efficacy and Safety of Dual Direct Current Cardioversion Versus Single Direct Current  Cardioversion as an Initial Treatment Strategy in Obese Patients   IRB #: [id number]  IRB approval date: 9/15/2020  Sponsor: [facility]  Subject ID: [id number]     Met with patient to do post-procedure action item. Asked patient if they experience any chest discomfort related to cardioversion.     Patient denied any chest discomfort.  Pain Scale: 0

## 2023-04-24 NOTE — HOSPITAL COURSE
S/p  HERIBERTO/DCCV  for atrial fibrillation. HERIBERTO: VIJAYA: no clot.  Underwent successful DCCV with conversion to sinus rhythm. Post DCCV EKG: AV paced; underlying sinus; ventricular rate: 60 bpm. Tolerated procedure.  Awake and alert. Without complaints.  D/c instructions provided to patient who verbalized understanding.

## 2023-04-24 NOTE — CONSULTS
"[facility] - Short Stay Cardiac Unit  Cardiology  Consult Note    Patient Name: Hussein Steinberg  MRN: [id number]  Admission Date: 4/24/2023  Hospital Length of Stay: 0 days  Code Status: Prior   Attending Provider: Dejan Mo MD   Consulting Provider: Ricci Catherine MD  Primary Care Physician: Lacey Rainey MD  Principal Problem:Longstanding persistent atrial fibrillation    Patient information was obtained from patient and ER records.     Consults  Subjective:     Chief Complaint:  DCCV for AFib     HPI:   Mr. Steinberg is a 73 y.o. male with AF, DCM, CRT-D (2013), HTN, HLD, CVA (hemorrhagic) here for follow up.     Background:     "Syeda Steinberg     DCM, s/p biV ICD with great response (LVEF 60%+ 2018)  HTN on meds  HL on meds   PAF, on pradaxa  hx hemorrhagic CVA 2012     CRT-D 3/13 (Tonia device). Great response to CRT. Gen change 9/2019.     Hx of higher-dose amio, c/b night vision changes. These resolved after d/c'ing amio.  AF, resulting in sx that may be directly from the AF and/or from lack of biV pacing that results.  HERIBERTO/DCCV next week. 30 day monitor after that.  Discussed AADs tikosyn vs amio (vs ablation). After hearing pros/cons of all this, we'll restart amio (with load) following DCCV. If side effects recur, we could revisit tikosyn or ablation.  9/8/2020: He is 5 weeks s/p cardioversion and initiation of amiodarone. Maintaining rhythm.HERIBERTO showed LVEF of 55%.   6/15/2021: Mr. Steinberg is doing well from a device perspective with stable lead and device function. No arrhythmia noted. On amiodarone.   12/15/2021: Mr. Steinberg is doing well from a device perspective with stable lead and device function. Some RA lead noise which is chronic and stable.  No arrhythmia noted. On amiodarone. Amio testing all WNL. Plan is to consider tikosyn vs ablation if he no longer tolerates amiodarone in the future. He continues to prefer to stay the course for now.     Update (01/25/2023):  Feeling very well. Max AMS on ICD " 34s. BiVP >99%  thyroid, LFTs, PFTs ok  ECG is APbiVP  In summary, Mr. Steinberg is a 72 y.o. male with AF, DCM, CRT-D (2013), HTN, HLD, CVA (hemorrhagic) here for follow up.  Doing well. No long episodes of AF (and some shorter ones were A lead noise).  Increased RR today due to large % HRs binned at LRL.  Return in 1 year with echo and amio tests, or earlier prn.     Update (04/18/2023):     He was in AF from 2/14/23-2/18/23, then resumed 2/25/23 - remains in progress at this time.      Today he reports worsening GUNN and fatigue for the past ~2 months. Some fatigue, no cp, palps, syncope.     He is currently taking pradaxa 150mg BID for stroke prophylaxis and denies significant bleeding episodes. He is currently being treated with amiodarone 200mg daily for rhythm control and lopressor 100mg BID for HR control.  Kidney function is stable, with a creatinine of 1.1 on 2/9/2023.     Device Interrogation (4/18/2023) reveals an intrinsic AF with stable lead and device function. AF since 2/25/2023, v rates controlled. He paces 26% in the RA and 100% in the BiV. Estimated battery longevity 3.7 years.      I have personally reviewed the patient's EKG today, which shows AFBP at 80bpm. QRS is 194. QTc is 567.     Relevant Cardiac Test Results:     2D Echo (12/2021):   The left ventricle is normal in size with normal systolic function. The estimated ejection fraction is 60%.   There is abnormal septal wall motion consistent with right ventricular pacemaker.   Normal right ventricular size with normal right ventricular systolic function.   Indeterminate left ventricular diastolic function.   Biatrial enlargement.   Mild to moderate tricuspid regurgitation.   The estimated PA systolic pressure is 41 mmHg.   Elevated central venous pressure (15 mmHg).   The ascending aorta is mildly dilated.    Anticoagulant/antiplatelets: Pradaxa  ECG: V-paced    Dysphagia or odynophagia:  No  Liver Disease, esophageal disease, or known  varices:  No  Upper GI Bleeding: No  Snoring:  Yes  Sleep Apnea:  Yes  Prior neck surgery or radiation:  No  History of anesthetic difficulties:  No  Family history of anesthetic difficulties:  No  Last oral intake: yesterday before midnight  Able to move neck in all directions:  Yes  Platelet count: 282K  INR: 1.3      Past Medical History:   Diagnosis Date    Anticoagulant long-term use     Atrial fibrillation     Basal cell carcinoma     Bronchitis 03/20/2017    Cardiac arrest 2012    has pacemaker/defibrillator placed 3/19/13 - followed by [facility]    Cataract     Current use of anticoagulant therapy 02/13/2023    Current use of anticoagulant therapy 02/13/2023    DCM (dilated cardiomyopathy) 06/15/2017    High cholesterol     History of intracerebral hemorrhage without residual deficit 08/23/2019    History of prostate cancer 06/23/2017    Hypertension     Kidney stone 10/2013    LBBB (left bundle branch block) 10/10/2012    LV dysfunction 10/10/2012    Prostate cancer     Treated by Dr. Rasmussen    Squamous cell carcinoma in situ of skin 2017    left arm removed    Stroke 09/2012    + hemorrhagic infarct to right occipital lobe after started on Plavix following cardiac event    SVT (supraventricular tachycardia) 10/10/2012       Past Surgical History:   Procedure Laterality Date    arthroscopic  knee    BASAL CELL CARCINOMA EXCISION  2018    removed from nose    Basil cell   2022    CARDIAC PACEMAKER PLACEMENT  2013    and defibrillator    COLONOSCOPY  2/12/2016    + polyp - repeat in 5 years- Dr. Calles    COLONOSCOPY N/A 7/19/2021    Procedure: COLONOSCOPY;  Surgeon: Troy Bryson MD;  Location: [facility] ([address] [place] [address]);  Service: Endoscopy;  Laterality: N/A;  pacemaker/AICD-[employer]  fully vaccinated-GT     ok to hold Pradaxa 2 days per Dr Chepe Moscoso prep    EYE SURGERY  1992    PROSTATECTOMY      SKIN CANCER EXCISION Left 10/2017    squamous cell carcinoma  removed from left arm    TREATMENT OF CARDIAC ARRHYTHMIA N/A 7/31/2020    Procedure: CARDIOVERSION;  Surgeon: Dejan Mo MD;  Location: [facility];  Service: Cardiology;  Laterality: N/A;  afib, HERIBERTO, DCCV, anes, DM, 3 Prep    VASECTOMY  1988       Review of patient's allergies indicates:   Allergen Reactions    Olmesartan Diarrhea     Diarrhea and muscle aches since starting medication.     Lisinopril Other (See Comments)     Dry cough       No current facility-administered medications on file prior to encounter.     Current Outpatient Medications on File Prior to Encounter   Medication Sig    acetaminophen (TYLENOL) 325 MG tablet Take 2 tablets (650 mg total) by mouth every 6 (six) hours as needed (pain 1-4/10 pain scale).    amiodarone (PACERONE) 200 MG Tab Take 1 tablet (200 mg total) by mouth once daily.    amLODIPine (NORVASC) 10 MG tablet Take 1 tablet (10 mg total) by mouth once daily.    candesartan (ATACAND) 32 MG tablet Take 1 tablet (32 mg total) by mouth once daily.    chondroitin sulfate A sodium 400 mg Cap Take by mouth.    dabigatran etexilate (PRADAXA) 150 mg Cap Take 1 capsule (150 mg total) by mouth 2 (two) times a day    ezetimibe (ZETIA) 10 mg tablet Take 1 tablet (10 mg total) by mouth once daily.    folic acid/multivit-min/lutein (CENTRUM SILVER ORAL) Take by mouth once daily.    metoprolol tartrate (LOPRESSOR) 100 MG tablet Take 1 tablet (100 mg total) by mouth 2 (two) times daily.    triamterene-hydrochlorothiazide 37.5-25 mg (MAXZIDE-25) 37.5-25 mg per tablet Take 1 tablet by mouth once daily.     Family History       Problem Relation (Age of Onset)    Cancer Father    Cataracts Mother    Glaucoma Mother    Heart attack Brother (59), Maternal Grandfather, Paternal Grandfather    Heart disease Mother, Brother    Hypertension Mother    Macular degeneration Mother    No Known Problems Sister, Brother, Daughter, Daughter    Parkinsonism Brother (54)          Tobacco Use     Smoking status: Never    Smokeless tobacco: Never   Substance and Sexual Activity    Alcohol use: Never    Drug use: Never    Sexual activity: Yes     Partners: Female     Birth control/protection: Partner-Vasectomy     Review of Systems   All other systems reviewed and are negative.  Objective:     Vital Signs (Most Recent):  Temp: 98.1 °F (36.7 °C) (04/24/23 0700)  Pulse: 80 (04/24/23 0700)  Resp: 16 (04/24/23 0700)  BP: 130/80 (04/24/23 0721)  SpO2: 96 % (04/24/23 0700) Vital Signs (24h Range):  Temp:  [98.1 °F (36.7 °C)] 98.1 °F (36.7 °C)  Pulse:  [80] 80  Resp:  [16] 16  SpO2:  [96 %] 96 %  BP: (130)/(80-91) 130/80     Weight: 113.4 kg (250 lb)  Body mass index is 34.87 kg/m².    SpO2: 96 %       No intake or output data in the 24 hours ending 04/24/23 0813    Lines/Drains/Airways       Peripheral Intravenous Line  Duration                  Peripheral IV - Single Lumen 04/24/23 0745 20 G Distal;Posterior;Right Forearm <1 day                    Physical Exam  Constitutional:       Appearance: Normal appearance. He is obese.   HENT:      Head: Normocephalic.      Mouth/Throat:      Mouth: Mucous membranes are moist.   Eyes:      Extraocular Movements: Extraocular movements intact.   Cardiovascular:      Rate and Rhythm: Normal rate and regular rhythm.      Comments: ICD in L upper chest  Pulmonary:      Effort: Pulmonary effort is normal. No respiratory distress.   Abdominal:      Palpations: Abdomen is soft.   Musculoskeletal:      Right lower leg: No edema.      Left lower leg: No edema.   Skin:     General: Skin is warm.   Neurological:      Mental Status: He is alert and oriented to person, place, and time.       Significant Labs: BMP: No results for input(s): GLU, NA, K, CL, CO2, BUN, CREATININE, CALCIUM, MG in the last 48 hours., CMP No results for input(s): NA, K, CL, CO2, GLU, BUN, CREATININE, CALCIUM, PROT, ALBUMIN, BILITOT, ALKPHOS, AST, ALT, ANIONGAP, ESTGFRAFRICA, EGFRNONAA in the last 48 hours.,  CBC No results for input(s): WBC, HGB, HCT, PLT in the last 48 hours., and INR No results for input(s): INR, PROTIME in the last 48 hours.    Significant Imaging:   Reviewed    Assessment and Plan:     * Longstanding persistent atrial fibrillation  Presents today for HERIBERTO/DCCV. Risks and benefits and alternatives of procedure discussed with patient including but not limited to death, brain damage, stroke, low blood pressure/heart rate, need for pacemaker. All Questions answered. Patient would like to proceed.  Taking Pradax for stroke prophylaxis. Reports full compliance with Pradaxa x > 4 weeks.  Last dose this morning.   Last dose amiodarone and lopressor-- this morning    1. HERIBERTO for evaluation of RA/LA/VIJAYA thrombus prior to DCCV.  -No absolute contraindications of esophageal stricture, tumor, perforation, laceration,or diverticulum and/or active GI bleed  -The risks, benefits & alternatives of the procedure were explained to the patient.   -The risks of transesophageal echo include but are not limited to:  Dental trauma, esophageal trauma/perforation, bleeding, laryngospasm/brochospasm, aspiration, sore throat/hoarseness, & dislodgement of the endotracheal tube/nasogastric tube (where applicable).    -The risks of moderate sedation include hypotension, respiratory depression, arrhythmias, bronchospasm, & death.    -Informed consent was obtained. The patient is agreeable to proceed with the procedure and all questions and concerns addressed.     Case discussed with an attending in echocardiography lab.      Further recommendations per attending addendum    Current use of anticoagulant therapy  Pradaxa for stroke prophylaxis for Afib  Last dose this morning        VTE Risk Mitigation (From admission, onward)    None          Thank you for your consult. I will sign off. Please contact us if you have any additional questions.    Ricci Catherine MD  Cardiology   [facility] - Short Stay Cardiac Unit

## 2023-04-24 NOTE — ANESTHESIA POSTPROCEDURE EVALUATION
Anesthesia Post Evaluation    Patient: Hussein Steinberg    Procedure(s) Performed: Procedure(s) (LRB):  Cardioversion or Defibrillation (N/A)  Transesophageal echo (HERIBERTO) intra-procedure log documentation (N/A)    Final Anesthesia Type: general      Level of consciousness: awake and alert  Post-procedure vital signs: reviewed and stable  Pain control: Pain has been treated.  Airway patency: patent    PONV status: Absent or treated.  Anesthetic complications: no      Cardiovascular status: hemodynamically stable  Respiratory status: unassisted  Hydration status: euvolemic            Vitals Value Taken Time   /67 04/24/23 1031   Temp 36.5 °C (97.7 °F) 04/24/23 1000   Pulse 63 04/24/23 1038   Resp 25 04/24/23 1038   SpO2 94 % 04/24/23 1038   Vitals shown include unvalidated device data.      No case tracking events are documented in the log.      Pain/Liberty Score: Liberty Score: 10 (4/24/2023 10:30 AM)

## 2023-04-24 NOTE — PLAN OF CARE
Patient aaox4. Vss. AVS printed. Home care instructions reviewed with patient and spouse. All questions answered. Spouse at bedside. IV hep lock removed. [facility]

## 2023-04-24 NOTE — PROGRESS NOTES
Date Consent signed: 04/24/2023      Sponsor: [facility]     Study Title/IRB Number: Dual vs Single [facility] / [id number]     Principle Investigator: Dejan Mo MD     Present for Discussion: Myself, patient and spouse      Is LAR Consenting for Subject: No    Met with patient and family, gave overview of study. All questions answered to patient's satisfaction. Patient and family provided with time to privately discuss study. Patient interested in participating in study.      Prior to the Informed Consent (IC) being signed, or any study protocol required data collection, testing, procedure, or intervention being performed, the following was done and/or discussed:  Patient was given a copy of the IC for review   Purpose of the study and qualifications to participate   Study design, Follow up schedule, and tests or procedures done at each visit  Confidentiality and HIPAA Authorization for Release of Medical Records for the research trial/ subject's rights/research related injury  Risk, Benefits, Alternative Treatments, Compensation and Costs  Participation in the research trial is voluntary and patient may withdraw at anytime  Contact information for study related questions     Patient verbalizes understanding of the above: Yes  Contact information for CRC and PI given to patient: Yes  Patient able to adequately summarize: the purpose of the study, the risks associated with the study, and all procedures, testing, and follow-ups associated with the study: Yes     Hussein Steinberg signed the informed consent form for the Dual vs Single [facility] research study with an IRB approval date of 9/15/2020.  Each page of the consent form was reviewed with Hussein Steinberg  (and pts family) and all questions answered satisfactorily. Hussein Steinberg signed the consent form and received a copy of same. The original consent was scanned into electronic medical records (EPIC) and filed into the subject's research study binder.         After signing consent, patient was randomized via RedCap to Single [facility]     EP RN, Dr. Mo, and Dr. Murillo were notified via e-mail. Expressed that patient is blinded to assignment. Randomization sign was placed in patient's chart.

## 2023-04-24 NOTE — PLAN OF CARE
Patient received to cath bay 9 s/p HERIBERTO/DCCV. Sleepy, but arousable. Breathing is even and unlabored. No distress noted. Bedside monitoring connected. Vss. Bedside report received from holli RN and BELEN Dumont. EKG tech  notified. Spouse notified.

## 2023-04-24 NOTE — PLAN OF CARE
Patient arrived to room. Admit assessment completed. Plan of care discussed with patient. Will monitor. Call light within reach, instructed in use.   Awaiting interrogation of PPM for rhythm determination

## 2023-04-24 NOTE — HPI
"Mr. Steinberg is a 73 y.o. male with AF, DCM, CRT-D (2013), HTN, HLD, CVA (hemorrhagic) here for follow up.     Background:     "ySeda Steinberg     DCM, s/p biV ICD with great response (LVEF 60%+ 2018)  HTN on meds  HL on meds   PAF, on pradaxa  hx hemorrhagic CVA 2012     CRT-D 3/13 (Tonia device). Great response to CRT. Gen change 9/2019.     Hx of higher-dose amio, c/b night vision changes. These resolved after d/c'ing amio.  AF, resulting in sx that may be directly from the AF and/or from lack of biV pacing that results.  HERIBERTO/DCCV next week. 30 day monitor after that.  Discussed AADs tikosyn vs amio (vs ablation). After hearing pros/cons of all this, we'll restart amio (with load) following DCCV. If side effects recur, we could revisit tikosyn or ablation.  9/8/2020: He is 5 weeks s/p cardioversion and initiation of amiodarone. Maintaining rhythm.HERIBERTO showed LVEF of 55%.   6/15/2021: Mr. Steinberg is doing well from a device perspective with stable lead and device function. No arrhythmia noted. On amiodarone.   12/15/2021: Mr. Steinberg is doing well from a device perspective with stable lead and device function. Some RA lead noise which is chronic and stable.  No arrhythmia noted. On amiodarone. Amio testing all WNL. Plan is to consider tikosyn vs ablation if he no longer tolerates amiodarone in the future. He continues to prefer to stay the course for now.     Update (01/25/2023):  Feeling very well. Max AMS on ICD 34s. BiVP >99%  thyroid, LFTs, PFTs ok  ECG is APbiVP  In summary, Mr. Steinberg is a 72 y.o. male with AF, DCM, CRT-D (2013), HTN, HLD, CVA (hemorrhagic) here for follow up.  Doing well. No long episodes of AF (and some shorter ones were A lead noise).  Increased RR today due to large % HRs binned at LRL.  Return in 1 year with echo and amio tests, or earlier prn.     Update (04/18/2023):     He was in AF from 2/14/23-2/18/23, then resumed 2/25/23 - remains in progress at this time.      Today he reports worsening GUNN " and fatigue for the past ~2 months. Some fatigue, no cp, palps, syncope.     He is currently taking pradaxa 150mg BID for stroke prophylaxis and denies significant bleeding episodes. He is currently being treated with amiodarone 200mg daily for rhythm control and lopressor 100mg BID for HR control.  Kidney function is stable, with a creatinine of 1.1 on 2/9/2023.     Device Interrogation (4/18/2023) reveals an intrinsic AF with stable lead and device function. AF since 2/25/2023, v rates controlled. He paces 26% in the RA and 100% in the BiV. Estimated battery longevity 3.7 years.      I have personally reviewed the patient's EKG today, which shows AFBP at 80bpm. QRS is 194. QTc is 567.     Relevant Cardiac Test Results:     2D Echo (12/2021):  The left ventricle is normal in size with normal systolic function. The estimated ejection fraction is 60%.  There is abnormal septal wall motion consistent with right ventricular pacemaker.  Normal right ventricular size with normal right ventricular systolic function.  Indeterminate left ventricular diastolic function.  Biatrial enlargement.  Mild to moderate tricuspid regurgitation.  The estimated PA systolic pressure is 41 mmHg.  Elevated central venous pressure (15 mmHg).  The ascending aorta is mildly dilated.    Anticoagulant/antiplatelets: Pradaxa  ECG: V-paced    Dysphagia or odynophagia:  No  Liver Disease, esophageal disease, or known varices:  No  Upper GI Bleeding: No  Snoring:  Yes  Sleep Apnea:  Yes  Prior neck surgery or radiation:  No  History of anesthetic difficulties:  No  Family history of anesthetic difficulties:  No  Last oral intake: yesterday before midnight  Able to move neck in all directions:  Yes  Platelet count: 282K  INR: 1.3

## 2023-04-24 NOTE — INTERVAL H&P NOTE
The patient has been examined and the H&P has been reviewed:    I concur with the findings and no changes have occurred since H&P was written.   Hussein Steinberg 73 y.o. male presents for HERIBERTO/DCCV.  He has history of   DCM, s/p biV ICD with great response (LVEF 60%+ 2018)  HTN on meds  HL on meds   PAF, on pradaxa  hx hemorrhagic CVA 2012     CRT-D 3/13 (Tonia device). Great response to CRT. Gen change 9/2019.     Hx of higher-dose amio, c/b night vision changes. These resolved after d/c'ing amio.  AF, resulting in sx that may be directly from the AF and/or from lack of biV pacing that results.  HERIBERTO/DCCV next week. 30 day monitor after that.  Discussed AADs tikosyn vs amio (vs ablation). After hearing pros/cons of all this, we'll restart amio (with load) following DCCV. If side effects recur, we could revisit tikosyn or ablation.  9/8/2020: He is 5 weeks s/p cardioversion and initiation of amiodarone. Maintaining rhythm.HERIBERTO showed LVEF of 55%.   6/15/2021: Mr. Steinberg is doing well from a device perspective with stable lead and device function. No arrhythmia noted. On amiodarone.   12/15/2021: Mr. Steinberg is doing well from a device perspective with stable lead and device function. Some RA lead noise which is chronic and stable.  No arrhythmia noted. On amiodarone. Amio testing all WNL. Plan is to consider tikosyn vs ablation if he no longer tolerates amiodarone in the future. He continues to prefer to stay the course for now.     Update (01/25/2023):  Feeling very well. Max AMS on ICD 34s. BiVP >99%  thyroid, LFTs, PFTs ok  ECG is APbiVP  In summary, Mr. Steinberg is a 72 y.o. male with AF, DCM, CRT-D (2013), HTN, HLD, CVA (hemorrhagic) here for follow up.  Doing well. No long episodes of AF (and some shorter ones were A lead noise).  Increased RR today due to large % HRs binned at LRL.  Return in 1 year with echo and amio tests, or earlier prn.     Update (04/18/2023):     He was in AF from 2/14/23-2/18/23, then resumed  2/25/23 - remains in progress at this time.      Today he reports worsening GUNN and fatigue for the past ~2 months. Some fatigue, no cp, palps, syncope.     He is currently taking pradaxa 150mg BID for stroke prophylaxis and denies significant bleeding episodes. He is currently being treated with amiodarone 200mg daily for rhythm control and lopressor 100mg BID for HR control.  Kidney function is stable, with a creatinine of 1.1 on 2/9/2023.     Device Interrogation (4/18/2023) reveals an intrinsic AF with stable lead and device function. AF since 2/25/2023, v rates controlled. He paces 26% in the RA and 100% in the BiV. Estimated battery longevity 3.7 years.      I have personally reviewed the patient's EKG today, which shows AFBP at 80bpm. QRS is 194. QTc is 567.     Relevant Cardiac Test Results:     2D Echo (12/2021):  The left ventricle is normal in size with normal systolic function. The estimated ejection fraction is 60%.  There is abnormal septal wall motion consistent with right ventricular pacemaker.  Normal right ventricular size with normal right ventricular systolic function.  Indeterminate left ventricular diastolic function.  Biatrial enlargement.  Mild to moderate tricuspid regurgitation.  The estimated PA systolic pressure is 41 mmHg.  Elevated central venous pressure (15 mmHg).  The ascending aorta is mildly dilated.      AAO x 3  Cardiac: irregular rate and rhythm.    Bilateral Radial pulses: 2+; irregular    EKG: v-paced; 80bpm; underlying rhythm appears to be Atrial fibrillation--review of [facility] record from yesterday shows patient persistently in afib;  AMS set to 80bpm, the rate at which patient is currently pacing  Recent Labs reviewed: CBC/BMP/PT/INR/PTT 4/19/2023---  ok     Active Hospital Problems    Diagnosis  POA    Longstanding persistent atrial fibrillation [I48.11]  Yes     Presents today for HERIBERTO/DCCV. Risks and benefits and alternatives of procedure discussed with patient including  but not limited to death, brain damage, stroke, low blood pressure/heart rate, need for pacemaker. All Questions answered. Patient would like to proceed.  Taking Pradax for stroke prophylaxis. Reports full compliance with Pradaxa x > 4 weeks.  Last dose this morning.   Last dose amiodarone and lopressor-- this morning        Resolved Hospital Problems   No resolved problems to display.

## 2023-04-24 NOTE — ASSESSMENT & PLAN NOTE
1. HERIBERTO for evaluation of RA/LA/VIJAYA thrombus prior to DCCV.  -No absolute contraindications of esophageal stricture, tumor, perforation, laceration,or diverticulum and/or active GI bleed  -The risks, benefits & alternatives of the procedure were explained to the patient.   -The risks of transesophageal echo include but are not limited to:  Dental trauma, esophageal trauma/perforation, bleeding, laryngospasm/brochospasm, aspiration, sore throat/hoarseness, & dislodgement of the endotracheal tube/nasogastric tube (where applicable).    -The risks of moderate sedation include hypotension, respiratory depression, arrhythmias, bronchospasm, & death.    -Informed consent was obtained. The patient is agreeable to proceed with the procedure and all questions and concerns addressed.     Case discussed with an attending in echocardiography lab.      Further recommendations per attending addendum

## 2023-04-24 NOTE — TRANSFER OF CARE
"Anesthesia Transfer of Care Note    Patient: Hussein Steinberg    Procedure(s) Performed: Procedure(s) (LRB):  Cardioversion or Defibrillation (N/A)  Transesophageal echo (HERIBERTO) intra-procedure log documentation (N/A)    Patient location: [facility]    Anesthesia Type: general    Transport from OR: Transported from OR on 6-10 L/min O2 by face mask with adequate spontaneous ventilation    Post pain: adequate analgesia    Post assessment: no apparent anesthetic complications    Post vital signs: stable    Level of consciousness: awake    Nausea/Vomiting: no nausea/vomiting    Complications: none    Transfer of care protocol was followed      Last vitals:   Visit Vitals  /80   Pulse 80   Temp 36.7 °C (98.1 °F) (Temporal)   Resp 16   Ht 5' 11" (1.803 m)   Wt 117.5 kg (259 lb 2.4 oz)   SpO2 96%   BMI 36.14 kg/m²     "

## 2023-05-01 ENCOUNTER — TELEPHONE (OUTPATIENT)
Dept: CARDIOLOGY | Facility: HOSPITAL | Age: 73
End: 2023-05-01
Payer: MEDICARE

## 2023-05-01 NOTE — TELEPHONE ENCOUNTER
Pt is post DCCV on 4/24/23.  Recurrence of paroxysmal atrial fibrillation is noted.  Ventricular rates appear controlled.  Patient c/o of being a little out of breath for the past couple of days  Bardy monitor on since 4/24/23    Pt taking Amiodarone 200 mg daily, Metoprolol Tartrate 100 mg BID and Pradaxa    Bp 106/67 4/24/23    AF episode ongoing for 22 hours.  Will cont to monitor.   MAs messaged to schedule for 4 wk f/u post DCCV with CINTHIA Ayers.

## 2023-05-12 ENCOUNTER — PATIENT MESSAGE (OUTPATIENT)
Dept: ADMINISTRATIVE | Facility: OTHER | Age: 73
End: 2023-05-12
Payer: MEDICARE

## 2023-05-12 DIAGNOSIS — I48.11 LONGSTANDING PERSISTENT ATRIAL FIBRILLATION: Primary | ICD-10-CM

## 2023-05-22 ENCOUNTER — CLINICAL SUPPORT (OUTPATIENT)
Dept: CARDIOLOGY | Facility: HOSPITAL | Age: 73
End: 2023-05-22
Payer: MEDICARE

## 2023-05-22 DIAGNOSIS — Z95.810 PRESENCE OF AUTOMATIC (IMPLANTABLE) CARDIAC DEFIBRILLATOR: ICD-10-CM

## 2023-05-22 PROCEDURE — 93295 DEV INTERROG REMOTE 1/2/MLT: CPT | Mod: ,,, | Performed by: INTERNAL MEDICINE

## 2023-05-22 PROCEDURE — 93296 REM INTERROG EVL PM/IDS: CPT | Performed by: INTERNAL MEDICINE

## 2023-05-22 PROCEDURE — 93295 CARDIAC DEVICE CHECK - REMOTE: ICD-10-PCS | Mod: ,,, | Performed by: INTERNAL MEDICINE

## 2023-06-14 NOTE — PROGRESS NOTES
"Mr. Steinberg is a patient of Dr. Mo and was last seen in clinic 4/18/2023.      Subjective:   Patient ID:  Hussein Steinberg is a 73 y.o. male who presents for follow up of Atrial Fibrillation  .     HPI:    Mr. Steinberg is a 73 y.o. male with AF, DCM, CRT-D (2013), HTN, HLD, CVA (hemorrhagic) here for follow up after cardioversion.    Background:    "Andre" Idris    DCM, s/p biV ICD with great response (LVEF 60%+ 2018)  HTN on meds  HL on meds   PAF, on pradaxa  hx hemorrhagic CVA 2012    CRT-D 3/13 (Tonia device). Great response to CRT. Gen change 9/2019.  Hx of higher-dose amio, c/b night vision changes. These resolved after d/c'ing amio.  AF, resulting in sx that may be directly from the AF and/or from lack of biV pacing that results.  HERIBERTO/DCCV next week. 30 day monitor after that.    Discussed AADs tikosyn vs amio (vs ablation). After hearing pros/cons of all this, we'll restart amio (with load) following DCCV. If side effects recur, we could revisit tikosyn or ablation.  9/8/2020: He is 5 weeks s/p cardioversion and initiation of amiodarone. Maintaining rhythm.HERIBERTO showed LVEF of 55%.   6/15/2021: Mr. Steinberg is doing well from a device perspective with stable lead and device function. No arrhythmia noted. On amiodarone.   12/15/2021: Mr. Steinberg is doing well from a device perspective with stable lead and device function. Some RA lead noise which is chronic and stable.  No arrhythmia noted. On amiodarone. Amio testing all WNL. Plan is to consider tikosyn vs ablation if he no longer tolerates amiodarone in the future. He continues to prefer to stay the course for now.    Update (01/25/2023):  Feeling very well. Max AMS on ICD 34s. BiVP >99%  thyroid, LFTs, PFTs ok  ECG is APbiVP  In summary, Mr. Steinberg is a 72 y.o. male with AF, DCM, CRT-D (2013), HTN, HLD, CVA (hemorrhagic) here for follow up.  Doing well. No long episodes of AF (and some shorter ones were A lead noise).  Increased RR today due to large % HRs binned at " LRL.  Return in 1 year with echo and amio tests, or earlier prn.    4/18/23: He had breakthrough AF despite amiodarone in mid-Feb and DCCV planned but canceled when he reverted back to SR. A few days afterward he reverted back into persistent AF. Initially he denied significant symptoms per device note but says he is feeling progressively GUNN and fatigued. Remains on amiodarone. We discussed options. It is reasonable to proceed with DCCV to determine whether symptoms are related to AF and plan for PVI if his symptoms improve after cardioversion. If his symptoms do not improve, would stop amiodarone to see if fatigue improved. On pradaxa. 100% biventricular pacing. Device function WNL.    Update (06/19/2023):    4/24/2023: Cardioversion was successfully performed with restoration of normal sinus rhythm.  This monitor was worn between 4/24 and 05/08/2023. The predominant rhythm was a paced V paced. There were 16 episodes of nonsustained atrial tachycardia, the longest of which was 9 beats averaging 103 beats per minute.  On 05/01, the tracing showed tachycardia with an undulating baseline, which correlates with the development of atrial fibrillation detected by the patient's implanted device. This was started last worn on 05/06, at which time there was atrial fibrillation with ventricular pacing at 80 beats per minute.    Today he says he noted improved symptoms the week after cardioversion. Now has more GUNN. [place] in May and experienced significant SOB at that visit. Worse when his HRs are high. No palps, CP, Lh, syncope.    He is currently taking pradaxa 150mg BID for stroke prophylaxis and denies significant bleeding episodes. He is currently being treated with amiodarone 200mg daily for rhythm control and lopressor 100mg BID for HR control.  Kidney function is stable, with a creatinine of 1.22 on 4/19/2023.    Device Interrogation (6/19/2023) reveals an intrinsic AF with stable lead and device function. 100% AF  since 4/30/23. He paces 99% in the BiV. Estimated battery longevity 3.5 years. AMS base rate decreased to 70bpm.    I have personally reviewed the patient's EKG today, which shows AFBP at 80bpm.     Relevant Cardiac Test Results:    HERIBERTO (4/24/2023):  Limited HERIBERTO as patient became hypoxic  Normal appearing left atrial appendage. No thrombus is present in the appendage. Abnormal appendage velocities. contrast was used.    Current Outpatient Medications   Medication Sig    acetaminophen (TYLENOL) 325 MG tablet Take 2 tablets (650 mg total) by mouth every 6 (six) hours as needed (pain 1-4/10 pain scale).    amiodarone (PACERONE) 200 MG Tab Take 1 tablet (200 mg total) by mouth once daily.    amLODIPine (NORVASC) 10 MG tablet Take 1 tablet (10 mg total) by mouth once daily.    candesartan (ATACAND) 32 MG tablet Take 1 tablet (32 mg total) by mouth once daily.    chondroitin sulfate A sodium 400 mg Cap Take by mouth.    dabigatran etexilate (PRADAXA) 150 mg Cap Take 1 capsule (150 mg total) by mouth 2 (two) times a day    ezetimibe (ZETIA) 10 mg tablet Take 1 tablet (10 mg total) by mouth once daily.    folic acid/multivit-min/lutein (CENTRUM SILVER ORAL) Take by mouth once daily.    metoprolol tartrate (LOPRESSOR) 100 MG tablet Take 1 tablet (100 mg total) by mouth 2 (two) times daily.    triamterene-hydrochlorothiazide 37.5-25 mg (MAXZIDE-25) 37.5-25 mg per tablet Take 1 tablet by mouth once daily.     No current facility-administered medications for this visit.       Review of Systems   Constitutional: Positive for malaise/fatigue.   Cardiovascular:  Positive for dyspnea on exertion. Negative for chest pain, irregular heartbeat, leg swelling and palpitations.   Respiratory:  Positive for shortness of breath.    Hematologic/Lymphatic: Negative for bleeding problem.   Skin:  Negative for rash.   Musculoskeletal:  Negative for myalgias.   Gastrointestinal:  Negative for hematemesis, hematochezia and nausea.  "  Genitourinary:  Negative for hematuria.   Neurological:  Negative for light-headedness.   Psychiatric/Behavioral:  Negative for altered mental status.    Allergic/Immunologic: Negative for persistent infections.     Objective:          /73   Pulse 80   Ht 5' 11" (1.803 m)   Wt 117.6 kg (259 lb 4.2 oz)   BMI 36.16 kg/m²     Physical Exam  Vitals and nursing note reviewed.   Constitutional:       Appearance: Normal appearance. He is well-developed.   HENT:      Head: Normocephalic.      Nose: Nose normal.   Eyes:      Pupils: Pupils are equal, round, and reactive to light.   Cardiovascular:      Rate and Rhythm: Normal rate and regular rhythm.   Pulmonary:      Effort: No respiratory distress.      Breath sounds: Normal breath sounds.   Musculoskeletal:         General: Normal range of motion.   Skin:     General: Skin is warm and dry.      Findings: No erythema.   Neurological:      Mental Status: He is alert and oriented to person, place, and time.   Psychiatric:         Speech: Speech normal.         Behavior: Behavior normal.       Lab Results   Component Value Date     04/19/2023    K 3.6 04/19/2023    MG 2.5 09/14/2012    BUN 19 04/19/2023    CREATININE 1.22 04/19/2023    ALT 25 08/19/2022    ALT 25 08/19/2022    AST 27 08/19/2022    AST 27 08/19/2022    HGB 15.6 04/19/2023    HCT 48.7 04/19/2023    TSH 9.430 (H) 08/19/2022    LDLCALC 114.8 08/19/2022       Recent Labs   Lab 07/28/20  0740 02/09/23  0919 04/19/23  1442   INR 1.3 H 1.1 1.3 H       Assessment:     1. Longstanding persistent atrial fibrillation    2. LBBB (left bundle branch block)    3. Essential hypertension    4. DCM (dilated cardiomyopathy)    5. Current use of anticoagulant therapy    6. History of CVA (cerebrovascular accident)    7. ICD (implantable cardioverter-defibrillator), biventricular, in situ    8. Long term current use of amiodarone    9. YUMIKO (obstructive sleep apnea)      Plan:     In summary, Mr. Steinberg is a 73 " y.o. male with AF, DCM, CRT-D (2013), HTN, HLD, CVA (hemorrhagic) here for follow up after cardioversion.  He is 2 months s/p cardioversion. Per monitor, he maintained SR for one week then reverted back to AF despite amiodarone. He reports symptom improvement in SR and worsened GUNN in AF. He is still biv pacing 99%. He is interested in proceeding with ablation. Would like virtual visit with Dr. Mo so he and his wife can discuss further.     Virtual visit with Dr. Mo to discuss PVI  Continue current meds for now  Continue device checks  RTC as scheduled    *A copy of this note has been sent to Dr. Mo*    Follow up as scheduled.    ------------------------------------------------------------------    LAMIN Henderson, NP-C  Cardiac Electrophysiology

## 2023-06-19 ENCOUNTER — CLINICAL SUPPORT (OUTPATIENT)
Dept: CARDIOLOGY | Facility: HOSPITAL | Age: 73
End: 2023-06-19
Attending: INTERNAL MEDICINE
Payer: MEDICARE

## 2023-06-19 ENCOUNTER — OFFICE VISIT (OUTPATIENT)
Dept: ELECTROPHYSIOLOGY | Facility: CLINIC | Age: 73
End: 2023-06-19
Payer: MEDICARE

## 2023-06-19 ENCOUNTER — HOSPITAL ENCOUNTER (OUTPATIENT)
Dept: CARDIOLOGY | Facility: CLINIC | Age: 73
Discharge: HOME | End: 2023-06-19
Payer: MEDICARE

## 2023-06-19 VITALS
DIASTOLIC BLOOD PRESSURE: 73 MMHG | SYSTOLIC BLOOD PRESSURE: 115 MMHG | WEIGHT: 259.25 LBS | HEART RATE: 80 BPM | HEIGHT: 71 IN | BODY MASS INDEX: 36.29 KG/M2

## 2023-06-19 DIAGNOSIS — Z95.810 ICD (IMPLANTABLE CARDIOVERTER-DEFIBRILLATOR), BIVENTRICULAR, IN SITU: ICD-10-CM

## 2023-06-19 DIAGNOSIS — I48.11 LONGSTANDING PERSISTENT ATRIAL FIBRILLATION: ICD-10-CM

## 2023-06-19 DIAGNOSIS — I42.0 DCM (DILATED CARDIOMYOPATHY): ICD-10-CM

## 2023-06-19 DIAGNOSIS — I44.7 LBBB (LEFT BUNDLE BRANCH BLOCK): ICD-10-CM

## 2023-06-19 DIAGNOSIS — I48.11 LONGSTANDING PERSISTENT ATRIAL FIBRILLATION: Primary | ICD-10-CM

## 2023-06-19 DIAGNOSIS — Z86.73 HISTORY OF CVA (CEREBROVASCULAR ACCIDENT): ICD-10-CM

## 2023-06-19 DIAGNOSIS — I10 ESSENTIAL HYPERTENSION: ICD-10-CM

## 2023-06-19 DIAGNOSIS — I48.0 PAF (PAROXYSMAL ATRIAL FIBRILLATION): ICD-10-CM

## 2023-06-19 DIAGNOSIS — G47.33 OSA (OBSTRUCTIVE SLEEP APNEA): ICD-10-CM

## 2023-06-19 DIAGNOSIS — Z79.899 LONG TERM CURRENT USE OF AMIODARONE: ICD-10-CM

## 2023-06-19 DIAGNOSIS — Z79.01 CURRENT USE OF ANTICOAGULANT THERAPY: ICD-10-CM

## 2023-06-19 PROCEDURE — 1126F PR PAIN SEVERITY QUANTIFIED, NO PAIN PRESENT: ICD-10-PCS | Mod: CPTII,S$GLB,, | Performed by: NURSE PRACTITIONER

## 2023-06-19 PROCEDURE — 3288F PR FALLS RISK ASSESSMENT DOCUMENTED: ICD-10-PCS | Mod: CPTII,S$GLB,, | Performed by: NURSE PRACTITIONER

## 2023-06-19 PROCEDURE — 1126F AMNT PAIN NOTED NONE PRSNT: CPT | Mod: CPTII,S$GLB,, | Performed by: NURSE PRACTITIONER

## 2023-06-19 PROCEDURE — 3008F PR BODY MASS INDEX (BMI) DOCUMENTED: ICD-10-PCS | Mod: CPTII,S$GLB,, | Performed by: NURSE PRACTITIONER

## 2023-06-19 PROCEDURE — 99214 OFFICE O/P EST MOD 30 MIN: CPT | Mod: S$GLB,,, | Performed by: NURSE PRACTITIONER

## 2023-06-19 PROCEDURE — 3078F PR MOST RECENT DIASTOLIC BLOOD PRESSURE < 80 MM HG: ICD-10-PCS | Mod: CPTII,S$GLB,, | Performed by: NURSE PRACTITIONER

## 2023-06-19 PROCEDURE — 93010 ELECTROCARDIOGRAM REPORT: CPT | Mod: S$GLB,,, | Performed by: INTERNAL MEDICINE

## 2023-06-19 PROCEDURE — 1159F MED LIST DOCD IN RCRD: CPT | Mod: CPTII,S$GLB,, | Performed by: NURSE PRACTITIONER

## 2023-06-19 PROCEDURE — 3074F PR MOST RECENT SYSTOLIC BLOOD PRESSURE < 130 MM HG: ICD-10-PCS | Mod: CPTII,S$GLB,, | Performed by: NURSE PRACTITIONER

## 2023-06-19 PROCEDURE — 93005 RHYTHM STRIP: ICD-10-PCS | Mod: S$GLB,,, | Performed by: INTERNAL MEDICINE

## 2023-06-19 PROCEDURE — 4010F ACE/ARB THERAPY RXD/TAKEN: CPT | Mod: CPTII,S$GLB,, | Performed by: NURSE PRACTITIONER

## 2023-06-19 PROCEDURE — 99999 PR PBB SHADOW E&M-EST. PATIENT-LVL IV: CPT | Mod: PBBFAC,,, | Performed by: NURSE PRACTITIONER

## 2023-06-19 PROCEDURE — 93010 RHYTHM STRIP: ICD-10-PCS | Mod: S$GLB,,, | Performed by: INTERNAL MEDICINE

## 2023-06-19 PROCEDURE — 93284 CARDIAC DEVICE CHECK - IN CLINIC & HOSPITAL: ICD-10-PCS | Mod: 26,,, | Performed by: INTERNAL MEDICINE

## 2023-06-19 PROCEDURE — 4010F PR ACE/ARB THEARPY RXD/TAKEN: ICD-10-PCS | Mod: CPTII,S$GLB,, | Performed by: NURSE PRACTITIONER

## 2023-06-19 PROCEDURE — 99999 PR PBB SHADOW E&M-EST. PATIENT-LVL IV: ICD-10-PCS | Mod: PBBFAC,,, | Performed by: NURSE PRACTITIONER

## 2023-06-19 PROCEDURE — 1101F PR PT FALLS ASSESS DOC 0-1 FALLS W/OUT INJ PAST YR: ICD-10-PCS | Mod: CPTII,S$GLB,, | Performed by: NURSE PRACTITIONER

## 2023-06-19 PROCEDURE — 93284 PRGRMG EVAL IMPLANTABLE DFB: CPT

## 2023-06-19 PROCEDURE — 1101F PT FALLS ASSESS-DOCD LE1/YR: CPT | Mod: CPTII,S$GLB,, | Performed by: NURSE PRACTITIONER

## 2023-06-19 PROCEDURE — 3008F BODY MASS INDEX DOCD: CPT | Mod: CPTII,S$GLB,, | Performed by: NURSE PRACTITIONER

## 2023-06-19 PROCEDURE — 3078F DIAST BP <80 MM HG: CPT | Mod: CPTII,S$GLB,, | Performed by: NURSE PRACTITIONER

## 2023-06-19 PROCEDURE — 3074F SYST BP LT 130 MM HG: CPT | Mod: CPTII,S$GLB,, | Performed by: NURSE PRACTITIONER

## 2023-06-19 PROCEDURE — 93284 PRGRMG EVAL IMPLANTABLE DFB: CPT | Mod: 26,,, | Performed by: INTERNAL MEDICINE

## 2023-06-19 PROCEDURE — 3288F FALL RISK ASSESSMENT DOCD: CPT | Mod: CPTII,S$GLB,, | Performed by: NURSE PRACTITIONER

## 2023-06-19 PROCEDURE — 99214 PR OFFICE/OUTPT VISIT, EST, LEVL IV, 30-39 MIN: ICD-10-PCS | Mod: S$GLB,,, | Performed by: NURSE PRACTITIONER

## 2023-06-19 PROCEDURE — 1159F PR MEDICATION LIST DOCUMENTED IN MEDICAL RECORD: ICD-10-PCS | Mod: CPTII,S$GLB,, | Performed by: NURSE PRACTITIONER

## 2023-06-19 PROCEDURE — 93005 ELECTROCARDIOGRAM TRACING: CPT | Mod: S$GLB,,, | Performed by: INTERNAL MEDICINE

## 2023-06-19 NOTE — Clinical Note
Pt with AF, CRT-D (EF recovered) s/p DCCV. Lasted 1 week in SR despite full amio load. He reports symptom improvement in SR and worsened GUNN in AF and would like to proceed with PVI if you agree. (He would also like a virtual with you prior to discuss emeka with his wife.) socorro

## 2023-06-20 ENCOUNTER — TELEPHONE (OUTPATIENT)
Dept: ELECTROPHYSIOLOGY | Facility: CLINIC | Age: 73
End: 2023-06-20
Payer: MEDICARE

## 2023-06-26 ENCOUNTER — TELEPHONE (OUTPATIENT)
Dept: ELECTROPHYSIOLOGY | Facility: CLINIC | Age: 73
End: 2023-06-26
Payer: MEDICARE

## 2023-06-26 NOTE — TELEPHONE ENCOUNTER
Spoke with pt, scheduled tentatively for PVI on 8/28 he will have virtual appt with josefina on 8/2 to discuss procedure in detail

## 2023-06-28 DIAGNOSIS — I48.11 LONGSTANDING PERSISTENT ATRIAL FIBRILLATION: Primary | ICD-10-CM

## 2023-07-21 ENCOUNTER — PATIENT MESSAGE (OUTPATIENT)
Dept: ELECTROPHYSIOLOGY | Facility: CLINIC | Age: 73
End: 2023-07-21
Payer: MEDICARE

## 2023-07-21 ENCOUNTER — TELEPHONE (OUTPATIENT)
Dept: OPHTHALMOLOGY | Facility: CLINIC | Age: 73
End: 2023-07-21
Payer: MEDICARE

## 2023-08-02 ENCOUNTER — TELEPHONE (OUTPATIENT)
Dept: ELECTROPHYSIOLOGY | Facility: CLINIC | Age: 73
End: 2023-08-02
Payer: MEDICARE

## 2023-08-02 ENCOUNTER — PATIENT MESSAGE (OUTPATIENT)
Dept: ELECTROPHYSIOLOGY | Facility: CLINIC | Age: 73
End: 2023-08-02

## 2023-08-02 ENCOUNTER — OFFICE VISIT (OUTPATIENT)
Dept: ELECTROPHYSIOLOGY | Facility: CLINIC | Age: 73
End: 2023-08-02
Payer: MEDICARE

## 2023-08-02 VITALS — HEIGHT: 71 IN | WEIGHT: 259.25 LBS | BODY MASS INDEX: 36.29 KG/M2

## 2023-08-02 DIAGNOSIS — E78.2 MIXED HYPERLIPIDEMIA: ICD-10-CM

## 2023-08-02 DIAGNOSIS — I42.0 DCM (DILATED CARDIOMYOPATHY): ICD-10-CM

## 2023-08-02 DIAGNOSIS — Z86.73 HISTORY OF CVA (CEREBROVASCULAR ACCIDENT): ICD-10-CM

## 2023-08-02 DIAGNOSIS — I48.0 PAROXYSMAL ATRIAL FIBRILLATION: Primary | ICD-10-CM

## 2023-08-02 DIAGNOSIS — I44.7 LBBB (LEFT BUNDLE BRANCH BLOCK): ICD-10-CM

## 2023-08-02 DIAGNOSIS — I10 ESSENTIAL HYPERTENSION: ICD-10-CM

## 2023-08-02 PROCEDURE — 3288F PR FALLS RISK ASSESSMENT DOCUMENTED: ICD-10-PCS | Mod: CPTII,95,, | Performed by: INTERNAL MEDICINE

## 2023-08-02 PROCEDURE — 1126F PR PAIN SEVERITY QUANTIFIED, NO PAIN PRESENT: ICD-10-PCS | Mod: CPTII,95,, | Performed by: INTERNAL MEDICINE

## 2023-08-02 PROCEDURE — 1101F PR PT FALLS ASSESS DOC 0-1 FALLS W/OUT INJ PAST YR: ICD-10-PCS | Mod: CPTII,95,, | Performed by: INTERNAL MEDICINE

## 2023-08-02 PROCEDURE — 1126F AMNT PAIN NOTED NONE PRSNT: CPT | Mod: CPTII,95,, | Performed by: INTERNAL MEDICINE

## 2023-08-02 PROCEDURE — 1159F MED LIST DOCD IN RCRD: CPT | Mod: CPTII,95,, | Performed by: INTERNAL MEDICINE

## 2023-08-02 PROCEDURE — 3008F PR BODY MASS INDEX (BMI) DOCUMENTED: ICD-10-PCS | Mod: CPTII,95,, | Performed by: INTERNAL MEDICINE

## 2023-08-02 PROCEDURE — 4010F PR ACE/ARB THEARPY RXD/TAKEN: ICD-10-PCS | Mod: CPTII,95,, | Performed by: INTERNAL MEDICINE

## 2023-08-02 PROCEDURE — 1101F PT FALLS ASSESS-DOCD LE1/YR: CPT | Mod: CPTII,95,, | Performed by: INTERNAL MEDICINE

## 2023-08-02 PROCEDURE — 3008F BODY MASS INDEX DOCD: CPT | Mod: CPTII,95,, | Performed by: INTERNAL MEDICINE

## 2023-08-02 PROCEDURE — 99215 PR OFFICE/OUTPT VISIT, EST, LEVL V, 40-54 MIN: ICD-10-PCS | Mod: 95,,, | Performed by: INTERNAL MEDICINE

## 2023-08-02 PROCEDURE — 99215 OFFICE O/P EST HI 40 MIN: CPT | Mod: 95,,, | Performed by: INTERNAL MEDICINE

## 2023-08-02 PROCEDURE — 4010F ACE/ARB THERAPY RXD/TAKEN: CPT | Mod: CPTII,95,, | Performed by: INTERNAL MEDICINE

## 2023-08-02 PROCEDURE — 3288F FALL RISK ASSESSMENT DOCD: CPT | Mod: CPTII,95,, | Performed by: INTERNAL MEDICINE

## 2023-08-02 PROCEDURE — 1159F PR MEDICATION LIST DOCUMENTED IN MEDICAL RECORD: ICD-10-PCS | Mod: CPTII,95,, | Performed by: INTERNAL MEDICINE

## 2023-08-02 NOTE — LETTER
August 2, 2023        [facility] - Electrophysiology 3rd Fl  [address]  [facility] [address]  Phone: [phone]  Fax: [phone]   Patient: Hussein Steinberg   MR Number: [id number]   YOB: 1950   Date of Visit: 8/2/2023       Dear Dr. Garcia:    Thank you for referring Hussein Steinberg to me for evaluation. Below are the relevant portions of my assessment and plan of care.            If you have questions, please do not hesitate to call me. I look forward to following Hussein along with you.    Sincerely,      Dejan Mo MD           CC  No Recipients

## 2023-08-02 NOTE — PROGRESS NOTES
"The patient location is: home  The chief complaint leading to consultation is: AF  Visit type: audiovisual  Total time spent with patient: 30 min  Each patient to whom he or she provides medical services by telemedicine is:  (1) informed of the relationship between the physician and patient and the respective role of any other health care provider with respect to management of the patient; and (2) notified that he or she may decline to receive medical services by telemedicine and may withdraw from such care at any time.    Subjective:   Patient ID:  Hussein Steinberg is a 73 y.o. male who presents for follow-up of PAF, NICM.     Mr. Steinberg is a 73 y.o. male with AF, DCM, CRT-D (2013), HTN, HLD, CVA (hemorrhagic) here for follow up.    "Syeda Steinberg  DCM, s/p biV ICD with great response (LVEF 60%+ 2018)  HTN on meds  HL on meds   PAF, on pradaxa  hx hemorrhagic CVA 2012    CRT-D 3/13 (Tonia device). Great response to CRT. Gen change 9/2019.    Hx of higher-dose amio, c/b night vision changes. These resolved after d/c'ing amio.  AF, resulting in sx that may be directly from the AF and/or from lack of biV pacing that results.  HERIBEROT/DCCV next week. 30 day monitor after that.  Discussed AADs tikosyn vs amio (vs ablation). After hearing pros/cons of all this, we'll restart amio (with load) following DCCV. If side effects recur, we could revisit tikosyn or ablation.  9/8/2020: He is 5 weeks s/p cardioversion and initiation of amiodarone. Maintaining rhythm.HERIBERTO showed LVEF of 55%.   6/15/2021: Mr. Steinberg is doing well from a device perspective with stable lead and device function. No arrhythmia noted. On amiodarone.   12/15/2021: Mr. Steinberg is doing well from a device perspective with stable lead and device function. Some RA lead noise which is chronic and stable.  No arrhythmia noted. On amiodarone. Amio testing all WNL. Plan is to consider tikosyn vs ablation if he no longer tolerates amiodarone in the future. He continues to " prefer to stay the course for now.    Update (08/02/2023):  He's scheduled for RF PVI, after visiting with OWEN Ayala NP. Wants to discuss procedure with me.    My interpretation of recent ECG is APbiVP    Current Outpatient Medications   Medication Sig    acetaminophen (TYLENOL) 325 MG tablet Take 2 tablets (650 mg total) by mouth every 6 (six) hours as needed (pain 1-4/10 pain scale).    amiodarone (PACERONE) 200 MG Tab Take 1 tablet (200 mg total) by mouth once daily.    amLODIPine (NORVASC) 10 MG tablet Take 1 tablet (10 mg total) by mouth once daily.    candesartan (ATACAND) 32 MG tablet Take 1 tablet (32 mg total) by mouth once daily.    dabigatran etexilate (PRADAXA) 150 mg Cap Take 1 capsule (150 mg total) by mouth 2 (two) times a day. TAKE 1 TABLET TWICE A DAY    ezetimibe (ZETIA) 10 mg tablet TAKE 1 TABLET BY MOUTH EVERY DAY    folic acid/multivit-min/lutein (CENTRUM SILVER ORAL) Take by mouth once daily.    metoprolol tartrate (LOPRESSOR) 100 MG tablet Take 1 tablet (100 mg total) by mouth 2 (two) times daily.    triamterene-hydrochlorothiazide 37.5-25 mg (MAXZIDE-25) 37.5-25 mg per tablet Take 1 tablet by mouth once daily.    traZODone (DESYREL) 100 MG tablet Take 1 tablet (100 mg total) by mouth every evening. (Patient not taking: Reported on 6/21/2022)     No current facility-administered medications for this visit.       Review of Systems   Constitutional: Negative for malaise/fatigue.   Cardiovascular:  Negative for chest pain, dyspnea on exertion, irregular heartbeat, leg swelling and palpitations.   Respiratory:  Negative for shortness of breath.    Hematologic/Lymphatic: Negative for bleeding problem.   Skin:  Negative for rash.   Musculoskeletal:  Negative for myalgias.   Gastrointestinal:  Negative for hematemesis, hematochezia and nausea.   Genitourinary:  Negative for hematuria.   Neurological:  Negative for light-headedness.   Psychiatric/Behavioral:  Negative for altered mental status.   "  Allergic/Immunologic: Negative for persistent infections.     Objective:        Ht 5' 11" (1.803 m)   Wt 117.6 kg (259 lb 4.2 oz)   BMI 36.16 kg/m²     Well developed, well nourished.   No distress.  Speaks in full sentences.        Assessment:     Persistent AF    Plan:     In summary, Mr. Steinberg is a 73 y.o. male with AF, DCM, CRT-D (2013), HTN, HLD, CVA (hemorrhagic) here for follow up.    Informed consent was obtained, we discussed the risks and benefits of  the procedure (pulmonary vein isolation) which included (but was not limited to) the possibility of bleeding or injury to the vessels involved, embolism, cardiac perforation, cardiac tamponade requiring emergent drainage with a pericardial drain or surgery, esophageal injury or fistula formation, phrenic nerve injury, pulmonary vein stenosis, injury to the native conduction system of the heart requiring a PPM, renal injury if contrast used, MI, stroke, and death. We also reviewed indications, and alternatives of the planned procedure. All questions were answered. Patient wishes to proceed  I discussed with patient risks, indications, benefits, and alternatives of the planned procedure. All questions were answered. Patient understands and wishes to proceed.    Plan RF PVI  Carto for mapping      "

## 2023-08-02 NOTE — H&P (VIEW-ONLY)
"The patient location is: home  The chief complaint leading to consultation is: AF  Visit type: audiovisual  Total time spent with patient: 30 min  Each patient to whom he or she provides medical services by telemedicine is:  (1) informed of the relationship between the physician and patient and the respective role of any other health care provider with respect to management of the patient; and (2) notified that he or she may decline to receive medical services by telemedicine and may withdraw from such care at any time.    Subjective:   Patient ID:  Hussein Steinberg is a 73 y.o. male who presents for follow-up of PAF, NICM.     Mr. Steinberg is a 73 y.o. male with AF, DCM, CRT-D (2013), HTN, HLD, CVA (hemorrhagic) here for follow up.    "Syeda Steinberg  DCM, s/p biV ICD with great response (LVEF 60%+ 2018)  HTN on meds  HL on meds   PAF, on pradaxa  hx hemorrhagic CVA 2012    CRT-D 3/13 (Tonia device). Great response to CRT. Gen change 9/2019.    Hx of higher-dose amio, c/b night vision changes. These resolved after d/c'ing amio.  AF, resulting in sx that may be directly from the AF and/or from lack of biV pacing that results.  HERIBERTO/DCCV next week. 30 day monitor after that.  Discussed AADs tikosyn vs amio (vs ablation). After hearing pros/cons of all this, we'll restart amio (with load) following DCCV. If side effects recur, we could revisit tikosyn or ablation.  9/8/2020: He is 5 weeks s/p cardioversion and initiation of amiodarone. Maintaining rhythm.HERIBERTO showed LVEF of 55%.   6/15/2021: Mr. Steinberg is doing well from a device perspective with stable lead and device function. No arrhythmia noted. On amiodarone.   12/15/2021: Mr. Steinberg is doing well from a device perspective with stable lead and device function. Some RA lead noise which is chronic and stable.  No arrhythmia noted. On amiodarone. Amio testing all WNL. Plan is to consider tikosyn vs ablation if he no longer tolerates amiodarone in the future. He continues to " prefer to stay the course for now.    Update (08/02/2023):  He's scheduled for RF PVI, after visiting with OWEN Ayala NP. Wants to discuss procedure with me.    My interpretation of recent ECG is APbiVP    Current Outpatient Medications   Medication Sig    acetaminophen (TYLENOL) 325 MG tablet Take 2 tablets (650 mg total) by mouth every 6 (six) hours as needed (pain 1-4/10 pain scale).    amiodarone (PACERONE) 200 MG Tab Take 1 tablet (200 mg total) by mouth once daily.    amLODIPine (NORVASC) 10 MG tablet Take 1 tablet (10 mg total) by mouth once daily.    candesartan (ATACAND) 32 MG tablet Take 1 tablet (32 mg total) by mouth once daily.    dabigatran etexilate (PRADAXA) 150 mg Cap Take 1 capsule (150 mg total) by mouth 2 (two) times a day. TAKE 1 TABLET TWICE A DAY    ezetimibe (ZETIA) 10 mg tablet TAKE 1 TABLET BY MOUTH EVERY DAY    folic acid/multivit-min/lutein (CENTRUM SILVER ORAL) Take by mouth once daily.    metoprolol tartrate (LOPRESSOR) 100 MG tablet Take 1 tablet (100 mg total) by mouth 2 (two) times daily.    triamterene-hydrochlorothiazide 37.5-25 mg (MAXZIDE-25) 37.5-25 mg per tablet Take 1 tablet by mouth once daily.    traZODone (DESYREL) 100 MG tablet Take 1 tablet (100 mg total) by mouth every evening. (Patient not taking: Reported on 6/21/2022)     No current facility-administered medications for this visit.       Review of Systems   Constitutional: Negative for malaise/fatigue.   Cardiovascular:  Negative for chest pain, dyspnea on exertion, irregular heartbeat, leg swelling and palpitations.   Respiratory:  Negative for shortness of breath.    Hematologic/Lymphatic: Negative for bleeding problem.   Skin:  Negative for rash.   Musculoskeletal:  Negative for myalgias.   Gastrointestinal:  Negative for hematemesis, hematochezia and nausea.   Genitourinary:  Negative for hematuria.   Neurological:  Negative for light-headedness.   Psychiatric/Behavioral:  Negative for altered mental status.   "  Allergic/Immunologic: Negative for persistent infections.     Objective:        Ht 5' 11" (1.803 m)   Wt 117.6 kg (259 lb 4.2 oz)   BMI 36.16 kg/m²     Well developed, well nourished.   No distress.  Speaks in full sentences.        Assessment:     Persistent AF    Plan:     In summary, Mr. Steinberg is a 73 y.o. male with AF, DCM, CRT-D (2013), HTN, HLD, CVA (hemorrhagic) here for follow up.    Informed consent was obtained, we discussed the risks and benefits of  the procedure (pulmonary vein isolation) which included (but was not limited to) the possibility of bleeding or injury to the vessels involved, embolism, cardiac perforation, cardiac tamponade requiring emergent drainage with a pericardial drain or surgery, esophageal injury or fistula formation, phrenic nerve injury, pulmonary vein stenosis, injury to the native conduction system of the heart requiring a PPM, renal injury if contrast used, MI, stroke, and death. We also reviewed indications, and alternatives of the planned procedure. All questions were answered. Patient wishes to proceed  I discussed with patient risks, indications, benefits, and alternatives of the planned procedure. All questions were answered. Patient understands and wishes to proceed.    Plan RF PVI  Carto for mapping      "

## 2023-08-20 ENCOUNTER — CLINICAL SUPPORT (OUTPATIENT)
Dept: CARDIOLOGY | Facility: HOSPITAL | Age: 73
End: 2023-08-20
Payer: MEDICARE

## 2023-08-20 DIAGNOSIS — Z95.810 PRESENCE OF AUTOMATIC (IMPLANTABLE) CARDIAC DEFIBRILLATOR: ICD-10-CM

## 2023-08-20 PROCEDURE — 93296 REM INTERROG EVL PM/IDS: CPT | Performed by: INTERNAL MEDICINE

## 2023-08-21 ENCOUNTER — HOSPITAL ENCOUNTER (OUTPATIENT)
Dept: RADIOLOGY | Facility: HOSPITAL | Age: 73
Discharge: HOME | End: 2023-08-21
Attending: INTERNAL MEDICINE
Payer: MEDICARE

## 2023-08-21 DIAGNOSIS — I48.11 LONGSTANDING PERSISTENT ATRIAL FIBRILLATION: ICD-10-CM

## 2023-08-21 PROCEDURE — 71275 CTA CHEST NON CORONARY (PE STUDIES): ICD-10-PCS | Mod: 26,,, | Performed by: RADIOLOGY

## 2023-08-21 PROCEDURE — 71275 CT ANGIOGRAPHY CHEST: CPT | Mod: TC

## 2023-08-21 PROCEDURE — 71275 CT ANGIOGRAPHY CHEST: CPT | Mod: 26,,, | Performed by: RADIOLOGY

## 2023-08-21 PROCEDURE — 25500020 PHARM REV CODE 255: Performed by: INTERNAL MEDICINE

## 2023-08-21 RX ADMIN — IOHEXOL 75 ML: 350 INJECTION, SOLUTION INTRAVENOUS at 09:08

## 2023-08-23 ENCOUNTER — PATIENT MESSAGE (OUTPATIENT)
Dept: PRIMARY CARE CLINIC | Facility: CLINIC | Age: 73
End: 2023-08-23
Payer: MEDICARE

## 2023-08-25 ENCOUNTER — TELEPHONE (OUTPATIENT)
Dept: ELECTROPHYSIOLOGY | Facility: CLINIC | Age: 73
End: 2023-08-25
Payer: MEDICARE

## 2023-08-25 NOTE — TELEPHONE ENCOUNTER
Spoke to patient and spouse    CONFIRMED procedure arrival time of 10    Reiterated instructions including:  -Directions to check in desk  -NPO after midnight night prior to procedure  -High importance of HOLDING pradaxa Monday morning only   -Pre-procedure LABS reviewed K+ 3.4, Dr Mo aware, instructed to increase K+ in diet, no need to repeat level  -Confirmed absence or presence of implanted device/stimulator CRT-D  -Confirmed no fever, cough, or shortness of breath in the past 30 days  -Confirmed no redness, rash, irritation, or yeast infection to groin area.   -Reviewed current visitor policy    Patient verbalized understanding of above and appreciated the call.

## 2023-08-28 ENCOUNTER — HOSPITAL ENCOUNTER (OUTPATIENT)
Dept: CARDIOLOGY | Facility: HOSPITAL | Age: 73
Discharge: HOME | End: 2023-08-28
Attending: INTERNAL MEDICINE | Admitting: INTERNAL MEDICINE
Payer: MEDICARE

## 2023-08-28 ENCOUNTER — ANESTHESIA EVENT (OUTPATIENT)
Dept: MEDSURG UNIT | Facility: HOSPITAL | Age: 73
End: 2023-08-28
Payer: MEDICARE

## 2023-08-28 ENCOUNTER — ANESTHESIA (OUTPATIENT)
Dept: MEDSURG UNIT | Facility: HOSPITAL | Age: 73
End: 2023-08-28
Payer: MEDICARE

## 2023-08-28 ENCOUNTER — HOSPITAL ENCOUNTER (OUTPATIENT)
Facility: HOSPITAL | Age: 73
Discharge: HOME | End: 2023-08-28
Attending: INTERNAL MEDICINE | Admitting: INTERNAL MEDICINE
Payer: MEDICARE

## 2023-08-28 VITALS
BODY MASS INDEX: 35 KG/M2 | HEIGHT: 71 IN | SYSTOLIC BLOOD PRESSURE: 137 MMHG | DIASTOLIC BLOOD PRESSURE: 86 MMHG | WEIGHT: 250 LBS

## 2023-08-28 VITALS
HEART RATE: 68 BPM | DIASTOLIC BLOOD PRESSURE: 107 MMHG | TEMPERATURE: 98 F | SYSTOLIC BLOOD PRESSURE: 141 MMHG | BODY MASS INDEX: 35 KG/M2 | HEIGHT: 71 IN | RESPIRATION RATE: 20 BRPM | OXYGEN SATURATION: 93 % | WEIGHT: 250 LBS

## 2023-08-28 DIAGNOSIS — I48.11 LONGSTANDING PERSISTENT ATRIAL FIBRILLATION: Primary | ICD-10-CM

## 2023-08-28 DIAGNOSIS — Z95.810 ICD (IMPLANTABLE CARDIOVERTER-DEFIBRILLATOR), BIVENTRICULAR, IN SITU: ICD-10-CM

## 2023-08-28 DIAGNOSIS — I49.9 ARRHYTHMIA: ICD-10-CM

## 2023-08-28 DIAGNOSIS — I48.11 LONGSTANDING PERSISTENT ATRIAL FIBRILLATION: ICD-10-CM

## 2023-08-28 DIAGNOSIS — I48.19 PERSISTENT ATRIAL FIBRILLATION: ICD-10-CM

## 2023-08-28 DIAGNOSIS — I48.91 ATRIAL FIBRILLATION: ICD-10-CM

## 2023-08-28 LAB
AORTIC ROOT ANNULUS: 2.2 CM
ASCENDING AORTA: 3.9 CM
BSA FOR ECHO PROCEDURE: 2.38 M2
EJECTION FRACTION: 50 %
LAA PV: 0.2 CM/S
POC ACTIVATED CLOTTING TIME K: 191 SEC (ref 74–137)
POC ACTIVATED CLOTTING TIME K: 329 SEC (ref 74–137)
POC ACTIVATED CLOTTING TIME K: 353 SEC (ref 74–137)
POC ACTIVATED CLOTTING TIME K: 366 SEC (ref 74–137)
POC ACTIVATED CLOTTING TIME K: 407 SEC (ref 74–137)
SAMPLE: ABNORMAL
SINUS: 3.7 CM
STJ: 3.2 CM

## 2023-08-28 PROCEDURE — 63600175 PHARM REV CODE 636 W HCPCS: Performed by: NURSE ANESTHETIST, CERTIFIED REGISTERED

## 2023-08-28 PROCEDURE — 93656 COMPRE EP EVAL ABLTJ ATR FIB: CPT | Mod: ,,, | Performed by: INTERNAL MEDICINE

## 2023-08-28 PROCEDURE — 36620 INSERTION CATHETER ARTERY: CPT | Mod: 59,,, | Performed by: ANESTHESIOLOGY

## 2023-08-28 PROCEDURE — C1894 INTRO/SHEATH, NON-LASER: HCPCS | Performed by: INTERNAL MEDICINE

## 2023-08-28 PROCEDURE — 93656 PR ELECTROPHYS EVAL, COMPREHEN, W/ABLATION OF ATRIAL FIBR: ICD-10-PCS | Mod: ,,, | Performed by: INTERNAL MEDICINE

## 2023-08-28 PROCEDURE — 93005 ELECTROCARDIOGRAM TRACING: CPT

## 2023-08-28 PROCEDURE — 93320 DOPPLER ECHO COMPLETE: CPT | Mod: 26,,, | Performed by: INTERNAL MEDICINE

## 2023-08-28 PROCEDURE — 92960 CARDIOVERSION ELECTRIC EXT: CPT | Mod: 59

## 2023-08-28 PROCEDURE — 92960 PR CARDIOVERSION, ELECTIVE;EXTERN: ICD-10-PCS | Mod: 59,,, | Performed by: INTERNAL MEDICINE

## 2023-08-28 PROCEDURE — 93320 TRANSESOPHAGEAL ECHO (TEE) (CUPID ONLY): ICD-10-PCS | Mod: 26,,, | Performed by: INTERNAL MEDICINE

## 2023-08-28 PROCEDURE — 92960 CARDIOVERSION ELECTRIC EXT: CPT | Mod: 59,,, | Performed by: INTERNAL MEDICINE

## 2023-08-28 PROCEDURE — 93312 TRANSESOPHAGEAL ECHO (TEE) (CUPID ONLY): ICD-10-PCS | Mod: 26,,, | Performed by: INTERNAL MEDICINE

## 2023-08-28 PROCEDURE — C1753 CATH, INTRAVAS ULTRASOUND: HCPCS | Performed by: INTERNAL MEDICINE

## 2023-08-28 PROCEDURE — 93325 DOPPLER ECHO COLOR FLOW MAPG: CPT

## 2023-08-28 PROCEDURE — 93325 TRANSESOPHAGEAL ECHO (TEE) (CUPID ONLY): ICD-10-PCS | Mod: 26,,, | Performed by: INTERNAL MEDICINE

## 2023-08-28 PROCEDURE — 25000003 PHARM REV CODE 250: Performed by: NURSE ANESTHETIST, CERTIFIED REGISTERED

## 2023-08-28 PROCEDURE — C1730 CATH, EP, 19 OR FEW ELECT: HCPCS | Performed by: INTERNAL MEDICINE

## 2023-08-28 PROCEDURE — 36620 ARTERIAL: ICD-10-PCS | Mod: 59,,, | Performed by: ANESTHESIOLOGY

## 2023-08-28 PROCEDURE — C1731 CATH, EP, 20 OR MORE ELEC: HCPCS | Performed by: INTERNAL MEDICINE

## 2023-08-28 PROCEDURE — 93010 ELECTROCARDIOGRAM REPORT: CPT | Mod: 76,,, | Performed by: INTERNAL MEDICINE

## 2023-08-28 PROCEDURE — 37000009 HC ANESTHESIA EA ADD 15 MINS: Performed by: INTERNAL MEDICINE

## 2023-08-28 PROCEDURE — 63600175 PHARM REV CODE 636 W HCPCS: Performed by: INTERNAL MEDICINE

## 2023-08-28 PROCEDURE — 93010 EKG 12-LEAD: ICD-10-PCS | Mod: 76,,, | Performed by: INTERNAL MEDICINE

## 2023-08-28 PROCEDURE — D9220A PRA ANESTHESIA: Mod: ANES,,, | Performed by: ANESTHESIOLOGY

## 2023-08-28 PROCEDURE — 63600175 PHARM REV CODE 636 W HCPCS: Performed by: NURSE PRACTITIONER

## 2023-08-28 PROCEDURE — 93312 ECHO TRANSESOPHAGEAL: CPT | Mod: 26,,, | Performed by: INTERNAL MEDICINE

## 2023-08-28 PROCEDURE — C1766 INTRO/SHEATH,STRBLE,NON-PEEL: HCPCS | Performed by: INTERNAL MEDICINE

## 2023-08-28 PROCEDURE — D9220A PRA ANESTHESIA: ICD-10-PCS | Mod: CRNA,,, | Performed by: NURSE ANESTHETIST, CERTIFIED REGISTERED

## 2023-08-28 PROCEDURE — D9220A PRA ANESTHESIA: Mod: CRNA,,, | Performed by: NURSE ANESTHETIST, CERTIFIED REGISTERED

## 2023-08-28 PROCEDURE — 63600175 PHARM REV CODE 636 W HCPCS: Performed by: ANESTHESIOLOGY

## 2023-08-28 PROCEDURE — C1732 CATH, EP, DIAG/ABL, 3D/VECT: HCPCS | Performed by: INTERNAL MEDICINE

## 2023-08-28 PROCEDURE — D9220A PRA ANESTHESIA: ICD-10-PCS | Mod: ANES,,, | Performed by: ANESTHESIOLOGY

## 2023-08-28 PROCEDURE — 25000003 PHARM REV CODE 250: Performed by: INTERNAL MEDICINE

## 2023-08-28 PROCEDURE — 37000008 HC ANESTHESIA 1ST 15 MINUTES: Performed by: INTERNAL MEDICINE

## 2023-08-28 PROCEDURE — C1769 GUIDE WIRE: HCPCS | Performed by: INTERNAL MEDICINE

## 2023-08-28 PROCEDURE — 27201423 OPTIME MED/SURG SUP & DEVICES STERILE SUPPLY: Performed by: INTERNAL MEDICINE

## 2023-08-28 PROCEDURE — 93005 ELECTROCARDIOGRAM TRACING: CPT | Mod: 59

## 2023-08-28 PROCEDURE — 93325 DOPPLER ECHO COLOR FLOW MAPG: CPT | Mod: 26,,, | Performed by: INTERNAL MEDICINE

## 2023-08-28 PROCEDURE — 93656 COMPRE EP EVAL ABLTJ ATR FIB: CPT | Performed by: INTERNAL MEDICINE

## 2023-08-28 PROCEDURE — 25000003 PHARM REV CODE 250: Performed by: NURSE PRACTITIONER

## 2023-08-28 RX ORDER — KETAMINE HCL IN 0.9 % NACL 50 MG/5 ML
SYRINGE (ML) INTRAVENOUS
Status: DISCONTINUED | OUTPATIENT
Start: 2023-08-28 | End: 2023-08-28

## 2023-08-28 RX ORDER — FUROSEMIDE 10 MG/ML
20 INJECTION INTRAMUSCULAR; INTRAVENOUS ONCE
Status: COMPLETED | OUTPATIENT
Start: 2023-08-28 | End: 2023-08-28

## 2023-08-28 RX ORDER — HEPARIN SODIUM 1000 [USP'U]/ML
INJECTION, SOLUTION INTRAVENOUS; SUBCUTANEOUS
Status: DISCONTINUED | OUTPATIENT
Start: 2023-08-28 | End: 2023-08-28

## 2023-08-28 RX ORDER — AMIODARONE HYDROCHLORIDE 200 MG/1
TABLET ORAL
Qty: 150 TABLET | Refills: 0 | Status: SHIPPED | OUTPATIENT
Start: 2023-08-28 | End: 2023-12-13

## 2023-08-28 RX ORDER — ACETAMINOPHEN 325 MG/1
650 TABLET ORAL EVERY 4 HOURS PRN
Status: DISCONTINUED | OUTPATIENT
Start: 2023-08-28 | End: 2023-09-01 | Stop reason: HOSPADM

## 2023-08-28 RX ORDER — HEPARIN SOD,PORCINE/0.9 % NACL 1000/500ML
INTRAVENOUS SOLUTION INTRAVENOUS
Status: DISCONTINUED | OUTPATIENT
Start: 2023-08-28 | End: 2023-09-01 | Stop reason: HOSPADM

## 2023-08-28 RX ORDER — PROPOFOL 10 MG/ML
VIAL (ML) INTRAVENOUS
Status: DISCONTINUED | OUTPATIENT
Start: 2023-08-28 | End: 2023-08-28

## 2023-08-28 RX ORDER — OXYCODONE AND ACETAMINOPHEN 5; 325 MG/1; MG/1
1 TABLET ORAL ONCE AS NEEDED
Status: DISCONTINUED | OUTPATIENT
Start: 2023-08-28 | End: 2023-09-01 | Stop reason: HOSPADM

## 2023-08-28 RX ORDER — DROPERIDOL 2.5 MG/ML
0.62 INJECTION, SOLUTION INTRAMUSCULAR; INTRAVENOUS ONCE AS NEEDED
Status: DISCONTINUED | OUTPATIENT
Start: 2023-08-28 | End: 2023-09-01 | Stop reason: HOSPADM

## 2023-08-28 RX ORDER — HEPARIN SODIUM 10000 [USP'U]/100ML
INJECTION, SOLUTION INTRAVENOUS CONTINUOUS PRN
Status: DISCONTINUED | OUTPATIENT
Start: 2023-08-28 | End: 2023-08-28

## 2023-08-28 RX ORDER — PROTAMINE SULFATE 10 MG/ML
INJECTION, SOLUTION INTRAVENOUS
Status: DISCONTINUED | OUTPATIENT
Start: 2023-08-28 | End: 2023-08-28

## 2023-08-28 RX ORDER — LIDOCAINE HYDROCHLORIDE 10 MG/ML
INJECTION, SOLUTION EPIDURAL; INFILTRATION; INTRACAUDAL; PERINEURAL
Status: DISCONTINUED | OUTPATIENT
Start: 2023-08-28 | End: 2023-08-28

## 2023-08-28 RX ORDER — HYDROMORPHONE HYDROCHLORIDE 1 MG/ML
0.2 INJECTION, SOLUTION INTRAMUSCULAR; INTRAVENOUS; SUBCUTANEOUS EVERY 5 MIN PRN
Status: DISCONTINUED | OUTPATIENT
Start: 2023-08-28 | End: 2023-09-01 | Stop reason: HOSPADM

## 2023-08-28 RX ORDER — ONDANSETRON 2 MG/ML
4 INJECTION INTRAMUSCULAR; INTRAVENOUS ONCE AS NEEDED
Status: DISCONTINUED | OUTPATIENT
Start: 2023-08-28 | End: 2023-09-01 | Stop reason: HOSPADM

## 2023-08-28 RX ORDER — SUCCINYLCHOLINE CHLORIDE 20 MG/ML
INJECTION INTRAMUSCULAR; INTRAVENOUS
Status: DISCONTINUED | OUTPATIENT
Start: 2023-08-28 | End: 2023-08-28

## 2023-08-28 RX ORDER — DABIGATRAN ETEXILATE 150 MG/1
150 CAPSULE ORAL 2 TIMES DAILY
Status: DISCONTINUED | OUTPATIENT
Start: 2023-08-28 | End: 2023-09-01 | Stop reason: HOSPADM

## 2023-08-28 RX ORDER — DEXAMETHASONE SODIUM PHOSPHATE 4 MG/ML
INJECTION, SOLUTION INTRA-ARTICULAR; INTRALESIONAL; INTRAMUSCULAR; INTRAVENOUS; SOFT TISSUE
Status: DISCONTINUED | OUTPATIENT
Start: 2023-08-28 | End: 2023-08-28

## 2023-08-28 RX ORDER — LIDOCAINE HYDROCHLORIDE 20 MG/ML
INJECTION, SOLUTION INFILTRATION; PERINEURAL
Status: DISCONTINUED | OUTPATIENT
Start: 2023-08-28 | End: 2023-09-01 | Stop reason: HOSPADM

## 2023-08-28 RX ORDER — DABIGATRAN ETEXILATE 150 MG/1
150 CAPSULE ORAL 2 TIMES DAILY
Qty: 180 CAPSULE | Refills: 3 | Status: SHIPPED | OUTPATIENT
Start: 2023-08-28 | End: 2024-08-27

## 2023-08-28 RX ORDER — FENTANYL CITRATE 50 UG/ML
INJECTION, SOLUTION INTRAMUSCULAR; INTRAVENOUS
Status: DISCONTINUED | OUTPATIENT
Start: 2023-08-28 | End: 2023-08-28

## 2023-08-28 RX ADMIN — ACETAMINOPHEN 650 MG: 325 TABLET ORAL at 04:08

## 2023-08-28 RX ADMIN — DEXAMETHASONE SODIUM PHOSPHATE 4 MG: 4 INJECTION, SOLUTION INTRAMUSCULAR; INTRAVENOUS at 11:08

## 2023-08-28 RX ADMIN — HYDROMORPHONE HYDROCHLORIDE 0.2 MG: 1 INJECTION, SOLUTION INTRAMUSCULAR; INTRAVENOUS; SUBCUTANEOUS at 05:08

## 2023-08-28 RX ADMIN — DABIGATRAN ETEXILATE MESYLATE 150 MG: 150 CAPSULE ORAL at 08:08

## 2023-08-28 RX ADMIN — HEPARIN SODIUM 3000 UNITS: 1000 INJECTION, SOLUTION INTRAVENOUS; SUBCUTANEOUS at 12:08

## 2023-08-28 RX ADMIN — HEPARIN SODIUM 17000 UNITS: 1000 INJECTION, SOLUTION INTRAVENOUS; SUBCUTANEOUS at 12:08

## 2023-08-28 RX ADMIN — FENTANYL CITRATE 50 MCG: 50 INJECTION, SOLUTION INTRAMUSCULAR; INTRAVENOUS at 10:08

## 2023-08-28 RX ADMIN — Medication 10 MG: at 01:08

## 2023-08-28 RX ADMIN — GLYCOPYRROLATE 0.2 MG: 0.2 INJECTION INTRAMUSCULAR; INTRAVENOUS at 11:08

## 2023-08-28 RX ADMIN — HYDROMORPHONE HYDROCHLORIDE 0.2 MG: 1 INJECTION, SOLUTION INTRAMUSCULAR; INTRAVENOUS; SUBCUTANEOUS at 04:08

## 2023-08-28 RX ADMIN — LIDOCAINE HYDROCHLORIDE 50 MG: 10 SOLUTION INTRAVENOUS at 10:08

## 2023-08-28 RX ADMIN — SODIUM CHLORIDE: 0.9 INJECTION, SOLUTION INTRAVENOUS at 10:08

## 2023-08-28 RX ADMIN — FUROSEMIDE 20 MG: 10 INJECTION, SOLUTION INTRAMUSCULAR; INTRAVENOUS at 08:08

## 2023-08-28 RX ADMIN — PROPOFOL 150 MG: 10 INJECTION, EMULSION INTRAVENOUS at 10:08

## 2023-08-28 RX ADMIN — DEXTROSE 2 G: 50 INJECTION, SOLUTION INTRAVENOUS at 11:08

## 2023-08-28 RX ADMIN — SUCCINYLCHOLINE CHLORIDE 120 MG: 20 INJECTION, SOLUTION INTRAMUSCULAR; INTRAVENOUS at 10:08

## 2023-08-28 RX ADMIN — HEPARIN SODIUM AND DEXTROSE 12 UNITS/KG/HR: 10000; 5 INJECTION INTRAVENOUS at 12:08

## 2023-08-28 RX ADMIN — PROPOFOL 50 MG: 10 INJECTION, EMULSION INTRAVENOUS at 10:08

## 2023-08-28 RX ADMIN — Medication 20 MG: at 12:08

## 2023-08-28 RX ADMIN — PROTAMINE SULFATE 90 MG: 10 INJECTION, SOLUTION INTRAVENOUS at 02:08

## 2023-08-28 RX ADMIN — SODIUM CHLORIDE 0.4 MCG/KG/MIN: 9 INJECTION, SOLUTION INTRAVENOUS at 11:08

## 2023-08-28 RX ADMIN — Medication 20 MG: at 11:08

## 2023-08-28 NOTE — PROGRESS NOTES
PATIENT ADMITTED TO RECOVERY SEE [facility] FOR COMPLETE ASSESSMENT PACU [facility]'S MAINTAINED SAFETY MEASURES VERIFIED PATIENT INSTRUCTED ON PAIN SCALE AND PATIENT VERBALIZED UNDERSTANDING. CALLED FOR EKG AND CALLED PATIENT'S WIFE AND UPDATED ON PATIETN LOCATION WITH THE PERMISSION OF PATIENT. EKG DONE AND IN CHART. ALSO SHOWED CHERELLE NP POST OP EKG.

## 2023-08-28 NOTE — DISCHARGE INSTRUCTIONS
Post Ablation Instructions:     No pushing, pulling, or lifting greater than 5 pounds for ONE week.  No long car rides (greater than 1 hour) for ONE week.  If a long car ride is required, we ask that you stop every hour and walk around for a few minutes.   No driving for 24 hours after procedure due to anesthesia.  Do not soak access site (groin) in water for ONE week (this includes baths, pools, and hot tubs).   Your groin site(s) may be tender and have some bruising.  This is normal.  If the area feels hard, or if there is any pain or swelling at the site(s), please call our office IMMEDIATELY at [phone].   It is OK to go up and down home stairs as needed after ablation procedure.         Any need to reschedule or cancel procedures, or any questions regarding your procedures should be addressed directly with the Arrhythmia Department Nurses at the following phone number: [phone].

## 2023-08-28 NOTE — NURSING TRANSFER
Nursing Transfer Note      8/28/2023   6:16 PM    Nurse giving handoff:KRISTI CARRION   Nurse receiving handoff:KALIA PACU RN     Reason patient is being transferred: D/C CRITERIA MET     Transfer To: PACU 15    Transfer via stretcher    Transfer with cardiac monitoring, PORTABLE TRAVEL MONITOR     Transported by KRISTI RN     Transfer Vital Signs:  Blood Pressure:SEE Epic   Heart Rate:SEE Epic   O2:2LITERS NC   Temperature:SEE Epic   Respirations:SEE Epic    Telemetry: YES   Order for Tele Monitor? Yes    Additional Lines: CONDOM CATH     4eyes on Skin: yes    Medicines sent: NONE     Any special needs or follow-up needed: WENT OVER SUTURE REMOVAL TIME AND ALSO MEDICATIONS DUE AT 2030. WIFE CAME DOWN TO PACU WITH PATIENT THEN SHOWED HER TO 2ND FLOOR WAITING ROOM     Patient belongings transferred with patient: Yes BELOINGINGS FROM SSCU WITH PATIENT     Chart send with patient: Yes    Notified: spouse    Patient reassessed at: SEE Epic  (date, time)  1  Upon arrival to floor: TO ROOM NO DISTRESS NOTED.

## 2023-08-28 NOTE — HPI
Hussein Steinberg 73 y.o. male presents for PVI RFA.  Patient has a history of:  DCM, s/p biV ICD with great response (LVEF 60%+ 2018)-- Gen Change 9/2019  HTN on meds  HL on meds   PAF, on pradaxa  hx hemorrhagic CVA 2012     He is 4 months s/p cardioversion. Per monitor, he maintained SR for one week then reverted back to AF despite amiodarone. He reports symptom improvement in SR and worsened GUNN in AF.    Hx of higher-dose amio, c/b night vision changes. These resolved after d/c'ing amio.      AF, resulting in sx that may be directly from the AF and/or from lack of biV pacing that results.

## 2023-08-28 NOTE — HOSPITAL COURSE
S/p PVI RFA.  Patient tolerated procedure.  Post procedure EKG: AV paced; 60 bpm.  Groin dressing intact. No bleeding. Area is soft, without hematoma or bruising noted. Patient ambulated, tolerated oral intake, voided, pain well controlled. Discharge instructions discussed with patient and wife, specifically to resume Pradaxa (first dose when patient ambulating), start amiodarone load, as well as restrictions, and follow up.  Patient d/c'd home.

## 2023-08-28 NOTE — PROGRESS NOTES
Patient transferred from  PACU to 2nd floor PACU at the time. Left groin with suture and stop cock CDI, right groin with gauze and tegaderm with scant old drainage. Will monitor patient.

## 2023-08-28 NOTE — ANESTHESIA PROCEDURE NOTES
Arterial    Diagnosis: a fib    Patient location during procedure: done in OR    Staffing  Authorizing Provider: Kirk Singh MD  Performing Provider: Kirk Singh MD    Staffing  Performed by: Kirk Singh MD  Authorized by: Kirk Singh MD    Anesthesiologist was present at the time of the procedure.  Arterial  Skin Prep: chlorhexidine gluconate and isopropyl alcohol  Local Infiltration: none  Orientation: right  Location: radial    Catheter Size: 20 G  Catheter placement by Anatomical landmarks. Heme positive aspiration all ports. Insertion Attempts: 1  Assessment  Dressing: secured with tape and tegaderm  Patient: Tolerated well

## 2023-08-28 NOTE — CONSULTS
[facility]  HERIBERTO  Consult Note       HPI: Hussein Steinberg is a 73 y.o. male with past medical history of:    DCM, s/p biV ICD with great response (LVEF 60%+ 2018)  HTN on meds  HL on meds   PAF, on pradaxa  hx hemorrhagic CVA 2012    Presents for PVI with Dr. Mo. No history of esophageal procedure, dysphagia, esophageal varices, loose teeth.      ROS: Patient denies angina, GUNN, lower extremity edema, orthopnea, PND, palpitations, presyncope or syncope. All other ROS negative except as stated above.    PMHx:  As above    PSHx:   As above    Family Hx:  Family History   Problem Relation Age of Onset    Macular degeneration Mother     Cataracts Mother     Hypertension Mother     Glaucoma Mother     Heart disease Mother         09/2017 passed  age 95 of heart attack    Cancer Father 56        Leukemia    Heart attack Brother 59        had stent placed    Heart disease Brother     Heart attack Maternal Grandfather     Heart attack Paternal Grandfather     No Known Problems Sister     Parkinsonism Brother 54    No Known Problems Brother     No Known Problems Daughter     No Known Problems Daughter     Amblyopia Neg Hx     Blindness Neg Hx     Diabetes Neg Hx     Retinal detachment Neg Hx     Strabismus Neg Hx     Stroke Neg Hx     Thyroid disease Neg Hx        Social Hx:   Social History     Tobacco Use    Smoking status: Never    Smokeless tobacco: Never   Substance Use Topics    Alcohol use: Never       Allergies:  Review of patient's allergies indicates:   Allergen Reactions    Olmesartan Diarrhea     Diarrhea and muscle aches since starting medication.     Lisinopril Other (See Comments)     Dry cough       Home Meds:  Current Outpatient Medications   Medication Instructions    acetaminophen (TYLENOL) 650 mg, Oral, Every 6 hours PRN    amLODIPine (NORVASC) 10 mg, Oral, Daily    candesartan (ATACAND) 32 mg, Oral, Daily    chondroitin sulfate A sodium 400 mg Cap Oral    dabigatran etexilate  "(PRADAXA) 150 mg Cap Take 1 capsule (150 mg total) by mouth 2 (two) times a day    ezetimibe (ZETIA) 10 mg, Oral, Daily    folic acid/multivit-min/lutein (CENTRUM SILVER ORAL) Oral, Daily    metoprolol tartrate (LOPRESSOR) 100 mg, Oral, 2 times daily    triamterene-hydrochlorothiazide 37.5-25 mg (MAXZIDE-25) 37.5-25 mg per tablet 1 tablet, Oral, Daily       Vitals:  Temp:  [97.9 °F (36.6 °C)] 97.9 °F (36.6 °C)  Pulse:  [70] 70  Resp:  [18] 18  SpO2:  [94 %] 94 %  BP: (130-137)/(86-87) 137/86    Physical Exam  Gen: No apparent distress, resting comfortably  HEENT: Pupils equal and reactive to light  Cardio: Regular rate, point of maximal impulse not displaced, no murmur noted, 2+ radial pulses bilaterally, 2+ DP pulses bilaterally  Resp: CTAB, no wheezing  Abd: Soft, non-tender, non-distended  Skin: Warm, dry, no peripheral edema noted  Neuro: Alert and oriented x3  Psych: Normal mood and affect    ASA: 2  Mallampati: 3    Labs:  No results for input(s): "WBC", "HGB", "HCT", "PLT" in the last 168 hours.  No results for input(s): "NA", "K", "CL", "CO2", "BUN", "CREATININE", "GLU", "CALCIUM", "MG", "PHOS", "LIPASE", "AMYLASE" in the last 168 hours.      Current Meds:      Assessment and Plan:    1. HERIBERTO for evaluation of VIJAYA prior to PVI  -No absolute contraindications of esophageal stricture, tumor, perforation, laceration,or diverticulum and/or active GI bleed  -The risks, benefits & alternatives of the procedure were explained to the patient.   -The risks of transesophageal echo include but are not limited to:  Dental trauma, esophageal trauma/perforation, bleeding, laryngospasm/brochospasm, aspiration, sore throat/hoarseness, & dislodgement of the endotracheal tube/nasogastric tube (where applicable).    -The risks of moderate sedation include hypotension, respiratory depression, arrhythmias, bronchospasm, & death.    -Informed consent was obtained. The patient is agreeable to proceed with the procedure and all " questions and concerns addressed.    Case discussed with an attending in echocardiography lab.     Further recommendations per attending addendum           Fred Miller MD  [facility] PGY-6    Staff attestation to follow.    This note was prepared using voice recognition system and may have sound alike errors that may have been overlooked even with proof reading.

## 2023-08-28 NOTE — PLAN OF CARE
Patient arrived to room. PIV placed. Admit assessment completed. Plan of care discussed with patient. Mepilex dressings placed to sacrum and bilateral heels per protocol.

## 2023-08-28 NOTE — Clinical Note
CRT-D initial settings DDDR 60 with tachy therapies on. Settings changed to DDI 50 with tachy therapies off per MD order, verified per 2 EPL staff

## 2023-08-28 NOTE — ANESTHESIA PROCEDURE NOTES
Intubation    Date/Time: 8/28/2023 10:53 AM    Performed by: Lj Silva CRNA  Authorized by: Kirk Singh MD    Intubation:     Induction:  Intravenous    Intubated:  Postinduction    Mask Ventilation:  Not attempted    Attempts:  1    Attempted By:  CRNA    Method of Intubation:  Video laryngoscopy    Blade:  Castaneda 4    Laryngeal View Grade: Grade I - full view of cords      Difficult Airway Encountered?: No      Complications:  None    Airway Device Size:  7.5    Style/Cuff Inflation:  Cuffed (inflated to minimal occlusive pressure)    Inflation Amount (mL):  8    Tube secured:  23    Secured at:  The lips    Placement Verified By:  Capnometry and Fiber optic visualization    Complicating Factors:  None    Findings Post-Intubation:  BS equal bilateral and atraumatic/condition of teeth unchanged

## 2023-08-28 NOTE — Clinical Note
ANES at bedside for induction Vaccine Information Statement(s) for was given today. This has been reviewed, questions answered, and verbal consent given by Parent for injection(s) and administration of Influenza (Inactivated).        Patient tolerated without incident. See immunization grid for documentation.

## 2023-08-28 NOTE — ANESTHESIA PREPROCEDURE EVALUATION
08/28/2023  Hussein Steinberg is a 73 y.o., male.      Pre-op Assessment          Review of Systems  Anesthesia Hx:  No problems with previous Anesthesia    Hematology/Oncology:         -- Cancer in past history:   Cardiovascular:   Pacemaker Denies Hypertension.  Denies CAD.   Dysrhythmias atrial fibrillation  Functional Capacity Can you climb two flights of stairs? ==> Yes    Pulmonary:   Denies Asthma. Sleep Apnea, CPAP    Renal/:   Denies Chronic Renal Disease.     Hepatic/GI:   Denies PUD. Denies GERD. Denies Liver Disease.    Neurological:   Denies CVA. Denies Seizures.    Endocrine:   Denies Diabetes. Denies Hypothyroidism.        Physical Exam  General: Alert    Airway:  Mallampati: II / II  Mouth Opening: Normal  TM Distance: Normal  Tongue: Normal  Neck ROM: Normal ROM  Neck: Girth Increased    Dental:  Intact        Anesthesia Plan  Type of Anesthesia, risks & benefits discussed:    Anesthesia Type: Gen ETT  Intra-op Monitoring Plan: Standard ASA Monitors  Post Op Pain Control Plan: multimodal analgesia and IV/PO Opioids PRN  Induction:  IV  Airway Plan: Direct  Informed Consent: Informed consent signed with the Patient and all parties understand the risks and agree with anesthesia plan.  All questions answered.   ASA Score: 3    Ready For Surgery From Anesthesia Perspective.     .

## 2023-08-28 NOTE — INTERVAL H&P NOTE
The patient has been examined and the H&P has been reviewed:    I concur with the findings and no changes have occurred since H&P was written.    Hussein Steinberg 73 y.o. male presents for PVI RFA.  Patient has a history of:  DCM, s/p biV ICD with great response (LVEF 60%+ 2018)-- Gen Change 9/2019  HTN on meds  HL on meds   PAF, on pradaxa  hx hemorrhagic CVA 2012     He is 4 months s/p cardioversion. Per monitor, he maintained SR for one week then reverted back to AF despite amiodarone. He reports symptom improvement in SR and worsened GUNN in AF.    Hx of higher-dose amio, c/b night vision changes. These resolved after d/c'ing amio.      AF, resulting in sx that may be directly from the AF and/or from lack of biV pacing that results.  Procedure  PVI RFA:  risks, benefits and alternative options discussed and understood by patient/family.    AAO x 3  Cardiac: irregular rate and rhythm.    Bilateral Radial pulses: 2+; irregular      EKG: V paced; underlying rhythm-- Atrial Fibrillation; 70 bpm   Recent Labs reviewed: CBC/BMP/PT/INR/PTT: 8/21/23: ok       Active Hospital Problems    Diagnosis  POA    *Persistent atrial fibrillation [I48.19]  Yes     Patient presents for RFA PVI :  Discussed risks and benefits, as well as alternatives of planned procedure. Discussed nature of PVI, including transseptal puncture.  Discussed the risk of death, infection, bleeding, stroke, MI, cardiac perforation/tamponade possibly requiring surgery, vascular injury, injury to phrenic nerve, pulmonary vein stenosis, as well as injury to the native conduction system of the heart, possibly requiring pacemaker implantation.  Patient understands and desires to proceed. Consent signed.     Last dose anticoagulant  Pradaxa: last night as instructed             Resolved Hospital Problems   No resolved problems to display.

## 2023-08-28 NOTE — TRANSFER OF CARE
"Anesthesia Transfer of Care Note    Patient: Hussein Steinberg    Procedure(s) Performed: Procedure(s) (LRB):  Ablation atrial fibrillation (N/A)  ECHOCARDIOGRAM, TRANSESOPHAGEAL (N/A)  Cardioversion    Patient location: PACU    Anesthesia Type: general    Transport from OR: Transported from OR on 6-10 L/min O2 by face mask with adequate spontaneous ventilation    Post pain: adequate analgesia    Post assessment: no apparent anesthetic complications    Post vital signs: stable    Level of consciousness: awake and alert    Nausea/Vomiting: no nausea/vomiting    Complications: none    Transfer of care protocol was followed      Last vitals:   Visit Vitals  /86 (BP Location: Left arm, Patient Position: Lying)   Pulse 70   Temp 36.6 °C (97.9 °F) (Temporal)   Resp 18   Ht 5' 11" (1.803 m)   Wt 113.4 kg (250 lb)   SpO2 (!) 94%   BMI 34.87 kg/m²     "

## 2023-08-29 LAB
CREAT SERPL-MCNC: 1.3 MG/DL (ref 0.5–1.4)
SAMPLE: NORMAL

## 2023-08-29 RX ORDER — PANTOPRAZOLE SODIUM 40 MG/1
40 TABLET, DELAYED RELEASE ORAL DAILY
Qty: 30 TABLET | Refills: 0 | Status: SHIPPED | OUTPATIENT
Start: 2023-08-29 | End: 2024-02-06

## 2023-08-29 NOTE — DISCHARGE SUMMARY
[facility] - [facility]iology  Cardiac Electrophysiology  Discharge Summary      Patient Name: Hussein Steinberg  MRN: [id number]  Admission Date: 8/28/2023  Hospital Length of Stay: 0 days  Discharge Date and Time: 8/28/2023 @ 2100  Attending Physician: Dejan Mo MD    Discharging Provider: Idalmis Campuzano NP  Primary Care Physician: Lacey Rainey MD    HPI:   Hussein Steinberg 73 y.o. male presents for PVI RFA.  Patient has a history of:  DCM, s/p biV ICD with great response (LVEF 60%+ 2018)-- Gen Change 9/2019  HTN on meds  HL on meds   PAF, on pradaxa  hx hemorrhagic CVA 2012     He is 4 months s/p cardioversion. Per monitor, he maintained SR for one week then reverted back to AF despite amiodarone. He reports symptom improvement in SR and worsened GUNN in AF.    Hx of higher-dose amio, c/b night vision changes. These resolved after d/c'ing amio.      AF, resulting in sx that may be directly from the AF and/or from lack of biV pacing that results.          Procedure(s) (LRB):  Ablation atrial fibrillation (N/A)  ECHOCARDIOGRAM, TRANSESOPHAGEAL (N/A)  Cardioversion     Electrophysiology Procedure    Result Date: 8/28/2023    Successful pulmonary vein RF ablation.   Pre- and post-procedure interrogation/reprogramming of biV ICD.   Cardioversion was successfully performed with restoration of normal sinus rhythm.   3D mapping performed with Carto 3.   CS catheter placement and pacing.   His bundle recording.   Intracardiac echo.   Transeptal puncture. I certify that I was present for the critical steps of the procedure including the diagnostic, surgical and/or interventional portions. Procedure Log documented by Documenter: Valerie Sun RN and verified by Dejan Mo MD. Date: 8/28/2023  Time: 2:45 PM    Transesophageal echo (HERIBERTO)  Result Date: 8/28/2023    Left Ventricle: The left ventricle is normal in size. Normal wall thickness. Normal wall motion. There is low normal systolic function.  Ejection fraction by visual approximation is 50%.   Left Atrium: Left atrium is dilated. Patent foramen ovale visualized with predominant left to right shunting. The left atrial appendage appears normal. Appendage velocity is reduced at less than 40 cm/sec. No thrombus noted in the appendage. The pulmonary veins have systolic blunting. There is no thrombus in the cavity.   Right Ventricle: Mild right ventricular enlargement. Wall thickness is normal. Right ventricle wall motion has global hypokinesis. Systolic function is mildly reduced.   Right Atrium: Right atrium is dilated.   Mitral Valve: There is mild regurgitation.   Tricuspid Valve: There is moderate regurgitation.   Aorta: Aortic root is normal in size measuring 3.7 cm. Ascending aorta is normal measuring 3.9 cm. Descending aorta is mildly dilated. Grade 2 atherosclerosis.     Hospital Course:  S/p PVI RFA.  Patient tolerated procedure.  Post procedure EKG: AV paced; 60 bpm.  Groin dressing intact. No bleeding. Area is soft, without hematoma or bruising noted. Patient ambulated, tolerated oral intake, voided, pain well controlled. Discharge instructions discussed with patient and wife, specifically to resume Pradaxa (first dose when patient ambulating), start amiodarone load, as well as restrictions, and follow up.  Patient d/c'd home.      Goals of Care Treatment Preferences:  Code Status: Full Code    Consults: Cardiology for HERIBERTO    Significant Diagnostic Studies: Cardiac Graphics: ECG:   Results for orders placed or performed during the hospital encounter of 08/28/23   EKG 12-lead    Collection Time: 08/28/23  2:50 PM    Narrative    Test Reason : I48.91,    Vent. Rate : 060 BPM     Atrial Rate : 060 BPM     P-R Int : 170 ms          QRS Dur : 156 ms      QT Int : 546 ms       P-R-T Axes : 000 156 -31 degrees     QTc Int : 546 ms    AV dual-paced rhythm  Abnormal ECG  When compared with ECG of 28-AUG-2023 08:53,  Vent. rate has decreased BY  10  BPM  Confirmed by Memo Retana MD ([id number]) on 8/28/2023 4:29:22 PM    Referred By: MEGA ARTEAGA           Confirmed By:Memo Retana MD     Pending Diagnostic Studies:     Procedure Component Value Units Date/Time    Transesophageal echo (HERIBERTO) [[id number]]     Order Status: Sent Lab Status: No result         Final Active Diagnoses:    Diagnosis Date Noted POA    PRINCIPAL PROBLEM:  Persistent atrial fibrillation [I48.19] 03/19/2013 Yes      Problems Resolved During this Admission:     Cardiac/Vascular  * Persistent atrial fibrillation  S/p PVI   Continue Pradaxa  F/u in 4 months with GHULAM Ayala NP    Discharged Condition: stable    Disposition: Home or Self Care    Follow Up:   Follow-up Information     Neelam Ayala NP. Schedule an appointment as soon as possible for a visit in 3 month(s).    Specialty: Cardiology  Why: Follow up post Ablation  Contact information:  [address]  [facility] [address]  [phone]                       Patient Instructions:      Diet Cardiac     Lifting restrictions   Order Comments: No lifting more than 5-10 pounds x 1 week     No driving until:   Order Comments: X 24 hours per anesthesia due to sedation     Remove dressing in 24 hours     Notify your health care provider if you experience any of the following:  temperature >100.4     Notify your health care provider if you experience any of the following:  persistent nausea and vomiting or diarrhea     Notify your health care provider if you experience any of the following:  severe uncontrolled pain     Notify your health care provider if you experience any of the following:  redness, tenderness, or signs of infection (pain, swelling, redness, odor or green/yellow discharge around incision site)     Notify your health care provider if you experience any of the following:  difficulty breathing or increased cough     Notify your health care provider if you experience any of the following:  severe persistent headache      Notify your health care provider if you experience any of the following:  worsening rash     Notify your health care provider if you experience any of the following:  persistent dizziness, light-headedness, or visual disturbances     Notify your health care provider if you experience any of the following:  increased confusion or weakness     Shower on day dressing removed (No bath)     Medications:  Reconciled Home Medications:      Medication List      START taking these medications    amiodarone 200 MG Tab  Commonly known as: PACERONE  Take 2 tablets (400 mg total) by mouth 2 (two) times daily for 15 days, THEN 2 tablets (400 mg total) once daily for 15 days, THEN 1 tablet (200 mg total) once daily.  Start taking on: August 28, 2023        CONTINUE taking these medications    acetaminophen 325 MG tablet  Commonly known as: TYLENOL  Take 2 tablets (650 mg total) by mouth every 6 (six) hours as needed (pain 1-4/10 pain scale).     amLODIPine 10 MG tablet  Commonly known as: NORVASC  Take 1 tablet (10 mg total) by mouth once daily.     candesartan 32 MG tablet  Commonly known as: ATACAND  Take 1 tablet (32 mg total) by mouth once daily.     CENTRUM SILVER ORAL  Take by mouth once daily.     chondroitin sulfate A sodium 400 mg Cap  Take by mouth.     dabigatran etexilate 150 mg Cap  Commonly known as: PRADAXA  Take 1 capsule (150 mg total) by mouth 2 (two) times a day     ezetimibe 10 mg tablet  Commonly known as: ZETIA  Take 1 tablet (10 mg total) by mouth once daily.     metoprolol tartrate 100 MG tablet  Commonly known as: LOPRESSOR  Take 1 tablet (100 mg total) by mouth 2 (two) times daily.     triamterene-hydrochlorothiazide 37.5-25 mg 37.5-25 mg per tablet  Commonly known as: MAXZIDE-25  Take 1 tablet by mouth once daily.          Time spent on the discharge of patient: 20 minutes    Idalmis Campuzano NP  Cardiac Electrophysiology  [facility] - Cardiology

## 2023-08-29 NOTE — PROGRESS NOTES
Discharge instructions reviewed with patient and wife. Patient ambulated to bathroom and voided x 1. Patient verbalizes understanding of discharge instructions. IV discontinued. Awaiting evaluation per cardiac fellow.

## 2023-08-29 NOTE — ANESTHESIA POSTPROCEDURE EVALUATION
Anesthesia Post Evaluation    Patient: Hussein Steinberg    Procedure(s) Performed: Procedure(s) (LRB):  Ablation atrial fibrillation (N/A)  ECHOCARDIOGRAM, TRANSESOPHAGEAL (N/A)  Cardioversion    Final Anesthesia Type: general      Level of consciousness: awake and alert  Post-procedure vital signs: reviewed and stable  Pain control: Pain has been treated.  Airway patency: patent    PONV status: Absent or treated.  Anesthetic complications: no      Cardiovascular status: hemodynamically stable  Respiratory status: unassisted  Hydration status: euvolemic            Vitals Value Taken Time   /107 08/28/23 2032   Temp 36.8 °C (98.2 °F) 08/28/23 2000   Pulse 71 08/28/23 2033   Resp 24 08/28/23 2033   SpO2 92 % 08/28/23 2033   Vitals shown include unvalidated device data.      Event Time   Out of Recovery 21:15:00         Pain/Liberty Score: Pain Rating Prior to Med Admin: 7 (8/28/2023  5:29 PM)  Pain Rating Post Med Admin: 7 (8/28/2023  5:29 PM)  Liberty Score: 9 (8/28/2023  4:45 PM)

## 2023-09-01 ENCOUNTER — TELEPHONE (OUTPATIENT)
Dept: PRIMARY CARE CLINIC | Facility: CLINIC | Age: 73
End: 2023-09-01
Payer: MEDICARE

## 2023-09-01 NOTE — TELEPHONE ENCOUNTER
----- Message from Yenny Mcwilliams sent at 9/1/2023  9:17 AM CDT -----  Contact: [phone] Patient  Patient would like to get medical advice.  Symptoms (please be specific):  Pt states the 11/07/23 is not going to work for his annual physical. Pt states the 2nd and 3rd week of the month are not good for him.  Pt states he was not able to r/s thru the pt portal. No appt dates were coming up on Dr Rainey's schedule thru 02/01/2024.   How long have you had these symptoms: N/A  Would you like a call back, or a response through your [facility] portal?:   pt portal   Pharmacy name and phone # (copy from chart):   N/A  Comments:

## 2023-10-16 ENCOUNTER — PATIENT MESSAGE (OUTPATIENT)
Dept: ADMINISTRATIVE | Facility: OTHER | Age: 73
End: 2023-10-16
Payer: MEDICARE

## 2023-10-16 ENCOUNTER — PATIENT MESSAGE (OUTPATIENT)
Dept: PRIMARY CARE CLINIC | Facility: CLINIC | Age: 73
End: 2023-10-16
Payer: MEDICARE

## 2023-11-19 ENCOUNTER — CLINICAL SUPPORT (OUTPATIENT)
Dept: CARDIOLOGY | Facility: HOSPITAL | Age: 73
End: 2023-11-19
Payer: MEDICARE

## 2023-11-19 ENCOUNTER — CLINICAL SUPPORT (OUTPATIENT)
Dept: CARDIOLOGY | Facility: HOSPITAL | Age: 73
End: 2023-11-19
Attending: INTERNAL MEDICINE
Payer: MEDICARE

## 2023-11-19 DIAGNOSIS — Z95.810 PRESENCE OF AUTOMATIC (IMPLANTABLE) CARDIAC DEFIBRILLATOR: ICD-10-CM

## 2023-11-19 PROCEDURE — 93296 REM INTERROG EVL PM/IDS: CPT | Performed by: INTERNAL MEDICINE

## 2023-11-19 PROCEDURE — 93295 DEV INTERROG REMOTE 1/2/MLT: CPT | Mod: ,,, | Performed by: INTERNAL MEDICINE

## 2023-11-19 PROCEDURE — 93295 CARDIAC DEVICE CHECK CHECK - REMOTE ALERT: ICD-10-PCS | Mod: ,,, | Performed by: INTERNAL MEDICINE

## 2023-11-21 LAB
OHS CV AF BURDEN PERCENT: < 1
OHS CV BIV PACING PERCENT: 99 %
OHS CV DC REMOTE DEVICE TYPE: NORMAL
OHS CV ICD SHOCK: NO

## 2023-11-22 ENCOUNTER — OFFICE VISIT (OUTPATIENT)
Dept: OPTOMETRY | Facility: CLINIC | Age: 73
End: 2023-11-22
Payer: MEDICARE

## 2023-11-22 DIAGNOSIS — H52.203 HYPEROPIA OF BOTH EYES WITH ASTIGMATISM: ICD-10-CM

## 2023-11-22 DIAGNOSIS — Z98.890 HISTORY OF REFRACTIVE SURGERY: ICD-10-CM

## 2023-11-22 DIAGNOSIS — H52.4 PRESBYOPIA OF BOTH EYES: ICD-10-CM

## 2023-11-22 DIAGNOSIS — H52.03 HYPEROPIA OF BOTH EYES WITH ASTIGMATISM: ICD-10-CM

## 2023-11-22 DIAGNOSIS — H18.003 VORTEX KERATOPATHY, BILATERAL: ICD-10-CM

## 2023-11-22 DIAGNOSIS — H26.9 CORTICAL CATARACT OF BOTH EYES: ICD-10-CM

## 2023-11-22 DIAGNOSIS — H25.13 NUCLEAR SCLEROSIS, BILATERAL: Primary | ICD-10-CM

## 2023-11-22 DIAGNOSIS — H43.393 VITREOUS FLOATERS OF BOTH EYES: ICD-10-CM

## 2023-11-22 DIAGNOSIS — Z13.5 SCREENING FOR EYE CONDITION: ICD-10-CM

## 2023-11-22 PROCEDURE — 1159F PR MEDICATION LIST DOCUMENTED IN MEDICAL RECORD: ICD-10-PCS | Mod: CPTII,S$GLB,, | Performed by: OPTOMETRIST

## 2023-11-22 PROCEDURE — 99999 PR PBB SHADOW E&M-EST. PATIENT-LVL II: CPT | Mod: PBBFAC,,, | Performed by: OPTOMETRIST

## 2023-11-22 PROCEDURE — 92014 COMPRE OPH EXAM EST PT 1/>: CPT | Mod: S$GLB,,, | Performed by: OPTOMETRIST

## 2023-11-22 PROCEDURE — 92015 DETERMINE REFRACTIVE STATE: CPT | Mod: S$GLB,,, | Performed by: OPTOMETRIST

## 2023-11-22 PROCEDURE — 99999 PR PBB SHADOW E&M-EST. PATIENT-LVL II: ICD-10-PCS | Mod: PBBFAC,,, | Performed by: OPTOMETRIST

## 2023-11-22 PROCEDURE — 92015 PR REFRACTION: ICD-10-PCS | Mod: S$GLB,,, | Performed by: OPTOMETRIST

## 2023-11-22 PROCEDURE — 1159F MED LIST DOCD IN RCRD: CPT | Mod: CPTII,S$GLB,, | Performed by: OPTOMETRIST

## 2023-11-22 PROCEDURE — 4010F PR ACE/ARB THEARPY RXD/TAKEN: ICD-10-PCS | Mod: CPTII,S$GLB,, | Performed by: OPTOMETRIST

## 2023-11-22 PROCEDURE — 92014 PR EYE EXAM, EST PATIENT,COMPREHESV: ICD-10-PCS | Mod: S$GLB,,, | Performed by: OPTOMETRIST

## 2023-11-22 PROCEDURE — 4010F ACE/ARB THERAPY RXD/TAKEN: CPT | Mod: CPTII,S$GLB,, | Performed by: OPTOMETRIST

## 2023-11-22 NOTE — PATIENT INSTRUCTIONS
S/P RK refractive surgery in each eye.     Nuclear sclerot/cortical cataract in both eyes.  Still no need for cataract surgery in either eye.      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    No impact on visual acuity.      History of CVA, with secondary left homonymous hemianopsia, per Dr. Skelton.  Full peripheral visual field to HM in all quadrants on confrontation visual field test in each eye today.  Prior diagnosis of bilateral optic atrophy, but very subtle presentation (at worst).     Few vitreous floaters in both eyes.  No evidence of retinal etiology.  Early vitreous syneresis      Resultant hyperopia with astigmatism in each eye, with very satisfactory correctable visual acuity in each eye.  Presbyopia.       New spectacle lens Rx issued for full-time wear, but no compelling need to replace lenses at this time.   Recheck in SIX MONTHS to reassess cataract development         
0 = understands/communicates without difficulty

## 2023-11-22 NOTE — PROGRESS NOTES
HPI     eye exam            Comments: Annual general eye examination and refraction.  Wears glasses full-time.  VA with glasses seems a little blurry -feels he can read better with the   music glasses previously prescribed   Distance VA with glasses.           Comments    Patient's age: 73 y.o. WM  Occupation: [employer] - retired [employer]  Approximate date of last eye examination: 02/23/2023  Last saw Dr. Esqueda:  City/State: [facility]  Wears glasses? Yes     If yes, wears  Full-time or part-time?  Full-time  Present glasses are: Bifocal, SV Distance, SV Reading?  Progressive lens -   and single vision reading/piano glasses  Approximate age of present glasses:  02/23/2023  Got new glasses following last exam, or subsequently?:  no   Any problem with VA with glasses? See notes above  Wears CLs?:  No  Headaches?  No  Eye pain/discomfort?  No                                                                                    Flashes?  No  Floaters?  No  Diplopia/Double vision?  No  Patient's Ocular History:         Any eye surgeries? No         Any eye injury?  No         Any treatment for eye disease?  No  Family history of eye disease?    Mother + Macular Degeneration  + Glaucoma  + Cataracts  Significant patient medical history:        1. Diabetes?  No          2. HBP?  Yes, controlled with medications              3. Other (describe):  Heart attack and stroke Sept, 2012   -pacemaker, prostate cancer   ! OTC eyedrops currently using:  None   ! Prescription eye meds currently using:  None   ! Any history of allergy/adverse reaction to any eye meds used   previously?  No   ! Any history of allergy/adverse reaction to eyedrops used during prior   eye exam(s)? No   ! Any history of allergy/adverse reaction to Novacaine or similar meds?   No   ! Any history of allergy/adverse reaction to Epinephrine or similar meds?   No   ! Patient okay with use of anesthetic eyedrops to check eye pressure?    Yes        "! Patient okay with use of eyedrops to dilate pupils today?  Yes   !  Allergies/Medications/Medical History/Family History reviewed today?    Yes      PD =   64/60  Desired reading distance = 19"                        Last edited by Ray Esqueda, OD on 11/22/2023  8:11 AM.            Assessment /Plan     For exam results, see Encounter Report.    1. Nuclear sclerosis, bilateral        2. Cortical cataract of both eyes        3. Vortex keratopathy, bilateral        4. History of refractive surgery        5. Vitreous floaters of both eyes        6. Screening for eye condition        7. Hyperopia of both eyes with astigmatism        8. Presbyopia of both eyes                       S/P RK refractive surgery in each eye.     Nuclear sclerot/cortical cataract in both eyes.  Still no need for cataract surgery in either eye.      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    No impact on visual acuity.      History of CVA, with secondary left homonymous hemianopsia, per Dr. Skelton.  Full peripheral visual field to HM in all quadrants on confrontation visual field test in each eye today.  Prior diagnosis of bilateral optic atrophy, but very subtle presentation (at worst).     Few vitreous floaters in both eyes.  No evidence of retinal etiology.  Early vitreous syneresis      Resultant hyperopia with astigmatism in each eye, with very satisfactory correctable visual acuity in each eye.  Presbyopia.       New spectacle lens Rx issued for full-time wear, but no compelling need to replace lenses at this time.   Recheck in SIX MONTHS to reassess cataract development             "

## 2023-11-28 ENCOUNTER — OFFICE VISIT (OUTPATIENT)
Dept: PRIMARY CARE CLINIC | Facility: CLINIC | Age: 73
End: 2023-11-28
Payer: MEDICARE

## 2023-11-28 VITALS
BODY MASS INDEX: 36.23 KG/M2 | DIASTOLIC BLOOD PRESSURE: 82 MMHG | HEIGHT: 71 IN | SYSTOLIC BLOOD PRESSURE: 134 MMHG | WEIGHT: 258.81 LBS | HEART RATE: 63 BPM | OXYGEN SATURATION: 95 %

## 2023-11-28 DIAGNOSIS — I48.19 PERSISTENT ATRIAL FIBRILLATION: ICD-10-CM

## 2023-11-28 DIAGNOSIS — Z79.899 ENCOUNTER FOR LONG-TERM (CURRENT) USE OF MEDICATIONS: ICD-10-CM

## 2023-11-28 DIAGNOSIS — E66.01 CLASS 2 SEVERE OBESITY DUE TO EXCESS CALORIES WITH SERIOUS COMORBIDITY AND BODY MASS INDEX (BMI) OF 36.0 TO 36.9 IN ADULT: ICD-10-CM

## 2023-11-28 DIAGNOSIS — G47.33 OSA (OBSTRUCTIVE SLEEP APNEA): ICD-10-CM

## 2023-11-28 DIAGNOSIS — E27.8 ADRENAL INCIDENTALOMA: ICD-10-CM

## 2023-11-28 DIAGNOSIS — I10 ESSENTIAL HYPERTENSION: ICD-10-CM

## 2023-11-28 DIAGNOSIS — E27.8 OTHER SPECIFIED DISORDERS OF ADRENAL GLAND: ICD-10-CM

## 2023-11-28 DIAGNOSIS — Z95.810 ICD (IMPLANTABLE CARDIOVERTER-DEFIBRILLATOR), BIVENTRICULAR, IN SITU: ICD-10-CM

## 2023-11-28 DIAGNOSIS — T46.6X5A MYALGIA DUE TO STATIN: ICD-10-CM

## 2023-11-28 DIAGNOSIS — Z79.01 CURRENT USE OF ANTICOAGULANT THERAPY: ICD-10-CM

## 2023-11-28 DIAGNOSIS — E78.2 MIXED HYPERLIPIDEMIA: ICD-10-CM

## 2023-11-28 DIAGNOSIS — M79.10 MYALGIA DUE TO STATIN: ICD-10-CM

## 2023-11-28 DIAGNOSIS — Z79.899 LONG TERM CURRENT USE OF AMIODARONE: ICD-10-CM

## 2023-11-28 DIAGNOSIS — Z86.73 HISTORY OF CVA (CEREBROVASCULAR ACCIDENT): Primary | ICD-10-CM

## 2023-11-28 DIAGNOSIS — Z85.46 HISTORY OF PROSTATE CANCER: ICD-10-CM

## 2023-11-28 PROCEDURE — 3008F BODY MASS INDEX DOCD: CPT | Mod: CPTII,S$GLB,, | Performed by: INTERNAL MEDICINE

## 2023-11-28 PROCEDURE — 4010F ACE/ARB THERAPY RXD/TAKEN: CPT | Mod: CPTII,S$GLB,, | Performed by: INTERNAL MEDICINE

## 2023-11-28 PROCEDURE — 3075F PR MOST RECENT SYSTOLIC BLOOD PRESS GE 130-139MM HG: ICD-10-PCS | Mod: CPTII,S$GLB,, | Performed by: INTERNAL MEDICINE

## 2023-11-28 PROCEDURE — 3079F PR MOST RECENT DIASTOLIC BLOOD PRESSURE 80-89 MM HG: ICD-10-PCS | Mod: CPTII,S$GLB,, | Performed by: INTERNAL MEDICINE

## 2023-11-28 PROCEDURE — 99214 OFFICE O/P EST MOD 30 MIN: CPT | Mod: S$GLB,,, | Performed by: INTERNAL MEDICINE

## 2023-11-28 PROCEDURE — 99999 PR PBB SHADOW E&M-EST. PATIENT-LVL III: ICD-10-PCS | Mod: PBBFAC,,, | Performed by: INTERNAL MEDICINE

## 2023-11-28 PROCEDURE — 99214 PR OFFICE/OUTPT VISIT, EST, LEVL IV, 30-39 MIN: ICD-10-PCS | Mod: S$GLB,,, | Performed by: INTERNAL MEDICINE

## 2023-11-28 PROCEDURE — 3008F PR BODY MASS INDEX (BMI) DOCUMENTED: ICD-10-PCS | Mod: CPTII,S$GLB,, | Performed by: INTERNAL MEDICINE

## 2023-11-28 PROCEDURE — 1159F MED LIST DOCD IN RCRD: CPT | Mod: CPTII,S$GLB,, | Performed by: INTERNAL MEDICINE

## 2023-11-28 PROCEDURE — 1159F PR MEDICATION LIST DOCUMENTED IN MEDICAL RECORD: ICD-10-PCS | Mod: CPTII,S$GLB,, | Performed by: INTERNAL MEDICINE

## 2023-11-28 PROCEDURE — 99999 PR PBB SHADOW E&M-EST. PATIENT-LVL III: CPT | Mod: PBBFAC,,, | Performed by: INTERNAL MEDICINE

## 2023-11-28 PROCEDURE — 3075F SYST BP GE 130 - 139MM HG: CPT | Mod: CPTII,S$GLB,, | Performed by: INTERNAL MEDICINE

## 2023-11-28 PROCEDURE — 1126F PR PAIN SEVERITY QUANTIFIED, NO PAIN PRESENT: ICD-10-PCS | Mod: CPTII,S$GLB,, | Performed by: INTERNAL MEDICINE

## 2023-11-28 PROCEDURE — 4010F PR ACE/ARB THEARPY RXD/TAKEN: ICD-10-PCS | Mod: CPTII,S$GLB,, | Performed by: INTERNAL MEDICINE

## 2023-11-28 PROCEDURE — 3079F DIAST BP 80-89 MM HG: CPT | Mod: CPTII,S$GLB,, | Performed by: INTERNAL MEDICINE

## 2023-11-28 PROCEDURE — 1126F AMNT PAIN NOTED NONE PRSNT: CPT | Mod: CPTII,S$GLB,, | Performed by: INTERNAL MEDICINE

## 2023-11-28 RX ORDER — DEXAMETHASONE 1 MG/1
TABLET ORAL
Qty: 1 TABLET | Refills: 0 | Status: SHIPPED | OUTPATIENT
Start: 2023-11-28 | End: 2024-02-06

## 2023-11-28 NOTE — ASSESSMENT & PLAN NOTE
Sees Dr. Mo, no CP/SOB.  Sleeps on 2 pillows.  On BB, amio, pradaxa. Has AICD.  GUNN is much better since recent ablation 8/2023.

## 2023-11-28 NOTE — ASSESSMENT & PLAN NOTE
BP controlled on norvasc 10 mg, lopressor 100 mg BID, maxzide and candesartan 32 mg daily.  No CP/SOB.  Had dry cough with lisinopril, diarrhea with olmesartan.

## 2023-11-28 NOTE — PROGRESS NOTES
[facility] Note    Chief Complaint      Chief Complaint   Patient presents with    Annual Exam       History of Present Illness      Hussein Steinberg is a 73 y.o. male who presents today for follow up HTN/HLD.  Patient comes to appointment alone.  EP: David Moho: Dheeraj, Sleep: [place]    Problem List Items Addressed This Visit       Persistent atrial fibrillation    Current Assessment & Plan     Sees Dr. Mo, no CP/SOB.  Sleeps on 2 pillows.  On BB, amio, pradaxa. Has AICD.  GUNN is much better since recent ablation 8/2023.         Relevant Orders    CBC Auto Differential    Comprehensive Metabolic Panel    Essential hypertension    Current Assessment & Plan     BP controlled on norvasc 10 mg, lopressor 100 mg BID, maxzide and candesartan 32 mg daily.  No CP/SOB.  Had dry cough with lisinopril, diarrhea with olmesartan.         ICD (implantable cardioverter-defibrillator), biventricular, in situ    Class 2 severe obesity due to excess calories with serious comorbidity and body mass index (BMI) of 36.0 to 36.9 in adult    Current Assessment & Plan     Has been trying to walk more.  Diet has been about the same.         History of prostate cancer    Current Assessment & Plan     Treated by Dr. Rasmussen. Sees PRN. No complaints at this time.  Occasional nocturia.         Hyperlipidemia    Current Assessment & Plan     Stable on zetia 10 mg.  Doesn't tolerate statins, had joint pains.  The 10-year ASCVD risk score (José BIRCH, et al., 2019) is: 26.6%    Values used to calculate the score:      Age: 73 years      Sex: Male      Is Non- : No      Diabetic: No      Tobacco smoker: No      Systolic Blood Pressure: 134 mmHg      Is BP treated: Yes      HDL Cholesterol: 47 mg/dL      Total Cholesterol: 180 mg/dL           Relevant Orders    Lipid Panel    History of CVA (cerebrovascular accident) - Primary    Current Assessment & Plan     Stable on ASA, Pradaxa for secondary  prevention.         Myalgia due to statin    Long term current use of amiodarone    YUMIKO (obstructive sleep apnea)    Current Assessment & Plan     Has CPAP, using every night.  Sees sleep med.         Current use of anticoagulant therapy    Overview     Pradaxa for stroke prophylaxis for Afib  Last dose this morning          Other Visit Diagnoses       Encounter for long-term (current) use of medications        Relevant Orders    Hemoglobin A1C    Adrenal incidentaloma        Relevant Orders    Dexamethasone    CORTISOL, 8AM    Other specified disorders of adrenal gland        Relevant Orders    CT Abdomen With Without Contrast              Health Maintenance   Topic Date Due    Lipid Panel  08/19/2027    TETANUS VACCINE  05/13/2031    Hepatitis C Screening  Completed    Shingles Vaccine  Completed    Colorectal Cancer Screening  Discontinued       Past Medical History:   Diagnosis Date    Anticoagulant long-term use     Atrial fibrillation     Basal cell carcinoma     Bronchitis 03/20/2017    Cardiac arrest 2012    has pacemaker/defibrillator placed 3/19/13 - followed by [facility]    Cataract     Current use of anticoagulant therapy 02/13/2023    Current use of anticoagulant therapy 02/13/2023    DCM (dilated cardiomyopathy) 06/15/2017    High cholesterol     History of intracerebral hemorrhage without residual deficit 08/23/2019    History of prostate cancer 06/23/2017    Hypertension     Kidney stone 10/2013    LBBB (left bundle branch block) 10/10/2012    LV dysfunction 10/10/2012    Prostate cancer     Treated by Dr. Rasmussen    Squamous cell carcinoma in situ of skin 2017    left arm removed    Stroke 09/2012    + hemorrhagic infarct to right occipital lobe after started on Plavix following cardiac event    SVT (supraventricular tachycardia) 10/10/2012       Past Surgical History:   Procedure Laterality Date    ABLATION OF ARRHYTHMOGENIC FOCUS FOR ATRIAL FIBRILLATION N/A 8/28/2023    Procedure:  Ablation atrial fibrillation;  Surgeon: Dejan Mo MD;  Location: [facility] EP LAB;  Service: Cardiology;  Laterality: N/A;  AF, HERIBERTO, PVI, RFA, CARTO, Gen, DM, 3 Prep *CRT-D SJM*    arthroscopic  knee    BASAL CELL CARCINOMA EXCISION  2018    removed from nose    Basil cell   2022    CARDIAC PACEMAKER PLACEMENT  2013    and defibrillator    CARDIOVERSION  8/28/2023    Procedure: Cardioversion;  Surgeon: Dejan Mo MD;  Location: [facility] EP LAB;  Service: Cardiology;;    COLONOSCOPY  2/12/2016    + polyp - repeat in 5 years- Dr. Calles    COLONOSCOPY N/A 7/19/2021    Procedure: COLONOSCOPY;  Surgeon: Troy Bryson MD;  Location[facility]  Service: Endoscopy;  Laterality: N/A;  pacemaker/AICD-[facility]  fully vaccinated-GT     ok to hold Pradaxa 2 days per Dr Mo  Miralax prep    ECHOCARDIOGRAM,TRANSESOPHAGEAL N/A 4/24/2023    Procedure: Transesophageal echo (HERIBERTO) intra-procedure log documentation;  Surgeon: [facility] Provider;  Location: [facility] EP LAB;  Service: Cardiology;  Laterality: N/A;    EYE SURGERY  1992    PROSTATECTOMY      SKIN CANCER EXCISION Left 10/2017    squamous cell carcinoma removed from left arm    TRANSESOPHAGEAL ECHOCARDIOGRAPHY N/A 8/28/2023    Procedure: ECHOCARDIOGRAM, TRANSESOPHAGEAL;  Surgeon: Kirk Guy III, MD;  Location: [facility] EP LAB;  Service: Cardiology;  Laterality: N/A;    TREATMENT OF CARDIAC ARRHYTHMIA N/A 7/31/2020    Procedure: CARDIOVERSION;  Surgeon: Dejan Mo MD;  Location: [facility] EP LAB;  Service: Cardiology;  Laterality: N/A;  afib, HERIBERTO, DCCV, anes, DM, 3 Prep    TREATMENT OF CARDIAC ARRHYTHMIA N/A 4/24/2023    Procedure: Cardioversion or Defibrillation;  Surgeon: Dejan Mo MD;  Location: [facility] EP LAB;  Service: Cardiology;  Laterality: N/A;  AF, HERIBERTO, DCCV, MAC, DM, 3 Prep *SJM CRTD*    VASECTOMY  1988       family history includes Cancer (age of onset: 56) in his father; Cataracts in his mother; Glaucoma in his mother;  Heart attack in his maternal grandfather and paternal grandfather; Heart attack (age of onset: 59) in his brother; Heart disease in his brother and mother; Hypertension in his mother; Macular degeneration in his mother; No Known Problems in his brother, daughter, daughter, and sister; Parkinsonism (age of onset: 54) in his brother.    Social History     Tobacco Use    Smoking status: Never    Smokeless tobacco: Never   Substance Use Topics    Alcohol use: Never    Drug use: Never       Review of Systems   Constitutional:  Negative for chills and fever.   Respiratory:  Negative for cough and shortness of breath.    Cardiovascular:  Negative for chest pain and palpitations.   Gastrointestinal:  Negative for constipation, diarrhea, nausea and vomiting.   Genitourinary:  Negative for dysuria and hematuria.   Musculoskeletal:  Negative for falls.   Neurological:  Negative for headaches.        Outpatient Encounter Medications as of 11/28/2023   Medication Sig Dispense Refill    acetaminophen (TYLENOL) 325 MG tablet Take 2 tablets (650 mg total) by mouth every 6 (six) hours as needed (pain 1-4/10 pain scale). 15 tablet 0    amiodarone (PACERONE) 200 MG Tab Take 2 tablets (400 mg total) by mouth 2 (two) times daily for 15 days, THEN 2 tablets (400 mg total) once daily for 15 days, THEN 1 tablet (200 mg total) once daily. (Patient taking differently: 1 po qd ) 150 tablet 0    amLODIPine (NORVASC) 10 MG tablet Take 1 tablet (10 mg total) by mouth once daily. 90 tablet 3    candesartan (ATACAND) 32 MG tablet Take 1 tablet (32 mg total) by mouth once daily. 90 tablet 3    chondroitin sulfate A sodium 400 mg Cap Take by mouth.      dabigatran etexilate (PRADAXA) 150 mg Cap Take 1 capsule (150 mg total) by mouth 2 (two) times a day 180 capsule 3    ezetimibe (ZETIA) 10 mg tablet Take 1 tablet (10 mg total) by mouth once daily. 90 tablet 3    folic acid/multivit-min/lutein (CENTRUM SILVER ORAL) Take by mouth once daily.       "metoprolol tartrate (LOPRESSOR) 100 MG tablet Take 1 tablet (100 mg total) by mouth 2 (two) times daily. 180 tablet 3    pantoprazole (PROTONIX) 40 MG tablet Take 1 tablet (40 mg total) by mouth once daily. 30 tablet 0    triamterene-hydrochlorothiazide 37.5-25 mg (MAXZIDE-25) 37.5-25 mg per tablet Take 1 tablet by mouth once daily. 90 tablet 3    dexAMETHasone (DECADRON) 1 MG Tab Take one tablet at midnight, have labs drawn at 8 AM. 1 tablet 0     Facility-Administered Encounter Medications as of 11/28/2023   Medication Dose Route Frequency Provider Last Rate Last Admin    [DISCONTINUED] ceFAZolin 2 g in dextrose 5 % in water (D5W) 50 mL IVPB (MB+)  2 g Intravenous On Call Procedure Idalmis Campuzano, NP            Review of patient's allergies indicates:   Allergen Reactions    Olmesartan Diarrhea     Diarrhea and muscle aches since starting medication.     Lisinopril Other (See Comments)     Dry cough       Physical Exam      Vital Signs  Pulse: 63  SpO2: 95 %  BP: 134/82  Pain Score: 0-No pain  Height and Weight  Height: 5' 11" (180.3 cm)  Weight: 117.4 kg (258 lb 13.1 oz)  BSA (Calculated - sq m): 2.42 sq meters  BMI (Calculated): 36.1  Weight in (lb) to have BMI = 25: 178.9]  Body mass index is 36.1 kg/m².    Physical Exam  Constitutional:       Appearance: He is well-developed.   HENT:      Head: Normocephalic and atraumatic.   Neck:      Thyroid: No thyromegaly.   Cardiovascular:      Rate and Rhythm: Normal rate and regular rhythm.      Heart sounds: No murmur heard.  Pulmonary:      Effort: Pulmonary effort is normal. No respiratory distress.      Breath sounds: Normal breath sounds.   Abdominal:      General: There is no distension.      Palpations: Abdomen is soft.      Tenderness: There is no abdominal tenderness.   Skin:     General: Skin is warm and dry.   Neurological:      Mental Status: He is alert and oriented to person, place, and time.   Psychiatric:         Behavior: Behavior normal.      " "    Laboratory:  CBC:  No results for input(s): "WBC", "RBC", "HGB", "HCT", "PLT", "MCV", "MCH", "MCHC" in the last 2160 hours.    CMP:  No results for input(s): "GLU", "CALCIUM", "ALBUMIN", "PROT", "NA", "K", "CO2", "CL", "BUN", "ALKPHOS", "ALT", "AST", "BILITOT" in the last 2160 hours.    Invalid input(s): "CREATININ"    URINALYSIS:  No results for input(s): "COLORU", "CLARITYU", "SPECGRAV", "PHUR", "PROTEINUA", "GLUCOSEU", "BILIRUBINCON", "BLOODU", "WBCU", "RBCU", "BACTERIA", "MUCUS", "NITRITE", "LEUKOCYTESUR", "UROBILINOGEN", "HYALINECASTS" in the last 2160 hours.   LIPIDS:  No results for input(s): "TSH", "HDL", "CHOL", "TRIG", "LDLCALC", "CHOLHDL", "NONHDLCHOL", "TOTALCHOLEST" in the last 2160 hours.    TSH:  No results for input(s): "TSH" in the last 2160 hours.    A1C:  No results for input(s): "HGBA1C" in the last 2160 hours.    Radiology:  No results found in the last 30 days.     Assessment/Plan     Hussein Steinberg is a 73 y.o.male with:    1. History of CVA (cerebrovascular accident)    2. Persistent atrial fibrillation  - CBC Auto Differential; Future  - Comprehensive Metabolic Panel; Future    3. Essential hypertension    4. ICD (implantable cardioverter-defibrillator), biventricular, in situ    5. Mixed hyperlipidemia  - Lipid Panel; Future    6. Long term current use of amiodarone    7. Current use of anticoagulant therapy    8. History of prostate cancer    9. Myalgia due to statin    10. Encounter for long-term (current) use of medications  - Hemoglobin A1C; Future    11. Class 2 severe obesity due to excess calories with serious comorbidity and body mass index (BMI) of 36.0 to 36.9 in adult    12. YUMIKO (obstructive sleep apnea)    13. Adrenal incidentaloma  - Dexamethasone; Future  - CORTISOL, 8AM; Future    14. Other specified disorders of adrenal gland  - CT Abdomen With Without Contrast; Future    -w/u adrenal incidentaloma with dex suppression test and CT  -counseled on increasing " exercise  -counseled on RSV vaccine  -Continue current medications and maintain follow up with specialists.    -Follow up in about 1 year (around 11/28/2024) for Annual Visit.       Lacey Rainey MD  [facility]

## 2023-11-28 NOTE — ASSESSMENT & PLAN NOTE
Stable on zetia 10 mg.  Doesn't tolerate statins, had joint pains.  The 10-year ASCVD risk score (José BIRCH, et al., 2019) is: 26.6%    Values used to calculate the score:      Age: 73 years      Sex: Male      Is Non- : No      Diabetic: No      Tobacco smoker: No      Systolic Blood Pressure: 134 mmHg      Is BP treated: Yes      HDL Cholesterol: 47 mg/dL      Total Cholesterol: 180 mg/dL

## 2023-12-13 ENCOUNTER — OFFICE VISIT (OUTPATIENT)
Dept: ELECTROPHYSIOLOGY | Facility: CLINIC | Age: 73
End: 2023-12-13
Payer: MEDICARE

## 2023-12-13 ENCOUNTER — CLINICAL SUPPORT (OUTPATIENT)
Dept: CARDIOLOGY | Facility: HOSPITAL | Age: 73
End: 2023-12-13
Attending: INTERNAL MEDICINE
Payer: MEDICARE

## 2023-12-13 VITALS
WEIGHT: 254.19 LBS | HEART RATE: 62 BPM | DIASTOLIC BLOOD PRESSURE: 66 MMHG | SYSTOLIC BLOOD PRESSURE: 136 MMHG | HEIGHT: 71 IN | BODY MASS INDEX: 35.59 KG/M2

## 2023-12-13 DIAGNOSIS — Z95.810 ICD (IMPLANTABLE CARDIOVERTER-DEFIBRILLATOR), BIVENTRICULAR, IN SITU: ICD-10-CM

## 2023-12-13 DIAGNOSIS — I42.0 DCM (DILATED CARDIOMYOPATHY): ICD-10-CM

## 2023-12-13 DIAGNOSIS — Z79.899 LONG TERM CURRENT USE OF AMIODARONE: ICD-10-CM

## 2023-12-13 DIAGNOSIS — G47.33 OSA (OBSTRUCTIVE SLEEP APNEA): ICD-10-CM

## 2023-12-13 DIAGNOSIS — Z86.73 HISTORY OF CVA (CEREBROVASCULAR ACCIDENT): Primary | ICD-10-CM

## 2023-12-13 DIAGNOSIS — E66.01 CLASS 2 SEVERE OBESITY DUE TO EXCESS CALORIES WITH SERIOUS COMORBIDITY AND BODY MASS INDEX (BMI) OF 36.0 TO 36.9 IN ADULT: ICD-10-CM

## 2023-12-13 DIAGNOSIS — I10 HYPERTENSION, UNSPECIFIED TYPE: ICD-10-CM

## 2023-12-13 DIAGNOSIS — I44.7 LBBB (LEFT BUNDLE BRANCH BLOCK): ICD-10-CM

## 2023-12-13 DIAGNOSIS — I48.19 PERSISTENT ATRIAL FIBRILLATION: ICD-10-CM

## 2023-12-13 DIAGNOSIS — I48.11 LONGSTANDING PERSISTENT ATRIAL FIBRILLATION: ICD-10-CM

## 2023-12-13 DIAGNOSIS — Z79.01 CURRENT USE OF ANTICOAGULANT THERAPY: ICD-10-CM

## 2023-12-13 DIAGNOSIS — I48.0 PAF (PAROXYSMAL ATRIAL FIBRILLATION): ICD-10-CM

## 2023-12-13 PROCEDURE — 3075F SYST BP GE 130 - 139MM HG: CPT | Mod: CPTII,S$GLB,, | Performed by: INTERNAL MEDICINE

## 2023-12-13 PROCEDURE — 1159F MED LIST DOCD IN RCRD: CPT | Mod: CPTII,S$GLB,, | Performed by: INTERNAL MEDICINE

## 2023-12-13 PROCEDURE — 3288F FALL RISK ASSESSMENT DOCD: CPT | Mod: CPTII,S$GLB,, | Performed by: INTERNAL MEDICINE

## 2023-12-13 PROCEDURE — 1101F PT FALLS ASSESS-DOCD LE1/YR: CPT | Mod: CPTII,S$GLB,, | Performed by: INTERNAL MEDICINE

## 2023-12-13 PROCEDURE — 93010 ELECTROCARDIOGRAM REPORT: CPT | Mod: S$GLB,,, | Performed by: INTERNAL MEDICINE

## 2023-12-13 PROCEDURE — 4010F ACE/ARB THERAPY RXD/TAKEN: CPT | Mod: CPTII,S$GLB,, | Performed by: INTERNAL MEDICINE

## 2023-12-13 PROCEDURE — 99999 PR PBB SHADOW E&M-EST. PATIENT-LVL IV: ICD-10-PCS | Mod: PBBFAC,,, | Performed by: INTERNAL MEDICINE

## 2023-12-13 PROCEDURE — 1126F PR PAIN SEVERITY QUANTIFIED, NO PAIN PRESENT: ICD-10-PCS | Mod: CPTII,S$GLB,, | Performed by: INTERNAL MEDICINE

## 2023-12-13 PROCEDURE — 3075F PR MOST RECENT SYSTOLIC BLOOD PRESS GE 130-139MM HG: ICD-10-PCS | Mod: CPTII,S$GLB,, | Performed by: INTERNAL MEDICINE

## 2023-12-13 PROCEDURE — 1160F PR REVIEW ALL MEDS BY PRESCRIBER/CLIN PHARMACIST DOCUMENTED: ICD-10-PCS | Mod: CPTII,S$GLB,, | Performed by: INTERNAL MEDICINE

## 2023-12-13 PROCEDURE — 99215 OFFICE O/P EST HI 40 MIN: CPT | Mod: S$GLB,,, | Performed by: INTERNAL MEDICINE

## 2023-12-13 PROCEDURE — 1159F PR MEDICATION LIST DOCUMENTED IN MEDICAL RECORD: ICD-10-PCS | Mod: CPTII,S$GLB,, | Performed by: INTERNAL MEDICINE

## 2023-12-13 PROCEDURE — 3008F BODY MASS INDEX DOCD: CPT | Mod: CPTII,S$GLB,, | Performed by: INTERNAL MEDICINE

## 2023-12-13 PROCEDURE — 99999 PR PBB SHADOW E&M-EST. PATIENT-LVL IV: CPT | Mod: PBBFAC,,, | Performed by: INTERNAL MEDICINE

## 2023-12-13 PROCEDURE — 3078F DIAST BP <80 MM HG: CPT | Mod: CPTII,S$GLB,, | Performed by: INTERNAL MEDICINE

## 2023-12-13 PROCEDURE — 99215 PR OFFICE/OUTPT VISIT, EST, LEVL V, 40-54 MIN: ICD-10-PCS | Mod: S$GLB,,, | Performed by: INTERNAL MEDICINE

## 2023-12-13 PROCEDURE — 3044F HG A1C LEVEL LT 7.0%: CPT | Mod: CPTII,S$GLB,, | Performed by: INTERNAL MEDICINE

## 2023-12-13 PROCEDURE — 4010F PR ACE/ARB THEARPY RXD/TAKEN: ICD-10-PCS | Mod: CPTII,S$GLB,, | Performed by: INTERNAL MEDICINE

## 2023-12-13 PROCEDURE — 3078F PR MOST RECENT DIASTOLIC BLOOD PRESSURE < 80 MM HG: ICD-10-PCS | Mod: CPTII,S$GLB,, | Performed by: INTERNAL MEDICINE

## 2023-12-13 PROCEDURE — 1160F RVW MEDS BY RX/DR IN RCRD: CPT | Mod: CPTII,S$GLB,, | Performed by: INTERNAL MEDICINE

## 2023-12-13 PROCEDURE — 93005 RHYTHM STRIP: ICD-10-PCS | Mod: S$GLB,,, | Performed by: INTERNAL MEDICINE

## 2023-12-13 PROCEDURE — 93284 CARDIAC DEVICE CHECK - IN CLINIC & HOSPITAL: ICD-10-PCS | Mod: 26,,, | Performed by: INTERNAL MEDICINE

## 2023-12-13 PROCEDURE — 93284 PRGRMG EVAL IMPLANTABLE DFB: CPT

## 2023-12-13 PROCEDURE — 3044F PR MOST RECENT HEMOGLOBIN A1C LEVEL <7.0%: ICD-10-PCS | Mod: CPTII,S$GLB,, | Performed by: INTERNAL MEDICINE

## 2023-12-13 PROCEDURE — 1101F PR PT FALLS ASSESS DOC 0-1 FALLS W/OUT INJ PAST YR: ICD-10-PCS | Mod: CPTII,S$GLB,, | Performed by: INTERNAL MEDICINE

## 2023-12-13 PROCEDURE — 93284 PRGRMG EVAL IMPLANTABLE DFB: CPT | Mod: 26,,, | Performed by: INTERNAL MEDICINE

## 2023-12-13 PROCEDURE — 3288F PR FALLS RISK ASSESSMENT DOCUMENTED: ICD-10-PCS | Mod: CPTII,S$GLB,, | Performed by: INTERNAL MEDICINE

## 2023-12-13 PROCEDURE — 1126F AMNT PAIN NOTED NONE PRSNT: CPT | Mod: CPTII,S$GLB,, | Performed by: INTERNAL MEDICINE

## 2023-12-13 PROCEDURE — 93010 RHYTHM STRIP: ICD-10-PCS | Mod: S$GLB,,, | Performed by: INTERNAL MEDICINE

## 2023-12-13 PROCEDURE — 93005 ELECTROCARDIOGRAM TRACING: CPT | Mod: S$GLB,,, | Performed by: INTERNAL MEDICINE

## 2023-12-13 PROCEDURE — 3008F PR BODY MASS INDEX (BMI) DOCUMENTED: ICD-10-PCS | Mod: CPTII,S$GLB,, | Performed by: INTERNAL MEDICINE

## 2023-12-13 NOTE — PROGRESS NOTES
"Subjective:   Patient ID:  Hussein Steinberg is a 73 y.o. male who presents for follow-up of PAF, NICM.     Mr. Steinberg is a 73 y.o. male with AF, DCM, CRT-D (2013), HTN, HLD, CVA (hemorrhagic) here for follow up.    "Syeda Steinberg  DCM, s/p biV ICD with great response (LVEF 60%+ 2018)  HTN on meds  HL on meds   PAF, on pradaxa  hx hemorrhagic CVA 2012    CRT-D 3/13 (Tonia device). Great response to CRT. Gen change 9/2019.    Hx of higher-dose amio, c/b night vision changes. These resolved after d/c'ing amio.  AF, resulting in sx that may be directly from the AF and/or from lack of biV pacing that results.  HERIBERTO/DCCV next week. 30 day monitor after that.  Discussed AADs tikosyn vs amio (vs ablation). After hearing pros/cons of all this, we'll restart amio (with load) following DCCV. If side effects recur, we could revisit tikosyn or ablation.  9/8/2020: He is 5 weeks s/p cardioversion and initiation of amiodarone. Maintaining rhythm.HERIBERTO showed LVEF of 55%.   6/15/2021: Mr. Steinberg is doing well from a device perspective with stable lead and device function. No arrhythmia noted. On amiodarone.   12/15/2021: Mr. Steinberg is doing well from a device perspective with stable lead and device function. Some RA lead noise which is chronic and stable.  No arrhythmia noted. On amiodarone. Amio testing all WNL. Plan is to consider tikosyn vs ablation if he no longer tolerates amiodarone in the future. He continues to prefer to stay the course for now.    Update (12/13/2023):  RF PVI done 8/2023. Since, no AF. Feels great!    My interpretation of recent ECG is APbiVP    Current Outpatient Medications   Medication Sig    acetaminophen (TYLENOL) 325 MG tablet Take 2 tablets (650 mg total) by mouth every 6 (six) hours as needed (pain 1-4/10 pain scale).    amiodarone (PACERONE) 200 MG Tab Take 1 tablet (200 mg total) by mouth once daily.    amLODIPine (NORVASC) 10 MG tablet Take 1 tablet (10 mg total) by mouth once daily.    candesartan " "(ATACAND) 32 MG tablet Take 1 tablet (32 mg total) by mouth once daily.    dabigatran etexilate (PRADAXA) 150 mg Cap Take 1 capsule (150 mg total) by mouth 2 (two) times a day. TAKE 1 TABLET TWICE A DAY    ezetimibe (ZETIA) 10 mg tablet TAKE 1 TABLET BY MOUTH EVERY DAY    folic acid/multivit-min/lutein (CENTRUM SILVER ORAL) Take by mouth once daily.    metoprolol tartrate (LOPRESSOR) 100 MG tablet Take 1 tablet (100 mg total) by mouth 2 (two) times daily.    triamterene-hydrochlorothiazide 37.5-25 mg (MAXZIDE-25) 37.5-25 mg per tablet Take 1 tablet by mouth once daily.    traZODone (DESYREL) 100 MG tablet Take 1 tablet (100 mg total) by mouth every evening. (Patient not taking: Reported on 6/21/2022)     No current facility-administered medications for this visit.       Review of Systems   Constitutional: Negative for malaise/fatigue.   Cardiovascular:  Negative for chest pain, dyspnea on exertion, irregular heartbeat, leg swelling and palpitations.   Respiratory:  Negative for shortness of breath.    Hematologic/Lymphatic: Negative for bleeding problem.   Skin:  Negative for rash.   Musculoskeletal:  Negative for myalgias.   Gastrointestinal:  Negative for hematemesis, hematochezia and nausea.   Genitourinary:  Negative for hematuria.   Neurological:  Negative for light-headedness.   Psychiatric/Behavioral:  Negative for altered mental status.    Allergic/Immunologic: Negative for persistent infections.     Objective:        /66   Pulse 62   Ht 5' 11" (1.803 m)   Wt 115.3 kg (254 lb 3.1 oz)   BMI 35.45 kg/m²     Well developed, well nourished.   No distress.  Speaks in full sentences.  RRR  No edema  CTA. No wheeze.        Assessment:     Persistent AF    Plan:     In summary, Mr. Steinberg is a 73 y.o. male with AF, DCM, CRT-D (2013), HTN, HLD, CVA (hemorrhagic) here for follow up.    Doing well s/p PVI.  d/c amio today  f/u 1 year or earlier prn.      "

## 2023-12-14 LAB
OHS CV BIV PACING PERCENT: 99 %
OHS CV DC REMOTE DEVICE TYPE: NORMAL

## 2023-12-16 ENCOUNTER — PATIENT MESSAGE (OUTPATIENT)
Dept: RESEARCH | Facility: CLINIC | Age: 73
End: 2023-12-16
Payer: MEDICARE

## 2024-01-10 ENCOUNTER — TELEPHONE (OUTPATIENT)
Dept: OPTOMETRY | Facility: CLINIC | Age: 74
End: 2024-01-10
Payer: MEDICARE

## 2024-01-18 DIAGNOSIS — I10 ESSENTIAL HYPERTENSION: ICD-10-CM

## 2024-01-18 DIAGNOSIS — E78.2 MIXED HYPERLIPIDEMIA: ICD-10-CM

## 2024-01-18 RX ORDER — METOPROLOL TARTRATE 100 MG/1
100 TABLET ORAL 2 TIMES DAILY
Qty: 180 TABLET | Refills: 3 | Status: SHIPPED | OUTPATIENT
Start: 2024-01-18

## 2024-01-18 RX ORDER — TRIAMTERENE/HYDROCHLOROTHIAZID 37.5-25 MG
1 TABLET ORAL
Qty: 90 TABLET | Refills: 3 | Status: SHIPPED | OUTPATIENT
Start: 2024-01-18

## 2024-01-18 RX ORDER — EZETIMIBE 10 MG/1
10 TABLET ORAL
Qty: 90 TABLET | Refills: 3 | Status: SHIPPED | OUTPATIENT
Start: 2024-01-18

## 2024-01-18 RX ORDER — AMLODIPINE BESYLATE 10 MG/1
10 TABLET ORAL
Qty: 90 TABLET | Refills: 3 | Status: SHIPPED | OUTPATIENT
Start: 2024-01-18

## 2024-01-18 RX ORDER — CANDESARTAN 32 MG/1
32 TABLET ORAL DAILY
Qty: 90 TABLET | Refills: 3 | Status: SHIPPED | OUTPATIENT
Start: 2024-01-18

## 2024-01-18 NOTE — TELEPHONE ENCOUNTER
Refill Decision Note   Hussein Steinberg  is requesting a refill authorization.  Brief Assessment and Rationale for Refill:  Approve     Medication Therapy Plan:         Pharmacist review requested: Yes   Extended chart review required: Yes   Comments:     Note composed:4:18 PM 01/18/2024

## 2024-01-18 NOTE — TELEPHONE ENCOUNTER
No care due was identified.  [facility] Embedded Care Due Messages. Reference number: [id number].   1/18/2024 12:11:06 AM CST

## 2024-01-18 NOTE — TELEPHONE ENCOUNTER
Refill Routing Note   Medication(s) are not appropriate for processing by [facility] for the following reason(s):        Allergy or intolerance    [facility] action(s):  Approve  Defer             Pharmacist review requested: Yes     Appointments  past 12m or future 3m with PCP    Date Provider   Last Visit   11/28/2023 Lacey Rainey MD   Next Visit   Visit date not found Lacey Rainey MD   ED visits in past 90 days: 0        Note composed:3:53 PM 01/18/2024

## 2024-02-05 NOTE — PROGRESS NOTES
"Subjective:   Patient ID:  Hussein Steinberg is a 73 y.o. male who presents for follow-up of cardiac diet      Assessment/Plan:     1. DCM (dilated cardiomyopathy) - EF has normalized after BiVICD    2. Mixed hyperlipidemia - not at goal of LDL70.  Start R10 and continue zetia.  Will titrate and possibly stop zetia if he tolerates statin.    3. Essential hypertension - In Dig HTN and controlled.    4. LBBB (left bundle branch block) - now with BiVICD.    5. Persistent atrial fibrillation - s/p PVI.  remains on NOAC.  Followed by EP.    6. ICD (implantable cardioverter-defibrillator), biventricular, in situ - For DCM.  EF has normalized    7. Current use of anticoagulant therapy - for PAF      We had a lengthy discussion (>20 minutes) about nutrition and diet as it pertains to the pathophysiology of CAD and CAD risk factors.  I have strongly encouraged patient to adopt a Plant Based, whole food (unprocessed) dietary strategy.  I have recommended to watch the movie "[employer] Over [employer]" and "Game Changers" as well as visit [email or web address] for additional resources.  Also, I advised patient to read the book "Prevent and Reverse Heart Disease" by Dr. Valdes for specific instructions and recipes.          Orders Placed This Encounter   Procedures    Comprehensive Metabolic Panel    Lipid Panel           ___________________________________________________________________________________________    HPI:   Pt is here to establish general cards care and discuss his lipids.  He has DCM with normalization of LVEF after BiV pacing.  He has HTN, HPL, PAF s/p PVI and a BiVICD (underlying LBBB).  LVEF is now 60%.  He is currently on pradaxa for AC. He is followed by Dr. Mo.  He is enrolled in Dig HTN program and is well controlled.  He is not on a statin but is on zetia.  Uncertain exactly why he is not on a statin.  There are some chart notes which state myalgias/cramping but also notes that state he tolerates R20. " "    He has a hx hemorrhagic CVA 2012    [facility] in 2012 demonstrated OM "95%" which was out of proportion with LVEF of 30%.  PCI not performed.       Echo    Interpretation Summary  · The left ventricle is normal in size with normal systolic function. The estimated ejection fraction is 60%.  · There is abnormal septal wall motion consistent with right ventricular pacemaker.  · Normal right ventricular size with normal right ventricular systolic function.  · Indeterminate left ventricular diastolic function.  · Biatrial enlargement.  · Mild to moderate tricuspid regurgitation.  · The estimated PA systolic pressure is 41 mmHg.  · Elevated central venous pressure (15 mmHg).  · The ascending aorta is mildly dilated.     No results found for this or any previous visit.        Last 5 Patient Entered Readings                Current 30 Day Average: 123/73  Recent Readings 1/24/2024 1/10/2024 12/29/2023 12/21/2023 12/2/2023   SBP (mmHg) 123 122 135 132 126   DBP (mmHg) 73 73 78 79 77   Pulse 59 59 59 68 59                  Vitals:    02/06/24 0956   BP: 125/68   Pulse: 60   SpO2: (!) 94%   Weight: 114.4 kg (252 lb 3.3 oz)   Height: 5' 11" (1.803 m)     Body mass index is 35.18 kg/m².  CrCl cannot be calculated (Patient's most recent lab result is older than the maximum 7 days allowed.).    Lab Results   Component Value Date     12/05/2023    K 4.1 12/05/2023     12/05/2023    CO2 29 12/05/2023    BUN 15 12/05/2023    CREATININE 1.14 12/05/2023     (H) 12/05/2023    HGBA1C 5.7 (H) 12/05/2023    MG 2.5 09/14/2012    AST 34 12/05/2023    ALT 44 12/05/2023    ALBUMIN 4.6 12/05/2023    PROT 7.8 12/05/2023    BILITOT 0.8 12/05/2023    WBC 10.09 12/05/2023    HGB 15.8 12/05/2023    HCT 48.2 12/05/2023    MCV 86 12/05/2023     12/05/2023    INR 1.3 (H) 08/21/2023    PSA <0.01 11/03/2015    TSH 9.430 (H) 08/19/2022    CHOL 200 (H) 12/05/2023    HDL 44 12/05/2023    LDLCALC 142.6 12/05/2023    TRIG 67 12/05/2023 "       Current Outpatient Medications   Medication Sig    acetaminophen (TYLENOL) 325 MG tablet Take 2 tablets (650 mg total) by mouth every 6 (six) hours as needed (pain 1-4/10 pain scale).    amLODIPine (NORVASC) 10 MG tablet TAKE 1 TABLET BY MOUTH EVERY DAY    candesartan (ATACAND) 32 MG tablet TAKE 1 TABLET (32 MG TOTAL) BY MOUTH ONCE DAILY.    chondroitin sulfate A sodium 400 mg Cap Take by mouth.    ezetimibe (ZETIA) 10 mg tablet TAKE 1 TABLET BY MOUTH EVERY DAY    folic acid/multivit-min/lutein (CENTRUM SILVER ORAL) Take by mouth once daily.    metoprolol tartrate (LOPRESSOR) 100 MG tablet TAKE 1 TABLET BY MOUTH TWICE A DAY    triamterene-hydrochlorothiazide 37.5-25 mg (MAXZIDE-25) 37.5-25 mg per tablet TAKE 1 TABLET BY MOUTH EVERY DAY    dabigatran etexilate (PRADAXA) 150 mg Cap Take 1 capsule (150 mg total) by mouth 2 (two) times a day    rosuvastatin (CRESTOR) 10 MG tablet Take 1 tablet (10 mg total) by mouth every evening.    RSVPreF3 antigen-AS01E, PF, (AREXVY, PF,) 120 mcg/0.5 mL SusR vaccine Inject into the muscle. (Patient not taking: Reported on 2/6/2024)     No current facility-administered medications for this visit.       Review of Systems   Constitutional: Negative for decreased appetite, malaise/fatigue, weight gain and weight loss.   Eyes:  Negative for visual disturbance.   Cardiovascular:  Negative for chest pain, claudication, dyspnea on exertion, irregular heartbeat, orthopnea, palpitations, paroxysmal nocturnal dyspnea and syncope.   Respiratory:  Negative for cough, shortness of breath and snoring.    Skin:  Negative for rash.   Musculoskeletal:  Negative for arthritis, muscle cramps, muscle weakness and myalgias.   Gastrointestinal:  Negative for abdominal pain, anorexia, change in bowel habit and nausea.   Genitourinary:  Negative for dysuria and frequency.   Neurological:  Negative for excessive daytime sleepiness, dizziness, headaches, loss of balance, numbness and weakness.    Psychiatric/Behavioral:  Negative for depression.        Objective:   Physical Exam  Constitutional:       Appearance: Normal appearance. He is well-developed.   HENT:      Head: Normocephalic and atraumatic.   Cardiovascular:      Rate and Rhythm: Normal rate and regular rhythm.      Pulses: Intact distal pulses.      Heart sounds: Normal heart sounds. No murmur heard.     No friction rub. No gallop.   Pulmonary:      Effort: Pulmonary effort is normal.      Breath sounds: Normal breath sounds.   Neurological:      Mental Status: He is alert and oriented to person, place, and time.   Psychiatric:         Attention and Perception: Attention normal.         Mood and Affect: Mood and affect normal.         Speech: Speech normal.         Behavior: Behavior normal.         Thought Content: Thought content normal.         Judgment: Judgment normal.

## 2024-02-06 ENCOUNTER — OFFICE VISIT (OUTPATIENT)
Dept: CARDIOLOGY | Facility: CLINIC | Age: 74
End: 2024-02-06
Payer: MEDICARE

## 2024-02-06 VITALS
HEIGHT: 71 IN | WEIGHT: 252.19 LBS | DIASTOLIC BLOOD PRESSURE: 68 MMHG | OXYGEN SATURATION: 94 % | BODY MASS INDEX: 35.31 KG/M2 | HEART RATE: 60 BPM | SYSTOLIC BLOOD PRESSURE: 125 MMHG

## 2024-02-06 DIAGNOSIS — I10 ESSENTIAL HYPERTENSION: ICD-10-CM

## 2024-02-06 DIAGNOSIS — I42.0 DCM (DILATED CARDIOMYOPATHY): Primary | ICD-10-CM

## 2024-02-06 DIAGNOSIS — E78.2 MIXED HYPERLIPIDEMIA: ICD-10-CM

## 2024-02-06 DIAGNOSIS — I48.19 PERSISTENT ATRIAL FIBRILLATION: ICD-10-CM

## 2024-02-06 DIAGNOSIS — Z79.01 CURRENT USE OF ANTICOAGULANT THERAPY: ICD-10-CM

## 2024-02-06 DIAGNOSIS — Z95.810 ICD (IMPLANTABLE CARDIOVERTER-DEFIBRILLATOR), BIVENTRICULAR, IN SITU: ICD-10-CM

## 2024-02-06 DIAGNOSIS — I44.7 LBBB (LEFT BUNDLE BRANCH BLOCK): ICD-10-CM

## 2024-02-06 PROCEDURE — 99999 PR PBB SHADOW E&M-EST. PATIENT-LVL V: CPT | Mod: PBBFAC,,, | Performed by: INTERNAL MEDICINE

## 2024-02-06 PROCEDURE — 1160F RVW MEDS BY RX/DR IN RCRD: CPT | Mod: CPTII,S$GLB,, | Performed by: INTERNAL MEDICINE

## 2024-02-06 PROCEDURE — 1159F MED LIST DOCD IN RCRD: CPT | Mod: CPTII,S$GLB,, | Performed by: INTERNAL MEDICINE

## 2024-02-06 PROCEDURE — 3288F FALL RISK ASSESSMENT DOCD: CPT | Mod: CPTII,S$GLB,, | Performed by: INTERNAL MEDICINE

## 2024-02-06 PROCEDURE — 1126F AMNT PAIN NOTED NONE PRSNT: CPT | Mod: CPTII,S$GLB,, | Performed by: INTERNAL MEDICINE

## 2024-02-06 PROCEDURE — 99204 OFFICE O/P NEW MOD 45 MIN: CPT | Mod: S$GLB,,, | Performed by: INTERNAL MEDICINE

## 2024-02-06 PROCEDURE — 3078F DIAST BP <80 MM HG: CPT | Mod: CPTII,S$GLB,, | Performed by: INTERNAL MEDICINE

## 2024-02-06 PROCEDURE — 4010F ACE/ARB THERAPY RXD/TAKEN: CPT | Mod: CPTII,S$GLB,, | Performed by: INTERNAL MEDICINE

## 2024-02-06 PROCEDURE — 3008F BODY MASS INDEX DOCD: CPT | Mod: CPTII,S$GLB,, | Performed by: INTERNAL MEDICINE

## 2024-02-06 PROCEDURE — 3074F SYST BP LT 130 MM HG: CPT | Mod: CPTII,S$GLB,, | Performed by: INTERNAL MEDICINE

## 2024-02-06 PROCEDURE — 1101F PT FALLS ASSESS-DOCD LE1/YR: CPT | Mod: CPTII,S$GLB,, | Performed by: INTERNAL MEDICINE

## 2024-02-06 RX ORDER — ROSUVASTATIN CALCIUM 10 MG/1
10 TABLET, COATED ORAL NIGHTLY
Qty: 90 TABLET | Refills: 3 | Status: SHIPPED | OUTPATIENT
Start: 2024-02-06 | End: 2024-03-11 | Stop reason: SDUPTHER

## 2024-02-06 NOTE — PATIENT INSTRUCTIONS
"I strongly encourage you to adopt a Plant Based, whole food (unprocessed) dietary strategy.  I recommend that you watch the movie "You are what you eat", "[place] Over [employer]" and "Game Changers" as well as visit [email or web address] for additional resources.  Also, I advise you to read the book "Prevent and Reverse Heart Disease" by Dr. Valdes for specific instructions and recipes.    "

## 2024-02-18 ENCOUNTER — CLINICAL SUPPORT (OUTPATIENT)
Dept: CARDIOLOGY | Facility: HOSPITAL | Age: 74
End: 2024-02-18
Attending: INTERNAL MEDICINE
Payer: MEDICARE

## 2024-02-18 ENCOUNTER — CLINICAL SUPPORT (OUTPATIENT)
Dept: CARDIOLOGY | Facility: HOSPITAL | Age: 74
End: 2024-02-18
Payer: MEDICARE

## 2024-02-18 DIAGNOSIS — R00.1 BRADYCARDIA, UNSPECIFIED: ICD-10-CM

## 2024-02-18 DIAGNOSIS — Z95.810 PRESENCE OF AUTOMATIC (IMPLANTABLE) CARDIAC DEFIBRILLATOR: ICD-10-CM

## 2024-02-18 DIAGNOSIS — I48.91 UNSPECIFIED ATRIAL FIBRILLATION: ICD-10-CM

## 2024-02-18 PROCEDURE — 93296 REM INTERROG EVL PM/IDS: CPT | Performed by: INTERNAL MEDICINE

## 2024-02-18 PROCEDURE — 93295 DEV INTERROG REMOTE 1/2/MLT: CPT | Mod: ,,, | Performed by: INTERNAL MEDICINE

## 2024-03-12 RX ORDER — ROSUVASTATIN CALCIUM 10 MG/1
10 TABLET, COATED ORAL NIGHTLY
Qty: 90 TABLET | Refills: 3 | Status: SHIPPED | OUTPATIENT
Start: 2024-03-12 | End: 2024-06-06

## 2024-04-11 ENCOUNTER — PATIENT MESSAGE (OUTPATIENT)
Dept: ADMINISTRATIVE | Facility: OTHER | Age: 74
End: 2024-04-11
Payer: MEDICARE

## 2024-05-09 ENCOUNTER — PATIENT MESSAGE (OUTPATIENT)
Dept: CARDIOLOGY | Facility: CLINIC | Age: 74
End: 2024-05-09
Payer: MEDICARE

## 2024-05-14 ENCOUNTER — PATIENT MESSAGE (OUTPATIENT)
Dept: CARDIOLOGY | Facility: CLINIC | Age: 74
End: 2024-05-14
Payer: MEDICARE

## 2024-05-19 ENCOUNTER — CLINICAL SUPPORT (OUTPATIENT)
Dept: CARDIOLOGY | Facility: HOSPITAL | Age: 74
End: 2024-05-19
Payer: MEDICARE

## 2024-05-19 ENCOUNTER — CLINICAL SUPPORT (OUTPATIENT)
Dept: CARDIOLOGY | Facility: HOSPITAL | Age: 74
End: 2024-05-19
Attending: INTERNAL MEDICINE
Payer: MEDICARE

## 2024-05-19 DIAGNOSIS — Z95.810 PRESENCE OF AUTOMATIC (IMPLANTABLE) CARDIAC DEFIBRILLATOR: ICD-10-CM

## 2024-05-19 DIAGNOSIS — R00.1 BRADYCARDIA, UNSPECIFIED: ICD-10-CM

## 2024-05-19 DIAGNOSIS — I48.91 UNSPECIFIED ATRIAL FIBRILLATION: ICD-10-CM

## 2024-05-19 PROCEDURE — 93295 DEV INTERROG REMOTE 1/2/MLT: CPT | Mod: ,,, | Performed by: INTERNAL MEDICINE

## 2024-05-19 PROCEDURE — 93296 REM INTERROG EVL PM/IDS: CPT | Performed by: INTERNAL MEDICINE

## 2024-05-20 ENCOUNTER — OFFICE VISIT (OUTPATIENT)
Dept: OPTOMETRY | Facility: CLINIC | Age: 74
End: 2024-05-20
Payer: COMMERCIAL

## 2024-05-20 DIAGNOSIS — Z13.5 SCREENING FOR EYE CONDITION: ICD-10-CM

## 2024-05-20 DIAGNOSIS — Z01.00 EXAMINATION OF EYES AND VISION: Primary | ICD-10-CM

## 2024-05-20 DIAGNOSIS — Z98.890 HISTORY OF REFRACTIVE SURGERY: ICD-10-CM

## 2024-05-20 DIAGNOSIS — H52.03 HYPEROPIA OF BOTH EYES WITH ASTIGMATISM: ICD-10-CM

## 2024-05-20 DIAGNOSIS — H52.4 PRESBYOPIA OF BOTH EYES: ICD-10-CM

## 2024-05-20 DIAGNOSIS — H52.203 HYPEROPIA OF BOTH EYES WITH ASTIGMATISM: ICD-10-CM

## 2024-05-20 PROCEDURE — 92015 DETERMINE REFRACTIVE STATE: CPT | Mod: S$GLB,,, | Performed by: OPTOMETRIST

## 2024-05-20 PROCEDURE — 92014 COMPRE OPH EXAM EST PT 1/>: CPT | Mod: S$GLB,,, | Performed by: OPTOMETRIST

## 2024-05-20 PROCEDURE — 99999 PR PBB SHADOW E&M-EST. PATIENT-LVL III: CPT | Mod: PBBFAC,,, | Performed by: OPTOMETRIST

## 2024-05-20 NOTE — PATIENT INSTRUCTIONS
S/P RK refractive surgery in each eye.     Nuclear sclerotic/cortical cataract in both eyes.  Still no need for cataract surgery in either eye.      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    No impact on visual acuity.      History of CVA, with secondary left homonymous hemianopsia, per Dr. Skelton.  Full peripheral visual field to HM in all quadrants on confrontation visual field test in each eye today.  Prior diagnosis of bilateral optic atrophy, but very subtle presentation (at worst).     Few vitreous floaters in both eyes.  No evidence of retinal etiology.  Early vitreous syneresis      Resultant hyperopia with astigmatism in each eye, with very satisfactory correctable visual acuity in each eye:  20/20 in the right eye and 20/20-1 in the left eye.   Presbyopia.       New spectacle lens Rx issued for full-time wear.    Recheck in TWElVE MONTHS to reassess cataract development

## 2024-05-20 NOTE — PROGRESS NOTES
[facility]     eye exam             Comments: Annual general eye exam and refraction.  Wonders if he needs cataract surgery. His brother recently had cataract   surgery and is very pleased with the           Comments    Patient's age: 74 y.o. WM  Occupation: [employer] in [place]  Approximate date of last eye examination: 11/22/2023  Last saw Dr. Esqueda  City/State: [facility]  Wears glasses? Yes     If yes, wears  Full-time or part-time?  Full-time  Present glasses are: Bifocal, SV Distance, SV Reading?  Progressive lens -   and single vision reading/piano glasses  Approximate age of present glasses:  02/23/2023  Got new glasses following last exam, or subsequently?:  no   Any problem with VA with glasses? See notes above  Wears CLs?:  No  Headaches?  No  Eye pain/discomfort?  Developing cataract OD                                                                                   Flashes?  No  Floaters?  No  Diplopia/Double vision?  No  Patient's Ocular History:         Any eye surgeries? No         Any eye injury?  No         Any treatment for eye disease?No  Family history of eye disease?    Mother + Macular Degeneration  + Glaucoma  + Cataracts  Significant patient medical history:        1. Diabetes?  No          2. HBP?  Yes, controlled with medications              3. Other (describe):  Heart attack and stroke Sept, 2012   -pacemaker, prostate cancer   ! OTC eyedrops currently using:  None   ! Prescription eye meds currently using:  None   ! Any history of allergy/adverse reaction to any eye meds used   previously?  No   ! Any history of allergy/adverse reaction to eyedrops used during prior   eye exam(s)? No   ! Any history of allergy/adverse reaction to Novacaine or similar meds?   No   ! Any history of allergy/adverse reaction to Epinephrine or similar meds?   No   ! Patient okay with use of anesthetic eyedrops to check eye pressure?    Yes       ! Patient okay with use of eyedrops to  "dilate pupils today?  Yes   !  Allergies/Medications/Medical History/Family History reviewed today?    Yes      PD =   64/60  Desired reading distance = 19"                        Last edited by Ray Esqueda OD on 5/20/2024  1:54 PM.            Assessment /Plan     For exam results, see Encounter Report.    1. Examination of eyes and vision        2. Hyperopia of both eyes with astigmatism        3. Presbyopia of both eyes        4. History of refractive surgery        5. Screening for eye condition                     S/P RK refractive surgery in each eye.     Nuclear sclerotic/cortical cataract in both eyes.  Still no need for cataract surgery in either eye.      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    No impact on visual acuity.      History of CVA, with secondary left homonymous hemianopsia, per Dr. Skelton.  Full peripheral visual field to HM in all quadrants on confrontation visual field test in each eye today.  Prior diagnosis of bilateral optic atrophy, but very subtle presentation (at worst).     Few vitreous floaters in both eyes.  No evidence of retinal etiology.  Early vitreous syneresis      Resultant hyperopia with astigmatism in each eye, with very satisfactory correctable visual acuity in each eye:  20/20 in the right eye and 20/20-1 in the left eye.   Presbyopia.       New spectacle lens Rx issued for full-time wear.    Recheck in TWElVE MONTHS to reassess cataract development               "

## 2024-05-23 LAB
OHS CV AF BURDEN PERCENT: < 1
OHS CV BIV PACING PERCENT: 99 %
OHS CV DC REMOTE DEVICE TYPE: NORMAL

## 2024-06-06 ENCOUNTER — PATIENT MESSAGE (OUTPATIENT)
Dept: PRIMARY CARE CLINIC | Facility: CLINIC | Age: 74
End: 2024-06-06
Payer: MEDICARE

## 2024-06-06 RX ORDER — ATORVASTATIN CALCIUM 20 MG/1
20 TABLET, FILM COATED ORAL DAILY
Qty: 90 TABLET | Refills: 3 | Status: SHIPPED | OUTPATIENT
Start: 2024-06-06 | End: 2025-06-06

## 2024-07-30 ENCOUNTER — PATIENT MESSAGE (OUTPATIENT)
Dept: ADMINISTRATIVE | Facility: OTHER | Age: 74
End: 2024-07-30
Payer: MEDICARE

## 2024-08-09 ENCOUNTER — PATIENT MESSAGE (OUTPATIENT)
Dept: OTHER | Facility: OTHER | Age: 74
End: 2024-08-09
Payer: MEDICARE

## 2024-08-18 ENCOUNTER — CLINICAL SUPPORT (OUTPATIENT)
Dept: CARDIOLOGY | Facility: HOSPITAL | Age: 74
End: 2024-08-18
Payer: MEDICARE

## 2024-08-18 ENCOUNTER — CLINICAL SUPPORT (OUTPATIENT)
Dept: CARDIOLOGY | Facility: HOSPITAL | Age: 74
End: 2024-08-18
Attending: INTERNAL MEDICINE
Payer: MEDICARE

## 2024-08-18 DIAGNOSIS — Z95.810 PRESENCE OF AUTOMATIC (IMPLANTABLE) CARDIAC DEFIBRILLATOR: ICD-10-CM

## 2024-08-18 DIAGNOSIS — R00.1 BRADYCARDIA, UNSPECIFIED: ICD-10-CM

## 2024-08-18 DIAGNOSIS — I48.91 UNSPECIFIED ATRIAL FIBRILLATION: ICD-10-CM

## 2024-08-18 PROCEDURE — 93296 REM INTERROG EVL PM/IDS: CPT | Performed by: INTERNAL MEDICINE

## 2024-08-18 PROCEDURE — 93295 DEV INTERROG REMOTE 1/2/MLT: CPT | Mod: ,,, | Performed by: INTERNAL MEDICINE

## 2024-08-19 ENCOUNTER — TELEPHONE (OUTPATIENT)
Dept: ELECTROPHYSIOLOGY | Facility: CLINIC | Age: 74
End: 2024-08-19
Payer: MEDICARE

## 2024-08-19 NOTE — TELEPHONE ENCOUNTER
Pt returned the call.  Pt was and remains asymptomatic. Amio was discontinued on 12/13/23 by Dr. Mo (see CV note on 12/13/23).  Pt is compliant with Pradaxa and Metoprolol 100 mg twice daily with no missed doses.  Informed pt this information will be provided to Dr. Mo and if he gives any recommendations and will receive a return call.  Otherwise the Device [facility] will continue to monitor.  Understanding was verbalized.    Message routed to Sr Device Tech ([employer]).

## 2024-08-19 NOTE — TELEPHONE ENCOUNTER
Persistent atrial fibrillation noted during device interrogation/device remote check:  Overall burden:  5.0%, persistent since 8/14/2024  Ventricular rates:   Controlled      BiV pacing 98%, however, trend data suggests approx 20% conduction in AF (see below)  Has had an EF reduction in the past, unclear if from AF itself or reduced BiV pacing  Anticoagulation status:  Pradaxa    Patient symptoms:  Asymptomatic per phone interview    Last seen in EP clinic Dec 2023  73 y.o. male with AF, DCM, CRT-D (2013), HTN, HLD, CVA (hemorrhagic)   PVI done 8/2023, amidarone discontinued in Dec 2023  Remains on metoprolol     Will update MD on persistent, rate controlled AF, s/p PVI

## 2024-08-22 ENCOUNTER — TELEPHONE (OUTPATIENT)
Dept: ELECTROPHYSIOLOGY | Facility: CLINIC | Age: 74
End: 2024-08-22
Payer: MEDICARE

## 2024-08-22 DIAGNOSIS — I48.19 PERSISTENT ATRIAL FIBRILLATION: Primary | ICD-10-CM

## 2024-08-24 LAB
OHS CV AF BURDEN PERCENT: 4
OHS CV BIV PACING PERCENT: 99 %
OHS CV DC REMOTE DEVICE TYPE: NORMAL
OHS CV ICD SHOCK: NO

## 2024-08-26 ENCOUNTER — TELEPHONE (OUTPATIENT)
Dept: ELECTROPHYSIOLOGY | Facility: CLINIC | Age: 74
End: 2024-08-26
Payer: MEDICARE

## 2024-08-26 ENCOUNTER — PATIENT MESSAGE (OUTPATIENT)
Dept: ELECTROPHYSIOLOGY | Facility: CLINIC | Age: 74
End: 2024-08-26
Payer: MEDICARE

## 2024-08-26 NOTE — TELEPHONE ENCOUNTER
Called pt to schedule raj/dccv, offered 8/28, pt will call me back today to confirm after speaking with his wife

## 2024-08-28 ENCOUNTER — HOSPITAL ENCOUNTER (OUTPATIENT)
Dept: CARDIOLOGY | Facility: HOSPITAL | Age: 74
Discharge: HOME | End: 2024-08-28
Attending: INTERNAL MEDICINE | Admitting: INTERNAL MEDICINE
Payer: MEDICARE

## 2024-08-28 ENCOUNTER — ANESTHESIA (OUTPATIENT)
Dept: MEDSURG UNIT | Facility: HOSPITAL | Age: 74
End: 2024-08-28
Payer: MEDICARE

## 2024-08-28 ENCOUNTER — HOSPITAL ENCOUNTER (OUTPATIENT)
Facility: HOSPITAL | Age: 74
Discharge: HOME | End: 2024-08-28
Attending: INTERNAL MEDICINE | Admitting: INTERNAL MEDICINE
Payer: MEDICARE

## 2024-08-28 ENCOUNTER — ANESTHESIA EVENT (OUTPATIENT)
Dept: MEDSURG UNIT | Facility: HOSPITAL | Age: 74
End: 2024-08-28
Payer: MEDICARE

## 2024-08-28 VITALS
BODY MASS INDEX: 33.6 KG/M2 | SYSTOLIC BLOOD PRESSURE: 125 MMHG | HEIGHT: 71 IN | WEIGHT: 240 LBS | DIASTOLIC BLOOD PRESSURE: 78 MMHG

## 2024-08-28 VITALS
DIASTOLIC BLOOD PRESSURE: 66 MMHG | RESPIRATION RATE: 18 BRPM | BODY MASS INDEX: 33.6 KG/M2 | HEIGHT: 71 IN | HEART RATE: 63 BPM | WEIGHT: 240 LBS | TEMPERATURE: 98 F | SYSTOLIC BLOOD PRESSURE: 118 MMHG | OXYGEN SATURATION: 99 %

## 2024-08-28 DIAGNOSIS — I48.19 ATRIAL FIBRILLATION, PERSISTENT: ICD-10-CM

## 2024-08-28 DIAGNOSIS — I48.11 LONGSTANDING PERSISTENT ATRIAL FIBRILLATION: ICD-10-CM

## 2024-08-28 DIAGNOSIS — I48.19 PERSISTENT ATRIAL FIBRILLATION: ICD-10-CM

## 2024-08-28 DIAGNOSIS — I48.19 PERSISTENT ATRIAL FIBRILLATION: Primary | ICD-10-CM

## 2024-08-28 LAB
ASCENDING AORTA: 3.5 CM
BSA FOR ECHO PROCEDURE: 2.34 M2
OHS QRS DURATION: 148 MS
OHS QRS DURATION: 182 MS
OHS QTC CALCULATION: 516 MS
OHS QTC CALCULATION: 579 MS
SINUS: 3.9 CM
STJ: 3.2 CM

## 2024-08-28 PROCEDURE — 93312 ECHO TRANSESOPHAGEAL: CPT | Mod: 26,,, | Performed by: INTERNAL MEDICINE

## 2024-08-28 PROCEDURE — 63600175 PHARM REV CODE 636 W HCPCS: Performed by: NURSE ANESTHETIST, CERTIFIED REGISTERED

## 2024-08-28 PROCEDURE — 93320 DOPPLER ECHO COMPLETE: CPT

## 2024-08-28 PROCEDURE — 92960 CARDIOVERSION ELECTRIC EXT: CPT | Performed by: INTERNAL MEDICINE

## 2024-08-28 PROCEDURE — 93010 ELECTROCARDIOGRAM REPORT: CPT | Mod: 76,,, | Performed by: INTERNAL MEDICINE

## 2024-08-28 PROCEDURE — 93320 DOPPLER ECHO COMPLETE: CPT | Mod: 26,,, | Performed by: INTERNAL MEDICINE

## 2024-08-28 PROCEDURE — 25500020 PHARM REV CODE 255: Performed by: INTERNAL MEDICINE

## 2024-08-28 PROCEDURE — 93325 DOPPLER ECHO COLOR FLOW MAPG: CPT | Mod: 26,,, | Performed by: INTERNAL MEDICINE

## 2024-08-28 PROCEDURE — 37000009 HC ANESTHESIA EA ADD 15 MINS: Performed by: INTERNAL MEDICINE

## 2024-08-28 PROCEDURE — 25000003 PHARM REV CODE 250: Performed by: NURSE ANESTHETIST, CERTIFIED REGISTERED

## 2024-08-28 PROCEDURE — 25000003 PHARM REV CODE 250

## 2024-08-28 PROCEDURE — 92960 CARDIOVERSION ELECTRIC EXT: CPT | Mod: ,,, | Performed by: INTERNAL MEDICINE

## 2024-08-28 PROCEDURE — 93005 ELECTROCARDIOGRAM TRACING: CPT

## 2024-08-28 PROCEDURE — 37000008 HC ANESTHESIA 1ST 15 MINUTES: Performed by: INTERNAL MEDICINE

## 2024-08-28 PROCEDURE — 93010 ELECTROCARDIOGRAM REPORT: CPT | Mod: ,,, | Performed by: INTERNAL MEDICINE

## 2024-08-28 RX ORDER — SILVER SULFADIAZINE 10 G/1000G
CREAM TOPICAL DAILY
Status: DISCONTINUED | OUTPATIENT
Start: 2024-08-28 | End: 2024-08-28 | Stop reason: HOSPADM

## 2024-08-28 RX ORDER — FENTANYL CITRATE 50 UG/ML
25 INJECTION, SOLUTION INTRAMUSCULAR; INTRAVENOUS EVERY 5 MIN PRN
Status: CANCELLED | OUTPATIENT
Start: 2024-08-28

## 2024-08-28 RX ORDER — LIDOCAINE HYDROCHLORIDE 20 MG/ML
INJECTION INTRAVENOUS
Status: DISCONTINUED | OUTPATIENT
Start: 2024-08-28 | End: 2024-08-28

## 2024-08-28 RX ORDER — DIPHENHYDRAMINE HYDROCHLORIDE 50 MG/ML
25 INJECTION INTRAMUSCULAR; INTRAVENOUS EVERY 6 HOURS PRN
Status: CANCELLED | OUTPATIENT
Start: 2024-08-28

## 2024-08-28 RX ORDER — GLUCAGON 1 MG
1 KIT INJECTION
Status: CANCELLED | OUTPATIENT
Start: 2024-08-28

## 2024-08-28 RX ORDER — HYDROMORPHONE HYDROCHLORIDE 1 MG/ML
0.2 INJECTION, SOLUTION INTRAMUSCULAR; INTRAVENOUS; SUBCUTANEOUS EVERY 5 MIN PRN
Status: CANCELLED | OUTPATIENT
Start: 2024-08-28

## 2024-08-28 RX ORDER — SODIUM CHLORIDE 0.9 % (FLUSH) 0.9 %
3 SYRINGE (ML) INJECTION
Status: CANCELLED | OUTPATIENT
Start: 2024-08-28

## 2024-08-28 RX ORDER — PROPOFOL 10 MG/ML
VIAL (ML) INTRAVENOUS
Status: DISCONTINUED | OUTPATIENT
Start: 2024-08-28 | End: 2024-08-28

## 2024-08-28 RX ORDER — DEXMEDETOMIDINE HYDROCHLORIDE 100 UG/ML
INJECTION, SOLUTION INTRAVENOUS
Status: DISCONTINUED | OUTPATIENT
Start: 2024-08-28 | End: 2024-08-28

## 2024-08-28 RX ORDER — PROCHLORPERAZINE EDISYLATE 5 MG/ML
5 INJECTION INTRAMUSCULAR; INTRAVENOUS EVERY 30 MIN PRN
Status: CANCELLED | OUTPATIENT
Start: 2024-08-28

## 2024-08-28 RX ADMIN — LIDOCAINE HYDROCHLORIDE 100 MG: 20 INJECTION INTRAVENOUS at 12:08

## 2024-08-28 RX ADMIN — PERFLUTREN 1.5 ML: 6.52 INJECTION, SUSPENSION INTRAVENOUS at 01:08

## 2024-08-28 RX ADMIN — PROPOFOL 80 MG: 10 INJECTION, EMULSION INTRAVENOUS at 12:08

## 2024-08-28 RX ADMIN — SILVER SULFADIAZINE: 10 CREAM TOPICAL at 01:08

## 2024-08-28 RX ADMIN — SODIUM CHLORIDE: 9 INJECTION, SOLUTION INTRAVENOUS at 12:08

## 2024-08-28 RX ADMIN — DEXMEDETOMIDINE 4 MCG: 200 INJECTION, SOLUTION INTRAVENOUS at 12:08

## 2024-08-28 NOTE — PLAN OF CARE
Patient discharged per MD orders. Instructions given on medications, skin care, activity, when to call MD, and follow up appointments. Pt verbalized understanding.  Patient AAOx4, VSS, no c/o pain or discomfort at this time. Telemetry and PIV removed. Wife at bedside. Patient awaiting on transport for discharge.

## 2024-08-28 NOTE — TRANSFER OF CARE
"Anesthesia Transfer of Care Note    Patient: Hussein Steinberg    Procedure(s) Performed: Procedure(s) (LRB):  Cardioversion or Defibrillation (N/A)  Transesophageal echo (HERIBERTO) intra-procedure log documentation (N/A)    Patient location: PACU    Anesthesia Type: general    Transport from OR: Transported from OR on 2-3 L/min O2 by NC with adequate spontaneous ventilation    Post pain: adequate analgesia    Post assessment: no apparent anesthetic complications    Post vital signs: stable    Level of consciousness: awake and alert    Nausea/Vomiting: no nausea/vomiting    Complications: none    Transfer of care protocol was followed    Last vitals: Visit Vitals  /78 (BP Location: Right arm, Patient Position: Lying)   Pulse 68   Temp 36.7 °C (98.1 °F) (Temporal)   Resp 18   Ht 5' 11" (1.803 m)   Wt 108.9 kg (240 lb)   SpO2 96%   BMI 33.47 kg/m²     "

## 2024-08-28 NOTE — Clinical Note
Safety sheet applied. Device interrogated; DDDR, rate = , A- and V-sensing, BiV-pacing occasionally.

## 2024-08-28 NOTE — H&P
[facility]  Cardiology  HERIBERTO/DCCV History & Physical      Hussein Steinberg  YOB: 1950  Medical Record Number:  [id number]  Attending Physician:  Dejan Mo MD   Date of Admission: 8/28/2024       Hospital Day:  0  Current Principal Problem:  <principal problem not specified>    Patient information was obtained from patient and past medical records.  History     Cc: HERIBERTO/DCCV for AF    HPI  Last OV with Dr. Mo on 12/13/23.   DCM, s/p biV ICD with great response (LVEF 60%+ 2018)  HTN on meds  HL on meds   PAF, on pradaxa  hx hemorrhagic CVA 2012     CRT-D 3/13 (Tonia device). Great response to CRT. Gen change 9/2019.     Hx of higher-dose amio, c/b night vision changes. These resolved after d/c'ing amio.  AF, resulting in sx that may be directly from the AF and/or from lack of biV pacing that results.  HERIBERTO/DCCV next week. 30 day monitor after that.  Discussed AADs tikosyn vs amio (vs ablation). After hearing pros/cons of all this, we'll restart amio (with load) following DCCV. If side effects recur, we could revisit tikosyn or ablation.  9/8/2020: He is 5 weeks s/p cardioversion and initiation of amiodarone. Maintaining rhythm.HERIBERTO showed LVEF of 55%.   6/15/2021: Mr. Steinberg is doing well from a device perspective with stable lead and device function. No arrhythmia noted. On amiodarone.   12/15/2021: Mr. Steinberg is doing well from a device perspective with stable lead and device function. Some RA lead noise which is chronic and stable.  No arrhythmia noted. On amiodarone. Amio testing all WNL. Plan is to consider tikosyn vs ablation if he no longer tolerates amiodarone in the future. He continues to prefer to stay the course for now.     Update (12/13/2023):  RF PVI done 8/2023. Since, no AF. Feels great!   My interpretation of recent ECG is APbiVP    08/19/24:   Persistent atrial fibrillation noted during device interrogation/device remote check:  Overall burden:   5.0%, persistent since 8/14/2024  Ventricular rates:   Controlled      BiV pacing 98%, however, trend data suggests approx 20% conduction in AF      Today, he presents for HERIBERTO/DCCV. He denies any active cardiac complaints.     Rate/rhythm control: metoprolol 100 mg BID  Anticoagulant/antiplatelets: Pradaxa 150 mg BID  ECG: V-paced rhythm w/ intrinsic complexes at 69 bpm   Platelet count: 228  INR: 1.2    History of stroke:  Hemorrhagic CVA 9/2012   Dysphagia or odynophagia:  no  Liver Disease, esophageal disease, or known varices:  no  Upper GI Bleeding:  no  Snoring:  yes   Sleep Apnea:  yes  Prior neck surgery or radiation:  no  History of anesthetic difficulties:  Hypoxia   Family history of anesthetic difficulties:  no  Last oral intake: last pm   Able to move neck in all directions:  yes  GLP-1 Use: no      Medications - Outpatient  Prior to Admission medications    Medication Sig Start Date End Date Taking? Authorizing Provider   acetaminophen (TYLENOL) 325 MG tablet Take 2 tablets (650 mg total) by mouth every 6 (six) hours as needed (pain 1-4/10 pain scale). 9/10/19   Emigdio Agee MD   amLODIPine (NORVASC) 10 MG tablet TAKE 1 TABLET BY MOUTH EVERY DAY 1/18/24   Lacey Rainey MD   atorvastatin (LIPITOR) 20 MG tablet Take 1 tablet (20 mg total) by mouth once daily. 6/6/24 6/6/25  Lacey Rainey MD   candesartan (ATACAND) 32 MG tablet TAKE 1 TABLET (32 MG TOTAL) BY MOUTH ONCE DAILY. 1/18/24   Lacey Rainey MD   chondroitin sulfate A sodium 400 mg Cap Take by mouth.    Provider, Historical   dabigatran etexilate (PRADAXA) 150 mg Cap Take 1 capsule (150 mg total) by mouth 2 (two) times a day 8/28/23 9/16/24  Idalmis Campuzano NP   ezetimibe (ZETIA) 10 mg tablet TAKE 1 TABLET BY MOUTH EVERY DAY 1/18/24   Lacey Rainey MD   folic acid/multivit-min/lutein (CENTRUM SILVER ORAL) Take by mouth once daily.    Provider, Historical   metoprolol tartrate (LOPRESSOR) 100 MG tablet TAKE 1 TABLET BY  MOUTH TWICE A DAY 1/18/24   Lacey Rainey MD   RSVPreF3 antigen-AS01E, PF, (AREXVY, PF,) 120 mcg/0.5 mL SusR vaccine Inject into the muscle. 11/28/23   Sara Snow, PharmD   triamterene-hydrochlorothiazide 37.5-25 mg (MAXZIDE-25) 37.5-25 mg per tablet TAKE 1 TABLET BY MOUTH EVERY DAY 1/18/24   Lacey Rainey MD       Medications - Current  Scheduled Meds:  Continuous Infusions:  PRN Meds:.      Allergies  Review of patient's allergies indicates:   Allergen Reactions    Olmesartan Diarrhea     Diarrhea and muscle aches since starting medication.     Lisinopril Other (See Comments)     Dry cough       Past Medical History  Past Medical History:   Diagnosis Date    Anticoagulant long-term use     Atrial fibrillation     Basal cell carcinoma     Bronchitis 03/20/2017    Cardiac arrest 2012    has pacemaker/defibrillator placed 3/19/13 - followed by [facility]    Cataract     Current use of anticoagulant therapy 02/13/2023    Current use of anticoagulant therapy 02/13/2023    DCM (dilated cardiomyopathy) 06/15/2017    High cholesterol     History of intracerebral hemorrhage without residual deficit 08/23/2019    History of prostate cancer 06/23/2017    Hypertension     Kidney stone 10/2013    LBBB (left bundle branch block) 10/10/2012    LV dysfunction 10/10/2012    Prostate cancer     Treated by Dr. Rasmussen    Squamous cell carcinoma in situ of skin 2017    left arm removed    Stroke 09/2012    + hemorrhagic infarct to right occipital lobe after started on Plavix following cardiac event    SVT (supraventricular tachycardia) 10/10/2012       Past Surgical History  Past Surgical History:   Procedure Laterality Date    ABLATION OF ARRHYTHMOGENIC FOCUS FOR ATRIAL FIBRILLATION N/A 8/28/2023    Procedure: Ablation atrial fibrillation;  Surgeon: Dejan Mo MD;  Location: [facility] EP LAB;  Service: Cardiology;  Laterality: N/A;  AF, HERIBERTO, PVI, RFA, CARTO, Gen, DM, 3 Prep *CRT-D SJM*    arthroscopic  knee     BASAL CELL CARCINOMA EXCISION  2018    removed from nose    Basil cell   2022    CARDIAC PACEMAKER PLACEMENT  2013    and defibrillator    CARDIOVERSION  8/28/2023    Procedure: Cardioversion;  Surgeon: Dejan Mo MD;  Location: [facility] EP LAB;  Service: Cardiology;;    COLONOSCOPY  2/12/2016    + polyp - repeat in 5 years- Dr. Calles    COLONOSCOPY N/A 7/19/2021    Procedure: COLONOSCOPY;  Surgeon: Troy Bryson MD;  Location: [facility] ENDO ([facility] FLR);  Service: Endoscopy;  Laterality: N/A;  pacemaker/AICD-[employer] [facility]  fully vaccinated-GT     ok to hold Pradaxa 2 days per Dr Mo  Miralax prep    ECHOCARDIOGRAM,TRANSESOPHAGEAL N/A 4/24/2023    Procedure: Transesophageal echo (HERIBERTO) intra-procedure log documentation;  Surgeon: Ramos Diagnostic Provider;  Location: [facility] EP LAB;  Service: Cardiology;  Laterality: N/A;    EYE SURGERY  1992    PROSTATECTOMY      SKIN CANCER EXCISION Left 10/2017    squamous cell carcinoma removed from left arm    TRANSESOPHAGEAL ECHOCARDIOGRAPHY N/A 8/28/2023    Procedure: ECHOCARDIOGRAM, TRANSESOPHAGEAL;  Surgeon: Kirk Guy III, MD;  Location: [facility] EP LAB;  Service: Cardiology;  Laterality: N/A;    TREATMENT OF CARDIAC ARRHYTHMIA N/A 7/31/2020    Procedure: CARDIOVERSION;  Surgeon: Dejan Mo MD;  Location: [facility] EP LAB;  Service: Cardiology;  Laterality: N/A;  afib, HERIBERTO, DCCV, anes, DM, 3 Prep    TREATMENT OF CARDIAC ARRHYTHMIA N/A 4/24/2023    Procedure: Cardioversion or Defibrillation;  Surgeon: Dejan Mo MD;  Location: [facility] EP LAB;  Service: Cardiology;  Laterality: N/A;  AF, HERIBERTO, DCCV, MAC, DM, 3 Prep *SJM CRTD*    VASECTOMY  1988       Social History  Social History     Socioeconomic History    Marital status:    Occupational History    Occupation: administrative position   Tobacco Use    Smoking status: Never    Smokeless tobacco: Never   Substance and Sexual Activity    Alcohol use: Never    Drug use: Never    Sexual activity: Yes      Partners: Female     Birth control/protection: Partner-Vasectomy     Social Determinants of Health     Financial Resource Strain: Low Risk  (11/15/2023)    Overall Financial Resource Strain (CARDIA)     Difficulty of Paying Living Expenses: Not hard at all   Food Insecurity: No Food Insecurity (11/15/2023)    Hunger Vital Sign     Worried About Running Out of Food in the Last Year: Never true     Ran Out of Food in the Last Year: Never true   Transportation Needs: No Transportation Needs (11/15/2023)    PRAPARE - Transportation     Lack of Transportation (Medical): No     Lack of Transportation (Non-Medical): No   Physical Activity: Insufficiently Active (11/15/2023)    Exercise Vital Sign     Days of Exercise per Week: 2 days     Minutes of Exercise per Session: 30 min   Stress: No Stress Concern Present (11/15/2023)    [employer] - Occupational Stress Questionnaire     Feeling of Stress : Only a little   Housing Stability: Low Risk  (11/15/2023)    Housing Stability Vital Sign     Unable to Pay for Housing in the Last Year: No     Number of Places Lived in the Last Year: 1     Unstable Housing in the Last Year: No       ROS  Review of Systems   Constitutional: Negative for chills.   HENT: Negative.     Eyes: Negative.    Cardiovascular:  Negative for chest pain, dyspnea on exertion and palpitations.   Respiratory: Negative.  Negative for shortness of breath and sleep disturbances due to breathing.    Endocrine: Negative.    Musculoskeletal: Negative.    Gastrointestinal:  Negative for hematemesis, melena, nausea and vomiting.   Genitourinary: Negative.    Neurological: Negative.    Psychiatric/Behavioral: Negative.  Negative for altered mental status.    Allergic/Immunologic: Negative.    Physical Examination     Vital Signs  24 Hour VS Range       No intake or output data in the 24 hours ending 08/28/24 1131      Physical Exam:   Physical Exam  Constitutional:       General: He is  "not in acute distress.     Appearance: Normal appearance. He is obese. He is not ill-appearing.   HENT:      Head: Normocephalic and atraumatic.      Nose: Nose normal. No congestion or rhinorrhea.      Mouth/Throat:      Mouth: Mucous membranes are moist.      Pharynx: Oropharynx is clear. No oropharyngeal exudate or posterior oropharyngeal erythema.   Eyes:      General:         Right eye: No discharge.         Left eye: No discharge.      Extraocular Movements: Extraocular movements intact.      Conjunctiva/sclera: Conjunctivae normal.   Cardiovascular:      Rate and Rhythm: Normal rate. Rhythm irregularly irregular.   Pulmonary:      Effort: Pulmonary effort is normal. No respiratory distress.      Breath sounds: Normal breath sounds. No stridor. No wheezing or rales.   Musculoskeletal:         General: No swelling. Normal range of motion.      Cervical back: Normal range of motion. No rigidity or tenderness.      Right lower leg: No edema.      Left lower leg: No edema.   Skin:     General: Skin is warm.      Coloration: Skin is not jaundiced.      Findings: No bruising or lesion.   Neurological:      General: No focal deficit present.      Mental Status: He is alert and oriented to person, place, and time. Mental status is at baseline.   Psychiatric:         Mood and Affect: Mood normal.         Behavior: Behavior normal.         Thought Content: Thought content normal.         Judgment: Judgment normal.         Data       Recent Labs   Lab 08/27/24  0902   WBC 7.65   HGB 14.4   HCT 44.3           Recent Labs   Lab 08/27/24  0902   INR 1.2        Recent Labs   Lab 08/27/24  0902      K 3.8      CO2 28   BUN 20   CREATININE 1.1   ANIONGAP 11   CALCIUM 9.5        No results for input(s): "PROT", "ALBUMIN", "BILITOT", "ALKPHOS", "AST", "ALT" in the last 168 hours.     No results for input(s): "TROPONINI" in the last 168 hours.     BNP (pg/mL)   Date Value   06/15/2017 126 (H)   04/07/2014 112 " "(H)   09/30/2013 191 (H)   06/28/2013 141 (H)   10/11/2012 281 (H)       No results for input(s): "LABBLOO" in the last 168 hours.     Assessment & Plan     #Atrial fibrillation   -patient presents for HERIBERTO/DCCV   -on Pradaxa for CVA prophylaxis, last dose this morning      Dr. Mo Plan from 08/21/24:   please schedule repeat interrogation of his device to see true recent % V paced, and also HERIBERTO/DCCV, and then f/u in clinic to talk about pros/cons of antiarrhythmic meds and/or repeat ablation.     HERIBERTO 06/28/23    Left Ventricle: The left ventricle is normal in size. Normal wall thickness. Normal wall motion. There is low normal systolic function. Ejection fraction by visual approximation is 50%.    Left Atrium: Left atrium is dilated. Patent foramen ovale visualized with predominant left to right shunting. The left atrial appendage appears normal. Appendage velocity is reduced at less than 40 cm/sec. No thrombus noted in the appendage. The pulmonary veins have systolic blunting. There is no thrombus in the cavity.    Right Ventricle: Mild right ventricular enlargement. Wall thickness is normal. Right ventricle wall motion has global hypokinesis. Systolic function is mildly reduced.    Right Atrium: Right atrium is dilated.    Mitral Valve: There is mild regurgitation.    Tricuspid Valve: There is moderate regurgitation.    Aorta: Aortic root is normal in size measuring 3.7 cm. Ascending aorta is normal measuring 3.9 cm. Descending aorta is mildly dilated. Grade 2 atherosclerosis.    HERIBERTO 04/24/23  Limited HERIBERTO as patient became hypoxic  Normal appearing left atrial appendage. No thrombus is present in the appendage. Abnormal appendage velocities. contrast was used.    -No absolute contraindications of esophageal stricture, tumor, perforation, laceration,or diverticulum and/or active GI bleed.  -The risks, benefits & alternatives of the procedure were explained to the patient.   -The risks of transesophageal echo " include but are not limited to:  Dental trauma, esophageal trauma/perforation, bleeding, laryngospasm/brochospasm, aspiration, sore throat/hoarseness, & dislodgement of the endotracheal tube/nasogastric tube (where applicable).    -The risks of moderate sedation include hypotension, respiratory depression, arrhythmias, bronchospasm, & death.    -Prior to procedure, extensive discussion with patient regarding risks and benefits of DCCV at bedside today. The patient voices understanding, all questions have been answered, and patient would like to proceed.  -Informed consent was obtained. The patient is agreeable to proceed with the procedure and all questions and concerns addressed.    Case was discussed with an attending physician prior to procedure.    Rachel Boles PA-C  [facility]

## 2024-08-28 NOTE — DISCHARGE SUMMARY
[facility]  Cardiology  Discharge Summary      Patient Name: Hussein Steinberg  MRN: [id number]  Admission Date: 8/28/2024  Hospital Length of Stay: 0 days  Discharge Date and Time: 8/28/2024  2:08 PM  Attending Physician: Dejan Mo MD    Discharging Provider: Rachel Boles PA-C  Primary Care Physician: Lacey Rainey MD    HPI:   Last OV with Dr. Mo on 12/13/23.   DCM, s/p biV ICD with great response (LVEF 60%+ 2018)  HTN on meds  HL on meds   PAF, on pradaxa  hx hemorrhagic CVA 2012     CRT-D 3/13 (Tonia device). Great response to CRT. Gen change 9/2019.     Hx of higher-dose amio, c/b night vision changes. These resolved after d/c'ing amio.  AF, resulting in sx that may be directly from the AF and/or from lack of biV pacing that results.  HERIBERTO/DCCV next week. 30 day monitor after that.  Discussed AADs tikosyn vs amio (vs ablation). After hearing pros/cons of all this, we'll restart amio (with load) following DCCV. If side effects recur, we could revisit tikosyn or ablation.  9/8/2020: He is 5 weeks s/p cardioversion and initiation of amiodarone. Maintaining rhythm.HERIBERTO showed LVEF of 55%.   6/15/2021: Mr. Steinberg is doing well from a device perspective with stable lead and device function. No arrhythmia noted. On amiodarone.   12/15/2021: Mr. Steinberg is doing well from a device perspective with stable lead and device function. Some RA lead noise which is chronic and stable.  No arrhythmia noted. On amiodarone. Amio testing all WNL. Plan is to consider tikosyn vs ablation if he no longer tolerates amiodarone in the future. He continues to prefer to stay the course for now.     Update (12/13/2023):  RF PVI done 8/2023. Since, no AF. Feels great!   My interpretation of recent ECG is APbiVP     08/19/24:   Persistent atrial fibrillation noted during device interrogation/device remote check:  Overall burden:  5.0%, persistent since 8/14/2024  Ventricular rates:   Controlled      BiV pacing 98%,  however, trend data suggests approx 20% conduction in AF        Today, he presents for HERIBERTO/DCCV. He denies any active cardiac complaints.     Procedure(s) (LRB):  Cardioversion or Defibrillation (N/A)  Transesophageal echo (HERIBERTO) intra-procedure log documentation (N/A)     Indwelling Lines/Drains at time of discharge:  Lines/Drains/Airways       None      Hospital Course:  Patient underwent HERIBERTO without evidence of VIJAYA thrombus. Proceeded with DCCV, converting from atrial fibrillation to sinus rhythm. Patient tolerated the procedure without any acute complications. Post-DCCV ECG revealed A-V dual paced rhythm at 84 bpm. Plan to continue all home medications including Pradaxa 150 mg BID, metoprolol 100 mg BID. Instructed to return in 1 week for follow up ECG and in 4 weeks for clinic follow up with Dr. Mo or Neelam Ayala NP.   Patient was assessed at bedside prior to discharge, they reported feeling well and denied chest discomfort, shortness of breath, palpitations, lightheadedness, or any other acute symptoms. Discharge instructions were discussed with patient and all questions were answered. Patient was discharged home in stable condition.       Goals of Care Treatment Preferences:  Code Status: Full Code      Procedures: Transesophageal echo (HERIBERTO)  Addendum Date: 8/28/2024      Left Atrium: Left atrium is dilated. The left atrial appendage appears normal. The left atrial appendage has a windsock morphology. Appendage velocity is normal at greater than 40 cm/sec. diastolic dominance. There is no thrombus in the cavity with contrast used. Dense spontaneous echo contrast visualized.   Left Ventricle: The left ventricle is normal in size. Normal wall thickness. Normal wall motion. There is low normal systolic function with a visually estimated ejection fraction of 50 - 55%.   Right Ventricle: Normal right ventricular cavity size. Wall thickness is normal. Right ventricle wall motion  is normal. Systolic  function is normal.   Right Atrium: Right atrium is dilated.   Aortic Valve: The aortic valve is a trileaflet valve.   Mitral Valve: There is no stenosis.   Tricuspid Valve: There is moderate regurgitation with a centrally directed jet.   Aorta: Aortic root is normal in size measuring 3.9 cm. Ascending aorta is normal measuring 3.5 cm.   IVC/SVC: IVC was not assessed.   Pericardium: There is no pericardial effusion.       Significant Diagnostic Studies: Cardiac Graphics: ECG: A-V dual paced rhythm at 84 bpm     Pending Diagnostic Studies:       None            There are no hospital problems to display for this patient.    No new Assessment & Plan notes have been filed under this hospital service since the last note was generated.  Service: Cardiology      Discharged Condition: stable    Disposition: Home or Self Care    Follow Up:   Follow-up Information       Neelam Ayala NP. Schedule an appointment as soon as possible for a visit in 1 month(s).    Specialty: Cardiology  Contact information:  [address] [facility] [address]  [phone]               Dejan Mo MD. Schedule an appointment as soon as possible for a visit in 1 month(s).    Specialties: Electrophysiology, Cardiology  Contact information:  [address] [facility] [address]  [phone]                           Patient Instructions:   No discharge procedures on file.  Medications:  Reconciled Home Medications:      Medication List        CONTINUE taking these medications      acetaminophen 325 MG tablet  Commonly known as: TYLENOL  Take 2 tablets (650 mg total) by mouth every 6 (six) hours as needed (pain 1-4/10 pain scale).     amLODIPine 10 MG tablet  Commonly known as: NORVASC  TAKE 1 TABLET BY MOUTH EVERY DAY     AREXVY (PF) 120 mcg/0.5 mL Susr vaccine  Generic drug: RSVPreF3 antigen-AS01E (PF)  Inject into the muscle.     atorvastatin 20 MG tablet  Commonly known as: LIPITOR  Take 1 tablet (20 mg total) by mouth  once daily.     candesartan 32 MG tablet  Commonly known as: ATACAND  TAKE 1 TABLET (32 MG TOTAL) BY MOUTH ONCE DAILY.     CENTRUM SILVER ORAL  Take by mouth once daily.     chondroitin sulfate A sodium 400 mg Cap  Take by mouth.     dabigatran etexilate 150 mg Cap  Commonly known as: PRADAXA  Take 1 capsule (150 mg total) by mouth 2 (two) times a day     ezetimibe 10 mg tablet  Commonly known as: ZETIA  TAKE 1 TABLET BY MOUTH EVERY DAY     metoprolol tartrate 100 MG tablet  Commonly known as: LOPRESSOR  TAKE 1 TABLET BY MOUTH TWICE A DAY     triamterene-hydrochlorothiazide 37.5-25 mg 37.5-25 mg per tablet  Commonly known as: MAXZIDE-25  TAKE 1 TABLET BY MOUTH EVERY DAY              Time spent on the discharge of patient: 35 minutes    Rachel Boles PA-C  Cardiology  [facility]

## 2024-08-28 NOTE — Clinical Note
The ECG showed atrial fibrillation and paced rhythm. The patient has a permanent pacemaker. V-pacing

## 2024-08-28 NOTE — ANESTHESIA PREPROCEDURE EVALUATION
08/28/2024  Pre-operative evaluation for Procedure(s) (LRB):  Cardioversion or Defibrillation (N/A)  Transesophageal echo (HERIBERTO) intra-procedure log documentation (N/A)    Hussein Steinberg is a 74 y.o. male with afib here for HERIBERTO and cardioversion. Mildly reduced biventricular systolic function per last echo     Patient Active Problem List   Diagnosis    Nuclear sclerosis - Both Eyes    Persistent atrial fibrillation    Essential hypertension    LBBB (left bundle branch block)    ICD (implantable cardioverter-defibrillator), biventricular, in situ    Class 2 severe obesity due to excess calories with serious comorbidity and body mass index (BMI) of 36.0 to 36.9 in adult    DCM (dilated cardiomyopathy)    History of prostate cancer    Squamous cell carcinoma in situ of skin    Hyperlipidemia    Other insomnia    History of intracerebral hemorrhage without residual deficit    History of CVA (cerebrovascular accident)    Myalgia due to statin    Long term current use of amiodarone    History of colon polyps    YUMIKO (obstructive sleep apnea)    Elevated TSH    Current use of anticoagulant therapy       Review of patient's allergies indicates:   Allergen Reactions    Olmesartan Diarrhea     Diarrhea and muscle aches since starting medication.     Lisinopril Other (See Comments)     Dry cough       No current facility-administered medications on file prior to encounter.     Current Outpatient Medications on File Prior to Encounter   Medication Sig Dispense Refill    acetaminophen (TYLENOL) 325 MG tablet Take 2 tablets (650 mg total) by mouth every 6 (six) hours as needed (pain 1-4/10 pain scale). 15 tablet 0    amLODIPine (NORVASC) 10 MG tablet TAKE 1 TABLET BY MOUTH EVERY DAY 90 tablet 3    atorvastatin (LIPITOR) 20 MG tablet Take 1 tablet (20 mg total) by mouth once daily. 90 tablet 3    candesartan (ATACAND) 32  MG tablet TAKE 1 TABLET (32 MG TOTAL) BY MOUTH ONCE DAILY. 90 tablet 3    chondroitin sulfate A sodium 400 mg Cap Take by mouth.      dabigatran etexilate (PRADAXA) 150 mg Cap Take 1 capsule (150 mg total) by mouth 2 (two) times a day 180 capsule 3    ezetimibe (ZETIA) 10 mg tablet TAKE 1 TABLET BY MOUTH EVERY DAY 90 tablet 3    folic acid/multivit-min/lutein (CENTRUM SILVER ORAL) Take by mouth once daily.      metoprolol tartrate (LOPRESSOR) 100 MG tablet TAKE 1 TABLET BY MOUTH TWICE A  tablet 3    RSVPreF3 antigen-AS01E, PF, (AREXVY, PF,) 120 mcg/0.5 mL SusR vaccine Inject into the muscle. 0.5 mL 0    triamterene-hydrochlorothiazide 37.5-25 mg (MAXZIDE-25) 37.5-25 mg per tablet TAKE 1 TABLET BY MOUTH EVERY DAY 90 tablet 3       Past Surgical History:   Procedure Laterality Date    ABLATION OF ARRHYTHMOGENIC FOCUS FOR ATRIAL FIBRILLATION N/A 8/28/2023    Procedure: Ablation atrial fibrillation;  Surgeon: Dejan Mo MD;  Location: [facility] EP LAB;  Service: Cardiology;  Laterality: N/A;  AF, HERIBERTO, PVI, RFA, CARTO, Gen, DM, 3 Prep *CRT-D [facility]*    arthroscopic  knee    BASAL CELL CARCINOMA EXCISION  2018    removed from nose    Basil cell   2022    CARDIAC PACEMAKER PLACEMENT  2013    and defibrillator    CARDIOVERSION  8/28/2023    Procedure: Cardioversion;  Surgeon: Dejan Mo MD;  Location: [facility] EP LAB;  Service: Cardiology;;    COLONOSCOPY  2/12/2016    + polyp - repeat in 5 years- Dr. Calles    COLONOSCOPY N/A 7/19/2021    Procedure: COLONOSCOPY;  Surgeon: Troy Bryson MD;  [facility]  Service: Endoscopy;  Laterality: N/A;  pacemaker/AICD-[facility]  fully vaccinated-GT     ok to hold Pradaxa 2 days per Dr Mo  Miralax prep    ECHOCARDIOGRAM,TRANSESOPHAGEAL N/A 4/24/2023    Procedure: Transesophageal echo (HERIBERTO) intra-procedure log documentation;  Surgeon: [facility];  Location: [facility] EP LAB;  Service: Cardiology;  Laterality: N/A;    EYE SURGERY  1992     PROSTATECTOMY      SKIN CANCER EXCISION Left 10/2017    squamous cell carcinoma removed from left arm    TRANSESOPHAGEAL ECHOCARDIOGRAPHY N/A 8/28/2023    Procedure: ECHOCARDIOGRAM, TRANSESOPHAGEAL;  Surgeon: Kirk Guy III, MD;  Location: [facility] EP LAB;  Service: Cardiology;  Laterality: N/A;    TREATMENT OF CARDIAC ARRHYTHMIA N/A 7/31/2020    Procedure: CARDIOVERSION;  Surgeon: Dejan Mo MD;  Location: [facility] EP LAB;  Service: Cardiology;  Laterality: N/A;  afib, HERIBERTO, DCCV, anes, DM, 3 Prep    TREATMENT OF CARDIAC ARRHYTHMIA N/A 4/24/2023    Procedure: Cardioversion or Defibrillation;  Surgeon: Dejan Mo MD;  Location: [facility] EP LAB;  Service: Cardiology;  Laterality: N/A;  AF, HERIBERTO, DCCV, MAC, DM, 3 Prep *SJM CRTD*    VASECTOMY  1988       Social History     Socioeconomic History    Marital status:    Occupational History    Occupation: administrative position   Tobacco Use    Smoking status: Never    Smokeless tobacco: Never   Substance and Sexual Activity    Alcohol use: Never    Drug use: Never    Sexual activity: Yes     Partners: Female     Birth control/protection: Partner-Vasectomy     Social Determinants of Health     Financial Resource Strain: Low Risk  (11/15/2023)    Overall Financial Resource Strain (CARDIA)     Difficulty of Paying Living Expenses: Not hard at all   Food Insecurity: No Food Insecurity (11/15/2023)    Hunger Vital Sign     Worried About Running Out of Food in the Last Year: Never true     Ran Out of Food in the Last Year: Never true   Transportation Needs: No Transportation Needs (11/15/2023)    PRAPARE - Transportation     Lack of Transportation (Medical): No     Lack of Transportation (Non-Medical): No   Physical Activity: Insufficiently Active (11/15/2023)    Exercise Vital Sign     Days of Exercise per Week: 2 days     Minutes of Exercise per Session: 30 min   Stress: No Stress Concern Present (11/15/2023)    [place] [employer] -  Occupational Stress Questionnaire     Feeling of Stress : Only a little   Housing Stability: Low Risk  (11/15/2023)    Housing Stability Vital Sign     Unable to Pay for Housing in the Last Year: No     Number of Places Lived in the Last Year: 1     Unstable Housing in the Last Year: No         CBC:   Recent Labs     08/27/24 0902   WBC 7.65   RBC 5.15   HGB 14.4   HCT 44.3      MCV 86   MCH 28.0   MCHC 32.5       CMP:   Recent Labs     08/27/24 0902      K 3.8      CO2 28   BUN 20   CREATININE 1.1   GLU 96   CALCIUM 9.5       INR  Recent Labs     08/27/24 0902   INR 1.2   APTT 37.9*           2D Echo:  Results for orders placed or performed during the hospital encounter of 05/01/18   2D echo with color flow doppler   Result Value Ref Range    EF + QEF 60 55 - 65    Mitral Valve Regurgitation TRIVIAL TO MILD     Diastolic Dysfunction No     Est. PA Systolic Pressure 31.94     Tricuspid Valve Regurgitation TRIVIAL TO MILD            Pre-op Assessment    I have reviewed the Patient Summary Reports.     I have reviewed the Nursing Notes. I have reviewed the NPO Status.      Review of Systems  Anesthesia Hx:  No problems with previous Anesthesia                Cardiovascular:     Hypertension    Dysrhythmias atrial fibrillation                                   Pulmonary:        Sleep Apnea                Renal/:  Chronic Renal Disease                Neurological:   CVA                                        Physical Exam    Airway:  Mallampati: II   Mouth Opening: Normal  Tongue: Normal    Chest/Lungs:  Normal Respiratory Rate    Heart:  Rhythm: Regular Rhythm        Anesthesia Plan  Type of Anesthesia, risks & benefits discussed:    Anesthesia Type: Gen Natural Airway  Intra-op Monitoring Plan: Standard ASA Monitors  Induction:  IV  Informed Consent: Informed consent signed with the Patient and all parties understand the risks and agree with anesthesia plan.  All questions answered.   ASA  Score: 3    Ready For Surgery From Anesthesia Perspective.     .

## 2024-08-29 NOTE — ANESTHESIA POSTPROCEDURE EVALUATION
Anesthesia Post Evaluation    Patient: Hussein Steinberg    Procedure(s) Performed: Procedure(s) (LRB):  Cardioversion or Defibrillation (N/A)  Transesophageal echo (HERIBERTO) intra-procedure log documentation (N/A)    Final Anesthesia Type: general      Patient location during evaluation: PACU  Patient participation: Yes- Able to Participate  Level of consciousness: awake and alert  Post-procedure vital signs: reviewed and stable  Pain management: adequate  Airway patency: patent    PONV status at discharge: No PONV  Anesthetic complications: no      Cardiovascular status: blood pressure returned to baseline  Respiratory status: unassisted  Follow-up not needed.              Vitals Value Taken Time   /66 08/28/24 1333   Temp 36.9 °C (98.4 °F) 08/28/24 1305   Pulse 63 08/28/24 1344   Resp 26 08/28/24 1344   SpO2 94 % 08/28/24 1343   Vitals shown include unfiled device data.      No case tracking events are documented in the log.      Pain/Liberty Score: Liberty Score: 10 (8/28/2024  1:15 PM)

## 2024-09-04 ENCOUNTER — TELEPHONE (OUTPATIENT)
Dept: ELECTROPHYSIOLOGY | Facility: CLINIC | Age: 74
End: 2024-09-04
Payer: MEDICARE

## 2024-09-04 DIAGNOSIS — I48.19 PERSISTENT ATRIAL FIBRILLATION: Primary | ICD-10-CM

## 2024-09-17 DIAGNOSIS — I48.19 PERSISTENT ATRIAL FIBRILLATION: ICD-10-CM

## 2024-09-17 RX ORDER — DABIGATRAN ETEXILATE 150 MG/1
150 CAPSULE ORAL 2 TIMES DAILY
Qty: 180 CAPSULE | Refills: 0 | Status: SHIPPED | OUTPATIENT
Start: 2024-09-17 | End: 2024-12-17

## 2024-09-18 ENCOUNTER — TELEPHONE (OUTPATIENT)
Dept: ELECTROPHYSIOLOGY | Facility: CLINIC | Age: 74
End: 2024-09-18
Payer: MEDICARE

## 2024-10-02 ENCOUNTER — OFFICE VISIT (OUTPATIENT)
Dept: ELECTROPHYSIOLOGY | Facility: CLINIC | Age: 74
End: 2024-10-02
Payer: MEDICARE

## 2024-10-02 ENCOUNTER — HOSPITAL ENCOUNTER (OUTPATIENT)
Dept: CARDIOLOGY | Facility: CLINIC | Age: 74
Discharge: HOME | End: 2024-10-02
Payer: MEDICARE

## 2024-10-02 ENCOUNTER — CLINICAL SUPPORT (OUTPATIENT)
Dept: CARDIOLOGY | Facility: HOSPITAL | Age: 74
End: 2024-10-02
Attending: INTERNAL MEDICINE
Payer: MEDICARE

## 2024-10-02 VITALS
HEART RATE: 66 BPM | DIASTOLIC BLOOD PRESSURE: 66 MMHG | BODY MASS INDEX: 33.47 KG/M2 | HEIGHT: 71 IN | SYSTOLIC BLOOD PRESSURE: 127 MMHG

## 2024-10-02 DIAGNOSIS — I48.19 PERSISTENT ATRIAL FIBRILLATION: ICD-10-CM

## 2024-10-02 DIAGNOSIS — Z79.01 CURRENT USE OF ANTICOAGULANT THERAPY: ICD-10-CM

## 2024-10-02 DIAGNOSIS — Z86.79 HISTORY OF INTRACEREBRAL HEMORRHAGE WITHOUT RESIDUAL DEFICIT: ICD-10-CM

## 2024-10-02 DIAGNOSIS — I10 ESSENTIAL HYPERTENSION: ICD-10-CM

## 2024-10-02 DIAGNOSIS — E66.01 CLASS 2 SEVERE OBESITY DUE TO EXCESS CALORIES WITH SERIOUS COMORBIDITY AND BODY MASS INDEX (BMI) OF 36.0 TO 36.9 IN ADULT: ICD-10-CM

## 2024-10-02 DIAGNOSIS — Z86.73 HISTORY OF CVA (CEREBROVASCULAR ACCIDENT): Primary | ICD-10-CM

## 2024-10-02 DIAGNOSIS — E66.812 CLASS 2 SEVERE OBESITY DUE TO EXCESS CALORIES WITH SERIOUS COMORBIDITY AND BODY MASS INDEX (BMI) OF 36.0 TO 36.9 IN ADULT: ICD-10-CM

## 2024-10-02 DIAGNOSIS — G47.33 OSA (OBSTRUCTIVE SLEEP APNEA): ICD-10-CM

## 2024-10-02 DIAGNOSIS — Z95.810 ICD (IMPLANTABLE CARDIOVERTER-DEFIBRILLATOR), BIVENTRICULAR, IN SITU: ICD-10-CM

## 2024-10-02 DIAGNOSIS — I42.0 DCM (DILATED CARDIOMYOPATHY): ICD-10-CM

## 2024-10-02 LAB
OHS CV AF BURDEN PERCENT: < 1
OHS CV BIV PACING PERCENT: 100 %
OHS CV DC REMOTE DEVICE TYPE: NORMAL
OHS QRS DURATION: 156 MS
OHS QTC CALCULATION: 478 MS

## 2024-10-02 PROCEDURE — 93284 PRGRMG EVAL IMPLANTABLE DFB: CPT

## 2024-10-02 PROCEDURE — 93005 ELECTROCARDIOGRAM TRACING: CPT | Mod: S$GLB,,, | Performed by: INTERNAL MEDICINE

## 2024-10-02 PROCEDURE — 3074F SYST BP LT 130 MM HG: CPT | Mod: CPTII,S$GLB,, | Performed by: INTERNAL MEDICINE

## 2024-10-02 PROCEDURE — 93284 PRGRMG EVAL IMPLANTABLE DFB: CPT | Mod: 26,,, | Performed by: INTERNAL MEDICINE

## 2024-10-02 PROCEDURE — 1160F RVW MEDS BY RX/DR IN RCRD: CPT | Mod: CPTII,S$GLB,, | Performed by: INTERNAL MEDICINE

## 2024-10-02 PROCEDURE — 4010F ACE/ARB THERAPY RXD/TAKEN: CPT | Mod: CPTII,S$GLB,, | Performed by: INTERNAL MEDICINE

## 2024-10-02 PROCEDURE — 3078F DIAST BP <80 MM HG: CPT | Mod: CPTII,S$GLB,, | Performed by: INTERNAL MEDICINE

## 2024-10-02 PROCEDURE — 99215 OFFICE O/P EST HI 40 MIN: CPT | Mod: S$GLB,,, | Performed by: INTERNAL MEDICINE

## 2024-10-02 PROCEDURE — 3008F BODY MASS INDEX DOCD: CPT | Mod: CPTII,S$GLB,, | Performed by: INTERNAL MEDICINE

## 2024-10-02 PROCEDURE — 1101F PT FALLS ASSESS-DOCD LE1/YR: CPT | Mod: CPTII,S$GLB,, | Performed by: INTERNAL MEDICINE

## 2024-10-02 PROCEDURE — 99999 PR PBB SHADOW E&M-EST. PATIENT-LVL III: CPT | Mod: PBBFAC,,, | Performed by: INTERNAL MEDICINE

## 2024-10-02 PROCEDURE — 3288F FALL RISK ASSESSMENT DOCD: CPT | Mod: CPTII,S$GLB,, | Performed by: INTERNAL MEDICINE

## 2024-10-02 PROCEDURE — 1159F MED LIST DOCD IN RCRD: CPT | Mod: CPTII,S$GLB,, | Performed by: INTERNAL MEDICINE

## 2024-10-02 PROCEDURE — 1126F AMNT PAIN NOTED NONE PRSNT: CPT | Mod: CPTII,S$GLB,, | Performed by: INTERNAL MEDICINE

## 2024-10-02 PROCEDURE — 93010 ELECTROCARDIOGRAM REPORT: CPT | Mod: S$GLB,,, | Performed by: INTERNAL MEDICINE

## 2024-10-02 RX ORDER — DABIGATRAN ETEXILATE 150 MG/1
150 CAPSULE ORAL EVERY 12 HOURS
Qty: 180 CAPSULE | Refills: 3 | Status: SHIPPED | OUTPATIENT
Start: 2024-10-02

## 2024-10-02 NOTE — PROGRESS NOTES
"Subjective:   Patient ID:  Hussein Steinberg is a 74 y.o. male who presents for follow-up of PAF, NICM.     Mr. Steinberg is a 74 y.o. male with AF, DCM, CRT-D (2013), HTN, HLD, CVA (hemorrhagic) here for follow up.    "Andre" Idris  DCM, s/p biV ICD with great response (LVEF 60%+ 2018)  HTN on meds  HL on meds   PAF, on pradaxa  hx hemorrhagic CVA 2012    CRT-D 3/13 (Tonia device). Great response to CRT. Gen change 9/2019.    Hx of higher-dose amio, c/b night vision changes. These resolved after d/c'ing amio.  AF, resulting in sx that may be directly from the AF and/or from lack of biV pacing that results.  HERIBERTO/DCCV next week. 30 day monitor after that.  Discussed AADs tikosyn vs amio (vs ablation). After hearing pros/cons of all this, we'll restart amio (with load) following DCCV. If side effects recur, we could revisit tikosyn or ablation.  9/8/2020: He is 5 weeks s/p cardioversion and initiation of amiodarone. Maintaining rhythm.HERIBERTO showed LVEF of 55%.   6/15/2021: Mr. Steinberg is doing well from a device perspective with stable lead and device function. No arrhythmia noted. On amiodarone.   12/15/2021: Mr. Steinberg is doing well from a device perspective with stable lead and device function. Some RA lead noise which is chronic and stable.  No arrhythmia noted. On amiodarone. Amio testing all WNL. Plan is to consider tikosyn vs ablation if he no longer tolerates amiodarone in the future. He continues to prefer to stay the course for now.    Update (10/02/2024):  RF PVI done 8/2023. Since, no AF. Feels great!... until 8/19/24, had AF again. DCCV on 8/28/24. No AF since.  CRT-D shows biV pacing at 99%    My interpretation of recent ECG is ASbiVP    Current Outpatient Medications   Medication Sig    acetaminophen (TYLENOL) 325 MG tablet Take 2 tablets (650 mg total) by mouth every 6 (six) hours as needed (pain 1-4/10 pain scale).    amiodarone (PACERONE) 200 MG Tab Take 1 tablet (200 mg total) by mouth once daily.    " "amLODIPine (NORVASC) 10 MG tablet Take 1 tablet (10 mg total) by mouth once daily.    candesartan (ATACAND) 32 MG tablet Take 1 tablet (32 mg total) by mouth once daily.    dabigatran etexilate (PRADAXA) 150 mg Cap Take 1 capsule (150 mg total) by mouth 2 (two) times a day. TAKE 1 TABLET TWICE A DAY    ezetimibe (ZETIA) 10 mg tablet TAKE 1 TABLET BY MOUTH EVERY DAY    folic acid/multivit-min/lutein (CENTRUM SILVER ORAL) Take by mouth once daily.    metoprolol tartrate (LOPRESSOR) 100 MG tablet Take 1 tablet (100 mg total) by mouth 2 (two) times daily.    triamterene-hydrochlorothiazide 37.5-25 mg (MAXZIDE-25) 37.5-25 mg per tablet Take 1 tablet by mouth once daily.    traZODone (DESYREL) 100 MG tablet Take 1 tablet (100 mg total) by mouth every evening. (Patient not taking: Reported on 6/21/2022)     No current facility-administered medications for this visit.       Review of Systems   Constitutional: Negative for malaise/fatigue.   Cardiovascular:  Negative for chest pain, dyspnea on exertion, irregular heartbeat, leg swelling and palpitations.   Respiratory:  Negative for shortness of breath.    Hematologic/Lymphatic: Negative for bleeding problem.   Skin:  Negative for rash.   Musculoskeletal:  Negative for myalgias.   Gastrointestinal:  Negative for hematemesis, hematochezia and nausea.   Genitourinary:  Negative for hematuria.   Neurological:  Negative for light-headedness.   Psychiatric/Behavioral:  Negative for altered mental status.    Allergic/Immunologic: Negative for persistent infections.     Objective:        /66   Pulse 66   Ht 5' 11" (1.803 m)   BMI 33.47 kg/m²     Well developed, well nourished.   No distress.  Speaks in full sentences.  RRR  No edema  CTA. No wheeze.        Assessment:     Persistent AF    Plan:     In summary, Mr. Steinberg is a 74 y.o. male with AF, DCM, CRT-D (2013), HTN, HLD, CVA (hemorrhagic) here for follow up.    Recurrent persistent AF since PVI last year.  Discussed " amio vs tikosyn vs multaq vs redo PVI.   I discussed with patient risks, indications, benefits, and alternatives of the options. All questions were answered. Patient understands and wishes to proceed with redo PVI.    Informed consent was obtained, we discussed the risks and benefits of  the procedure (pulmonary vein isolation) which included (but was not limited to) the possibility of bleeding or injury to the vessels involved, embolism, cardiac perforation, cardiac tamponade requiring emergent drainage with a pericardial drain or surgery, esophageal injury or fistula formation, phrenic nerve injury, pulmonary vein stenosis, injury to the native conduction system of the heart requiring a PPM, renal injury if contrast used, MI, stroke, and death. We also reviewed indications, and alternatives of the planned procedure. All questions were answered. Patient wishes to proceed  I discussed with patient risks, indications, benefits, and alternatives of the planned procedure. All questions were answered. Patient understands and wishes to proceed.    PFA redo PVI.

## 2024-10-03 DIAGNOSIS — I48.19 PERSISTENT ATRIAL FIBRILLATION: Primary | ICD-10-CM

## 2024-10-04 ENCOUNTER — PATIENT MESSAGE (OUTPATIENT)
Dept: ELECTROPHYSIOLOGY | Facility: CLINIC | Age: 74
End: 2024-10-04
Payer: MEDICARE

## 2024-10-05 DIAGNOSIS — E78.2 MIXED HYPERLIPIDEMIA: ICD-10-CM

## 2024-10-05 DIAGNOSIS — I10 ESSENTIAL HYPERTENSION: ICD-10-CM

## 2024-10-05 NOTE — TELEPHONE ENCOUNTER
No care due was identified.  [facility] Embedded Care Due Messages. Reference number: [id number].   10/05/2024 4:42:07 PM CDT

## 2024-10-06 RX ORDER — METOPROLOL TARTRATE 100 MG/1
100 TABLET ORAL 2 TIMES DAILY
Qty: 180 TABLET | Refills: 0 | Status: SHIPPED | OUTPATIENT
Start: 2024-10-06

## 2024-10-06 RX ORDER — AMLODIPINE BESYLATE 10 MG/1
10 TABLET ORAL DAILY
Qty: 90 TABLET | Refills: 0 | Status: SHIPPED | OUTPATIENT
Start: 2024-10-06

## 2024-10-06 RX ORDER — TRIAMTERENE/HYDROCHLOROTHIAZID 37.5-25 MG
1 TABLET ORAL DAILY
Qty: 90 TABLET | Refills: 0 | Status: SHIPPED | OUTPATIENT
Start: 2024-10-06

## 2024-10-06 RX ORDER — EZETIMIBE 10 MG/1
10 TABLET ORAL DAILY
Qty: 90 TABLET | Refills: 0 | Status: SHIPPED | OUTPATIENT
Start: 2024-10-06

## 2024-10-06 NOTE — TELEPHONE ENCOUNTER
Refill Routing Note   Medication(s) are not appropriate for processing by [facility] for the following reason(s):        Allergy or intolerance      [facility] action(s):  Defer  Approve               Appointments  past 12m or future 3m with PCP    Date Provider   Last Visit   11/28/2023 Lacey Rainey MD   Next Visit   12/3/2024 Lacey Rainey MD   ED visits in past 90 days: 0        Note composed:12:40 PM 10/06/2024

## 2024-10-07 RX ORDER — CANDESARTAN 32 MG/1
32 TABLET ORAL DAILY
Qty: 90 TABLET | Refills: 3 | Status: SHIPPED | OUTPATIENT
Start: 2024-10-07

## 2024-10-08 ENCOUNTER — PATIENT MESSAGE (OUTPATIENT)
Dept: ELECTROPHYSIOLOGY | Facility: CLINIC | Age: 74
End: 2024-10-08
Payer: MEDICARE

## 2024-10-09 ENCOUNTER — OFFICE VISIT (OUTPATIENT)
Dept: ELECTROPHYSIOLOGY | Facility: CLINIC | Age: 74
End: 2024-10-09
Payer: MEDICARE

## 2024-10-09 VITALS — WEIGHT: 238 LBS | HEIGHT: 71 IN | BODY MASS INDEX: 33.32 KG/M2

## 2024-10-09 DIAGNOSIS — I48.19 PERSISTENT ATRIAL FIBRILLATION: Primary | ICD-10-CM

## 2024-10-09 PROCEDURE — 1160F RVW MEDS BY RX/DR IN RCRD: CPT | Mod: CPTII,95,, | Performed by: INTERNAL MEDICINE

## 2024-10-09 PROCEDURE — 3288F FALL RISK ASSESSMENT DOCD: CPT | Mod: CPTII,95,, | Performed by: INTERNAL MEDICINE

## 2024-10-09 PROCEDURE — 4010F ACE/ARB THERAPY RXD/TAKEN: CPT | Mod: CPTII,95,, | Performed by: INTERNAL MEDICINE

## 2024-10-09 PROCEDURE — 3008F BODY MASS INDEX DOCD: CPT | Mod: CPTII,95,, | Performed by: INTERNAL MEDICINE

## 2024-10-09 PROCEDURE — 99214 OFFICE O/P EST MOD 30 MIN: CPT | Mod: 95,,, | Performed by: INTERNAL MEDICINE

## 2024-10-09 PROCEDURE — 1101F PT FALLS ASSESS-DOCD LE1/YR: CPT | Mod: CPTII,95,, | Performed by: INTERNAL MEDICINE

## 2024-10-09 PROCEDURE — 1159F MED LIST DOCD IN RCRD: CPT | Mod: CPTII,95,, | Performed by: INTERNAL MEDICINE

## 2024-10-09 NOTE — PROGRESS NOTES
"The patient location is: home  The chief complaint leading to consultation is: AF  Visit type: audiovisual  Total time spent with patient: 15 min  Each patient to whom he or she provides medical services by telemedicine is:  (1) informed of the relationship between the physician and patient and the respective role of any other health care provider with respect to management of the patient; and (2) notified that he or she may decline to receive medical services by telemedicine and may withdraw from such care at any time.      Subjective:   Patient ID:  Hussein Steinberg is a 74 y.o. male who presents for follow-up of PAF, NICM.     Mr. Steinberg is a 74 y.o. male with AF, DCM, CRT-D (2013), HTN, HLD, CVA (hemorrhagic) here for follow up.    "Syeda Steinberg  DCM, s/p biV ICD with great response (LVEF 60%+ 2018)  HTN on meds  HL on meds   PAF, on pradaxa  hx hemorrhagic CVA 2012    CRT-D 3/13 (Tonia device). Great response to CRT. Gen change 9/2019.    Hx of higher-dose amio, c/b night vision changes. These resolved after d/c'ing amio.  AF, resulting in sx that may be directly from the AF and/or from lack of biV pacing that results.  HERIBERTO/DCCV next week. 30 day monitor after that.  Discussed AADs tikosyn vs amio (vs ablation). After hearing pros/cons of all this, we'll restart amio (with load) following DCCV. If side effects recur, we could revisit tikosyn or ablation.  9/8/2020: He is 5 weeks s/p cardioversion and initiation of amiodarone. Maintaining rhythm.HERIBERTO showed LVEF of 55%.   6/15/2021: Mr. Steinberg is doing well from a device perspective with stable lead and device function. No arrhythmia noted. On amiodarone.   12/15/2021: Mr. Steinberg is doing well from a device perspective with stable lead and device function. Some RA lead noise which is chronic and stable.  No arrhythmia noted. On amiodarone. Amio testing all WNL. Plan is to consider tikosyn vs ablation if he no longer tolerates amiodarone in the future. He continues " to prefer to stay the course for now.    Update (10/09/2024):  RF PVI done 8/2023. Since, no AF. Feels great!... until 8/19/24, had AF again. DCCV on 8/28/24. No AF since.  CRT-D shows biV pacing at 99%  He's interested in redo ablation. Had some questions re: procedure today; he presents virtually with his wife.    My interpretation of recent ECG is ASbiVP    Current Outpatient Medications   Medication Sig    acetaminophen (TYLENOL) 325 MG tablet Take 2 tablets (650 mg total) by mouth every 6 (six) hours as needed (pain 1-4/10 pain scale).    amiodarone (PACERONE) 200 MG Tab Take 1 tablet (200 mg total) by mouth once daily.    amLODIPine (NORVASC) 10 MG tablet Take 1 tablet (10 mg total) by mouth once daily.    candesartan (ATACAND) 32 MG tablet Take 1 tablet (32 mg total) by mouth once daily.    dabigatran etexilate (PRADAXA) 150 mg Cap Take 1 capsule (150 mg total) by mouth 2 (two) times a day. TAKE 1 TABLET TWICE A DAY    ezetimibe (ZETIA) 10 mg tablet TAKE 1 TABLET BY MOUTH EVERY DAY    folic acid/multivit-min/lutein (CENTRUM SILVER ORAL) Take by mouth once daily.    metoprolol tartrate (LOPRESSOR) 100 MG tablet Take 1 tablet (100 mg total) by mouth 2 (two) times daily.    triamterene-hydrochlorothiazide 37.5-25 mg (MAXZIDE-25) 37.5-25 mg per tablet Take 1 tablet by mouth once daily.    traZODone (DESYREL) 100 MG tablet Take 1 tablet (100 mg total) by mouth every evening. (Patient not taking: Reported on 6/21/2022)     No current facility-administered medications for this visit.       Review of Systems   Constitutional: Negative for malaise/fatigue.   Cardiovascular:  Negative for chest pain, dyspnea on exertion, irregular heartbeat, leg swelling and palpitations.   Respiratory:  Negative for shortness of breath.    Hematologic/Lymphatic: Negative for bleeding problem.   Skin:  Negative for rash.   Musculoskeletal:  Negative for myalgias.   Gastrointestinal:  Negative for hematemesis, hematochezia and nausea.  "  Genitourinary:  Negative for hematuria.   Neurological:  Negative for light-headedness.   Psychiatric/Behavioral:  Negative for altered mental status.    Allergic/Immunologic: Negative for persistent infections.     Objective:        Ht 5' 11" (1.803 m)   Wt 108 kg (238 lb)   BMI 33.19 kg/m²     Well developed, well nourished.   No distress.  Speaks in full sentences.        Assessment:     Persistent AF    Plan:     In summary, Mr. Steinberg is a 74 y.o. male with AF, DCM, CRT-D (2013), HTN, HLD, CVA (hemorrhagic) here for follow up.    Recurrent persistent AF despite RF PVI last year.  Discussed meds vs redo PVI.   Answered all of wife's and pt's questions. He'd like to proceed with the planned redo PVI.    PFA redo PVI.            "

## 2024-10-15 ENCOUNTER — HOSPITAL ENCOUNTER (OUTPATIENT)
Dept: RADIOLOGY | Facility: HOSPITAL | Age: 74
Discharge: HOME | End: 2024-10-15
Attending: INTERNAL MEDICINE
Payer: MEDICARE

## 2024-10-15 DIAGNOSIS — I48.19 PERSISTENT ATRIAL FIBRILLATION: ICD-10-CM

## 2024-10-15 LAB
CREAT SERPL-MCNC: 1.1 MG/DL (ref 0.5–1.4)
SAMPLE: NORMAL

## 2024-10-15 PROCEDURE — 71275 CT ANGIOGRAPHY CHEST: CPT | Mod: 26,,, | Performed by: RADIOLOGY

## 2024-10-15 PROCEDURE — 25500020 PHARM REV CODE 255: Performed by: INTERNAL MEDICINE

## 2024-10-15 PROCEDURE — 71275 CT ANGIOGRAPHY CHEST: CPT | Mod: TC

## 2024-10-15 RX ADMIN — IOHEXOL 100 ML: 350 INJECTION, SOLUTION INTRAVENOUS at 07:10

## 2024-10-21 ENCOUNTER — TELEPHONE (OUTPATIENT)
Dept: ELECTROPHYSIOLOGY | Facility: CLINIC | Age: 74
End: 2024-10-21
Payer: MEDICARE

## 2024-10-21 NOTE — TELEPHONE ENCOUNTER
Spoke to patient    CONFIRMED procedure arrival time of 9 am    Reiterated instructions including:    -Directions to check in desk    -NPO after midnight night prior to procedure. Fasting upon arrival to the hospital the day of the procedure.     -High importance of HOLDING Pradaxa on day of procedure (last dose on 10/21/24)    - Confirms no new meds prescribed or med changes since scheduling -     -Pre-procedure LABS Reviewed, no alerts noted    -Confirmed absence or presence of implanted device/stimulator [facility] CRT-D    -Confirmed no recent or current fever, cough, or shortness of breath .    -Confirmed no redness, rash, irritation, or yeast infection to skin/ chest / groin area.       Patient verbalized understanding of above, denies any further questions and appreciated the call.

## 2024-10-21 NOTE — TELEPHONE ENCOUNTER
Pt notified arrival time changed to 8 am. Patient verbalized understanding and had no further questions.

## 2024-10-22 ENCOUNTER — ANESTHESIA (OUTPATIENT)
Dept: MEDSURG UNIT | Facility: HOSPITAL | Age: 74
End: 2024-10-22
Payer: MEDICARE

## 2024-10-22 ENCOUNTER — ANESTHESIA EVENT (OUTPATIENT)
Dept: MEDSURG UNIT | Facility: HOSPITAL | Age: 74
End: 2024-10-22
Payer: MEDICARE

## 2024-10-22 ENCOUNTER — HOSPITAL ENCOUNTER (OUTPATIENT)
Facility: HOSPITAL | Age: 74
Discharge: HOME | End: 2024-10-22
Attending: INTERNAL MEDICINE | Admitting: INTERNAL MEDICINE
Payer: MEDICARE

## 2024-10-22 ENCOUNTER — HOSPITAL ENCOUNTER (OUTPATIENT)
Dept: CARDIOLOGY | Facility: HOSPITAL | Age: 74
Discharge: HOME | End: 2024-10-22
Attending: INTERNAL MEDICINE | Admitting: INTERNAL MEDICINE
Payer: MEDICARE

## 2024-10-22 VITALS
HEART RATE: 60 BPM | WEIGHT: 238 LBS | SYSTOLIC BLOOD PRESSURE: 137 MMHG | BODY MASS INDEX: 33.32 KG/M2 | DIASTOLIC BLOOD PRESSURE: 68 MMHG | HEIGHT: 71 IN

## 2024-10-22 VITALS
WEIGHT: 238 LBS | BODY MASS INDEX: 33.32 KG/M2 | TEMPERATURE: 98 F | HEART RATE: 63 BPM | RESPIRATION RATE: 18 BRPM | DIASTOLIC BLOOD PRESSURE: 66 MMHG | SYSTOLIC BLOOD PRESSURE: 126 MMHG | HEIGHT: 71 IN | OXYGEN SATURATION: 95 %

## 2024-10-22 DIAGNOSIS — I48.19 PERSISTENT ATRIAL FIBRILLATION: ICD-10-CM

## 2024-10-22 DIAGNOSIS — I49.9 ARRHYTHMIA: ICD-10-CM

## 2024-10-22 DIAGNOSIS — I48.91 ATRIAL FIBRILLATION: ICD-10-CM

## 2024-10-22 DIAGNOSIS — I48.19 PERSISTENT ATRIAL FIBRILLATION: Primary | ICD-10-CM

## 2024-10-22 LAB
ASCENDING AORTA: 3.9 CM
BSA FOR ECHO PROCEDURE: 2.33 M2
OHS QRS DURATION: 158 MS
OHS QRS DURATION: 164 MS
OHS QTC CALCULATION: 493 MS
OHS QTC CALCULATION: 510 MS
POC ACTIVATED CLOTTING TIME K: 177 SEC (ref 74–137)
POC ACTIVATED CLOTTING TIME K: 202 SEC (ref 74–137)
POC ACTIVATED CLOTTING TIME K: 220 SEC (ref 74–137)
POC ACTIVATED CLOTTING TIME K: 220 SEC (ref 74–137)
POC ACTIVATED CLOTTING TIME K: 544 SEC (ref 74–137)
SAMPLE: ABNORMAL
SINUS: 3.4 CM
STJ: 3 CM

## 2024-10-22 PROCEDURE — 25000003 PHARM REV CODE 250

## 2024-10-22 PROCEDURE — 93656 COMPRE EP EVAL ABLTJ ATR FIB: CPT | Performed by: INTERNAL MEDICINE

## 2024-10-22 PROCEDURE — C1766 INTRO/SHEATH,STRBLE,NON-PEEL: HCPCS | Performed by: INTERNAL MEDICINE

## 2024-10-22 PROCEDURE — 93325 DOPPLER ECHO COLOR FLOW MAPG: CPT

## 2024-10-22 PROCEDURE — 37000008 HC ANESTHESIA 1ST 15 MINUTES: Performed by: INTERNAL MEDICINE

## 2024-10-22 PROCEDURE — 93005 ELECTROCARDIOGRAM TRACING: CPT

## 2024-10-22 PROCEDURE — 93657 TX L/R ATRIAL FIB ADDL: CPT | Performed by: INTERNAL MEDICINE

## 2024-10-22 PROCEDURE — 63600175 PHARM REV CODE 636 W HCPCS: Performed by: STUDENT IN AN ORGANIZED HEALTH CARE EDUCATION/TRAINING PROGRAM

## 2024-10-22 PROCEDURE — 63600175 PHARM REV CODE 636 W HCPCS

## 2024-10-22 PROCEDURE — 93010 ELECTROCARDIOGRAM REPORT: CPT | Mod: ,,, | Performed by: INTERNAL MEDICINE

## 2024-10-22 PROCEDURE — 63600175 PHARM REV CODE 636 W HCPCS: Performed by: ANESTHESIOLOGY

## 2024-10-22 PROCEDURE — C1730 CATH, EP, 19 OR FEW ELECT: HCPCS | Performed by: INTERNAL MEDICINE

## 2024-10-22 PROCEDURE — C1894 INTRO/SHEATH, NON-LASER: HCPCS | Performed by: INTERNAL MEDICINE

## 2024-10-22 PROCEDURE — 93312 ECHO TRANSESOPHAGEAL: CPT | Mod: 26,,, | Performed by: STUDENT IN AN ORGANIZED HEALTH CARE EDUCATION/TRAINING PROGRAM

## 2024-10-22 PROCEDURE — 27201423 OPTIME MED/SURG SUP & DEVICES STERILE SUPPLY: Performed by: INTERNAL MEDICINE

## 2024-10-22 PROCEDURE — C1733 CATH, EP, OTHR THAN COOL-TIP: HCPCS | Performed by: INTERNAL MEDICINE

## 2024-10-22 PROCEDURE — 93325 DOPPLER ECHO COLOR FLOW MAPG: CPT | Mod: 26,,, | Performed by: STUDENT IN AN ORGANIZED HEALTH CARE EDUCATION/TRAINING PROGRAM

## 2024-10-22 PROCEDURE — 93656 COMPRE EP EVAL ABLTJ ATR FIB: CPT | Mod: ,,, | Performed by: INTERNAL MEDICINE

## 2024-10-22 PROCEDURE — 25000003 PHARM REV CODE 250: Performed by: NURSE PRACTITIONER

## 2024-10-22 PROCEDURE — 93320 DOPPLER ECHO COMPLETE: CPT | Mod: 26,,, | Performed by: STUDENT IN AN ORGANIZED HEALTH CARE EDUCATION/TRAINING PROGRAM

## 2024-10-22 PROCEDURE — 93657 TX L/R ATRIAL FIB ADDL: CPT | Mod: ,,, | Performed by: INTERNAL MEDICINE

## 2024-10-22 PROCEDURE — 63600175 PHARM REV CODE 636 W HCPCS: Performed by: INTERNAL MEDICINE

## 2024-10-22 PROCEDURE — 93005 ELECTROCARDIOGRAM TRACING: CPT | Mod: 59

## 2024-10-22 PROCEDURE — 93312 ECHO TRANSESOPHAGEAL: CPT

## 2024-10-22 PROCEDURE — 99234 HOSP IP/OBS SM DT SF/LOW 45: CPT | Mod: ,,, | Performed by: INTERNAL MEDICINE

## 2024-10-22 PROCEDURE — C1769 GUIDE WIRE: HCPCS | Performed by: INTERNAL MEDICINE

## 2024-10-22 PROCEDURE — 37000009 HC ANESTHESIA EA ADD 15 MINS: Performed by: INTERNAL MEDICINE

## 2024-10-22 PROCEDURE — C1753 CATH, INTRAVAS ULTRASOUND: HCPCS | Performed by: INTERNAL MEDICINE

## 2024-10-22 RX ORDER — LIDOCAINE HYDROCHLORIDE 10 MG/ML
INJECTION, SOLUTION INFILTRATION; PERINEURAL
Status: DISCONTINUED | OUTPATIENT
Start: 2024-10-22 | End: 2024-10-22 | Stop reason: HOSPADM

## 2024-10-22 RX ORDER — CEFAZOLIN 2 G/1
2 INJECTION, POWDER, FOR SOLUTION INTRAMUSCULAR; INTRAVENOUS
Status: DISCONTINUED | OUTPATIENT
Start: 2024-10-22 | End: 2024-10-22 | Stop reason: HOSPADM

## 2024-10-22 RX ORDER — ONDANSETRON HYDROCHLORIDE 2 MG/ML
INJECTION, SOLUTION INTRAVENOUS
Status: DISCONTINUED | OUTPATIENT
Start: 2024-10-22 | End: 2024-10-22

## 2024-10-22 RX ORDER — HYDROMORPHONE HYDROCHLORIDE 1 MG/ML
0.2 INJECTION, SOLUTION INTRAMUSCULAR; INTRAVENOUS; SUBCUTANEOUS EVERY 5 MIN PRN
Status: DISCONTINUED | OUTPATIENT
Start: 2024-10-22 | End: 2024-10-22 | Stop reason: HOSPADM

## 2024-10-22 RX ORDER — DEXAMETHASONE SODIUM PHOSPHATE 4 MG/ML
INJECTION, SOLUTION INTRA-ARTICULAR; INTRALESIONAL; INTRAMUSCULAR; INTRAVENOUS; SOFT TISSUE
Status: DISCONTINUED | OUTPATIENT
Start: 2024-10-22 | End: 2024-10-22

## 2024-10-22 RX ORDER — ATROPINE SULFATE 0.1 MG/ML
INJECTION INTRAVENOUS
Status: DISCONTINUED | OUTPATIENT
Start: 2024-10-22 | End: 2024-10-22

## 2024-10-22 RX ORDER — HEPARIN SODIUM 10000 [USP'U]/100ML
INJECTION, SOLUTION INTRAVENOUS CONTINUOUS PRN
Status: DISCONTINUED | OUTPATIENT
Start: 2024-10-22 | End: 2024-10-22

## 2024-10-22 RX ORDER — ONDANSETRON HYDROCHLORIDE 2 MG/ML
4 INJECTION, SOLUTION INTRAVENOUS ONCE AS NEEDED
Status: DISCONTINUED | OUTPATIENT
Start: 2024-10-22 | End: 2024-10-22 | Stop reason: HOSPADM

## 2024-10-22 RX ORDER — PROTAMINE SULFATE 10 MG/ML
INJECTION, SOLUTION INTRAVENOUS
Status: DISCONTINUED | OUTPATIENT
Start: 2024-10-22 | End: 2024-10-22

## 2024-10-22 RX ORDER — ROCURONIUM BROMIDE 10 MG/ML
INJECTION, SOLUTION INTRAVENOUS
Status: DISCONTINUED | OUTPATIENT
Start: 2024-10-22 | End: 2024-10-22

## 2024-10-22 RX ORDER — HEPARIN SOD,PORCINE/0.9 % NACL 1000/500ML
INTRAVENOUS SOLUTION INTRAVENOUS
Status: DISCONTINUED | OUTPATIENT
Start: 2024-10-22 | End: 2024-10-22 | Stop reason: HOSPADM

## 2024-10-22 RX ORDER — CEFAZOLIN SODIUM 1 G/3ML
INJECTION, POWDER, FOR SOLUTION INTRAMUSCULAR; INTRAVENOUS
Status: DISCONTINUED | OUTPATIENT
Start: 2024-10-22 | End: 2024-10-22

## 2024-10-22 RX ORDER — HEPARIN SODIUM 1000 [USP'U]/ML
INJECTION, SOLUTION INTRAVENOUS; SUBCUTANEOUS
Status: DISCONTINUED | OUTPATIENT
Start: 2024-10-22 | End: 2024-10-22

## 2024-10-22 RX ORDER — LIDOCAINE HYDROCHLORIDE 20 MG/ML
INJECTION, SOLUTION EPIDURAL; INFILTRATION; INTRACAUDAL; PERINEURAL
Status: DISCONTINUED | OUTPATIENT
Start: 2024-10-22 | End: 2024-10-22

## 2024-10-22 RX ORDER — AMIODARONE HYDROCHLORIDE 200 MG/1
TABLET ORAL
Qty: 84 TABLET | Refills: 1 | Status: SHIPPED | OUTPATIENT
Start: 2024-10-22

## 2024-10-22 RX ORDER — PROCHLORPERAZINE EDISYLATE 5 MG/ML
5 INJECTION INTRAMUSCULAR; INTRAVENOUS EVERY 30 MIN PRN
Status: DISCONTINUED | OUTPATIENT
Start: 2024-10-22 | End: 2024-10-22 | Stop reason: HOSPADM

## 2024-10-22 RX ORDER — PROPOFOL 10 MG/ML
VIAL (ML) INTRAVENOUS
Status: DISCONTINUED | OUTPATIENT
Start: 2024-10-22 | End: 2024-10-22

## 2024-10-22 RX ORDER — FENTANYL CITRATE 50 UG/ML
25 INJECTION, SOLUTION INTRAMUSCULAR; INTRAVENOUS EVERY 5 MIN PRN
Status: DISCONTINUED | OUTPATIENT
Start: 2024-10-22 | End: 2024-10-22 | Stop reason: HOSPADM

## 2024-10-22 RX ORDER — DEXMEDETOMIDINE HYDROCHLORIDE 4 UG/ML
INJECTION, SOLUTION INTRAVENOUS CONTINUOUS PRN
Status: DISCONTINUED | OUTPATIENT
Start: 2024-10-22 | End: 2024-10-22

## 2024-10-22 RX ORDER — DIPHENHYDRAMINE HYDROCHLORIDE 50 MG/ML
25 INJECTION INTRAMUSCULAR; INTRAVENOUS EVERY 6 HOURS PRN
Status: DISCONTINUED | OUTPATIENT
Start: 2024-10-22 | End: 2024-10-22 | Stop reason: HOSPADM

## 2024-10-22 RX ORDER — ACETAMINOPHEN 325 MG/1
650 TABLET ORAL EVERY 4 HOURS PRN
Status: DISCONTINUED | OUTPATIENT
Start: 2024-10-22 | End: 2024-10-22 | Stop reason: HOSPADM

## 2024-10-22 RX ORDER — FENTANYL CITRATE 50 UG/ML
INJECTION, SOLUTION INTRAMUSCULAR; INTRAVENOUS
Status: DISCONTINUED | OUTPATIENT
Start: 2024-10-22 | End: 2024-10-22

## 2024-10-22 RX ADMIN — DEXMEDETOMIDINE HYDROCHLORIDE 8 MCG/KG/HR: 4 INJECTION INTRAVENOUS at 12:10

## 2024-10-22 RX ADMIN — HYDROMORPHONE HYDROCHLORIDE 0.2 MG: 1 INJECTION, SOLUTION INTRAMUSCULAR; INTRAVENOUS; SUBCUTANEOUS at 01:10

## 2024-10-22 RX ADMIN — HEPARIN SODIUM 12000 UNITS: 1000 INJECTION, SOLUTION INTRAVENOUS; SUBCUTANEOUS at 12:10

## 2024-10-22 RX ADMIN — ROCURONIUM BROMIDE 100 MG: 10 INJECTION INTRAVENOUS at 10:10

## 2024-10-22 RX ADMIN — PROPOFOL 150 MG: 10 INJECTION, EMULSION INTRAVENOUS at 10:10

## 2024-10-22 RX ADMIN — CEFAZOLIN 2 G: 330 INJECTION, POWDER, FOR SOLUTION INTRAMUSCULAR; INTRAVENOUS at 11:10

## 2024-10-22 RX ADMIN — FENTANYL CITRATE 100 MCG: 50 INJECTION INTRAMUSCULAR; INTRAVENOUS at 10:10

## 2024-10-22 RX ADMIN — SODIUM CHLORIDE, SODIUM GLUCONATE, SODIUM ACETATE, POTASSIUM CHLORIDE, MAGNESIUM CHLORIDE, SODIUM PHOSPHATE, DIBASIC, AND POTASSIUM PHOSPHATE: .53; .5; .37; .037; .03; .012; .00082 INJECTION, SOLUTION INTRAVENOUS at 10:10

## 2024-10-22 RX ADMIN — HYDROMORPHONE HYDROCHLORIDE 0.2 MG: 1 INJECTION, SOLUTION INTRAMUSCULAR; INTRAVENOUS; SUBCUTANEOUS at 02:10

## 2024-10-22 RX ADMIN — ONDANSETRON 4 MG: 2 INJECTION INTRAMUSCULAR; INTRAVENOUS at 12:10

## 2024-10-22 RX ADMIN — HEPARIN SODIUM AND DEXTROSE 12 UNITS/KG/HR: 10000; 5 INJECTION INTRAVENOUS at 12:10

## 2024-10-22 RX ADMIN — SUGAMMADEX 400 MG: 100 INJECTION, SOLUTION INTRAVENOUS at 01:10

## 2024-10-22 RX ADMIN — LIDOCAINE HYDROCHLORIDE 100 MG: 20 INJECTION, SOLUTION EPIDURAL; INFILTRATION; INTRACAUDAL at 10:10

## 2024-10-22 RX ADMIN — PROTAMINE SULFATE 60 MG: 10 INJECTION, SOLUTION INTRAVENOUS at 12:10

## 2024-10-22 RX ADMIN — HEPARIN SODIUM 11000 UNITS: 1000 INJECTION, SOLUTION INTRAVENOUS; SUBCUTANEOUS at 12:10

## 2024-10-22 RX ADMIN — DEXAMETHASONE SODIUM PHOSPHATE 4 MG: 4 INJECTION INTRA-ARTICULAR; INTRALESIONAL; INTRAMUSCULAR; INTRAVENOUS; SOFT TISSUE at 11:10

## 2024-10-22 RX ADMIN — ROCURONIUM BROMIDE 20 MG: 10 INJECTION INTRAVENOUS at 12:10

## 2024-10-22 RX ADMIN — ACETAMINOPHEN 650 MG: 325 TABLET ORAL at 02:10

## 2024-10-22 RX ADMIN — ATROPINE SULFATE 0.5 MG: 0.1 INJECTION INTRAVENOUS at 12:10

## 2024-10-22 RX ADMIN — PHENYLEPHRINE HYDROCHLORIDE 0.2 MCG/KG/MIN: 10 INJECTION INTRAVENOUS at 11:10

## 2024-10-22 NOTE — PROGRESS NOTES
Patient admitted to recovery see [facility] for complete assessment pacu bcg's maintained safety measures verified patient instructed on pain scale and patient verbalized understanding. Called for EKG and it was done and placed in chart. Also called patient's wife and updated on patient location. Also upon initial assessment of handoff from holli pinzon rn right groin bleeding no hematoma noted pressure held by antonina vinson and holli vinson and then holli called dr. Trinh and he came by and pressure held total of 25mins. Will continue to monitor and also frida np aware during this time gave patient pain medication due to pressure being held.  Then at 215pm patient bleeding again from right groin site upon assessment pressure held for total 20mins. Repeated act check it was 202 and frida np came by will monitor. During this time with pressure being held also gave patient medication for pain .

## 2024-10-22 NOTE — NURSING TRANSFER
Nursing Transfer Note      10/22/2024   4:33 PM    Nurse giving handoff:antonina vinson   Nurse receiving handoff:demetra pleitezu rn     Reason patient is being transferred: d/c critieria met     Transfer To: [facility]    Transfer via stretcher    Transfer with cardiac monitoring box 0604 registered and verified able to see patient on monitor     Transported by antonina vinson     Transfer Vital Signs:  Blood Pressure:see epic   Heart Rate:see epic   O2:room air   Temperature:see epic   Respirations:see epic     Telemetry: yes   Order for Tele Monitor? Yes     Additional Lines: went over suture removal time with demetra vinson     Medicines sent: none     Any special needs or follow-up needed: monitor incision sites     Patient belongings transferred with patient: n/a     Chart send with patient: yes     Notified: demetra vinson     Patient reassessed at: see epic  (date, time)  1  Upon arrival to floor: to room no complaints no distress noted and let frida np know patient in [facility].

## 2024-10-22 NOTE — ANESTHESIA PROCEDURE NOTES
Intubation    Date/Time: 10/22/2024 10:52 AM    Performed by: Willian Valencia CRNA  Authorized by: Jake Sierra MD    Intubation:     Induction:  Intravenous    Intubated:  Postinduction    Mask Ventilation:  Easy mask    Attempts:  1    Attempted By:  CRNA    Method of Intubation:  Video laryngoscopy    Blade:  Castaneda 4    Laryngeal View Grade: Grade I - full view of cords      Difficult Airway Encountered?: No      Complications:  None    Airway Device:  Oral endotracheal tube    Airway Device Size:  7.5    Style/Cuff Inflation:  Cuffed (inflated to minimal occlusive pressure)    Inflation Amount (mL):  8    Tube secured:  24    Secured at:  The lips    Placement Verified By:  Capnometry    Complicating Factors:  None    Findings Post-Intubation:  BS equal bilateral and atraumatic/condition of teeth unchanged

## 2024-10-22 NOTE — ANESTHESIA PREPROCEDURE EVALUATION
10/22/2024  Hussein Steinberg is a 74 y.o., male.  Patient Active Problem List   Diagnosis    Nuclear sclerosis - Both Eyes    Persistent atrial fibrillation    Essential hypertension    LBBB (left bundle branch block)    ICD (implantable cardioverter-defibrillator), biventricular, in situ    Class 2 severe obesity due to excess calories with serious comorbidity and body mass index (BMI) of 36.0 to 36.9 in adult    DCM (dilated cardiomyopathy)    History of prostate cancer    Squamous cell carcinoma in situ of skin    Hyperlipidemia    Other insomnia    History of intracerebral hemorrhage without residual deficit    History of CVA (cerebrovascular accident)    Myalgia due to statin    History of colon polyps    YUMIKO (obstructive sleep apnea)    Elevated TSH    Current use of anticoagulant therapy           Pre-op Assessment    I have reviewed the Patient Summary Reports.       I have reviewed the Medications.     Review of Systems  Anesthesia Hx:  No problems with previous Anesthesia               Denies Personal Hx of Anesthesia complications.                    Cardiovascular:     Hypertension    Dysrhythmias                                Hypertension     Disorder of Cardiac Rhythm, Atrial Fibrillation     Pulmonary:        Sleep Apnea     Obstructive Sleep Apnea (YUMIKO).           Renal/:  Chronic Renal Disease        Kidney Function/Disease             Neurological:   CVA                       CVA - Cerebrovasular Accident                     Physical Exam    Airway:  No airway management difficulties anticipated  Dental:  No active dental issues noted  Chest/Lungs:  Clear to auscultation    Heart:  Rate: Normal  Rhythm: Regular Rhythm  Sounds: Normal        Anesthesia Plan  Type of Anesthesia, risks & benefits discussed:    Anesthesia Type: Gen ETT  Intra-op Monitoring Plan: Art Line  Informed Consent:  Informed consent signed with the Patient and all parties understand the risks and agree with anesthesia plan.  All questions answered.   ASA Score: 3  Anesthesia Plan Notes: Chart reviewed. Patient seen and examined. Anesthesia plan discussed and questions answered. E-consent signed. Jake Sierra MD    Ready For Surgery From Anesthesia Perspective.     .

## 2024-10-22 NOTE — NURSING
Sutures removed per orders.  Dressings placed to bilateral groin. Dressing is CDI at this time.  No s/s of bleeding noted.

## 2024-10-22 NOTE — CONSULTS
[facility]  HERIBERTO Consult      Hussein Steinberg  YOB: 1950  Medical Record Number:  [id number]  Attending Physician:  Dejan Mo MD   Date of Admission: 10/22/2024       Hospital Day:  0  Current Principal Problem:  Atrial fibrillation      History     Cc: HERIBERTO for PVI    HPI  74 y.o. male with AF, DCM, CRT-D (2013), HTN, HLD, CVA (hemorrhagic).    RF PVI done 8/2023. Since, no AF. Feels great!... until 8/19/24, had AF again. DCCV on 8/28/24. No AF since.    Recurrent persistent AF despite RF PVI last year.    Today, here for redo ablation with Dr. Mo.      Anticoagulant/antiplatelets: pradaxa  ECG: AS-, 66 bpm  Platelet count: 217  INR: 1.2    History of stroke:  hx of hemorrhagic CVA  Dysphagia or odynophagia:  no  Liver Disease, esophageal disease, or known varices:  no  Upper GI Bleeding:  no  Snoring:  no   Sleep Apnea:  no  Prior neck surgery or radiation:  no  History of anesthetic difficulties:  no  Family history of anesthetic difficulties:  no  Last oral intake: last pm   Able to move neck in all directions:  yes  Use of GLP-1:  no      Medications - Outpatient  Prior to Admission medications    Medication Sig Start Date End Date Taking? Authorizing Provider   acetaminophen (TYLENOL) 325 MG tablet Take 2 tablets (650 mg total) by mouth every 6 (six) hours as needed (pain 1-4/10 pain scale). 9/10/19  Yes Emigdio Agee MD   amLODIPine (NORVASC) 10 MG tablet Take 1 tablet (10 mg total) by mouth once daily. 10/6/24  Yes Lacey Rainey MD   atorvastatin (LIPITOR) 20 MG tablet Take 1 tablet (20 mg total) by mouth once daily. 6/6/24 6/6/25 Yes Lacey Rainey MD   candesartan (ATACAND) 32 MG tablet Take 1 tablet (32 mg total) by mouth once daily. 10/7/24  Yes Lacey Rainey MD   chondroitin sulfate A sodium 400 mg Cap Take by mouth.   Yes Provider, Historical   dabigatran etexilate (PRADAXA) 150 mg Cap Take 1 capsule (150 mg total) by mouth  every 12 (twelve) hours. 10/2/24  Yes Dejan Mo MD   folic acid/multivit-min/lutein (CENTRUM SILVER ORAL) Take by mouth once daily.   Yes Provider, Historical   metoprolol tartrate (LOPRESSOR) 100 MG tablet Take 1 tablet (100 mg total) by mouth 2 (two) times daily. 10/6/24  Yes Lacey Rainey MD   triamterene-hydrochlorothiazide 37.5-25 mg (MAXZIDE-25) 37.5-25 mg per tablet Take 1 tablet by mouth once daily. 10/6/24  Yes Lacey Rainey MD   ezetimibe (ZETIA) 10 mg tablet Take 1 tablet (10 mg total) by mouth once daily.  Patient not taking: Reported on 10/9/2024 10/6/24   Lacey Rainey MD   RSVPreF3 antigen-AS01E, PF, (AREXVY, PF,) 120 mcg/0.5 mL SusR vaccine Inject into the muscle. 11/28/23   Sara Snow, PharmD         Medications - Current  Scheduled Meds:  Continuous Infusions:  PRN Meds:.  Current Facility-Administered Medications:     ceFAZolin (Ancef) IV (PEDS and ADULTS), 2 g, Intravenous, On Call Procedure      Allergies  Review of patient's allergies indicates:   Allergen Reactions    Olmesartan Diarrhea     Diarrhea and muscle aches since starting medication.     Lisinopril Other (See Comments)     Dry cough         Past Medical History  Past Medical History:   Diagnosis Date    Anticoagulant long-term use     Atrial fibrillation     Basal cell carcinoma     Bronchitis 03/20/2017    Cardiac arrest 2012    has pacemaker/defibrillator placed 3/19/13 - followed by [facility]    Cataract     Current use of anticoagulant therapy 02/13/2023    Current use of anticoagulant therapy 02/13/2023    DCM (dilated cardiomyopathy) 06/15/2017    High cholesterol     History of intracerebral hemorrhage without residual deficit 08/23/2019    History of prostate cancer 06/23/2017    Hypertension     Kidney stone 10/2013    LBBB (left bundle branch block) 10/10/2012    LV dysfunction 10/10/2012    Prostate cancer     Treated by Dr. Rasmussen    Squamous cell carcinoma in situ of skin 2017    left  arm removed    Stroke 09/2012    + hemorrhagic infarct to right occipital lobe after started on Plavix following cardiac event    SVT (supraventricular tachycardia) 10/10/2012         Past Surgical History  Past Surgical History:   Procedure Laterality Date    ABLATION OF ARRHYTHMOGENIC FOCUS FOR ATRIAL FIBRILLATION N/A 8/28/2023    Procedure: Ablation atrial fibrillation;  Surgeon: Dejan Mo MD;  Location: [facility] EP LAB;  Service: Cardiology;  Laterality: N/A;  AF, HERIBERTO, PVI, RFA, CARTO, Gen, DM, 3 Prep *CRT-D SJM*    arthroscopic  knee    BASAL CELL CARCINOMA EXCISION  2018    removed from nose    Basil cell   2022    CARDIAC PACEMAKER PLACEMENT  2013    and defibrillator    CARDIOVERSION  8/28/2023    Procedure: Cardioversion;  Surgeon: Dejan Mo MD;  Location: [facility] EP LAB;  Service: Cardiology;;    COLONOSCOPY  2/12/2016    + polyp - repeat in 5 years- Dr. Calles    COLONOSCOPY N/A 7/19/2021    Procedure: COLONOSCOPY;  Surgeon: Troy Bryson MD;  Location: [facility] ENDO ([address] [place] [address]);  Service: Endoscopy;  Laterality: N/A;  pacemaker/AICD-[facility]  fully vaccinated-GT     ok to hold Pradaxa 2 days per Dr Mo  Miralax prep    ECHOCARDIOGRAM,TRANSESOPHAGEAL N/A 4/24/2023    Procedure: Transesophageal echo (HERIBERTO) intra-procedure log documentation;  Surgeon: [facility] Diagnostic Provider;  Location: [facility] EP LAB;  Service: Cardiology;  Laterality: N/A;    ECHOCARDIOGRAM,TRANSESOPHAGEAL N/A 8/28/2024    Procedure: Transesophageal echo (HERIBERTO) intra-procedure log documentation;  Surgeon: Karly West MD;  Location: [facility] EP LAB;  Service: Cardiology;  Laterality: N/A;    EYE SURGERY  1992    PROSTATECTOMY      SKIN CANCER EXCISION Left 10/2017    squamous cell carcinoma removed from left arm    TRANSESOPHAGEAL ECHOCARDIOGRAPHY N/A 8/28/2023    Procedure: ECHOCARDIOGRAM, TRANSESOPHAGEAL;  Surgeon: Kirk Guy III, MD;  Location: [facility] EP LAB;  Service: Cardiology;  Laterality: N/A;    TREATMENT OF  CARDIAC ARRHYTHMIA N/A 7/31/2020    Procedure: CARDIOVERSION;  Surgeon: Dejan Mo MD;  Location: [facility] EP LAB;  Service: Cardiology;  Laterality: N/A;  afib, HERIBERTO, DCCV, anes, DM, 3 Prep    TREATMENT OF CARDIAC ARRHYTHMIA N/A 4/24/2023    Procedure: Cardioversion or Defibrillation;  Surgeon: Dejan Mo MD;  Location: [facility] EP LAB;  Service: Cardiology;  Laterality: N/A;  AF, HERIBERTO, DCCV, MAC, DM, 3 Prep *SJM CRTD*    TREATMENT OF CARDIAC ARRHYTHMIA N/A 8/28/2024    Procedure: Cardioversion or Defibrillation;  Surgeon: Dejan Mo MD;  Location: [facility] EP LAB;  Service: Cardiology;  Laterality: N/A;  AF, HERIBERTO, DCCV, MAC, DM, 3 Prep *SJM CRT-D*    VASECTOMY  1988         Social History  Social History     Socioeconomic History    Marital status:    Occupational History    Occupation: administrative position   Tobacco Use    Smoking status: Never    Smokeless tobacco: Never   Substance and Sexual Activity    Alcohol use: Never    Drug use: Never    Sexual activity: Yes     Partners: Female     Birth control/protection: Partner-Vasectomy     Social Drivers of Health     Financial Resource Strain: Low Risk  (11/15/2023)    Overall Financial Resource Strain (CARDIA)     Difficulty of Paying Living Expenses: Not hard at all   Food Insecurity: No Food Insecurity (11/15/2023)    Hunger Vital Sign     Worried About Running Out of Food in the Last Year: Never true     Ran Out of Food in the Last Year: Never true   Transportation Needs: No Transportation Needs (11/15/2023)    PRAPARE - Transportation     Lack of Transportation (Medical): No     Lack of Transportation (Non-Medical): No   Physical Activity: Unknown (11/15/2023)    Exercise Vital Sign     Days of Exercise per Week: 2 days   Recent Concern: Physical Activity - Insufficiently Active (11/15/2023)    Exercise Vital Sign     Days of Exercise per Week: 2 days     Minutes of Exercise per Session: 30 min   Stress: No Stress Concern Present (11/15/2023)  "   [facility] of Occupational Health - Occupational Stress Questionnaire     Feeling of Stress : Only a little   Housing Stability: Low Risk  (11/15/2023)    Housing Stability Vital Sign     Unable to Pay for Housing in the Last Year: No     Number of Places Lived in the Last Year: 1     Unstable Housing in the Last Year: No         ROS  10 point ROS performed and negative except as stated in HPI     Physical Examination     Vital Signs  24 Hour VS Range    Temp:  [98.9 °F (37.2 °C)]   Pulse:  [64]   Resp:  [20]   BP: (137-147)/(68-74)   SpO2:  [96 %]       Physical Exam:   Constitutional: no acute distress  Cardiovascular: Regular rate and rhythm   Pulmonary: Normal respiratory effort   Neuro: alert and oriented, no focal deficits  Extremities: warm, no edema     Data       No results for input(s): "WBC", "HGB", "HCT", "PLT" in the last 168 hours.     No results for input(s): "PROTIME", "INR" in the last 168 hours.     No results for input(s): "NA", "K", "CL", "CO2", "BUN", "CREATININE", "ANIONGAP", "CALCIUM" in the last 168 hours.     No results for input(s): "PROT", "ALBUMIN", "BILITOT", "ALKPHOS", "AST", "ALT" in the last 168 hours.     No results for input(s): "TROPONINI" in the last 168 hours.     BNP (pg/mL)   Date Value   06/15/2017 126 (H)   04/07/2014 112 (H)   09/30/2013 191 (H)   06/28/2013 141 (H)   10/11/2012 281 (H)       No results for input(s): "LABBLOO" in the last 168 hours.         Assessment & Plan     #Atrial fibrillation  - proceed with HERIBERTO prior to PVI      HERIBERTO 8/28/24:    Left Atrium: Left atrium is dilated. The left atrial appendage appears normal. The left atrial appendage has a windsock morphology. Appendage velocity is normal at greater than 40 cm/sec. diastolic dominance. There is no thrombus in the cavity with contrast used. Dense spontaneous echo contrast visualized.    Left Ventricle: The left ventricle is normal in size. Normal wall thickness. Normal wall motion. There is low " normal systolic function with a visually estimated ejection fraction of 50 - 55%.    Right Ventricle: Normal right ventricular cavity size. Wall thickness is normal. Right ventricle wall motion  is normal. Systolic function is normal.    Right Atrium: Right atrium is dilated.    Aortic Valve: The aortic valve is a trileaflet valve.    Mitral Valve: There is no stenosis.    Tricuspid Valve: There is moderate regurgitation with a centrally directed jet.    Aorta: Aortic root is normal in size measuring 3.9 cm. Ascending aorta is normal measuring 3.5 cm.    IVC/SVC: IVC was not assessed.    Pericardium: There is no pericardial effusion.      -No absolute contraindications of esophageal stricture, tumor, perforation, laceration,or diverticulum and/or active GI bleed.  -The risks, benefits & alternatives of the procedure were explained to the patient.   -The risks of transesophageal echo include but are not limited to:  Dental trauma, esophageal trauma/perforation, bleeding, laryngospasm/brochospasm, aspiration, sore throat/hoarseness, & dislodgement of the endotracheal tube/nasogastric tube (where applicable).  -The risks of moderate sedation include hypotension, respiratory depression, arrhythmias, bronchospasm, & death.    -Informed consent was obtained. The patient is agreeable to proceed with the procedure and all questions and concerns addressed.    Case was discussed with an attending physician in echocardiography lab prior to procedure.    Emmanuelle Cardoso PA-C  [facility]

## 2024-10-22 NOTE — INTERVAL H&P NOTE
Mr. Steinberg is a 74 year old male with a PMHx of AF, DCM, CRT-D (2013, gen change 2019), DC-BiV ICD SJM, HTN, HLD, CVA (hemorrhagic), RFA PVI 2023 (hx amiodarone), AF recurrence-DCCV 8/2024 (no AF since, BiV pacing 99%) who presents today to [facility] for scheduled PVI PFA with Dr. Mo. He denies any chest pain, palpitations, SOB, GUNN, dizziness, light headedness, weakness, LE swelling, syncope, or near syncopal episodes. He denies any bleeding, infections, fevers, rashes, or surgeries in the past 30 days. He does endorse decreased peripheral vision on the left-residual from CVA, VSS. He is currently taking norvasc 10 mg daily, pradaxa 150mg BID (last dose taken yesterday PM), metoprolol tartrate 100 mfg BID, and maxzide daily.    The patient has been examined and the H&P has been reviewed:     I concur with the findings and no changes have occurred since H&P was written.     Review of Systems   Constitutional: Negative for chills, fever and malaise/fatigue.   HENT:  Negative for congestion and nosebleeds. Negative for ear pain and tinnitus.    Eyes: Positive for decreased peripheral vision on the left-residual from CVA. Negative for blurred vision.   Cardiovascular:  CRT-D left chest wall. Positive for leg swelling. Negative for chest pain, dyspnea on exertion, irregular heartbeat, near-syncope, orthopnea, palpitations, paroxysmal nocturnal dyspnea and syncope.   Respiratory:  Negative for cough, hemoptysis, shortness of breath, sleep disturbances due to breathing, sputum production and wheezing.    Endocrine: Negative for polyphagia.   Hematologic/Lymphatic: Negative for bleeding problem. Negative for significant bleeding. Positive for bruise/bleed easily.   Skin:  Negative for itching and rash.   Musculoskeletal:  Negative for back pain, joint swelling, muscle cramps and muscle weakness.   Gastrointestinal:  Negative for bloating, abdominal pain, hematemesis, hematochezia, nausea and vomiting.   Genitourinary:   Negative for dysuria and hematuria.   Neurological:  Negative for dizziness, focal weakness, headaches, light-headedness, loss of balance, numbness and weakness.   Psychiatric/Behavioral:  Negative for altered mental status. Negative for depression. The patient is not nervous/anxious.        Physical Exam  Vitals and nursing note reviewed.   Constitutional:       General: Oriented to person, place, and time. Appears well-developed and well-nourished. Not in acute distress.     Appearance: Normal appearance. Well-developed. Not diaphoretic.   HENT:      Head: Normocephalic and atraumatic.      Mouth/Throat:      Mouth: Mucous membranes are moist.      Pharynx: No oropharyngeal exudate.       Neck: Normal range of motion.   Eyes:      Conjunctiva/sclera: Conjunctivae normal.      Pupils: Pupils are equal, round, and reactive to light.   Cardiovascular:      Rate and Rhythm: Normal rate and regular rhythm. No extrasystoles are present.     Pulses: Normal pulses and intact distal pulses.           Radial pulses are 2+ on the right side and 2+ on the left side.        Dorsalis pedis pulses are 1+ on the right side and 1+ on the left side.      Heart sounds: Normal heart sounds, S1 normal and S2 normal. No murmur heard.  Pulmonary:      Effort: Pulmonary effort is normal. No accessory muscle usage or respiratory distress.      Breath sounds: Normal breath sounds. No decreased breath sounds, wheezing, rhonchi or rales.   Chest:      Chest wall: No tenderness. Left chest wall device site in good condition.  Abdominal:      General: Bowel sounds are normal. There is no distension.      Palpations: Abdomen is soft.      Tenderness: There is no abdominal tenderness. There is no guarding.   Musculoskeletal:         General: Normal range of motion. Bilateral LE edema +1-2     Cervical back: Normal range of motion and neck supple.   Skin:     General: Skin is warm and dry.      Findings: No erythema or rash.   Neurological:       Mental Status: Alert and oriented to person, place, and time. Not disoriented.      Sensory: No sensory deficit.      Motor: No abnormal muscle tone.      Coordination: Coordination normal.      Gait: Gait normal.   Psychiatric:         Mood and Affect: Mood normal.         Behavior: Behavior normal.         Thought Content: Thought content normal.         Judgment: Judgment normal.     Labs: Pre procedure labs from 10/15/24 reviewed.      Significant Studies: ECG today reveals AsBiv paced at 63 BPM.       Plan:  -PFA PVI  -HERIBERTO prior to rule out thrombus   -Anesthesia for sedation     Prior to procedure, Dr. Mo at bedside speaking with patient and family. Extensive discussion with patient regarding the nature of PFA PVI including transseptal puncture. We discussed risks and benefits at length. Our discussion included, but was not limited to the risk of death, infection, bleeding, stroke, MI, cardiac perforation, embolism, cardiac tamponade, vascular injury, AE fistula, injury to phrenic nerve, pulmonary vein stenosis and other organic injury including the possibility for need for surgery or pacemaker implantation. We discussed success rates and possible need for redo procedures. Patient verbalized understanding. All questions answered, no further questions or concerns, patient would like to proceed. Consents signed.    JERICHO Burgos-FEDERICA  Cardiology Electrophysiology NP   [facility]    Attending: Dejan Mo MD

## 2024-10-22 NOTE — DISCHARGE INSTRUCTIONS
"Ablation Discharge Instructions and Care of Your Groin      Follow up:  Follow up with Dr. Latif in 3 months.    Medications:  Make sure to continue taking your blood thinner Pradaxa after your procedure as prescribed  Start Amiodarone: Take 2 tablets (400 mg) by mouth 2 (two) times daily for 14 days, THEN, Take 2 tablets (400 mg) by mouth 1 (one) time daily for 14 days, THEN take 1 tablet (200 mg) by mouth 1 (one) time daily.   If given a prescription of furosemide (trade name: Lasix), which is a diuretic (fluid pill), you can take it daily or twice daily as needed for fluid retention or shortness of breath following your ablation  You may experience chest discomfort (also known as "pericarditis") with deep breathes, coughing, and/or laying down which is typically normal following your procedure. If this occurs, you can take ibuprofen (Motrin) 800 mg every 8 hours for 2-3 days. If the chest pain is persistent or severe please visit the nearest emergency department.    Groin site management, precautions, and restrictions:  Remove the bandages over your groin area the morning after your procedure. You can shower after you remove these bandages. Wash the sites at least once daily with soap and water. Keep the groin sites clean and dry. You do not need to apply ointments or bandages to the area.   Wear loose clothes and loose underwear.  Do not take a bath or submerge your groin area (for example: Jacuzzi, swimming pool, or lake) or at least 1 week or when the puncture sites in your groin have completely healed.     If bleeding should occur at the groin sites following discharge:  If oozing from groin site occurs, lay down and apply pressure to the puncture site with your fingers without letting up for 15 minutes and lay flat for 1 hour. If bleeding has resolved, you can continue to monitor. If the bleeding continues or there is significant swelling or pain in the groin area, please call 911 immediately and visit the " nearest ER for evaluation and treatment. Do not drive yourself to the hospital. DO NOT STOP TAKING YOUR BLOOD THINNER UNLESS INSTRUCTED BY A PHYSICIAN.     Activity Instructions:  Do not drive or operate any dangerous machinery for 24 hours, but optimally 48-72 hours since you were given general anesthesia.   Do not strain during bowel movements for the first 3 to 4 days after the procedure to prevent bleeding from the groin sites.  Avoid heavy lifting (more than 10 pounds) and pushing or pulling heavy objects for the first 5 to 7 days after the procedure.  Do not participate in strenuous activities for 5 days after the procedure. This includes most sports - jogging, golfing, play tennis, and bowling.  You may climb stairs if needed, but walk up and down the stairs more slowly than usual.  Gradually increase your activities until you reach your normal activity level within one week after the procedure.    Go to the Emergency Department if you develop:   Change in color or temperature of the leg  Redness, warmth, or drainage/pus at the groin sites  Chills or fever greater than 101.0 F   Severe bleeding or swelling at the groin sites  Acute Weakness or numbness   Visual, gait or speech disturbances   New chest pain, palpitations, shortness of breath, fainting

## 2024-10-22 NOTE — DISCHARGE SUMMARY
[facility] - Short Stay Cardiac Unit  Cardiac Electrophysiology  Discharge Summary      Patient Name: Hussein Steinberg  MRN: [id number]  Admission Date: 10/22/2024  Hospital Length of Stay: 0 days  Discharge Date and Time: 10/22/2024 6:04PM  Attending Physician: Dejan Mo MD    Discharging Provider: Yarelis Thomas NP  Primary Care Physician: Lacey Rainey MD    HPI: Mr. Steinberg is a 74 year old male with a PMHx of AF, DCM, CRT-D (2013, gen change 2019), DC-BiV ICD SJM, HTN, HLD, CVA (hemorrhagic), RFA PVI 2023 (hx amiodarone), AF recurrence-DCCV 8/2024 (no AF since, BiV pacing 99%) who presents today to [facility] for scheduled PVI PFA with Dr. Mo. He denies any chest pain, palpitations, SOB, GUNN, dizziness, light headedness, weakness, LE swelling, syncope, or near syncopal episodes. He denies any bleeding, infections, fevers, rashes, or surgeries in the past 30 days. He does endorse decreased peripheral vision on the left-residual from CVA, VSS. He is currently taking norvasc 10 mg daily, pradaxa 150mg BID (last dose taken yesterday PM), metoprolol tartrate 100 mg BID, and maxzide daily.     Procedure(s) (LRB):  Ablation atrial fibrillation (N/A)  ECHOCARDIOGRAM, TRANSESOPHAGEAL (N/A)     Indwelling Lines/Drains at time of discharge:  Lines/Drains/Airways    none    Hospital Course:Patient underwent PFA-PVI (see procedure note for details), tolerated procedure well with no acute complications. Admitted to PACU, post-procedure ECG showed AV dual paced rhythm at 60 bpm  ms  ms QT/QTc 510/510 ms. Small bleed noted to right groin while in recovery. Hemostasis achieved after manual pressure held. Bilateral groin sites with sutures removed, dressings C/D/I. No bleeding, hematoma, or bruit noted. Bedrest completed x 4 hours. Mr. Steinberg was monitored on telemetry, no acute arrhythmias. Patient ambulating, tolerating PO intake, and voiding with no issues. Denies any chest pain or SOB. Discharge plans  discussed with Dr. Mo. Patient to continue all home medications including Pradaxa tonight for CVA prophylaxis. Start amiodarone 400 mg BID x 14 days, then 400 mg daily x 14 days, then 200 mg daily. Follow up with Dr. Latif in 3 months. Patient was assessed at bedside prior to discharge. He reported feeling well and denied chest discomfort, shortness of breath, palpitations, lightheadedness, or any other acute symptoms. Discharge plans/instructions discussed with patient and wife who verbalized understanding and agreement of plans of care. No further questions or concerns voiced at this time. He was discharged home in stable condition.     Goals of Care Treatment Preferences:  Code Status: Full Code    Consults: Cardiology HERIBERTO    Significant Diagnostic Studies: HERIBERTO    HERIBERTO for LA/VIJAYA assessment prior to PVI. No thrombus noted in the VIJAYA.    Left Ventricle: The left ventricle is normal in size. Normal wall thickness. Normal wall motion. There is low normal systolic function with a visually estimated ejection fraction of 50 - 55%.    Right Ventricle: Normal right ventricular cavity size. Wall thickness is normal. Systolic function is normal.    Left Atrium: The left atrial appendage appears normal. The left atrial appendage has a cauliflower morphology. Appendage velocity is normal at greater than 40 cm/sec. The pulmonary veins have systolic blunting. There is no thrombus in the cavity.    Right Atrium: Right atrium is dilated. Lead present in the right atrium.    Aortic Valve: The aortic valve is a trileaflet valve. Mildly calcified cusps. There is annular calcification present.    Mitral Valve: There is mild regurgitation with a centrally directed jet.    Tricuspid Valve: There is mild regurgitation with a centrally directed jet.    Aorta: Aortic root is normal in size measuring 3.4 cm. Ascending aorta is mildly dilated measuring 3.9 cm. Grade 3 atherosclerosis of the descending aorta and in the aortic  "arch.    Final Active Diagnoses:    Diagnosis Date Noted POA    PRINCIPAL PROBLEM:  Persistent atrial fibrillation [I48.19] 03/19/2013 Yes      Problems Resolved During this Admission:     Discharged Condition: stable    Disposition: Home or Self Care    Follow Up:   Follow-up Information       Srinivas Latif MD Follow up.    Specialties: Electrophysiology, Cardiology  Why: Post ablation  Contact information:  [address]  [facility] [address]  [phone]                           Patient Instructions:      No driving until:   Order Comments: No driving or operating heavy machinery for 24-48 hours after your procedure because you received sedation.     Other restrictions (specify):   Order Comments: Ablation Discharge Instructions and Care of Your Groin      Follow up:  · Follow up with Dr. Latif in 3 months.    Medications:  · Make sure to continue taking your blood thinner Pradaxa after your procedure as prescribed  · Start Amiodarone: Take 2 tablets (400 mg) by mouth 2 (two) times daily for 14 days, THEN, Take 2 tablets (400 mg) by mouth 1 (one) time daily for 14 days, THEN take 1 tablet (200 mg) by mouth 1 (one) time daily.   · If given a prescription of furosemide (trade name: Lasix), which is a diuretic (fluid pill), you can take it daily or twice daily as needed for fluid retention or shortness of breath following your ablation  · You may experience chest discomfort (also known as "pericarditis") with deep breathes, coughing, and/or laying down which is typically normal following your procedure. If this occurs, you can take ibuprofen (Motrin) 800 mg every 8 hours for 2-3 days. If the chest pain is persistent or severe please visit the nearest emergency department.    Groin site management, precautions, and restrictions:  · Remove the bandages over your groin area the morning after your procedure. You can shower after you remove these bandages. Wash the sites at least once daily with soap and water. " Keep the groin sites clean and dry. You do not need to apply ointments or bandages to the area.   · Wear loose clothes and loose underwear.  · Do not take a bath or submerge your groin area (for example: Jacuzzi, swimming pool, or lake) or at least 1 week or when the puncture sites in your groin have completely healed.     If bleeding should occur at the groin sites following discharge:  · If oozing from groin site occurs, lay down and apply pressure to the puncture site with your fingers without letting up for 15 minutes and lay flat for 1 hour. If bleeding has resolved, you can continue to monitor. If the bleeding continues or there is significant swelling or pain in the groin area, please call 911 immediately and visit the nearest ER for evaluation and treatment. Do not drive yourself to the hospital. DO NOT STOP TAKING YOUR BLOOD THINNER UNLESS INSTRUCTED BY A PHYSICIAN.     Activity Instructions:  · Do not drive or operate any dangerous machinery for 24 hours, but optimally 48-72 hours since you were given general anesthesia.   · Do not strain during bowel movements for the first 3 to 4 days after the procedure to prevent bleeding from the groin sites.  · Avoid heavy lifting (more than 10 pounds) and pushing or pulling heavy objects for the first 5 to 7 days after the procedure.  · Do not participate in strenuous activities for 5 days after the procedure. This includes most sports - jogging, golfing, play tennis, and bowling.  · You may climb stairs if needed, but walk up and down the stairs more slowly than usual.  · Gradually increase your activities until you reach your normal activity level within one week after the procedure.    Go to the Emergency Department if you develop:   · Change in color or temperature of the leg  · Redness, warmth, or drainage/pus at the groin sites  · Chills or fever greater than 101.0 F   · Severe bleeding or swelling at the groin sites  · Acute Weakness or numbness   · Visual,  gait or speech disturbances   · New chest pain, palpitations, shortness of breath, fainting     Lifting restrictions     Notify your health care provider if you experience any of the following:  increased confusion or weakness     Notify your health care provider if you experience any of the following:  persistent dizziness, light-headedness, or visual disturbances     Notify your health care provider if you experience any of the following:  worsening rash     Notify your health care provider if you experience any of the following:  severe persistent headache     Notify your health care provider if you experience any of the following:  difficulty breathing or increased cough     Notify your health care provider if you experience any of the following:  redness, tenderness, or signs of infection (pain, swelling, redness, odor or green/yellow discharge around incision site)     Notify your health care provider if you experience any of the following:  severe uncontrolled pain     Notify your health care provider if you experience any of the following:  persistent nausea and vomiting or diarrhea     Notify your health care provider if you experience any of the following:  temperature >100.4     Remove dressing in 24 hours     Weight bearing restrictions (specify):     Shower on day dressing removed (No bath)     Medications:  Reconciled Home Medications:      Medication List        START taking these medications      amiodarone 200 MG Tab  Commonly known as: PACERONE  Take 2 tablets (400 mg) by mouth 2 (two) times daily for 14 days, THEN, Take 2 tablets (400 mg) by mouth 1 (one) time daily for 14 days, THEN take 1 tablet (200 mg) by mouth 1 (one) time daily.            CONTINUE taking these medications      acetaminophen 325 MG tablet  Commonly known as: TYLENOL  Take 2 tablets (650 mg total) by mouth every 6 (six) hours as needed (pain 1-4/10 pain scale).     amLODIPine 10 MG tablet  Commonly known as: NORVASC  Take 1  tablet (10 mg total) by mouth once daily.     AREXVY (PF) 120 mcg/0.5 mL Susr vaccine  Generic drug: RSVPreF3 antigen-AS01E (PF)  Inject into the muscle.     atorvastatin 20 MG tablet  Commonly known as: LIPITOR  Take 1 tablet (20 mg total) by mouth once daily.     candesartan 32 MG tablet  Commonly known as: ATACAND  Take 1 tablet (32 mg total) by mouth once daily.     CENTRUM SILVER ORAL  Take by mouth once daily.     chondroitin sulfate A sodium 400 mg Cap  Take by mouth.     dabigatran etexilate 150 mg Cap  Commonly known as: PRADAXA  Take 1 capsule (150 mg total) by mouth every 12 (twelve) hours.     metoprolol tartrate 100 MG tablet  Commonly known as: LOPRESSOR  Take 1 tablet (100 mg total) by mouth 2 (two) times daily.     triamterene-hydrochlorothiazide 37.5-25 mg 37.5-25 mg per tablet  Commonly known as: MAXZIDE-25  Take 1 tablet by mouth once daily.            ASK your doctor about these medications      ezetimibe 10 mg tablet  Commonly known as: ZETIA  Take 1 tablet (10 mg total) by mouth once daily.            Plan:  -Start amiodarone with oral load  -Continue Pradaxa tonight  -Continue all other home medications  -Follow up with Dr. Latif in 3 months    Time spent on the discharge of patient: 25 minutes    Yarelis Thomas NP  Cardiac Electrophysiology  [facility] - Short Stay Cardiac Unit    Attending: Dejan Mo MD

## 2024-10-22 NOTE — NURSING
Pt walked around the unit per orders.   Pt's dressing's to bilateral groin remained CDI.   No s/s of bleeding noted. VSS.  NAD noted. Will DC pt per orders.

## 2024-10-22 NOTE — NURSING
Pt DC'd per orders.  DC paperwork reviewed w pt and spouse.  Pt verbalized DC instructions.    IV's removed. Tele DC'd.  Pt was able to drink, eat, walk, and urinate with no difficulties.    Pt being wheeled off unit per Melanie RN in wheelchair.  Spouse in car in front of hospital.

## 2024-10-22 NOTE — TRANSFER OF CARE
"Anesthesia Transfer of Care Note    Patient: Hussein Steinberg    Procedure(s) Performed: Procedure(s) (LRB):  Ablation atrial fibrillation (N/A)  ECHOCARDIOGRAM, TRANSESOPHAGEAL (N/A)    Patient location: Cath Lab    Anesthesia Type: general    Transport from OR: Transported from OR on 6-10 L/min O2 by face mask with adequate spontaneous ventilation    Post pain: adequate analgesia    Post assessment: no apparent anesthetic complications    Post vital signs: stable    Level of consciousness: awake    Nausea/Vomiting: no nausea/vomiting    Complications: none    Transfer of care protocol was followed      Last vitals: Visit Vitals  /68 (BP Location: Left arm, Patient Position: Lying)   Pulse 64   Temp 37.2 °C (98.9 °F) (Temporal)   Resp 20   Ht 5' 11" (1.803 m)   Wt 108 kg (238 lb)   SpO2 96%   BMI 33.19 kg/m²     "

## 2024-10-23 ENCOUNTER — TELEPHONE (OUTPATIENT)
Dept: ELECTROPHYSIOLOGY | Facility: CLINIC | Age: 74
End: 2024-10-23
Payer: MEDICARE

## 2024-10-23 NOTE — TELEPHONE ENCOUNTER
----- Message from Med Assistant Hussein sent at 10/22/2024  5:03 PM CDT -----  Regarding: FW: Ablation follow up    ----- Message -----  From: Yarelis Thomas NP  Sent: 10/22/2024   5:01 PM CDT  To: [facility]IN Staff  Subject: Ablation follow up                               This is a patient of Dr. Mo's who underwent successful PVI ablation today. Can we please get him a new patient visit with Dr. Latif for his 3 month s/p ablation appointment?     Thank you!   Kel

## 2024-10-24 NOTE — ANESTHESIA POSTPROCEDURE EVALUATION
Anesthesia Post Evaluation    Patient: Hussein Steinberg    Procedure(s) Performed: Procedure(s) (LRB):  Ablation atrial fibrillation (N/A)  ECHOCARDIOGRAM, TRANSESOPHAGEAL (N/A)    Final Anesthesia Type: general      Patient location during evaluation: PACU  Patient participation: Yes- Able to Participate  Level of consciousness: awake and alert and oriented  Post-procedure vital signs: reviewed and stable  Pain management: adequate  Airway patency: patent  YUMIKO mitigation strategies: Intraoperative administration of CPAP, nasopharyngeal airway, or oral appliance during sedation, Multimodal analgesia, Extubation while patient is awake, Verification of full reversal of neuromuscular block and Extubation and recovery carried out in lateral, semiupright, or other nonsupine position  PONV status at discharge: No PONV  Anesthetic complications: no      Cardiovascular status: hemodynamically stable  Respiratory status: unassisted  Hydration status: euvolemic  Follow-up not needed.              Vitals Value Taken Time   /66 10/22/24 1730   Temp 36.7 °C (98.1 °F) 10/22/24 1730   Pulse 63 10/22/24 1730   Resp 18 10/22/24 1730   SpO2 95 % 10/22/24 1730         No case tracking events are documented in the log.      Pain/Liberty Score: No data recorded

## 2024-10-25 NOTE — ANESTHESIA PROCEDURE NOTES
Arterial    Diagnosis: a fib    Patient location during procedure: done in OR    Staffing  Authorizing Provider: Jake Sierra MD  Performing Provider: Jake Sierra MD    Staffing  Performed by: Jake Sierra MD  Authorized by: Jake Sierra MD    Anesthesiologist was present at the time of the procedure.  Arterial  Skin Prep: chlorhexidine gluconate  Local Infiltration: none  Orientation: right  Location: radial    Catheter Size: 20 G  Catheter placement by Ultrasound guidance. Heme positive aspiration all ports.   Vessel Caliber: patent  Needle advanced into vessel with real time Ultrasound guidance.Insertion Attempts: 1  Assessment  Dressing: secured with tape and tegaderm

## 2024-10-25 NOTE — ANESTHESIA POSTPROCEDURE EVALUATION
Anesthesia Post Evaluation    Patient: Hussein Steinberg    Procedure(s) Performed: Procedure(s) (LRB):  Ablation atrial fibrillation (N/A)  ECHOCARDIOGRAM, TRANSESOPHAGEAL (N/A)    Final Anesthesia Type: general      Level of consciousness: awake and alert  Post-procedure vital signs: reviewed and stable  Pain control: Pain has been treated.  Airway patency: patent    PONV status: Absent or treated.  Anesthetic complications: no      Cardiovascular status: hemodynamically stable  Respiratory status: unassisted  Hydration status: euvolemic                Vitals Value Taken Time   /66 10/22/24 1730   Temp 36.7 °C (98.1 °F) 10/22/24 1730   Pulse 63 10/22/24 1730   Resp 18 10/22/24 1730   SpO2 95 % 10/22/24 1730         No case tracking events are documented in the log.      Pain/Liberty Score: No data recorded

## 2024-10-28 ENCOUNTER — TELEPHONE (OUTPATIENT)
Dept: ELECTROPHYSIOLOGY | Facility: CLINIC | Age: 74
End: 2024-10-28
Payer: MEDICARE

## 2024-10-28 DIAGNOSIS — Z95.810 ICD (IMPLANTABLE CARDIOVERTER-DEFIBRILLATOR), BIVENTRICULAR, IN SITU: Primary | ICD-10-CM

## 2024-11-17 ENCOUNTER — CLINICAL SUPPORT (OUTPATIENT)
Dept: CARDIOLOGY | Facility: HOSPITAL | Age: 74
End: 2024-11-17
Attending: INTERNAL MEDICINE
Payer: MEDICARE

## 2024-11-17 ENCOUNTER — CLINICAL SUPPORT (OUTPATIENT)
Dept: CARDIOLOGY | Facility: HOSPITAL | Age: 74
End: 2024-11-17
Payer: MEDICARE

## 2024-11-17 DIAGNOSIS — Z95.810 PRESENCE OF AUTOMATIC (IMPLANTABLE) CARDIAC DEFIBRILLATOR: ICD-10-CM

## 2024-11-17 DIAGNOSIS — I48.91 UNSPECIFIED ATRIAL FIBRILLATION: ICD-10-CM

## 2024-11-17 DIAGNOSIS — R00.1 BRADYCARDIA, UNSPECIFIED: ICD-10-CM

## 2024-11-17 PROCEDURE — 93295 DEV INTERROG REMOTE 1/2/MLT: CPT | Mod: ,,, | Performed by: INTERNAL MEDICINE

## 2024-11-17 PROCEDURE — 93296 REM INTERROG EVL PM/IDS: CPT | Performed by: INTERNAL MEDICINE

## 2024-12-02 ENCOUNTER — PATIENT MESSAGE (OUTPATIENT)
Dept: ADMINISTRATIVE | Facility: OTHER | Age: 74
End: 2024-12-02
Payer: MEDICARE

## 2024-12-03 ENCOUNTER — OFFICE VISIT (OUTPATIENT)
Dept: PRIMARY CARE CLINIC | Facility: CLINIC | Age: 74
End: 2024-12-03
Payer: MEDICARE

## 2024-12-03 VITALS
BODY MASS INDEX: 33.36 KG/M2 | DIASTOLIC BLOOD PRESSURE: 82 MMHG | WEIGHT: 238.31 LBS | OXYGEN SATURATION: 96 % | HEART RATE: 64 BPM | SYSTOLIC BLOOD PRESSURE: 132 MMHG | HEIGHT: 71 IN

## 2024-12-03 DIAGNOSIS — M79.10 MYALGIA DUE TO STATIN: ICD-10-CM

## 2024-12-03 DIAGNOSIS — R79.89 ELEVATED TSH: ICD-10-CM

## 2024-12-03 DIAGNOSIS — E78.2 MIXED HYPERLIPIDEMIA: ICD-10-CM

## 2024-12-03 DIAGNOSIS — G47.33 OSA (OBSTRUCTIVE SLEEP APNEA): ICD-10-CM

## 2024-12-03 DIAGNOSIS — Z85.46 HISTORY OF PROSTATE CANCER: ICD-10-CM

## 2024-12-03 DIAGNOSIS — I48.19 PERSISTENT ATRIAL FIBRILLATION: ICD-10-CM

## 2024-12-03 DIAGNOSIS — E66.09 CLASS 1 OBESITY DUE TO EXCESS CALORIES WITH SERIOUS COMORBIDITY AND BODY MASS INDEX (BMI) OF 33.0 TO 33.9 IN ADULT: ICD-10-CM

## 2024-12-03 DIAGNOSIS — E66.811 CLASS 1 OBESITY DUE TO EXCESS CALORIES WITH SERIOUS COMORBIDITY AND BODY MASS INDEX (BMI) OF 33.0 TO 33.9 IN ADULT: ICD-10-CM

## 2024-12-03 DIAGNOSIS — Z79.899 ENCOUNTER FOR LONG-TERM (CURRENT) USE OF MEDICATIONS: ICD-10-CM

## 2024-12-03 DIAGNOSIS — T46.6X5A MYALGIA DUE TO STATIN: ICD-10-CM

## 2024-12-03 DIAGNOSIS — Z86.73 HISTORY OF CVA (CEREBROVASCULAR ACCIDENT): Primary | ICD-10-CM

## 2024-12-03 DIAGNOSIS — G47.09 OTHER INSOMNIA: ICD-10-CM

## 2024-12-03 DIAGNOSIS — Z79.01 CURRENT USE OF ANTICOAGULANT THERAPY: ICD-10-CM

## 2024-12-03 DIAGNOSIS — I42.0 DCM (DILATED CARDIOMYOPATHY): ICD-10-CM

## 2024-12-03 DIAGNOSIS — I10 ESSENTIAL HYPERTENSION: ICD-10-CM

## 2024-12-03 DIAGNOSIS — Z95.810 ICD (IMPLANTABLE CARDIOVERTER-DEFIBRILLATOR), BIVENTRICULAR, IN SITU: ICD-10-CM

## 2024-12-03 PROCEDURE — 99999 PR PBB SHADOW E&M-EST. PATIENT-LVL III: CPT | Mod: PBBFAC,,, | Performed by: INTERNAL MEDICINE

## 2024-12-03 RX ORDER — AMLODIPINE BESYLATE 10 MG/1
10 TABLET ORAL DAILY
Qty: 90 TABLET | Refills: 3 | Status: SHIPPED | OUTPATIENT
Start: 2024-12-03

## 2024-12-03 RX ORDER — TRIAMTERENE/HYDROCHLOROTHIAZID 37.5-25 MG
1 TABLET ORAL DAILY
Qty: 90 TABLET | Refills: 3 | Status: SHIPPED | OUTPATIENT
Start: 2024-12-03

## 2024-12-03 RX ORDER — METOPROLOL TARTRATE 100 MG/1
100 TABLET ORAL 2 TIMES DAILY
Qty: 180 TABLET | Refills: 3 | Status: SHIPPED | OUTPATIENT
Start: 2024-12-03

## 2024-12-03 NOTE — ASSESSMENT & PLAN NOTE
Stable on lipitor 20 mg, has been having constipation.  Doesn't tolerate other statins, had joint pains.  The ASCVD Risk score (José BIRCH, et al., 2019) failed to calculate for the following reasons:    Risk score cannot be calculated because patient has a medical history suggesting prior/existing ASCVD

## 2024-12-03 NOTE — PROGRESS NOTES
[facility]    Chief Complaint      Chief Complaint   Patient presents with    Annual Exam       History of Present Illness      Hussein Steinberg is a 74 y.o. male who presents today for follow up HTN/HLD.  Patient comes to appointment alone.  EP: David Moho: Dheeraj, Sleep: [place]      Problem List Items Addressed This Visit       Class 1 obesity due to excess calories with serious comorbidity and body mass index (BMI) of 33.0 to 33.9 in adult    Current Assessment & Plan     Has been trying to walk more.  Lost 20 pounds since 2022.         Current use of anticoagulant therapy    Overview     Pradaxa for stroke prophylaxis for Afib  Last dose this morning         DCM (dilated cardiomyopathy)    Current Assessment & Plan     Sees Dr. Mo, no CP/SOB.  Sleeps on 2 pillows.  On BB, ARB, pradaxa. Has AICD.         Elevated TSH    Relevant Orders    T4, FREE    Essential hypertension    Current Assessment & Plan     BP controlled on norvasc 10 mg, lopressor 100 mg BID, maxzide and candesartan 32 mg daily.  No CP/SOB.  Had dry cough with lisinopril, diarrhea with olmesartan.         Relevant Medications    triamterene-hydrochlorothiazide 37.5-25 mg (MAXZIDE-25) 37.5-25 mg per tablet    metoprolol tartrate (LOPRESSOR) 100 MG tablet    amLODIPine (NORVASC) 10 MG tablet    History of CVA (cerebrovascular accident) - Primary    Current Assessment & Plan     Stable on ASA, Pradaxa for secondary prevention.         History of prostate cancer    Current Assessment & Plan     Treated by Dr. Rasmussen. Sees PRN. No complaints at this time.  Occasional nocturia.         Hyperlipidemia    Current Assessment & Plan     Stable on lipitor 20 mg, has been having constipation.  Doesn't tolerate other statins, had joint pains.  The ASCVD Risk score (José BIRCH, et al., 2019) failed to calculate for the following reasons:    Risk score cannot be calculated because patient has a medical history suggesting  prior/existing ASCVD           Relevant Orders    CBC Auto Differential    Lipid Panel    Comprehensive Metabolic Panel    ICD (implantable cardioverter-defibrillator), biventricular, in situ    Myalgia due to statin    YUMIKO (obstructive sleep apnea)    Current Assessment & Plan     Has CPAP, using every night.  Sees sleep med.         Other insomnia    Current Assessment & Plan     Waking up at odd times and having hard time falling back asleep.    Previously on trazodone without relief.         Persistent atrial fibrillation    Current Assessment & Plan     Sees Dr. Latif, no CP/SOB.  Sleeps on 2 pillows.  On BB, amio, pradaxa. Has AICD. Had another ablation with Dr. Mo on 10/22/2024.          Relevant Orders    TSH    T4, FREE     Other Visit Diagnoses       Encounter for long-term (current) use of medications        Relevant Orders    Hemoglobin A1C                Health Maintenance   Topic Date Due    COVID-19 Vaccine (8 - 2024-25 season) 09/01/2024    Lipid Panel  03/13/2029    TETANUS VACCINE  05/13/2031    Hepatitis C Screening  Completed    Shingles Vaccine  Completed    Influenza Vaccine  Completed    RSV Vaccine (Age 60+ and Pregnant patients)  Completed    Pneumococcal Vaccines (Age 65+)  Completed    Colorectal Cancer Screening  Discontinued       Past Medical History:   Diagnosis Date    Anticoagulant long-term use     Atrial fibrillation     Basal cell carcinoma     Bronchitis 03/20/2017    Cardiac arrest 2012    has pacemaker/defibrillator placed 3/19/13 - followed by [facility]    Cataract     Current use of anticoagulant therapy 02/13/2023    Current use of anticoagulant therapy 02/13/2023    DCM (dilated cardiomyopathy) 06/15/2017    High cholesterol     History of intracerebral hemorrhage without residual deficit 08/23/2019    History of prostate cancer 06/23/2017    Hypertension     Kidney stone 10/2013    LBBB (left bundle branch block) 10/10/2012    LV dysfunction 10/10/2012     Prostate cancer     Treated by Dr. Rasmussen    Squamous cell carcinoma in situ of skin 2017    left arm removed    Stroke 09/2012    + hemorrhagic infarct to right occipital lobe after started on Plavix following cardiac event    SVT (supraventricular tachycardia) 10/10/2012       Past Surgical History:   Procedure Laterality Date    ABLATION OF ARRHYTHMOGENIC FOCUS FOR ATRIAL FIBRILLATION N/A 8/28/2023    Procedure: Ablation atrial fibrillation;  Surgeon: Dejan Mo MD;  Location: [facility] EP LAB;  Service: Cardiology;  Laterality: N/A;  AF, HERIBERTO, PVI, RFA, CARTO, Gen, DM, 3 Prep *CRT-D SJM*    ABLATION OF ARRHYTHMOGENIC FOCUS FOR ATRIAL FIBRILLATION N/A 10/22/2024    Procedure: Ablation atrial fibrillation;  Surgeon: Dejan Mo MD;  Location: [facility] EP LAB;  Service: Cardiology;  Laterality: N/A;  AF, HERIBERTO, PVI, PFA, SHARAD, Gen, DM, 3 Prep *CRT-D SJM*    arthroscopic  knee    BASAL CELL CARCINOMA EXCISION  2018    removed from nose    Basil cell   2022    CARDIAC PACEMAKER PLACEMENT  2013    and defibrillator    CARDIOVERSION  8/28/2023    Procedure: Cardioversion;  Surgeon: Dejan oM MD;  Location: [facility] EP LAB;  Service: Cardiology;;    COLONOSCOPY  2/12/2016    + polyp - repeat in 5 years- Dr. Calles    COLONOSCOPY N/A 7/19/2021    Procedure: COLONOSCOPY;  Surgeon: Troy Bryson MD;  Location: [facility] ENDO (4TH FLR);  Service: Endoscopy;  Laterality: N/A;  pacemaker/AICD-[facility]  fully vaccinated-GT     ok to hold Pradaxa 2 days per Dr Mo  Miralax prep    ECHOCARDIOGRAM,TRANSESOPHAGEAL N/A 4/24/2023    Procedure: Transesophageal echo (HERIBERTO) intra-procedure log documentation;  Surgeon: [facility];  Location: [facility] EP LAB;  Service: Cardiology;  Laterality: N/A;    ECHOCARDIOGRAM,TRANSESOPHAGEAL N/A 8/28/2024    Procedure: Transesophageal echo (HERIBERTO) intra-procedure log documentation;  Surgeon: Karly West MD;  Location: [facility] EP LAB;  Service: Cardiology;  Laterality: N/A;     EYE SURGERY  1992    PROSTATECTOMY      SKIN CANCER EXCISION Left 10/2017    squamous cell carcinoma removed from left arm    TRANSESOPHAGEAL ECHOCARDIOGRAPHY N/A 8/28/2023    Procedure: ECHOCARDIOGRAM, TRANSESOPHAGEAL;  Surgeon: Kirk Guy III, MD;  Location: [facility] EP LAB;  Service: Cardiology;  Laterality: N/A;    TRANSESOPHAGEAL ECHOCARDIOGRAPHY N/A 10/22/2024    Procedure: ECHOCARDIOGRAM, TRANSESOPHAGEAL;  Surgeon: Onofre Miller MD;  Location: [facility] EP LAB;  Service: Cardiology;  Laterality: N/A;    TREATMENT OF CARDIAC ARRHYTHMIA N/A 7/31/2020    Procedure: CARDIOVERSION;  Surgeon: Dejan Mo MD;  Location: [facility] EP LAB;  Service: Cardiology;  Laterality: N/A;  afib, HERIBERTO, DCCV, anes, DM, 3 Prep    TREATMENT OF CARDIAC ARRHYTHMIA N/A 4/24/2023    Procedure: Cardioversion or Defibrillation;  Surgeon: Dejan Mo MD;  Location: [facility] EP LAB;  Service: Cardiology;  Laterality: N/A;  AF, HERIBERTO, DCCV, MAC, DM, 3 Prep *SJM CRTD*    TREATMENT OF CARDIAC ARRHYTHMIA N/A 8/28/2024    Procedure: Cardioversion or Defibrillation;  Surgeon: Dejan Mo MD;  Location: [facility] EP LAB;  Service: Cardiology;  Laterality: N/A;  AF, HERIBERTO, DCCV, MAC, DM, 3 Prep *SJM CRT-D*    VASECTOMY  1988       family history includes Cancer (age of onset: 56) in his father; Cataracts in his mother; Glaucoma in his mother; Heart attack in his maternal grandfather and paternal grandfather; Heart attack (age of onset: 59) in his brother; Heart disease in his brother and mother; Hypertension in his mother; Macular degeneration in his mother; No Known Problems in his brother, daughter, daughter, and sister; Parkinsonism (age of onset: 54) in his brother.    Social History     Tobacco Use    Smoking status: Never    Smokeless tobacco: Never   Substance Use Topics    Alcohol use: Never    Drug use: Never       Review of Systems   Constitutional:  Negative for chills and fever.   Respiratory:  Negative for cough and shortness  of breath.    Cardiovascular:  Negative for chest pain and palpitations.   Gastrointestinal:  Negative for constipation, diarrhea, nausea and vomiting.   Genitourinary:  Negative for dysuria and hematuria.   Musculoskeletal:  Negative for falls.   Neurological:  Negative for headaches.        Outpatient Encounter Medications as of 12/3/2024   Medication Sig Dispense Refill    acetaminophen (TYLENOL) 325 MG tablet Take 2 tablets (650 mg total) by mouth every 6 (six) hours as needed (pain 1-4/10 pain scale). 15 tablet 0    amiodarone (PACERONE) 200 MG Tab Take 2 tablets (400 mg) by mouth 2 (two) times daily for 14 days, THEN, Take 2 tablets (400 mg) by mouth 1 (one) time daily for 14 days, THEN take 1 tablet (200 mg) by mouth 1 (one) time daily. 84 tablet 1    atorvastatin (LIPITOR) 20 MG tablet Take 1 tablet (20 mg total) by mouth once daily. 90 tablet 3    candesartan (ATACAND) 32 MG tablet Take 1 tablet (32 mg total) by mouth once daily. 90 tablet 3    chondroitin sulfate A sodium 400 mg Cap Take by mouth.      dabigatran etexilate (PRADAXA) 150 mg Cap Take 1 capsule (150 mg total) by mouth every 12 (twelve) hours. 180 capsule 3    folic acid/multivit-min/lutein (CENTRUM SILVER ORAL) Take by mouth once daily.      [DISCONTINUED] amLODIPine (NORVASC) 10 MG tablet Take 1 tablet (10 mg total) by mouth once daily. 90 tablet 0    [DISCONTINUED] metoprolol tartrate (LOPRESSOR) 100 MG tablet Take 1 tablet (100 mg total) by mouth 2 (two) times daily. 180 tablet 0    [DISCONTINUED] triamterene-hydrochlorothiazide 37.5-25 mg (MAXZIDE-25) 37.5-25 mg per tablet Take 1 tablet by mouth once daily. 90 tablet 0    amLODIPine (NORVASC) 10 MG tablet Take 1 tablet (10 mg total) by mouth once daily. 90 tablet 3    metoprolol tartrate (LOPRESSOR) 100 MG tablet Take 1 tablet (100 mg total) by mouth 2 (two) times daily. 180 tablet 3    triamterene-hydrochlorothiazide 37.5-25 mg (MAXZIDE-25) 37.5-25 mg per tablet Take 1 tablet by mouth  "once daily. 90 tablet 3    [DISCONTINUED] ezetimibe (ZETIA) 10 mg tablet Take 1 tablet (10 mg total) by mouth once daily. (Patient not taking: Reported on 10/9/2024) 90 tablet 0    [DISCONTINUED] RSVPreF3 antigen-AS01E, PF, (AREXVY, PF,) 120 mcg/0.5 mL SusR vaccine Inject into the muscle. 0.5 mL 0     No facility-administered encounter medications on file as of 12/3/2024.        Review of patient's allergies indicates:   Allergen Reactions    Olmesartan Diarrhea     Diarrhea and muscle aches since starting medication.     Lisinopril Other (See Comments)     Dry cough       Physical Exam      Vital Signs  Pulse: 64  SpO2: 96 %  BP: 132/82  Pain Score: 0-No pain  Height and Weight  Height: 5' 11" (180.3 cm)  Weight: 108.1 kg (238 lb 5.1 oz)  BSA (Calculated - sq m): 2.33 sq meters  BMI (Calculated): 33.3  Weight in (lb) to have BMI = 25: 178.9]  Body mass index is 33.24 kg/m².    Physical Exam  Constitutional:       Appearance: He is well-developed.   HENT:      Head: Normocephalic and atraumatic.   Neck:      Thyroid: No thyromegaly.   Cardiovascular:      Rate and Rhythm: Normal rate and regular rhythm.      Heart sounds: No murmur heard.  Pulmonary:      Effort: Pulmonary effort is normal. No respiratory distress.      Breath sounds: Normal breath sounds.   Abdominal:      General: There is no distension.      Palpations: Abdomen is soft.      Tenderness: There is no abdominal tenderness.   Skin:     General: Skin is warm and dry.   Neurological:      Mental Status: He is alert and oriented to person, place, and time.   Psychiatric:         Behavior: Behavior normal.          Laboratory:  CBC:  Recent Labs   Lab Result Units 10/15/24  0709   WBC K/uL 6.79   RBC M/uL 5.18   Hemoglobin g/dL 14.3   Hematocrit % 45.0   Platelets K/uL 217   MCV fL 87   MCH pg 27.6   MCHC g/dL 31.8*       CMP:  Recent Labs   Lab Result Units 10/15/24  0709   Glucose mg/dL 101   Calcium mg/dL 8.8   Sodium mmol/L 144   Potassium mmol/L 4.1 " "  CO2 mmol/L 27   Chloride mmol/L 105   BUN mg/dL 16       URINALYSIS:  No results for input(s): "COLORU", "CLARITYU", "SPECGRAV", "PHUR", "PROTEINUA", "GLUCOSEU", "BILIRUBINCON", "BLOODU", "WBCU", "RBCU", "BACTERIA", "MUCUS", "NITRITE", "LEUKOCYTESUR", "UROBILINOGEN", "HYALINECASTS" in the last 2160 hours.   LIPIDS:  No results for input(s): "TSH", "HDL", "CHOL", "TRIG", "LDLCALC", "CHOLHDL", "NONHDLCHOL", "TOTALCHOLEST" in the last 2160 hours.    TSH:  No results for input(s): "TSH" in the last 2160 hours.    A1C:  No results for input(s): "HGBA1C" in the last 2160 hours.    Radiology:  No results found in the last 30 days.     Assessment/Plan     Hussein Steinberg is a 74 y.o.male with:    1. History of CVA (cerebrovascular accident)    2. Persistent atrial fibrillation  - TSH; Future  - T4, FREE; Future    3. Essential hypertension  - triamterene-hydrochlorothiazide 37.5-25 mg (MAXZIDE-25) 37.5-25 mg per tablet; Take 1 tablet by mouth once daily.  Dispense: 90 tablet; Refill: 3  - metoprolol tartrate (LOPRESSOR) 100 MG tablet; Take 1 tablet (100 mg total) by mouth 2 (two) times daily.  Dispense: 180 tablet; Refill: 3  - amLODIPine (NORVASC) 10 MG tablet; Take 1 tablet (10 mg total) by mouth once daily.  Dispense: 90 tablet; Refill: 3    4. DCM (dilated cardiomyopathy)    5. ICD (implantable cardioverter-defibrillator), biventricular, in situ    6. Mixed hyperlipidemia  - CBC Auto Differential; Future  - Lipid Panel; Future  - Comprehensive Metabolic Panel; Future    7. Current use of anticoagulant therapy    8. History of prostate cancer    9. Myalgia due to statin    10. Class 1 obesity due to excess calories with serious comorbidity and body mass index (BMI) of 33.0 to 33.9 in adult    11. YUMIKO (obstructive sleep apnea)    12. Encounter for long-term (current) use of medications  - Hemoglobin A1C; Future    13. Elevated TSH  - T4, FREE; Future    14. Other insomnia    -check labs  -counseled on increasing " exercise  -add fiber supplement, try taking 1/2 tablet of lipitor 10 mg  -Continue current medications and maintain follow up with specialists.    -Follow up in about 1 year (around 12/3/2025) for Annual Visit.       Lacey Rainey MD  [facility]

## 2024-12-03 NOTE — ASSESSMENT & PLAN NOTE
Waking up at odd times and having hard time falling back asleep.    Previously on trazodone without relief.

## 2024-12-03 NOTE — ASSESSMENT & PLAN NOTE
Sees Dr. Latif, no CP/SOB.  Sleeps on 2 pillows.  On BB, amio, pradaxa. Has AICD. Had another ablation with Dr. Mo on 10/22/2024.

## 2025-01-08 ENCOUNTER — PATIENT MESSAGE (OUTPATIENT)
Dept: ADMINISTRATIVE | Facility: OTHER | Age: 75
End: 2025-01-08
Payer: MEDICARE

## 2025-01-23 DIAGNOSIS — Z95.810 ICD (IMPLANTABLE CARDIOVERTER-DEFIBRILLATOR), BIVENTRICULAR, IN SITU: Primary | ICD-10-CM

## 2025-01-24 ENCOUNTER — OFFICE VISIT (OUTPATIENT)
Dept: CARDIOLOGY | Facility: CLINIC | Age: 75
End: 2025-01-24
Payer: MEDICARE

## 2025-01-24 ENCOUNTER — CLINICAL SUPPORT (OUTPATIENT)
Dept: CARDIOLOGY | Facility: CLINIC | Age: 75
End: 2025-01-24
Attending: INTERNAL MEDICINE
Payer: MEDICARE

## 2025-01-24 VITALS
HEIGHT: 71 IN | SYSTOLIC BLOOD PRESSURE: 122 MMHG | BODY MASS INDEX: 33.46 KG/M2 | WEIGHT: 239 LBS | DIASTOLIC BLOOD PRESSURE: 77 MMHG | HEART RATE: 62 BPM

## 2025-01-24 DIAGNOSIS — Z86.73 HISTORY OF CVA (CEREBROVASCULAR ACCIDENT): ICD-10-CM

## 2025-01-24 DIAGNOSIS — I42.0 DCM (DILATED CARDIOMYOPATHY): ICD-10-CM

## 2025-01-24 DIAGNOSIS — I48.19 PERSISTENT ATRIAL FIBRILLATION: Primary | ICD-10-CM

## 2025-01-24 DIAGNOSIS — Z95.810 ICD (IMPLANTABLE CARDIOVERTER-DEFIBRILLATOR), BIVENTRICULAR, IN SITU: ICD-10-CM

## 2025-01-24 DIAGNOSIS — I10 ESSENTIAL HYPERTENSION: ICD-10-CM

## 2025-01-24 DIAGNOSIS — G47.33 OSA (OBSTRUCTIVE SLEEP APNEA): ICD-10-CM

## 2025-01-24 DIAGNOSIS — Z95.810 ICD (IMPLANTABLE CARDIOVERTER-DEFIBRILLATOR), BIVENTRICULAR, IN SITU: Primary | ICD-10-CM

## 2025-01-24 DIAGNOSIS — I44.7 LBBB (LEFT BUNDLE BRANCH BLOCK): ICD-10-CM

## 2025-01-24 PROCEDURE — 99999 PR PBB SHADOW E&M-EST. PATIENT-LVL III: CPT | Mod: PBBFAC,,, | Performed by: INTERNAL MEDICINE

## 2025-01-24 PROCEDURE — 93010 ELECTROCARDIOGRAM REPORT: CPT | Mod: S$GLB,,, | Performed by: STUDENT IN AN ORGANIZED HEALTH CARE EDUCATION/TRAINING PROGRAM

## 2025-01-24 PROCEDURE — 99999 PR PBB SHADOW E&M-EST. PATIENT-LVL I: CPT | Mod: PBBFAC,,,

## 2025-01-24 PROCEDURE — 93005 ELECTROCARDIOGRAM TRACING: CPT | Mod: XE,S$GLB,, | Performed by: INTERNAL MEDICINE

## 2025-01-24 PROCEDURE — 1159F MED LIST DOCD IN RCRD: CPT | Mod: CPTII,S$GLB,, | Performed by: INTERNAL MEDICINE

## 2025-01-24 PROCEDURE — 1101F PT FALLS ASSESS-DOCD LE1/YR: CPT | Mod: CPTII,S$GLB,, | Performed by: INTERNAL MEDICINE

## 2025-01-24 PROCEDURE — 3074F SYST BP LT 130 MM HG: CPT | Mod: CPTII,S$GLB,, | Performed by: INTERNAL MEDICINE

## 2025-01-24 PROCEDURE — 93284 PRGRMG EVAL IMPLANTABLE DFB: CPT | Mod: S$GLB,,, | Performed by: INTERNAL MEDICINE

## 2025-01-24 PROCEDURE — 1160F RVW MEDS BY RX/DR IN RCRD: CPT | Mod: CPTII,S$GLB,, | Performed by: INTERNAL MEDICINE

## 2025-01-24 PROCEDURE — 3008F BODY MASS INDEX DOCD: CPT | Mod: CPTII,S$GLB,, | Performed by: INTERNAL MEDICINE

## 2025-01-24 PROCEDURE — 3078F DIAST BP <80 MM HG: CPT | Mod: CPTII,S$GLB,, | Performed by: INTERNAL MEDICINE

## 2025-01-24 PROCEDURE — 99214 OFFICE O/P EST MOD 30 MIN: CPT | Mod: S$GLB,,, | Performed by: INTERNAL MEDICINE

## 2025-01-24 PROCEDURE — 3288F FALL RISK ASSESSMENT DOCD: CPT | Mod: CPTII,S$GLB,, | Performed by: INTERNAL MEDICINE

## 2025-01-24 NOTE — PROGRESS NOTES
Subjective   Patient ID:  Hussein Steinberg is a 74 y.o. male who presents for follow-up of Atrial Fibrillation and ICD check      HPI  I had the pleasure of seeing Mr. Steinberg today in our electrophysiology clinic in follow-up for his AF and CRT-D. As you are aware he is a 74 year-old man, former patient of Dr. Mo, with persistent AF, left bundle related CMP s/p CRT-D in 2013 with functional recovery (has chronic RA lead noise), hypertension, and prior hemorrhagic CVA. Previously treated with amiodarone however had vision changes that resolved with stopping. Symptomatic persistent AF in 8/2020 resulted in reduced BiV pacing. Underwent DCCV and resumption of amiodarone in 9/2020 and then ultimately PVI 8/2023 (RFA). Had AF recurrence 8/2024 requiring DCCV. Underwent redo-PVI with PFA 10/2024. LSPV required re-isolation then LA posterior wall was isolated. He returns for follow-up.    My interpretation of today's in-clinic ICD check is stable lead parameters with 95% RA and 100% BiV pacing, chronic RA lead noise noted, no arrhythmias, estimated battery longevity 2 years.    My interpretation of today's in-clinic ECG is RA/BiV paced rhythm      Review of Systems   Constitutional: Negative for fever and malaise/fatigue.   HENT:  Negative for congestion and sore throat.    Eyes:  Negative for blurred vision and visual disturbance.   Cardiovascular:  Negative for chest pain, dyspnea on exertion, irregular heartbeat, near-syncope, palpitations and syncope.   Respiratory:  Negative for cough and shortness of breath.    Hematologic/Lymphatic: Negative for bleeding problem. Does not bruise/bleed easily.   Skin: Negative.    Musculoskeletal: Negative.    Gastrointestinal:  Negative for bloating, abdominal pain, hematochezia and melena.   Neurological:  Negative for focal weakness and weakness.   Psychiatric/Behavioral: Negative.            Objective     Physical Exam  Vitals reviewed.   Constitutional:       General: He is  not in acute distress.     Appearance: He is well-developed. He is not diaphoretic.   HENT:      Head: Normocephalic and atraumatic.   Eyes:      General:         Right eye: No discharge.         Left eye: No discharge.      Conjunctiva/sclera: Conjunctivae normal.   Cardiovascular:      Rate and Rhythm: Normal rate and regular rhythm.      Heart sounds: No murmur heard.     No friction rub. No gallop.   Pulmonary:      Effort: Pulmonary effort is normal. No respiratory distress.      Breath sounds: Normal breath sounds. No wheezing or rales.   Abdominal:      General: Bowel sounds are normal. There is no distension.      Palpations: Abdomen is soft.      Tenderness: There is no abdominal tenderness.   Musculoskeletal:      Cervical back: Neck supple.   Skin:     General: Skin is warm and dry.   Neurological:      Mental Status: He is alert and oriented to person, place, and time.   Psychiatric:         Behavior: Behavior normal.         Thought Content: Thought content normal.         Judgment: Judgment normal.            Assessment and Plan     1. Persistent atrial fibrillation    2. LBBB (left bundle branch block)    3. ICD (implantable cardioverter-defibrillator), biventricular, in situ    4. Essential hypertension    5. DCM (dilated cardiomyopathy)    6. History of CVA (cerebrovascular accident)    7. YUMIKO (obstructive sleep apnea)        Plan:  In summary, Mr. Steinberg is a 74 year-old man, former patient of Dr. Mo, with persistent AF, left bundle related CMP s/p CRT-D in 2013 with functional recovery (has chronic RA lead noise), hypertension, and prior hemorrhagic CVA. He is s/p PVI x 2, most recently in 10/2024 (had LAPW isolated as well with PFA for PVI #2). He weaned off amiodarone post-ablation. Continue dabigatran. RTC in 6 months.     Thank you for allowing me to participate in the care of this patient. Please do not hesitate to call me with any questions or concerns.    Srinivas Latif MD, PhD  Cardiac  Electrophysiology

## 2025-01-27 LAB
OHS CV BIV PACING PERCENT: 100 %
OHS CV DC REMOTE DEVICE TYPE: NORMAL
OHS QRS DURATION: 100 MS
OHS QTC CALCULATION: 429 MS

## 2025-02-16 ENCOUNTER — CLINICAL SUPPORT (OUTPATIENT)
Dept: CARDIOLOGY | Facility: HOSPITAL | Age: 75
End: 2025-02-16
Payer: MEDICARE

## 2025-02-16 ENCOUNTER — CLINICAL SUPPORT (OUTPATIENT)
Dept: CARDIOLOGY | Facility: HOSPITAL | Age: 75
End: 2025-02-16
Attending: INTERNAL MEDICINE
Payer: MEDICARE

## 2025-02-16 DIAGNOSIS — R00.1 BRADYCARDIA, UNSPECIFIED: ICD-10-CM

## 2025-02-16 DIAGNOSIS — Z95.810 PRESENCE OF AUTOMATIC (IMPLANTABLE) CARDIAC DEFIBRILLATOR: ICD-10-CM

## 2025-02-16 DIAGNOSIS — I48.91 UNSPECIFIED ATRIAL FIBRILLATION: ICD-10-CM

## 2025-02-16 PROCEDURE — 93295 DEV INTERROG REMOTE 1/2/MLT: CPT | Mod: ,,, | Performed by: INTERNAL MEDICINE

## 2025-02-16 PROCEDURE — 93296 REM INTERROG EVL PM/IDS: CPT | Performed by: INTERNAL MEDICINE

## 2025-03-08 DIAGNOSIS — E78.2 MIXED HYPERLIPIDEMIA: ICD-10-CM

## 2025-03-08 NOTE — TELEPHONE ENCOUNTER
No care due was identified.  [facility] Embedded Care Due Messages. Reference number: [id number].   3/08/2025 6:55:19 AM CST

## 2025-03-09 RX ORDER — EZETIMIBE 10 MG/1
10 TABLET ORAL
Qty: 90 TABLET | Refills: 0 | OUTPATIENT
Start: 2025-03-09

## 2025-03-09 NOTE — TELEPHONE ENCOUNTER
Refill Decision Note   Hussein Steinberg  is requesting a refill authorization.  Brief Assessment and Rationale for Refill:  Quick Discontinue     Medication Therapy Plan:  prescription was discontinued on 12/3/2024 by Lacey Rainey MD      Comments:     Note composed:4:56 PM 03/09/2025

## 2025-04-02 ENCOUNTER — PATIENT MESSAGE (OUTPATIENT)
Dept: ADMINISTRATIVE | Facility: OTHER | Age: 75
End: 2025-04-02
Payer: MEDICARE

## 2025-05-16 RX ORDER — ATORVASTATIN CALCIUM 20 MG/1
20 TABLET, FILM COATED ORAL
Qty: 90 TABLET | Refills: 1 | Status: SHIPPED | OUTPATIENT
Start: 2025-05-16

## 2025-05-16 NOTE — TELEPHONE ENCOUNTER
Refill Decision Note   Hussein Steinberg  is requesting a refill authorization.  Brief Assessment and Rationale for Refill:  Approve     Medication Therapy Plan:  LOV 12/3/24 with PCP; New Care Team protocol issue, manually retrieved      Comments:     Note composed:7:22 AM 05/16/2025

## 2025-05-16 NOTE — TELEPHONE ENCOUNTER
No care due was identified.  [facility] Embedded Care Due Messages. Reference number: [id number].   5/16/2025 1:26:51 AM CDT

## 2025-05-18 ENCOUNTER — CLINICAL SUPPORT (OUTPATIENT)
Dept: CARDIOLOGY | Facility: HOSPITAL | Age: 75
End: 2025-05-18
Payer: MEDICARE

## 2025-05-18 ENCOUNTER — CLINICAL SUPPORT (OUTPATIENT)
Dept: CARDIOLOGY | Facility: HOSPITAL | Age: 75
End: 2025-05-18
Attending: INTERNAL MEDICINE
Payer: MEDICARE

## 2025-05-18 DIAGNOSIS — Z95.810 PRESENCE OF AUTOMATIC (IMPLANTABLE) CARDIAC DEFIBRILLATOR: ICD-10-CM

## 2025-05-18 DIAGNOSIS — R00.1 BRADYCARDIA, UNSPECIFIED: ICD-10-CM

## 2025-05-18 DIAGNOSIS — I48.91 UNSPECIFIED ATRIAL FIBRILLATION: ICD-10-CM

## 2025-05-18 PROCEDURE — 93296 REM INTERROG EVL PM/IDS: CPT | Performed by: INTERNAL MEDICINE

## 2025-05-18 PROCEDURE — 93295 DEV INTERROG REMOTE 1/2/MLT: CPT | Mod: ,,, | Performed by: INTERNAL MEDICINE

## 2025-06-06 DIAGNOSIS — I10 ESSENTIAL HYPERTENSION: ICD-10-CM

## 2025-06-07 NOTE — TELEPHONE ENCOUNTER
No care due was identified.  [facility] Embedded Care Due Messages. Reference number: [id number].   6/06/2025 8:45:31 PM CDT

## 2025-06-08 RX ORDER — AMLODIPINE BESYLATE 10 MG/1
10 TABLET ORAL DAILY
Qty: 90 TABLET | Refills: 1 | Status: SHIPPED | OUTPATIENT
Start: 2025-06-08

## 2025-06-08 RX ORDER — TRIAMTERENE AND HYDROCHLOROTHIAZIDE 37.5; 25 MG/1; MG/1
1 TABLET ORAL DAILY
Qty: 90 TABLET | Refills: 1 | Status: SHIPPED | OUTPATIENT
Start: 2025-06-08

## 2025-06-08 RX ORDER — ATORVASTATIN CALCIUM 20 MG/1
20 TABLET, FILM COATED ORAL DAILY
Qty: 90 TABLET | Refills: 1 | Status: SHIPPED | OUTPATIENT
Start: 2025-06-08

## 2025-06-08 NOTE — TELEPHONE ENCOUNTER
Refill Routing Note   Medication(s) are not appropriate for processing by [facility] for the following reason(s):        Allergy or intolerance  Drug-disease interaction: metoprolol tartrate and Bradycardia, unspecified     [facility] action(s):  Approve  Defer               Appointments  past 12m or future 3m with PCP    Date Provider   Last Visit   12/3/2024 Lacey Rainey MD   Next Visit   Visit date not found Lacey Rainey MD   ED visits in past 90 days: 0        Note composed:6:37 PM 06/08/2025

## 2025-06-09 RX ORDER — METOPROLOL TARTRATE 100 MG/1
100 TABLET ORAL 2 TIMES DAILY
Qty: 180 TABLET | Refills: 3 | Status: SHIPPED | OUTPATIENT
Start: 2025-06-09

## 2025-06-09 RX ORDER — CANDESARTAN 32 MG/1
32 TABLET ORAL DAILY
Qty: 90 TABLET | Refills: 3 | Status: SHIPPED | OUTPATIENT
Start: 2025-06-09

## 2025-06-10 ENCOUNTER — TELEPHONE (OUTPATIENT)
Dept: ELECTROPHYSIOLOGY | Facility: CLINIC | Age: 75
End: 2025-06-10
Payer: MEDICARE

## 2025-07-07 ENCOUNTER — PATIENT MESSAGE (OUTPATIENT)
Dept: ADMINISTRATIVE | Facility: OTHER | Age: 75
End: 2025-07-07
Payer: MEDICARE

## 2025-07-23 ENCOUNTER — TELEPHONE (OUTPATIENT)
Dept: OPTOMETRY | Facility: CLINIC | Age: 75
End: 2025-07-23
Payer: MEDICARE

## 2025-07-24 ENCOUNTER — PATIENT MESSAGE (OUTPATIENT)
Dept: OPTOMETRY | Facility: CLINIC | Age: 75
End: 2025-07-24
Payer: MEDICARE

## 2025-07-25 ENCOUNTER — TELEPHONE (OUTPATIENT)
Dept: OPTOMETRY | Facility: CLINIC | Age: 75
End: 2025-07-25
Payer: MEDICARE

## 2025-07-25 NOTE — TELEPHONE ENCOUNTER
Copied from CRM #[id number]. Topic: General Inquiry - Return Call  >> Jul 25, 2025 10:50 AM Onofre wrote:  Returning a Missed Call     Caller: Hussein        Returning call to: Magaly Carmona     Caller can be reached @:  [phone]      Nature of the call: Returning call to reschedule appt

## 2025-07-31 ENCOUNTER — OFFICE VISIT (OUTPATIENT)
Dept: OPTOMETRY | Facility: CLINIC | Age: 75
End: 2025-07-31
Payer: MEDICARE

## 2025-07-31 DIAGNOSIS — H04.123 DRY EYE SYNDROME OF BOTH EYES: ICD-10-CM

## 2025-07-31 DIAGNOSIS — Z98.890 HISTORY OF RADIAL KERATOTOMY: ICD-10-CM

## 2025-07-31 DIAGNOSIS — H25.13 NUCLEAR SCLEROSIS OF BOTH EYES: Primary | ICD-10-CM

## 2025-07-31 DIAGNOSIS — H52.7 REFRACTIVE ERROR: ICD-10-CM

## 2025-07-31 DIAGNOSIS — H53.40 VISUAL FIELD DEFECT: ICD-10-CM

## 2025-07-31 PROCEDURE — 1126F AMNT PAIN NOTED NONE PRSNT: CPT | Mod: CPTII,S$GLB,, | Performed by: OPTOMETRIST

## 2025-07-31 PROCEDURE — 92015 DETERMINE REFRACTIVE STATE: CPT | Mod: S$GLB,,, | Performed by: OPTOMETRIST

## 2025-07-31 PROCEDURE — 1159F MED LIST DOCD IN RCRD: CPT | Mod: CPTII,S$GLB,, | Performed by: OPTOMETRIST

## 2025-07-31 PROCEDURE — 1101F PT FALLS ASSESS-DOCD LE1/YR: CPT | Mod: CPTII,S$GLB,, | Performed by: OPTOMETRIST

## 2025-07-31 PROCEDURE — 3288F FALL RISK ASSESSMENT DOCD: CPT | Mod: CPTII,S$GLB,, | Performed by: OPTOMETRIST

## 2025-07-31 PROCEDURE — 99999 PR PBB SHADOW E&M-EST. PATIENT-LVL III: CPT | Mod: PBBFAC,,, | Performed by: OPTOMETRIST

## 2025-07-31 PROCEDURE — 92014 COMPRE OPH EXAM EST PT 1/>: CPT | Mod: S$GLB,,, | Performed by: OPTOMETRIST

## 2025-07-31 PROCEDURE — 4010F ACE/ARB THERAPY RXD/TAKEN: CPT | Mod: CPTII,S$GLB,, | Performed by: OPTOMETRIST

## 2025-08-17 ENCOUNTER — CLINICAL SUPPORT (OUTPATIENT)
Dept: CARDIOLOGY | Facility: HOSPITAL | Age: 75
End: 2025-08-17
Payer: MEDICARE

## 2025-08-17 ENCOUNTER — CLINICAL SUPPORT (OUTPATIENT)
Dept: CARDIOLOGY | Facility: HOSPITAL | Age: 75
End: 2025-08-17
Attending: INTERNAL MEDICINE
Payer: MEDICARE

## 2025-08-17 DIAGNOSIS — I48.91 UNSPECIFIED ATRIAL FIBRILLATION: ICD-10-CM

## 2025-08-17 DIAGNOSIS — Z95.810 PRESENCE OF AUTOMATIC (IMPLANTABLE) CARDIAC DEFIBRILLATOR: ICD-10-CM

## 2025-08-17 DIAGNOSIS — R00.1 BRADYCARDIA, UNSPECIFIED: ICD-10-CM

## 2025-08-17 PROCEDURE — 93296 REM INTERROG EVL PM/IDS: CPT | Performed by: INTERNAL MEDICINE

## 2025-08-17 PROCEDURE — 93295 DEV INTERROG REMOTE 1/2/MLT: CPT | Mod: ,,, | Performed by: INTERNAL MEDICINE

## 2025-08-29 ENCOUNTER — CLINICAL SUPPORT (OUTPATIENT)
Dept: CARDIOLOGY | Facility: CLINIC | Age: 75
End: 2025-08-29
Attending: INTERNAL MEDICINE
Payer: MEDICARE

## 2025-08-29 ENCOUNTER — OFFICE VISIT (OUTPATIENT)
Dept: CARDIOLOGY | Facility: CLINIC | Age: 75
End: 2025-08-29
Payer: MEDICARE

## 2025-08-29 VITALS
DIASTOLIC BLOOD PRESSURE: 82 MMHG | SYSTOLIC BLOOD PRESSURE: 134 MMHG | HEIGHT: 71 IN | HEART RATE: 62 BPM | BODY MASS INDEX: 32.2 KG/M2 | WEIGHT: 230 LBS

## 2025-08-29 DIAGNOSIS — I48.19 PERSISTENT ATRIAL FIBRILLATION: Primary | ICD-10-CM

## 2025-08-29 DIAGNOSIS — I42.0 DCM (DILATED CARDIOMYOPATHY): ICD-10-CM

## 2025-08-29 DIAGNOSIS — Z95.810 ICD (IMPLANTABLE CARDIOVERTER-DEFIBRILLATOR), BIVENTRICULAR, IN SITU: ICD-10-CM

## 2025-08-29 DIAGNOSIS — I10 ESSENTIAL HYPERTENSION: ICD-10-CM

## 2025-08-29 DIAGNOSIS — I44.7 LBBB (LEFT BUNDLE BRANCH BLOCK): ICD-10-CM

## 2025-08-29 DIAGNOSIS — G47.33 OSA (OBSTRUCTIVE SLEEP APNEA): ICD-10-CM

## 2025-08-29 DIAGNOSIS — Z86.73 HISTORY OF CVA (CEREBROVASCULAR ACCIDENT): ICD-10-CM

## 2025-08-29 PROCEDURE — 93284 PRGRMG EVAL IMPLANTABLE DFB: CPT | Mod: S$GLB,,, | Performed by: INTERNAL MEDICINE

## 2025-08-29 PROCEDURE — 99999 PR PBB SHADOW E&M-EST. PATIENT-LVL III: CPT | Mod: PBBFAC,,, | Performed by: INTERNAL MEDICINE

## 2025-08-29 PROCEDURE — 99999 PR PBB SHADOW E&M-EST. PATIENT-LVL I: CPT | Mod: PBBFAC,,,

## 2025-09-04 LAB
OHS CV AF BURDEN PERCENT: < 1
OHS CV BIV PACING PERCENT: 99
OHS CV DC REMOTE DEVICE TYPE: NORMAL

## (undated) DEVICE — COVER PRB TRNSDUC 7.6X183CM

## (undated) DEVICE — R CATH BIDIRECTIONL DF CRV 7FR

## (undated) DEVICE — R CATH ACUSON ACUNAV 8FR

## (undated) DEVICE — KIT ENSITE ELECTRODE SURFACE

## (undated) DEVICE — ELECTRODE REM PLYHSV RETURN 9

## (undated) DEVICE — INTRODUCER HEMOSTASIS 7.5F

## (undated) DEVICE — PACK EP DRAPE OMC

## (undated) DEVICE — PAD RADIOLUCENT STAT ADULT

## (undated) DEVICE — CATH THERMOCOOL SMTCH SF D F

## (undated) DEVICE — GUIDEWIRE TORAY INOUE

## (undated) DEVICE — SHEATH HEMOSTASIS 8.5FR

## (undated) DEVICE — ADHESIVE DERMABOND ADVANCED

## (undated) DEVICE — ELECTRODE POLYHESIVEPRE-ATTACH

## (undated) DEVICE — BLADE PLASMA WIDE SPATULA TIP

## (undated) DEVICE — PAD RADI FEMORAL

## (undated) DEVICE — INTRO FAST-CATH SL1 8.5FR 63CM

## (undated) DEVICE — LINE PRESSURE MONITORING 96IN

## (undated) DEVICE — PAD DEFIB CADENCE ADULT R2

## (undated) DEVICE — SET SMARTABLATE IRR TUBE

## (undated) DEVICE — CATH PENTARY F 2-6-2MM 115CM

## (undated) DEVICE — NDL TRNSSPTL BRK-1 18GA 71CM

## (undated) DEVICE — R CATH HIS OCTAPOLAR 7FRX115CM

## (undated) DEVICE — PACK PACER PERMANENT

## (undated) DEVICE — Device

## (undated) DEVICE — KIT WRENCH

## (undated) DEVICE — DRESSING AQUACEL AG FOAM 4X4

## (undated) DEVICE — DILATOR COONS TAPER 14FR

## (undated) DEVICE — CATH ADVISOR VL CIRC MAP BI-D

## (undated) DEVICE — COVER INSTR ELASTIC BAND 40X20

## (undated) DEVICE — PATCH CARTO REFERENCE

## (undated) DEVICE — KIT PROBE COVER WITH GEL

## (undated) DEVICE — SHEATH STEERABLE CLEAR

## (undated) DEVICE — COVER PROBE US 5.5X58L NON LTX

## (undated) DEVICE — GUIDEWIRE ROSEN VAS JTIP 180CM

## (undated) DEVICE — INTRO AGILIS MED CRL 8.5F 71CM

## (undated) DEVICE — CATH ABLATION PULS FIELD 31MM